# Patient Record
Sex: FEMALE | Race: WHITE | Employment: OTHER | URBAN - METROPOLITAN AREA
[De-identification: names, ages, dates, MRNs, and addresses within clinical notes are randomized per-mention and may not be internally consistent; named-entity substitution may affect disease eponyms.]

---

## 2017-12-11 PROBLEM — I42.9 CARDIOMYOPATHY, UNSPECIFIED TYPE (HCC): Status: ACTIVE | Noted: 2017-12-11

## 2017-12-11 PROBLEM — I10 ESSENTIAL HYPERTENSION: Status: ACTIVE | Noted: 2017-12-11

## 2018-11-13 PROBLEM — M81.0 AGE-RELATED OSTEOPOROSIS WITHOUT CURRENT PATHOLOGICAL FRACTURE: Status: ACTIVE | Noted: 2018-11-13

## 2018-11-13 PROBLEM — I25.10 CORONARY ARTERY DISEASE INVOLVING NATIVE CORONARY ARTERY OF NATIVE HEART WITHOUT ANGINA PECTORIS: Status: ACTIVE | Noted: 2018-11-13

## 2018-11-13 PROBLEM — F41.9 ANXIETY AND DEPRESSION: Status: ACTIVE | Noted: 2018-11-13

## 2018-11-13 PROBLEM — G89.4 CHRONIC PAIN DISORDER: Status: ACTIVE | Noted: 2018-11-13

## 2018-11-13 PROBLEM — F32.A ANXIETY AND DEPRESSION: Status: ACTIVE | Noted: 2018-11-13

## 2018-12-06 PROBLEM — E55.9 VITAMIN D DEFICIENCY: Status: ACTIVE | Noted: 2018-12-06

## 2018-12-06 PROBLEM — E11.9 CONTROLLED TYPE 2 DIABETES MELLITUS WITHOUT COMPLICATION, WITHOUT LONG-TERM CURRENT USE OF INSULIN (HCC): Status: ACTIVE | Noted: 2018-12-06

## 2019-03-21 PROBLEM — F51.05 INSOMNIA DUE TO OTHER MENTAL DISORDER: Status: ACTIVE | Noted: 2019-03-21

## 2019-03-21 PROBLEM — F99 INSOMNIA DUE TO OTHER MENTAL DISORDER: Status: ACTIVE | Noted: 2019-03-21

## 2019-05-21 PROBLEM — D12.2 ADENOMATOUS POLYP OF ASCENDING COLON: Status: ACTIVE | Noted: 2019-05-21

## 2019-05-21 PROBLEM — K29.40: Status: ACTIVE | Noted: 2019-05-21

## 2019-05-21 PROBLEM — T45.8X5S: Status: ACTIVE | Noted: 2019-05-21

## 2019-06-27 PROBLEM — R11.2 NAUSEA AND VOMITING: Status: ACTIVE | Noted: 2019-06-27

## 2019-09-03 PROBLEM — K63.5 POLYP OF COLON: Status: ACTIVE | Noted: 2019-05-21

## 2020-11-11 ENCOUNTER — APPOINTMENT (OUTPATIENT)
Dept: GENERAL RADIOLOGY | Facility: HOSPITAL | Age: 70
DRG: 177 | End: 2020-11-11
Attending: EMERGENCY MEDICINE
Payer: MEDICARE

## 2020-11-11 ENCOUNTER — APPOINTMENT (OUTPATIENT)
Dept: CT IMAGING | Facility: HOSPITAL | Age: 70
DRG: 177 | End: 2020-11-11
Attending: EMERGENCY MEDICINE
Payer: MEDICARE

## 2020-11-11 ENCOUNTER — HOSPITAL ENCOUNTER (INPATIENT)
Facility: HOSPITAL | Age: 70
LOS: 3 days | Discharge: HOME | DRG: 177 | End: 2020-11-15
Attending: EMERGENCY MEDICINE | Admitting: INTERNAL MEDICINE
Payer: MEDICARE

## 2020-11-11 DIAGNOSIS — R09.02 HYPOXIA: ICD-10-CM

## 2020-11-11 DIAGNOSIS — R77.8 ELEVATED TROPONIN: ICD-10-CM

## 2020-11-11 DIAGNOSIS — U07.1 COVID-19: Primary | ICD-10-CM

## 2020-11-11 DIAGNOSIS — J44.1 COPD EXACERBATION (HCC): ICD-10-CM

## 2020-11-11 PROCEDURE — 94640 AIRWAY INHALATION TREATMENT: CPT

## 2020-11-11 PROCEDURE — 71275 CT ANGIOGRAPHY CHEST: CPT | Performed by: EMERGENCY MEDICINE

## 2020-11-11 PROCEDURE — 80053 COMPREHEN METABOLIC PANEL: CPT | Performed by: EMERGENCY MEDICINE

## 2020-11-11 PROCEDURE — 93005 ELECTROCARDIOGRAM TRACING: CPT

## 2020-11-11 PROCEDURE — 99285 EMERGENCY DEPT VISIT HI MDM: CPT

## 2020-11-11 PROCEDURE — 96374 THER/PROPH/DIAG INJ IV PUSH: CPT

## 2020-11-11 PROCEDURE — 84145 PROCALCITONIN (PCT): CPT | Performed by: INTERNAL MEDICINE

## 2020-11-11 PROCEDURE — 36415 COLL VENOUS BLD VENIPUNCTURE: CPT

## 2020-11-11 PROCEDURE — 84484 ASSAY OF TROPONIN QUANT: CPT | Performed by: EMERGENCY MEDICINE

## 2020-11-11 PROCEDURE — 71045 X-RAY EXAM CHEST 1 VIEW: CPT | Performed by: EMERGENCY MEDICINE

## 2020-11-11 PROCEDURE — 85025 COMPLETE CBC W/AUTO DIFF WBC: CPT | Performed by: EMERGENCY MEDICINE

## 2020-11-11 PROCEDURE — 93010 ELECTROCARDIOGRAM REPORT: CPT

## 2020-11-11 PROCEDURE — 85379 FIBRIN DEGRADATION QUANT: CPT | Performed by: EMERGENCY MEDICINE

## 2020-11-11 PROCEDURE — 86769 SARS-COV-2 COVID-19 ANTIBODY: CPT | Performed by: INTERNAL MEDICINE

## 2020-11-11 RX ORDER — ALBUTEROL SULFATE 90 UG/1
8 AEROSOL, METERED RESPIRATORY (INHALATION) ONCE
Status: COMPLETED | OUTPATIENT
Start: 2020-11-11 | End: 2020-11-11

## 2020-11-11 RX ORDER — ASPIRIN 81 MG/1
243 TABLET, CHEWABLE ORAL ONCE
Status: COMPLETED | OUTPATIENT
Start: 2020-11-11 | End: 2020-11-11

## 2020-11-11 RX ORDER — METHYLPREDNISOLONE SODIUM SUCCINATE 125 MG/2ML
125 INJECTION, POWDER, LYOPHILIZED, FOR SOLUTION INTRAMUSCULAR; INTRAVENOUS ONCE
Status: COMPLETED | OUTPATIENT
Start: 2020-11-11 | End: 2020-11-11

## 2020-11-11 RX ORDER — ASPIRIN 81 MG/1
324 TABLET, CHEWABLE ORAL ONCE
Status: DISCONTINUED | OUTPATIENT
Start: 2020-11-11 | End: 2020-11-11

## 2020-11-11 RX ORDER — LISINOPRIL 20 MG/1
40 TABLET ORAL DAILY
Status: ON HOLD | COMMUNITY
End: 2021-01-31

## 2020-11-11 RX ORDER — ATORVASTATIN CALCIUM 40 MG/1
40 TABLET, FILM COATED ORAL NIGHTLY
COMMUNITY
End: 2021-05-03

## 2020-11-11 RX ORDER — MONTELUKAST SODIUM 10 MG/1
10 TABLET ORAL NIGHTLY
COMMUNITY
End: 2020-11-23

## 2020-11-11 RX ORDER — ASPIRIN 81 MG/1
243 TABLET, CHEWABLE ORAL ONCE
Status: DISCONTINUED | OUTPATIENT
Start: 2020-11-11 | End: 2020-11-11

## 2020-11-11 RX ORDER — ASPIRIN 81 MG/1
81 TABLET ORAL DAILY
COMMUNITY
End: 2021-05-03

## 2020-11-11 RX ORDER — PANTOPRAZOLE SODIUM 40 MG/1
40 TABLET, DELAYED RELEASE ORAL
COMMUNITY
End: 2021-01-28

## 2020-11-12 PROBLEM — R79.89 ELEVATED TROPONIN: Status: ACTIVE | Noted: 2020-11-12

## 2020-11-12 PROBLEM — R09.02 HYPOXIA: Status: ACTIVE | Noted: 2020-11-12

## 2020-11-12 PROBLEM — R77.8 ELEVATED TROPONIN: Status: ACTIVE | Noted: 2020-11-12

## 2020-11-12 PROBLEM — J44.1 COPD EXACERBATION (HCC): Status: ACTIVE | Noted: 2020-11-12

## 2020-11-12 PROCEDURE — 94640 AIRWAY INHALATION TREATMENT: CPT

## 2020-11-12 PROCEDURE — 86140 C-REACTIVE PROTEIN: CPT | Performed by: INTERNAL MEDICINE

## 2020-11-12 PROCEDURE — 85025 COMPLETE CBC W/AUTO DIFF WBC: CPT | Performed by: INTERNAL MEDICINE

## 2020-11-12 PROCEDURE — 93010 ELECTROCARDIOGRAM REPORT: CPT | Performed by: INTERNAL MEDICINE

## 2020-11-12 PROCEDURE — 80048 BASIC METABOLIC PNL TOTAL CA: CPT | Performed by: INTERNAL MEDICINE

## 2020-11-12 PROCEDURE — 83880 ASSAY OF NATRIURETIC PEPTIDE: CPT | Performed by: INTERNAL MEDICINE

## 2020-11-12 PROCEDURE — 84484 ASSAY OF TROPONIN QUANT: CPT | Performed by: INTERNAL MEDICINE

## 2020-11-12 PROCEDURE — 85379 FIBRIN DEGRADATION QUANT: CPT | Performed by: INTERNAL MEDICINE

## 2020-11-12 PROCEDURE — 93005 ELECTROCARDIOGRAM TRACING: CPT

## 2020-11-12 PROCEDURE — 82728 ASSAY OF FERRITIN: CPT | Performed by: INTERNAL MEDICINE

## 2020-11-12 PROCEDURE — 83615 LACTATE (LD) (LDH) ENZYME: CPT | Performed by: INTERNAL MEDICINE

## 2020-11-12 RX ORDER — ENOXAPARIN SODIUM 100 MG/ML
40 INJECTION SUBCUTANEOUS DAILY
Status: DISCONTINUED | OUTPATIENT
Start: 2020-11-12 | End: 2020-11-15

## 2020-11-12 RX ORDER — METOCLOPRAMIDE HYDROCHLORIDE 5 MG/ML
5 INJECTION INTRAMUSCULAR; INTRAVENOUS EVERY 8 HOURS PRN
Status: DISCONTINUED | OUTPATIENT
Start: 2020-11-12 | End: 2020-11-15

## 2020-11-12 RX ORDER — AMLODIPINE BESYLATE 5 MG/1
5 TABLET ORAL DAILY
Status: DISCONTINUED | OUTPATIENT
Start: 2020-11-12 | End: 2020-11-15

## 2020-11-12 RX ORDER — MORPHINE SULFATE 4 MG/ML
4 INJECTION, SOLUTION INTRAMUSCULAR; INTRAVENOUS EVERY 2 HOUR PRN
Status: DISCONTINUED | OUTPATIENT
Start: 2020-11-12 | End: 2020-11-15

## 2020-11-12 RX ORDER — METHYLPREDNISOLONE SODIUM SUCCINATE 125 MG/2ML
80 INJECTION, POWDER, LYOPHILIZED, FOR SOLUTION INTRAMUSCULAR; INTRAVENOUS EVERY 6 HOURS
Status: DISCONTINUED | OUTPATIENT
Start: 2020-11-12 | End: 2020-11-12

## 2020-11-12 RX ORDER — FLUTICASONE PROPIONATE 50 MCG
1 SPRAY, SUSPENSION (ML) NASAL DAILY
Status: DISCONTINUED | OUTPATIENT
Start: 2020-11-12 | End: 2020-11-15

## 2020-11-12 RX ORDER — MORPHINE SULFATE 2 MG/ML
1 INJECTION, SOLUTION INTRAMUSCULAR; INTRAVENOUS EVERY 2 HOUR PRN
Status: DISCONTINUED | OUTPATIENT
Start: 2020-11-12 | End: 2020-11-15

## 2020-11-12 RX ORDER — METOCLOPRAMIDE 10 MG/1
10 TABLET ORAL
Status: DISCONTINUED | OUTPATIENT
Start: 2020-11-12 | End: 2020-11-15

## 2020-11-12 RX ORDER — HYDROCODONE BITARTRATE AND ACETAMINOPHEN 5; 325 MG/1; MG/1
1 TABLET ORAL EVERY 4 HOURS PRN
Status: DISCONTINUED | OUTPATIENT
Start: 2020-11-12 | End: 2020-11-15

## 2020-11-12 RX ORDER — ALBUTEROL SULFATE 90 UG/1
2 AEROSOL, METERED RESPIRATORY (INHALATION) EVERY 4 HOURS PRN
Status: DISCONTINUED | OUTPATIENT
Start: 2020-11-12 | End: 2020-11-15

## 2020-11-12 RX ORDER — ESCITALOPRAM OXALATE 20 MG/1
20 TABLET ORAL DAILY
Status: DISCONTINUED | OUTPATIENT
Start: 2020-11-12 | End: 2020-11-15

## 2020-11-12 RX ORDER — TIZANIDINE 4 MG/1
4 TABLET ORAL EVERY 8 HOURS PRN
Status: DISCONTINUED | OUTPATIENT
Start: 2020-11-12 | End: 2020-11-15

## 2020-11-12 RX ORDER — LISINOPRIL 20 MG/1
20 TABLET ORAL DAILY
Status: DISCONTINUED | OUTPATIENT
Start: 2020-11-12 | End: 2020-11-15

## 2020-11-12 RX ORDER — METHYLPREDNISOLONE SODIUM SUCCINATE 40 MG/ML
40 INJECTION, POWDER, LYOPHILIZED, FOR SOLUTION INTRAMUSCULAR; INTRAVENOUS EVERY 6 HOURS
Status: DISCONTINUED | OUTPATIENT
Start: 2020-11-12 | End: 2020-11-14

## 2020-11-12 RX ORDER — TRIMETHOPRIM 100 MG/1
100 TABLET ORAL DAILY
Status: DISCONTINUED | OUTPATIENT
Start: 2020-11-12 | End: 2020-11-15

## 2020-11-12 RX ORDER — HYDROCODONE BITARTRATE AND ACETAMINOPHEN 5; 325 MG/1; MG/1
2 TABLET ORAL EVERY 4 HOURS PRN
Status: DISCONTINUED | OUTPATIENT
Start: 2020-11-12 | End: 2020-11-15

## 2020-11-12 RX ORDER — EZETIMIBE 10 MG/1
10 TABLET ORAL NIGHTLY
Status: DISCONTINUED | OUTPATIENT
Start: 2020-11-12 | End: 2020-11-15

## 2020-11-12 RX ORDER — MORPHINE SULFATE 2 MG/ML
2 INJECTION, SOLUTION INTRAMUSCULAR; INTRAVENOUS EVERY 2 HOUR PRN
Status: DISCONTINUED | OUTPATIENT
Start: 2020-11-12 | End: 2020-11-15

## 2020-11-12 RX ORDER — SODIUM CHLORIDE 9 MG/ML
INJECTION, SOLUTION INTRAVENOUS CONTINUOUS
Status: DISCONTINUED | OUTPATIENT
Start: 2020-11-12 | End: 2020-11-12

## 2020-11-12 RX ORDER — ASPIRIN 81 MG/1
81 TABLET ORAL DAILY
Status: DISCONTINUED | OUTPATIENT
Start: 2020-11-12 | End: 2020-11-15

## 2020-11-12 RX ORDER — PANTOPRAZOLE SODIUM 40 MG/1
40 TABLET, DELAYED RELEASE ORAL
Status: DISCONTINUED | OUTPATIENT
Start: 2020-11-12 | End: 2020-11-15

## 2020-11-12 RX ORDER — MONTELUKAST SODIUM 10 MG/1
10 TABLET ORAL NIGHTLY
Status: DISCONTINUED | OUTPATIENT
Start: 2020-11-12 | End: 2020-11-15

## 2020-11-12 RX ORDER — ATORVASTATIN CALCIUM 40 MG/1
40 TABLET, FILM COATED ORAL NIGHTLY
Status: DISCONTINUED | OUTPATIENT
Start: 2020-11-12 | End: 2020-11-15

## 2020-11-12 RX ORDER — ONDANSETRON 2 MG/ML
4 INJECTION INTRAMUSCULAR; INTRAVENOUS EVERY 6 HOURS PRN
Status: DISCONTINUED | OUTPATIENT
Start: 2020-11-12 | End: 2020-11-15

## 2020-11-12 RX ORDER — TEMAZEPAM 7.5 MG/1
7.5 CAPSULE ORAL NIGHTLY PRN
Status: DISCONTINUED | OUTPATIENT
Start: 2020-11-12 | End: 2020-11-15

## 2020-11-12 RX ORDER — ACETAMINOPHEN 325 MG/1
650 TABLET ORAL EVERY 4 HOURS PRN
Status: DISCONTINUED | OUTPATIENT
Start: 2020-11-12 | End: 2020-11-15

## 2020-11-12 RX ORDER — LEVOTHYROXINE SODIUM 0.03 MG/1
25 TABLET ORAL
Status: DISCONTINUED | OUTPATIENT
Start: 2020-11-12 | End: 2020-11-15

## 2020-11-12 NOTE — ED NOTES
Patient resting on cart, on cardiac monitor, on O2. Resting with eyes closed. Respirations easy and unlabored.  Awaiting room assignment to be cleaned

## 2020-11-12 NOTE — ED NOTES
Patient returned from [address]. Patient's O2 sat varies from 88%-93% on RA. Patient placed on 2L NC at this time.   MD rangel

## 2020-11-12 NOTE — ED NOTES
Report to [address] [place] [address] (Ext. [phone]).   Patient awaiting room assignment to be cleaned

## 2020-11-12 NOTE — CONSULTS
INFECTIOUS DISEASE CONSULT NOTE    Fortunato Barajas Patient Status:  Inpatient    1950 MRN [id number]   [facility] 3NW-A Attending Lisa Yarbrough MD   Hosp Day # 0 PCP Marialuisa Dye SBLUJM,8+I/V,[id number]     • UMBILICAL HERNIA REPAIR       Family History   Problem Relation Age of Onset   • Cancer Father    • Cancer Mother    • No Known Problems Daughter    • No Known Problems Son    • No Known Problems Maternal Grandmother    • No Kno inhaler 2 puff, 2 puff, Inhalation, Q4H PRN  •  tiZANidine HCl (ZANAFLEX) tab 4 mg, 4 mg, Oral, Q8H PRN  •  Fluticasone Propionate (FLONASE) 50 MCG/ACT nasal spray 1 spray, 1 spray, Nasal, Daily  •  ezetimibe (ZETIA) tab 10 mg, 10 mg, Oral, Nightly  •  esc 98.2 °F (36.8 °C), temperature source Oral, resp. rate 16, height 5' 1\" (1.549 m), weight 171 lb (77.6 kg), SpO2 94 %.       Laboratory Data:    Recent Labs   Lab 11/11/20  1817 11/12/20  0652   RBC 3.85 3.43*   HGB 12.0 10.8*   HCT 34.2* 31.0*   MCV 88.8 Radiology) NRDR ([address]) which includes the Dose Index Registry. PATIENT STATED HISTORY:(As transcribed by Technologist)  Patient presents with shortness of breath, headache, nausea, vomiting and diarrhea.    CONTRAST USED:  100cc CONCLUSION:  No acute abnormality is seen. If clinical symptoms persist consider followup radiographs or CT.    Dictated by (CST): Demetrio Kennedy MD on 11/11/2020 at 7:36 PM     Finalized by (CST): Demetrio Kennedy MD on 11/11/2020 at 7:37 PM          ASSESSMENT

## 2020-11-12 NOTE — PLAN OF CARE
Assumed patient care at 0730 this AM. Patient A&O x4- glasses, hearing aids and dentures left at home- advised pt to ask son not to bring them in given he is awaiting covid test results himself. Received pt on 2L O2, weaned to RA.  Orders to decrease IV Sol appropriate and evaluate response  - Consider cultural and social influences on pain and pain management  - Manage/alleviate anxiety  - Utilize distraction and/or relaxation techniques  - Monitor for opioid side effects  - Notify MD/LIP if interventions un for coordinating discharge planning if the patient needs post-hospital services based on physician/LIP order or complex needs related to functional status, cognitive ability or social support system  Outcome: Progressing

## 2020-11-12 NOTE — ED INITIAL ASSESSMENT (HPI)
Pt here for chest pain that started today. Pt dx with covid yesterday @ [facility]. Pt states sob, headache, n,v,d. Pt denies fever.

## 2020-11-12 NOTE — RESPIRATORY THERAPY NOTE
Albuterol 8 puffs given with spacer. Spacer teaching done. Breath sounds improved, better aeration, but still scattered wheezes. Overall, pt states her breathing has improved.

## 2020-11-12 NOTE — ED PROVIDER NOTES
Patient Seen in: [facility] Emergency Department      History   Patient presents with:  Chest Pain Angina    Stated Complaint: TL - SOB, chest pain, covid positive     HPI    39-year-old female presents for evaluation of chest pain and shortness of again    Alcohol use: No    Drug use: No             Review of Systems    Positive for stated complaint: TL - SOB, chest pain, covid positive   Other systems are as noted in HPI. Constitutional and vital signs reviewed.       All other systems reviewed and WITH DIFFERENTIAL WITH PLATELET    Narrative: The following orders were created for panel order CBC WITH DIFFERENTIAL WITH PLATELET.   Procedure                               Abnormality         Status                     --------- slightly elevated at 0.07.   Discussed with Dr. Matthew Sommers, will trend no need for cardiology at this time as patient has known nonobstructive CAD with medical management                   Disposition and Plan     Clinical Impression:  COVID-19  (primary enco

## 2020-11-12 NOTE — PROGRESS NOTES
Pharmacy Note: Renal dose adjustment for Metoclopramide (Reglan)  Magnolia Jefferson has been prescribed Metoclopramide (Reglan) 10 mg every 8 hours as needed. Estimated Creatinine Clearance: 36.7 mL/min (A) (based on SCr of 1.06 mg/dL (H)).     Her calculate

## 2020-11-12 NOTE — H&P
[facility] Patient Status:  Inpatient    1950 MRN [id number]   [facility] 3NW-A Attending Sarita Holt MD   Hosp Day # 0 PCP Bettye Fulton MD     History of Present Illness:  Rosy Peterson That all  Sounds fine. Would obviously not use tramadol further and for now would recommend tylenol alone for pain rather than suggesting alternative pain medication which also may have the side effect of nausea.    Grandmother    • No Known Problems Paternal Grandfather    • No Known Problems Sister    • No Known Problems Brother    • No Known Problems Other       reports that she quit smoking about 3 years ago.  She has never used smokeless tobacco. She reports that Disp: 30 tablet, Rfl: 2, 11/11/2020 at Unknown time    •  Levothyroxine Sodium 25 MCG Oral Tab, Take 1 tablet (25 mcg total) by mouth before breakfast., Disp: 90 tablet, Rfl: 0, 11/11/2020 at Unknown time    •  amLODIPine Besylate 5 MG Oral Tab, amlodipine auscultation bilaterally. No wheezes. No rhonchi. Cardiovascular: S1, S2.  Regular rate and rhythm. No murmurs. Equal pulses   Abdomen: Soft, nontender, nondistended. Positive bowel sounds.  No rebound tenderness  Neurologic: No focal neurological defic moderate centrilobular emphysematous changes are present. There is some minimal secretions present within the trachea. MEDIASTINUM:  No adenopathy or mass. REBEKAH:  No mass or adenopathy.   CARDIAC:  No enlargement, pericardial thickening, or significant ca Patient Active Problem List:     Cardiomyopathy, unspecified type ([facility]     Essential hypertension     Atherosclerosis of coronary artery     COPD (chronic obstructive pulmonary disease) with chronic bronchitis ([facility]     Asthma     Cancer ([facility]     Marti

## 2020-11-12 NOTE — PLAN OF CARE
NURSING ADMISSION NOTE  Dolly Bill  8/8/1950    Patient admitted via Cart  Oriented to room. Safety precautions initiated. Bed in low position. Call light in reach.   /74 (BP Location: Left arm)   Pulse 70   Temp 98.8 °F (37.1 °C) (Oral)   R

## 2020-11-12 NOTE — ED NOTES
IV site right AC infiltrated in CT. Site removed with catheter intact in CT.   MD aware, new IV to be placed for CT

## 2020-11-13 ENCOUNTER — APPOINTMENT (OUTPATIENT)
Dept: CV DIAGNOSTICS | Facility: HOSPITAL | Age: 70
DRG: 177 | End: 2020-11-13
Attending: INTERNAL MEDICINE
Payer: MEDICARE

## 2020-11-13 PROCEDURE — 94640 AIRWAY INHALATION TREATMENT: CPT

## 2020-11-13 PROCEDURE — 85025 COMPLETE CBC W/AUTO DIFF WBC: CPT | Performed by: INTERNAL MEDICINE

## 2020-11-13 PROCEDURE — 83880 ASSAY OF NATRIURETIC PEPTIDE: CPT | Performed by: INTERNAL MEDICINE

## 2020-11-13 PROCEDURE — 86850 RBC ANTIBODY SCREEN: CPT | Performed by: INTERNAL MEDICINE

## 2020-11-13 PROCEDURE — 82728 ASSAY OF FERRITIN: CPT | Performed by: INTERNAL MEDICINE

## 2020-11-13 PROCEDURE — 93306 TTE W/DOPPLER COMPLETE: CPT | Performed by: INTERNAL MEDICINE

## 2020-11-13 PROCEDURE — 80048 BASIC METABOLIC PNL TOTAL CA: CPT | Performed by: INTERNAL MEDICINE

## 2020-11-13 PROCEDURE — 83615 LACTATE (LD) (LDH) ENZYME: CPT | Performed by: INTERNAL MEDICINE

## 2020-11-13 PROCEDURE — 86900 BLOOD TYPING SEROLOGIC ABO: CPT | Performed by: INTERNAL MEDICINE

## 2020-11-13 PROCEDURE — 86901 BLOOD TYPING SEROLOGIC RH(D): CPT | Performed by: INTERNAL MEDICINE

## 2020-11-13 PROCEDURE — 85379 FIBRIN DEGRADATION QUANT: CPT | Performed by: INTERNAL MEDICINE

## 2020-11-13 PROCEDURE — 86140 C-REACTIVE PROTEIN: CPT | Performed by: INTERNAL MEDICINE

## 2020-11-13 RX ORDER — FUROSEMIDE 10 MG/ML
40 INJECTION INTRAMUSCULAR; INTRAVENOUS ONCE
Status: COMPLETED | OUTPATIENT
Start: 2020-11-13 | End: 2020-11-13

## 2020-11-13 RX ORDER — FUROSEMIDE 10 MG/ML
20 INJECTION INTRAMUSCULAR; INTRAVENOUS
Status: DISCONTINUED | OUTPATIENT
Start: 2020-11-13 | End: 2020-11-14

## 2020-11-13 RX ORDER — SODIUM CHLORIDE 9 MG/ML
INJECTION, SOLUTION INTRAVENOUS ONCE
Status: COMPLETED | OUTPATIENT
Start: 2020-11-13 | End: 2020-11-14

## 2020-11-13 NOTE — PROGRESS NOTES
INFECTIOUS DISEASE PROGRESS NOTE    Josefa Pandya Patient Status:  Inpatient    1950 MRN [id number]   [facility] 3NW-A Attending Vilma Santos MD   Hosp Day # 1 PCP [facility] trimethoprim (TRIMPEX) tab 100 mg, 100 mg, Oral, Daily  •  Levothyroxine Sodium tab 25 mcg, 25 mcg, Oral, Before breakfast  •  Metoclopramide HCl (REGLAN) tab 10 mg, 10 mg, Oral, TID AC and HS  •  Montelukast Sodium (SINGULAIR) tab 10 mg, 10 mg, Oral, Nigh Bret Mingle, [address], [address], [address], NITRITE, LEUUR, WBCUR, RBCUR, BACUR, EPIUR in the last 168 hours.    COVID-19 Lab Results    COVID-19  Lab Results   Component Value Date    COVID19 Not Detected 11/11/2020       Pro-Calcitonin  Recent Labs   Lab 11 adenopathy. CARDIAC:  No enlargement, pericardial thickening, or significant calcification. PLEURA:  No mass or effusion. CHEST WALL:  No mass or axillary adenopathy. LIMITED ABDOMEN:  Limited images of the upper abdomen are unremarkable.   BONES:  No nancy n/v/d- ? covid related- seems better today  4. Pancytopenia- ? covid related, better today  5.  Copd with exacerbation- per Dr Calle Pellet:  - since may have an early covid infection and may be at risk for more severe illness would give CCP- d/w pt marcin

## 2020-11-13 NOTE — PROGRESS NOTES
[facility]    Progress Note    Thais Vasquez Patient Status:  Inpatient    1950 MRN [id number]   [facility] 3NW-A Attending Miguel Angel Padilla MD   Hosp Day # 1 PCP Chucky Bliss MD       SUBJECTIVE:  Feeling much better (PF) 2 MG/ML injection 1 mg, 1 mg, Intravenous, Q2H PRN    Or    •  morphINE sulfate (PF) 2 MG/ML injection 2 mg, 2 mg, Intravenous, Q2H PRN    Or    •  morphINE sulfate (PF) 4 MG/ML injection 4 mg, 4 mg, Intravenous, Q2H PRN    •  temazepam (RESTORIL) cap pulmonary edema - improving with iv lasix  · HYPERTENSION  · CAD    · CHRONIC PAIN S/P PAIN PUMP  · HYPONATREMIA  · MILD ACUTE KIDNEY INJURY - CR - 1.01  · HYPOCALCEMIA    PLAN:    Admit inpatient. IV solumedrol  MDI albuterol. o2 support - weaned off.

## 2020-11-13 NOTE — PLAN OF CARE
Assumed care at 0730  Pt reporting mild dyspnea with exertion. O2 satuaration >90% on RA  Self proning and IS encouraged frequently  No nausea, vomiting, or diarrhea  Pt updated on POC.  Needs attended to, will continue to monitor

## 2020-11-13 NOTE — PLAN OF CARE
Assumed care at [address] [place] [address].  1L O2 per NC as patient was satting 88-89% on room air. C/o headache, given PRN Norco. Patient also has implanted morphine pump to [facility]. Reports appetite is improving. Patient ate cheesecake this evening.    Self-proning encouraged Administer growth factors as ordered  - Implement neutropenic guidelines  Outcome: Progressing     Problem: SAFETY ADULT - FALL  Goal: Free from fall injury  Description: INTERVENTIONS:  - Assess pt frequently for physical needs  - Identify cognitive and p

## 2020-11-14 PROCEDURE — 86140 C-REACTIVE PROTEIN: CPT | Performed by: INTERNAL MEDICINE

## 2020-11-14 PROCEDURE — 82728 ASSAY OF FERRITIN: CPT | Performed by: INTERNAL MEDICINE

## 2020-11-14 PROCEDURE — XW13325 TRANSFUSION OF CONVALESCENT PLASMA (NONAUTOLOGOUS) INTO PERIPHERAL VEIN, PERCUTANEOUS APPROACH, NEW TECHNOLOGY GROUP 5: ICD-10-PCS | Performed by: INTERNAL MEDICINE

## 2020-11-14 PROCEDURE — 85025 COMPLETE CBC W/AUTO DIFF WBC: CPT | Performed by: INTERNAL MEDICINE

## 2020-11-14 PROCEDURE — 85379 FIBRIN DEGRADATION QUANT: CPT | Performed by: INTERNAL MEDICINE

## 2020-11-14 PROCEDURE — 80048 BASIC METABOLIC PNL TOTAL CA: CPT | Performed by: INTERNAL MEDICINE

## 2020-11-14 PROCEDURE — 83880 ASSAY OF NATRIURETIC PEPTIDE: CPT | Performed by: INTERNAL MEDICINE

## 2020-11-14 PROCEDURE — 86927 PLASMA FRESH FROZEN: CPT

## 2020-11-14 PROCEDURE — 36430 TRANSFUSION BLD/BLD COMPNT: CPT

## 2020-11-14 PROCEDURE — 83615 LACTATE (LD) (LDH) ENZYME: CPT | Performed by: INTERNAL MEDICINE

## 2020-11-14 RX ORDER — FUROSEMIDE 10 MG/ML
20 INJECTION INTRAMUSCULAR; INTRAVENOUS DAILY
Status: DISCONTINUED | OUTPATIENT
Start: 2020-11-14 | End: 2020-11-15

## 2020-11-14 RX ORDER — FUROSEMIDE 10 MG/ML
20 INJECTION INTRAMUSCULAR; INTRAVENOUS DAILY
Status: DISCONTINUED | OUTPATIENT
Start: 2020-11-15 | End: 2020-11-14

## 2020-11-14 RX ORDER — METHYLPREDNISOLONE SODIUM SUCCINATE 40 MG/ML
40 INJECTION, POWDER, LYOPHILIZED, FOR SOLUTION INTRAMUSCULAR; INTRAVENOUS EVERY 12 HOURS
Status: DISCONTINUED | OUTPATIENT
Start: 2020-11-14 | End: 2020-11-15

## 2020-11-14 NOTE — PLAN OF CARE
Assumed care at 0700  A&O x 4. Very pleasant with staff. Lungs clear but diminished  HR NS to SB on tele. Morning metoprolol given around lunch once HR increased. HR in 40s in am.  Steroid and lasix changed  Urine output adequate. Up stand by  C/o HA.  Ty

## 2020-11-14 NOTE — PROGRESS NOTES
INFECTIOUS DISEASE PROGRESS NOTE    Raciel Lively Patient Status:  Inpatient    1950 MRN [id number]   [facility] 3NW-A Attending Sheryl Graff MD   Hosp Day # 2 [facility] (TRIMPEX) tab 100 mg, 100 mg, Oral, Daily  •  Levothyroxine Sodium tab 25 mcg, 25 mcg, Oral, Before breakfast  •  Metoclopramide HCl (REGLAN) tab 10 mg, 10 mg, Oral, TID AC and HS  •  Montelukast Sodium (SINGULAIR) tab 10 mg, 10 mg, Oral, Nightly  •  Panto Component Value Date    COVID19 Not Detected 11/11/2020       Pro-Calcitonin  Recent Labs   Lab 11/11/20  1817   PCT <0.05       Cardiac  Recent Labs   Lab 11/11/20  1817 11/12/20  0652 11/12/20  0652 11/14/20  0727   TROP 0.072* <0.045  --   --    PBNP adenopathy. LIMITED ABDOMEN:  Limited images of the upper abdomen are unremarkable. BONES:  No bony lesion or fracture. OTHER:  Negative. CONCLUSION:  No pulmonary embolism to the first subsegmental arterial level.   No focal consolidation is diuresis and steroids per primary (would hold off on steroids unless felt to have a copd exacerbation)  - TTE noted  - stool for Cdiff f more diarrhea  - hopefully home tomorrow if cont to improve and after ccp given.  If ccp not available but pt close to b

## 2020-11-14 NOTE — PLAN OF CARE
Assumed care at [address]. C/o headache, given PRN Norco with relief. Morphine pain pump to LLQ. Satting 93% on room air. Self-proning encouraged. Patient stated \"I've been on my belly all afternoon. \"   Denies nausea/vomiting/diarrhea.    Consent received INTERVENTIONS  - Monitor WBC  - Administer growth factors as ordered  - Implement neutropenic guidelines  Outcome: Progressing     Problem: SAFETY ADULT - FALL  Goal: Free from fall injury  Description: INTERVENTIONS:  - Assess pt frequently for physical n

## 2020-11-15 VITALS
RESPIRATION RATE: 18 BRPM | BODY MASS INDEX: 31.59 KG/M2 | SYSTOLIC BLOOD PRESSURE: 138 MMHG | TEMPERATURE: 99 F | DIASTOLIC BLOOD PRESSURE: 69 MMHG | OXYGEN SATURATION: 90 % | HEIGHT: 61 IN | HEART RATE: 53 BPM | WEIGHT: 167.31 LBS

## 2020-11-15 RX ORDER — PREDNISONE 10 MG/1
10 TABLET ORAL DAILY
Qty: 7 TABLET | Refills: 0 | Status: SHIPPED | OUTPATIENT
Start: 2020-11-15 | End: 2021-01-28

## 2020-11-15 RX ORDER — FUROSEMIDE 40 MG/1
40 TABLET ORAL DAILY
Qty: 30 TABLET | Refills: 11 | Status: ON HOLD | OUTPATIENT
Start: 2020-11-15 | End: 2021-01-31

## 2020-11-15 RX ORDER — POTASSIUM CHLORIDE 750 MG/1
10 TABLET, FILM COATED, EXTENDED RELEASE ORAL DAILY
Qty: 30 TABLET | Refills: 0 | Status: SHIPPED | OUTPATIENT
Start: 2020-11-15 | End: 2020-12-14

## 2020-11-15 NOTE — DISCHARGE SUMMARY
[facility]  Discharge Summary    Kaylie Lawler Patient Status:  Inpatient    1950 MRN [id number]   [facility] 3NW-A Attending No att. providers found   Hosp Day # 3 PCP Sandoval Lundberg MD     Date of Admission: 2020    Da use, sequela     Nausea and vomiting     COVID-19     Hypoxia     COPD exacerbation (HCC)     Elevated troponin      Reason for Admission: SOB     Physical Exam: See progress note    Disposition: Home     Discharge Condition: Stable    Discharge Medication Historical    Fluticasone Propionate 50 MCG/ACT Nasal Suspension  1 spray by Nasal route daily.   , Historical    ADVAIR DISKUS 500-50 MCG/DOSE Inhalation Aerosol Powder, Breath Activated  Inhale 1 puff into the lungs as needed.  , Historical, GLORIA LYNCH

## 2020-11-15 NOTE — PROGRESS NOTES
Weaned to room air while sleeping. 02 sat would drop to 82% but non-sustaining, it will go up to 96% in few seconds. No resp distress noted. Cont. to monitor

## 2020-11-15 NOTE — PLAN OF CARE
Alert. Oriented. Sr per tele. 02 sat 91% on 2lnc. Denies pain, sob. Up ad ambar. Poc updated w/ pt. Cont. to monitor pt.  Pt poss home today

## 2020-11-15 NOTE — PLAN OF CARE
Assumed care of patient at 07:30 - A&Ox4; NAD; reports feeling better than when she came in. Reports breathing is much improved since admission and denies SOB at rest. Patient yet to have BM but denies constipation symptoms at present.  She is eating breakf supplementation based on oxygen saturation or ABGs  - Provide Smoking Cessation handout, if applicable  - Encourage broncho-pulmonary hygiene including cough, deep breathe, Incentive Spirometry  - Assess the need for suctioning and perform as needed  - Ass Progressing     Problem: RISK FOR INFECTION - ADULT  Goal: Absence of fever/infection during anticipated neutropenic period  Description: INTERVENTIONS  - Monitor WBC  - Administer growth factors as ordered  - Implement neutropenic guidelines  Outcome: Pro

## 2020-11-25 NOTE — CDS QUERY
Heart Failure  CLINICAL DOCUMENTATION CLARIFICATION FORM  Dear Doctor:  Clinical information (provided below) includes a diagnosis of Heart Failure.  For accurate ICD-10-CM code assignment to reflect severity of illness and risk of mortality,  PLEASE (X) AL PERMANENT PART OF THE MEDICAL RECORD

## 2021-01-25 DIAGNOSIS — Z23 NEED FOR VACCINATION: ICD-10-CM

## 2021-01-28 ENCOUNTER — APPOINTMENT (OUTPATIENT)
Dept: GENERAL RADIOLOGY | Facility: HOSPITAL | Age: 71
DRG: 065 | End: 2021-01-28
Attending: EMERGENCY MEDICINE
Payer: MEDICARE

## 2021-01-28 ENCOUNTER — HOSPITAL ENCOUNTER (INPATIENT)
Facility: HOSPITAL | Age: 71
LOS: 3 days | Discharge: HOME | DRG: 065 | End: 2021-01-31
Attending: EMERGENCY MEDICINE | Admitting: INTERNAL MEDICINE
Payer: MEDICARE

## 2021-01-28 ENCOUNTER — APPOINTMENT (OUTPATIENT)
Dept: CT IMAGING | Facility: HOSPITAL | Age: 71
DRG: 065 | End: 2021-01-28
Attending: EMERGENCY MEDICINE
Payer: MEDICARE

## 2021-01-28 DIAGNOSIS — E53.8 FOLIC ACID DEFICIENCY: ICD-10-CM

## 2021-01-28 DIAGNOSIS — R53.1 RIGHT SIDED WEAKNESS: ICD-10-CM

## 2021-01-28 DIAGNOSIS — D61.818 PANCYTOPENIA (HCC): ICD-10-CM

## 2021-01-28 DIAGNOSIS — N17.9 ACUTE RENAL FAILURE, UNSPECIFIED ACUTE RENAL FAILURE TYPE (HCC): ICD-10-CM

## 2021-01-28 DIAGNOSIS — E87.1 HYPONATREMIA: Primary | ICD-10-CM

## 2021-01-28 LAB
ALBUMIN SERPL-MCNC: 3.4 G/DL (ref 3.4–5)
ALBUMIN/GLOB SERPL: 1.1 {RATIO} (ref 1–2)
ALP LIVER SERPL-CCNC: 72 U/L
ALT SERPL-CCNC: 17 U/L
ANION GAP SERPL CALC-SCNC: 6 MMOL/L (ref 0–18)
AST SERPL-CCNC: 18 U/L (ref 15–37)
ATRIAL RATE: 57 BPM
BASOPHILS # BLD AUTO: 0.02 X10(3) UL (ref 0–0.2)
BASOPHILS NFR BLD AUTO: 0.6 %
BILIRUB SERPL-MCNC: 0.8 MG/DL (ref 0.1–2)
BILIRUB UR QL STRIP.AUTO: NEGATIVE
BUN BLD-MCNC: 48 MG/DL (ref 7–18)
BUN/CREAT SERPL: 22.2 (ref 10–20)
CALCIUM BLD-MCNC: 9.1 MG/DL (ref 8.5–10.1)
CHLORIDE SERPL-SCNC: 94 MMOL/L (ref 98–112)
CLARITY UR REFRACT.AUTO: CLEAR
CO2 SERPL-SCNC: 25 MMOL/L (ref 21–32)
CREAT BLD-MCNC: 2.16 MG/DL
DEPRECATED RDW RBC AUTO: 43 FL (ref 35.1–46.3)
EOSINOPHIL # BLD AUTO: 0.09 X10(3) UL (ref 0–0.7)
EOSINOPHIL NFR BLD AUTO: 2.7 %
ERYTHROCYTE [DISTWIDTH] IN BLOOD BY AUTOMATED COUNT: 13.9 % (ref 11–15)
GLOBULIN PLAS-MCNC: 3.1 G/DL (ref 2.8–4.4)
GLUCOSE BLD-MCNC: 106 MG/DL (ref 70–99)
GLUCOSE BLD-MCNC: 110 MG/DL (ref 70–99)
GLUCOSE UR STRIP.AUTO-MCNC: NEGATIVE MG/DL
HCT VFR BLD AUTO: 30.8 %
HGB BLD-MCNC: 11 G/DL
IMM GRANULOCYTES # BLD AUTO: 0.05 X10(3) UL (ref 0–1)
IMM GRANULOCYTES NFR BLD: 1.5 %
KETONES UR STRIP.AUTO-MCNC: NEGATIVE MG/DL
LEUKOCYTE ESTERASE UR QL STRIP.AUTO: NEGATIVE
LYMPHOCYTES # BLD AUTO: 0.52 X10(3) UL (ref 1–4)
LYMPHOCYTES NFR BLD AUTO: 15.5 %
M PROTEIN MFR SERPL ELPH: 6.5 G/DL (ref 6.4–8.2)
MCH RBC QN AUTO: 30.6 PG (ref 26–34)
MCHC RBC AUTO-ENTMCNC: 35.7 G/DL (ref 31–37)
MCV RBC AUTO: 85.8 FL
MONOCYTES # BLD AUTO: 0.26 X10(3) UL (ref 0.1–1)
MONOCYTES NFR BLD AUTO: 7.8 %
NEUTROPHILS # BLD AUTO: 2.41 X10 (3) UL (ref 1.5–7.7)
NEUTROPHILS # BLD AUTO: 2.41 X10(3) UL (ref 1.5–7.7)
NEUTROPHILS NFR BLD AUTO: 71.9 %
NITRITE UR QL STRIP.AUTO: NEGATIVE
OSMOLALITY SERPL CALC.SUM OF ELEC: 273 MOSM/KG (ref 275–295)
P AXIS: 52 DEGREES
P-R INTERVAL: 178 MS
PH UR STRIP.AUTO: 5 [PH] (ref 4.5–8)
PLATELET # BLD AUTO: 113 10(3)UL (ref 150–450)
POTASSIUM SERPL-SCNC: 4.5 MMOL/L (ref 3.5–5.1)
PROT UR STRIP.AUTO-MCNC: NEGATIVE MG/DL
Q-T INTERVAL: 458 MS
QRS DURATION: 88 MS
QTC CALCULATION (BEZET): 445 MS
R AXIS: -24 DEGREES
RBC # BLD AUTO: 3.59 X10(6)UL
RBC UR QL AUTO: NEGATIVE
SODIUM SERPL-SCNC: 125 MMOL/L (ref 136–145)
SP GR UR STRIP.AUTO: 1.01 (ref 1–1.03)
T AXIS: 29 DEGREES
UROBILINOGEN UR STRIP.AUTO-MCNC: <2 MG/DL
VENTRICULAR RATE: 57 BPM
WBC # BLD AUTO: 3.4 X10(3) UL (ref 4–11)

## 2021-01-28 PROCEDURE — 71045 X-RAY EXAM CHEST 1 VIEW: CPT | Performed by: EMERGENCY MEDICINE

## 2021-01-28 PROCEDURE — 70450 CT HEAD/BRAIN W/O DYE: CPT | Performed by: EMERGENCY MEDICINE

## 2021-01-28 PROCEDURE — 72125 CT NECK SPINE W/O DYE: CPT | Performed by: EMERGENCY MEDICINE

## 2021-01-28 RX ORDER — FLUTICASONE PROPIONATE 50 MCG
1 SPRAY, SUSPENSION (ML) NASAL DAILY
Status: DISCONTINUED | OUTPATIENT
Start: 2021-01-29 | End: 2021-01-31

## 2021-01-28 RX ORDER — HEPARIN SODIUM 5000 [USP'U]/ML
5000 INJECTION, SOLUTION INTRAVENOUS; SUBCUTANEOUS EVERY 8 HOURS SCHEDULED
Status: DISCONTINUED | OUTPATIENT
Start: 2021-01-28 | End: 2021-01-31

## 2021-01-28 RX ORDER — HYDROMORPHONE HYDROCHLORIDE 1 MG/ML
0.2 INJECTION, SOLUTION INTRAMUSCULAR; INTRAVENOUS; SUBCUTANEOUS EVERY 2 HOUR PRN
Status: DISCONTINUED | OUTPATIENT
Start: 2021-01-28 | End: 2021-01-31

## 2021-01-28 RX ORDER — ESCITALOPRAM OXALATE 20 MG/1
20 TABLET ORAL DAILY
Status: DISCONTINUED | OUTPATIENT
Start: 2021-01-29 | End: 2021-01-29

## 2021-01-28 RX ORDER — LEVOTHYROXINE SODIUM 0.03 MG/1
25 TABLET ORAL
Status: DISCONTINUED | OUTPATIENT
Start: 2021-01-29 | End: 2021-01-31

## 2021-01-28 RX ORDER — SODIUM CHLORIDE 9 MG/ML
INJECTION, SOLUTION INTRAVENOUS CONTINUOUS
Status: DISCONTINUED | OUTPATIENT
Start: 2021-01-28 | End: 2021-01-30

## 2021-01-28 RX ORDER — ONDANSETRON 2 MG/ML
4 INJECTION INTRAMUSCULAR; INTRAVENOUS EVERY 6 HOURS PRN
Status: DISCONTINUED | OUTPATIENT
Start: 2021-01-28 | End: 2021-01-31

## 2021-01-28 RX ORDER — ALBUTEROL SULFATE 90 UG/1
2 AEROSOL, METERED RESPIRATORY (INHALATION) EVERY 4 HOURS PRN
Status: DISCONTINUED | OUTPATIENT
Start: 2021-01-28 | End: 2021-01-31

## 2021-01-28 RX ORDER — ASPIRIN 81 MG/1
324 TABLET, CHEWABLE ORAL ONCE
Status: COMPLETED | OUTPATIENT
Start: 2021-01-28 | End: 2021-01-28

## 2021-01-28 RX ORDER — HYDROMORPHONE HYDROCHLORIDE 1 MG/ML
0.8 INJECTION, SOLUTION INTRAMUSCULAR; INTRAVENOUS; SUBCUTANEOUS EVERY 2 HOUR PRN
Status: DISCONTINUED | OUTPATIENT
Start: 2021-01-28 | End: 2021-01-31

## 2021-01-28 RX ORDER — ATORVASTATIN CALCIUM 40 MG/1
40 TABLET, FILM COATED ORAL NIGHTLY
Status: DISCONTINUED | OUTPATIENT
Start: 2021-01-28 | End: 2021-01-31

## 2021-01-28 RX ORDER — ENOXAPARIN SODIUM 100 MG/ML
40 INJECTION SUBCUTANEOUS DAILY
Status: DISCONTINUED | OUTPATIENT
Start: 2021-01-28 | End: 2021-01-28

## 2021-01-28 RX ORDER — EZETIMIBE 10 MG/1
10 TABLET ORAL NIGHTLY
COMMUNITY
End: 2021-05-03

## 2021-01-28 RX ORDER — METOCLOPRAMIDE HYDROCHLORIDE 5 MG/ML
5 INJECTION INTRAMUSCULAR; INTRAVENOUS EVERY 8 HOURS PRN
Status: DISCONTINUED | OUTPATIENT
Start: 2021-01-28 | End: 2021-01-31

## 2021-01-28 RX ORDER — ASPIRIN 81 MG/1
81 TABLET ORAL DAILY
Status: DISCONTINUED | OUTPATIENT
Start: 2021-01-29 | End: 2021-01-29

## 2021-01-28 RX ORDER — HYDROMORPHONE HYDROCHLORIDE 1 MG/ML
0.4 INJECTION, SOLUTION INTRAMUSCULAR; INTRAVENOUS; SUBCUTANEOUS EVERY 2 HOUR PRN
Status: DISCONTINUED | OUTPATIENT
Start: 2021-01-28 | End: 2021-01-31

## 2021-01-28 RX ORDER — MORPHINE SULFATE 4 MG/ML
4 INJECTION, SOLUTION INTRAMUSCULAR; INTRAVENOUS ONCE
Status: COMPLETED | OUTPATIENT
Start: 2021-01-28 | End: 2021-01-28

## 2021-01-28 RX ORDER — AMLODIPINE BESYLATE 5 MG/1
5 TABLET ORAL DAILY
Status: DISCONTINUED | OUTPATIENT
Start: 2021-01-29 | End: 2021-01-29

## 2021-01-28 RX ORDER — ZOLPIDEM TARTRATE 5 MG/1
5 TABLET ORAL NIGHTLY PRN
Status: DISCONTINUED | OUTPATIENT
Start: 2021-01-28 | End: 2021-01-31

## 2021-01-28 RX ORDER — POTASSIUM CHLORIDE 750 MG/1
10 TABLET, EXTENDED RELEASE ORAL DAILY
Status: ON HOLD | COMMUNITY
End: 2021-01-31

## 2021-01-28 RX ORDER — ONDANSETRON 2 MG/ML
4 INJECTION INTRAMUSCULAR; INTRAVENOUS ONCE
Status: COMPLETED | OUTPATIENT
Start: 2021-01-28 | End: 2021-01-28

## 2021-01-28 RX ORDER — SODIUM CHLORIDE 9 MG/ML
INJECTION, SOLUTION INTRAVENOUS CONTINUOUS
Status: ACTIVE | OUTPATIENT
Start: 2021-01-28 | End: 2021-01-28

## 2021-01-28 RX ORDER — ACETAMINOPHEN 325 MG/1
650 TABLET ORAL EVERY 6 HOURS PRN
Status: DISCONTINUED | OUTPATIENT
Start: 2021-01-28 | End: 2021-01-31

## 2021-01-28 NOTE — ED PROVIDER NOTES
Patient Seen in: [facility] Emergency Department      History   Patient presents with:  Pain  Dehydration    Stated Complaint: PAIN, WEIGHT LOSS    HPI/Subjective:   HPI    76-year-old female presents emergency room with multiple complaints.   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 SKIN: Warm, dry, intact, no rashes.   NEUROLOGIC EXAM: Tongue midline, no facial drooping, no ptosis, muscle strength 4/5 right upper and l unremarkable. Chemistry sodium 125 potassium 4.5 BUN 40 creatinine 2.16 glucose 110. CBC white count 3.4 hemoglobin 8.0 platelet 187. Discussed all results with the patient, and son. Patient did receive IV fluid hydration.   Discussed with Dr. Vignesh Lockhart,

## 2021-01-29 ENCOUNTER — APPOINTMENT (OUTPATIENT)
Dept: CT IMAGING | Facility: HOSPITAL | Age: 71
DRG: 065 | End: 2021-01-29
Attending: PHYSICIAN ASSISTANT
Payer: MEDICARE

## 2021-01-29 LAB
ANION GAP SERPL CALC-SCNC: 4 MMOL/L (ref 0–18)
BASOPHILS # BLD AUTO: 0.02 X10(3) UL (ref 0–0.2)
BASOPHILS NFR BLD AUTO: 0.8 %
BUN BLD-MCNC: 38 MG/DL (ref 7–18)
BUN/CREAT SERPL: 23.2 (ref 10–20)
CALCIUM BLD-MCNC: 8.3 MG/DL (ref 8.5–10.1)
CHLORIDE SERPL-SCNC: 103 MMOL/L (ref 98–112)
CO2 SERPL-SCNC: 26 MMOL/L (ref 21–32)
CREAT BLD-MCNC: 1.64 MG/DL
DEPRECATED HBV CORE AB SER IA-ACNC: 316.3 NG/ML
DEPRECATED RDW RBC AUTO: 43.9 FL (ref 35.1–46.3)
EOSINOPHIL # BLD AUTO: 0.08 X10(3) UL (ref 0–0.7)
EOSINOPHIL NFR BLD AUTO: 3.1 %
ERYTHROCYTE [DISTWIDTH] IN BLOOD BY AUTOMATED COUNT: 14.1 % (ref 11–15)
FOLATE SERPL-MCNC: 6.2 NG/ML (ref 8.7–?)
GLUCOSE BLD-MCNC: 165 MG/DL (ref 70–99)
GLUCOSE BLD-MCNC: 90 MG/DL (ref 70–99)
HCT VFR BLD AUTO: 27.1 %
HGB BLD-MCNC: 9.5 G/DL
HGB RETIC QN AUTO: 35.8 PG (ref 28.2–36.6)
IMM GRANULOCYTES # BLD AUTO: 0.04 X10(3) UL (ref 0–1)
IMM GRANULOCYTES NFR BLD: 1.6 %
IMM RETICS NFR: 0.12 RATIO (ref 0.1–0.3)
IRON SATURATION: 37 %
IRON SERPL-MCNC: 116 UG/DL
LDH SERPL L TO P-CCNC: 187 U/L
LYMPHOCYTES # BLD AUTO: 0.56 X10(3) UL (ref 1–4)
LYMPHOCYTES NFR BLD AUTO: 22 %
MCH RBC QN AUTO: 30.6 PG (ref 26–34)
MCHC RBC AUTO-ENTMCNC: 35.1 G/DL (ref 31–37)
MCV RBC AUTO: 87.4 FL
MONOCYTES # BLD AUTO: 0.27 X10(3) UL (ref 0.1–1)
MONOCYTES NFR BLD AUTO: 10.6 %
NEUTROPHILS # BLD AUTO: 1.57 X10 (3) UL (ref 1.5–7.7)
NEUTROPHILS # BLD AUTO: 1.57 X10(3) UL (ref 1.5–7.7)
NEUTROPHILS NFR BLD AUTO: 61.9 %
OSMOLALITY SERPL CALC.SUM OF ELEC: 285 MOSM/KG (ref 275–295)
PLATELET # BLD AUTO: 88 10(3)UL (ref 150–450)
POTASSIUM SERPL-SCNC: 4.1 MMOL/L (ref 3.5–5.1)
RBC # BLD AUTO: 3.1 X10(6)UL
RETICS # AUTO: 102.4 X10(3) UL (ref 22.5–147.5)
RETICS/RBC NFR AUTO: 3.2 %
SODIUM SERPL-SCNC: 133 MMOL/L (ref 136–145)
TOTAL IRON BINDING CAPACITY: 310 UG/DL (ref 240–450)
TRANSFERRIN SERPL-MCNC: 208 MG/DL (ref 200–360)
VIT B12 SERPL-MCNC: 403 PG/ML (ref 193–986)
WBC # BLD AUTO: 2.5 X10(3) UL (ref 4–11)

## 2021-01-29 PROCEDURE — 70496 CT ANGIOGRAPHY HEAD: CPT | Performed by: PHYSICIAN ASSISTANT

## 2021-01-29 PROCEDURE — 99223 1ST HOSP IP/OBS HIGH 75: CPT | Performed by: OTHER

## 2021-01-29 PROCEDURE — 70498 CT ANGIOGRAPHY NECK: CPT | Performed by: PHYSICIAN ASSISTANT

## 2021-01-29 PROCEDURE — 99223 1ST HOSP IP/OBS HIGH 75: CPT | Performed by: INTERNAL MEDICINE

## 2021-01-29 RX ORDER — ASPIRIN 325 MG
325 TABLET, DELAYED RELEASE (ENTERIC COATED) ORAL DAILY
Status: DISCONTINUED | OUTPATIENT
Start: 2021-01-30 | End: 2021-01-31

## 2021-01-29 RX ORDER — ESCITALOPRAM OXALATE 10 MG/1
10 TABLET ORAL DAILY
Status: DISCONTINUED | OUTPATIENT
Start: 2021-01-30 | End: 2021-01-31

## 2021-01-29 RX ORDER — FOLIC ACID 1 MG/1
1 TABLET ORAL DAILY
Status: DISCONTINUED | OUTPATIENT
Start: 2021-01-29 | End: 2021-01-31

## 2021-01-29 NOTE — CONSULTS
[address] [facility] Consultation    Date of consult: 1/29/2021    Reason for consult: right side weeakness    HPI: Mireille Reed is a 69year old female with past medical history as listed below presents with right side weakness for 4 weeks, Pa Daily    •  Albuterol Sulfate  (90 Base) MCG/ACT inhaler 2 puff, 2 puff, Inhalation, Q4H PRN    •  atorvastatin (LIPITOR) tab 40 mg, 40 mg, Oral, Nightly    •  aspirin EC tab 81 mg, 81 mg, Oral, Daily    •  Fluticasone Furoate-Vilanterol (BREO ELLIP Smoking status: Former Smoker        Quit date: 11/12/2017        Years since quitting: 3.2      Smokeless tobacco: Never Used      Tobacco comment: started smoking, but just lasted for a month and she quit again    Alcohol use: No    Family History   Prob Reviewed on 1/29/2021  Labs Reviewed:   Recent Labs   Lab 01/29/21  0705   RBC 3.10*   HGB 9.5*   HCT 27.1*   MCV 87.4   MCH 30.6   MCHC 35.1   RDW 14.1   NEPRELIM 1.57   WBC 2.5*   PLT 88.0*     Recent Labs   Lab 01/28/21  1254 01/29/21  0705   * 9

## 2021-01-29 NOTE — H&P
[address] Patient Status:  Inpatient    1950 MRN [id number]   [facility] 4NW-A Attending Vilma Santos MD   Hosp Day # 1 PCP Fortunato Talley MD     Date:  2021  Date of Admiss chemotherapy    • Polyp of ascending colon    • Stroke ([facility]     tia   • Visual impairment      Past Surgical History:   Procedure Laterality Date   • ANGIOPLASTY (CORONARY)      x 2 ballon   • OTHER SURGICAL HISTORY      pain pump L side Medtronic   • OT 0900    •  zolpidem 5 MG Oral Tab, Take 1 tablet (5 mg total) by mouth nightly as needed for Sleep., Disp: 14 tablet, Rfl: 0, 1/27/2021 at 2000    •  furosemide 40 MG Oral Tab, Take 1 tablet (40 mg total) by mouth daily. , Disp: 30 tablet, Rfl: 11, 1/27/202 regular rate and rhythm  Abdomen: soft, non-tender; bowel sounds normal; no masses,  no organomegaly  Extremities: extremities normal, atraumatic, no cyanosis or edema  Neurologic: right facial droop  Definite right sided weakness     Laboratory Data:   La long-term current use of insulin (HCC)     Vitamin D deficiency     Insomnia due to other mental disorder     Polyp of colon     CEG (chronic erosive gastritis)     Oral alendronate causing adverse effect in therapeutic use, sequela     Nausea and vomiting

## 2021-01-29 NOTE — PLAN OF CARE
Alert and oriented, no facial droop noted, both hand  are weak, able to move them up and has good strength, pupils are reactive to lights, denies nausea or vomiting. Awaits for MRI, pain service to interrogate pain pump prior to MRI.  Palpable pain pum Monitor temperature, glucose, and sodium.  Initiate appropriate interventions as ordered  Outcome: Progressing

## 2021-01-29 NOTE — PROGRESS NOTES
[facility] Pharmacy Note:  Renal Dose Adjustment for Enoxaparin and Reglan    Rufina Horne has been prescribed:  1. Enoxaparin (LOVENOX) 40 mg subcutaneously every 24 hours.   2. Reglan 10mg iv q8h prn n/v    Estimated Creatinine Clearance: 17.2 mL/min (A) (based

## 2021-01-29 NOTE — CONSULTS
THE MEDICAL [facility] Hematology and Oncology Consult Note    Reason for Consult: Pancytopenia   Medical Record Number: [id number]   CSN: [id number]   Referring Physician: No ref.  provider found  PCP: Ivory Fatima MD    History of Present Illness: 78F with a PMH of (LEXAPRO) tablet 10 mg, 10 mg, Oral, Daily  •  sodium chloride 0.9% IV bolus 500 mL, 500 mL, Intravenous, Once PRN  •  [START ON 1/30/2021] aspirin EC EC tab 325 mg, 325 mg, Oral, Daily  •  sodium chloride 0.9% IV bolus 500 mL, 500 mL, Intravenous, Once ** to medical diagnostic radiation    • Hearing impairment    • Heart attack ([facility]     3 heart attacks   • High blood pressure    • High cholesterol    • History of blood clots     Heart clot    • History of stomach ulcers    • Localized swelling of both lowe no scleral icterus   LN: no supraclavicular, infraclavicular, axillary LAD  CV: RRR S1S2 no murmurs  Extremities: No edema  Lungs: CTAB, no increased work of breathing  Abd: soft nt nd +BS no hepatosplenomegaly, pain pump  Neuro: CN: II-XII grossly intact Folic acid deficiency: Folic acid 1 mg daily     Right sided weakness: CT Brain normal. Neuro following. MR pending. Ken  [facility]

## 2021-01-29 NOTE — CONSULTS
[facility]                       [facility]astroenterology Consultation[facility]    Magnolia Jefferson Patient Status:  Inpatient    1950 MRN [id number]   [facility] 4NW-A Attending Walker Gowers, MD   Hosp Day # 1 PCP Sum attacks   • High blood pressure    • High cholesterol    • History of blood clots     Heart clot    • History of stomach ulcers    • Localized swelling of both lower legs    • Obstructive chronic bronchitis without exacerbation (HCC)    • Personal history 5 mg, 5 mg, Intravenous, Q8H PRN    •  Pantoprazole Sodium (PROTONIX) 40 mg in Sodium Chloride (PF) 0.9 % 10 mL IV push, 40 mg, Intravenous, Q24H    •  Umeclidinium Bromide (INCRUSE ELLIPTA) 62.5 MCG/INH inhaler 1 puff, 1 puff, Inhalation, Daily    •  Flut reports no history of chronic arthritis, myalgias, arthralgias            Genitourinary:  The patient reports no history of recurrent urinary tract infections, hematuria, dysuria, or nephrolithiasis           Psychiatric: The patient reports no history of 31*   CA 9.1 8.3*   * 133*   K 4.5 4.1   CL 94* 103   CO2 25.0 26.0     Recent Labs   Lab 01/29/21  0705   RBC 3.10*   HGB 9.5*   HCT 27.1*   MCV 87.4   MCH 30.6   MCHC 35.1   RDW 14.1   NEPRELIM 1.57   WBC 2.5*   PLT 88.0*       Recent Labs   Lab 01 Shaggy Ritter last month which he believes were normal except for a hiatal hernia. Patient does endorse a 10 pound weight loss over the last week. Pt noted to have a hemoglobin of 9.5.      Assessment and Plan:  - pt with full resolution of nausea and vomiting and

## 2021-01-29 NOTE — PROGRESS NOTES
THE [facility] Hematology and Oncology Progress Note   Length of Stay: 1    Subjective: She states that she feels better. No new complaints. Unable to have MRI due to pain pump. US abdomen with normal liver contour and splenomegaly (15.4 cm).      ROS: 12 Point ROS Ref. Range 1/29/2021 07:05   Iron Saturation Latest Ref Range: 15 - 50 % 37   FERRITIN Latest Ref Range: 18.0 - 340.0 ng/mL 316.3   Vitamin B12 Latest Ref Range: 193 - 986 pg/mL 533   FOLATE (FOLIC ACID), SERUM Latest Ref Range: >=8.7 ng/mL 6.2 (L)       I

## 2021-01-29 NOTE — PLAN OF CARE
Discussed with RN. Pt unable to have MRI due to pain pump and other back surgeries. Unable to have CTA H/N due to Cr 1.64. Will recheck Cr tomorrow (1/30) am and get CTA if able. Otherwise will obtain repeat CTH for stroke work up.     Ramirez Gutierrez, [id number]

## 2021-01-29 NOTE — PLAN OF CARE
Pt is aox4 on room air and tele and via tele pt is nsr. Pt vitals has been stable and pt has been afebrile during this shift. Pt is stating right sided neck pain with nausea. Pt has not had any episodes of vomiting during this shift.  Pt did come in and p

## 2021-01-29 NOTE — PLAN OF CARE
Responding to rapid response. NIH completed, recorded, score 0.  RUE strength 4/5. Pt states strength consistent with her known baseline. History of prior CVA with right side weakness.

## 2021-01-30 ENCOUNTER — APPOINTMENT (OUTPATIENT)
Dept: GENERAL RADIOLOGY | Facility: HOSPITAL | Age: 71
DRG: 065 | End: 2021-01-30
Attending: INTERNAL MEDICINE
Payer: MEDICARE

## 2021-01-30 ENCOUNTER — TELEPHONE (OUTPATIENT)
Dept: SURGERY | Facility: CLINIC | Age: 71
End: 2021-01-30

## 2021-01-30 ENCOUNTER — APPOINTMENT (OUTPATIENT)
Dept: ULTRASOUND IMAGING | Facility: HOSPITAL | Age: 71
DRG: 065 | End: 2021-01-30
Attending: INTERNAL MEDICINE
Payer: MEDICARE

## 2021-01-30 LAB
ANION GAP SERPL CALC-SCNC: 7 MMOL/L (ref 0–18)
BASOPHILS # BLD AUTO: 0.03 X10(3) UL (ref 0–0.2)
BASOPHILS NFR BLD AUTO: 1.1 %
BUN BLD-MCNC: 20 MG/DL (ref 7–18)
BUN/CREAT SERPL: 15.3 (ref 10–20)
C DIFF TOX B STL QL: POSITIVE
CALCIUM BLD-MCNC: 8.5 MG/DL (ref 8.5–10.1)
CHLORIDE SERPL-SCNC: 111 MMOL/L (ref 98–112)
CO2 SERPL-SCNC: 21 MMOL/L (ref 21–32)
CREAT BLD-MCNC: 1.31 MG/DL
DEPRECATED RDW RBC AUTO: 47.8 FL (ref 35.1–46.3)
EOSINOPHIL # BLD AUTO: 0.05 X10(3) UL (ref 0–0.7)
EOSINOPHIL NFR BLD AUTO: 1.9 %
ERYTHROCYTE [DISTWIDTH] IN BLOOD BY AUTOMATED COUNT: 14.2 % (ref 11–15)
GLUCOSE BLD-MCNC: 99 MG/DL (ref 70–99)
HCT VFR BLD AUTO: 32 %
HGB BLD-MCNC: 10.6 G/DL
HIV 1+2 AB+HIV1 P24 AG SERPL QL IA: NONREACTIVE
IMM GRANULOCYTES # BLD AUTO: 0.11 X10(3) UL (ref 0–1)
IMM GRANULOCYTES NFR BLD: 4.2 %
LYMPHOCYTES # BLD AUTO: 0.39 X10(3) UL (ref 1–4)
LYMPHOCYTES NFR BLD AUTO: 14.7 %
MCH RBC QN AUTO: 30.5 PG (ref 26–34)
MCHC RBC AUTO-ENTMCNC: 33.1 G/DL (ref 31–37)
MCV RBC AUTO: 92.2 FL
MONOCYTES # BLD AUTO: 0.29 X10(3) UL (ref 0.1–1)
MONOCYTES NFR BLD AUTO: 10.9 %
NEUTROPHILS # BLD AUTO: 1.78 X10 (3) UL (ref 1.5–7.7)
NEUTROPHILS # BLD AUTO: 1.78 X10(3) UL (ref 1.5–7.7)
NEUTROPHILS NFR BLD AUTO: 67.2 %
OSMOLALITY SERPL CALC.SUM OF ELEC: 291 MOSM/KG (ref 275–295)
PLATELET # BLD AUTO: 99 10(3)UL (ref 150–450)
POTASSIUM SERPL-SCNC: 4.2 MMOL/L (ref 3.5–5.1)
RBC # BLD AUTO: 3.47 X10(6)UL
SODIUM SERPL-SCNC: 139 MMOL/L (ref 136–145)
WBC # BLD AUTO: 2.7 X10(3) UL (ref 4–11)

## 2021-01-30 PROCEDURE — 99232 SBSQ HOSP IP/OBS MODERATE 35: CPT | Performed by: OTHER

## 2021-01-30 PROCEDURE — 74018 RADEX ABDOMEN 1 VIEW: CPT | Performed by: INTERNAL MEDICINE

## 2021-01-30 PROCEDURE — 99232 SBSQ HOSP IP/OBS MODERATE 35: CPT | Performed by: INTERNAL MEDICINE

## 2021-01-30 PROCEDURE — 72050 X-RAY EXAM NECK SPINE 4/5VWS: CPT | Performed by: INTERNAL MEDICINE

## 2021-01-30 PROCEDURE — 76700 US EXAM ABDOM COMPLETE: CPT | Performed by: INTERNAL MEDICINE

## 2021-01-30 RX ORDER — VANCOMYCIN HYDROCHLORIDE 125 MG/1
125 CAPSULE ORAL EVERY 6 HOURS
Status: DISCONTINUED | OUTPATIENT
Start: 2021-01-30 | End: 2021-01-31

## 2021-01-30 RX ORDER — CLOTRIMAZOLE 1 %
CREAM (GRAM) TOPICAL 2 TIMES DAILY
Status: DISCONTINUED | OUTPATIENT
Start: 2021-01-30 | End: 2021-01-30

## 2021-01-30 NOTE — PROGRESS NOTES
Pain Service    70 yo admitted after fall at home      Interrogated Medtronic Implanted Intrathecal Pump    Pump is managed by Dr. Nicole Jolley [phone]  [place], [place] [id number] [phone]    Morphine 5 mg/ml  Baclofen 350 mcg/ml  Clonidin

## 2021-01-30 NOTE — PROGRESS NOTES
[facility]  Progress Note    Jake Shearer Patient Status:  Inpatient    1950 MRN [id number]   [facility] 4NW-A Attending Rosmery Minaya MD   Hosp Day # 2 PCP Yaya Chi MD       SUBJECTIVE:  C/o nausea, not able to Daily    •  Albuterol Sulfate  (90 Base) MCG/ACT inhaler 2 puff, 2 puff, Inhalation, Q4H PRN    •  atorvastatin (LIPITOR) tab 40 mg, 40 mg, Oral, Nightly    •  Fluticasone Furoate-Vilanterol (BREO ELLIPTA) 200-25 MCG/INH inhaler 1 puff, 1 puff, Inha COPD  HYPERTENSION.    HYPOTHYROIDISM  ADVANCE DIET   D/C IV FLUID     Patient Active Problem List:     Cardiomyopathy, unspecified type [facility])     Essential hypertension     Atherosclerosis of coronary artery     COPD (chronic obstructive pulmonary diseas

## 2021-01-30 NOTE — PHYSICAL THERAPY NOTE
PHYSICAL THERAPY QUICK EVALUATION - INPATIENT    Room Number: 408/408-A  Evaluation Date: 1/30/2021  Presenting Problem: hyponatremia  Physician Order: PT Eval and Treat    Problem List  Principal Problem:    Hyponatremia  Active Problems:    Acute renal RESTRICTION  Weight Bearing Restriction: None                PAIN ASSESSMENT  Ratin         RANGE OF MOTION AND STRENGTH ASSESSMENT  Upper extremity ROM and strength are within functional limits  Lower extremity ROM is within functional limits   Lower DC recs; discussed use of RW for long distances/community, assist with stair management, pt in agreement    Patient End of Session: Up in chair;Needs met;Call light within reach;RN aware of session/findings; All patient questions and concerns addressed    A

## 2021-01-30 NOTE — CM/SW NOTE
01/30/21 0900   CM/SW Referral Data   Referral Source Nurse;Social Work (self-referral)   Reason for Referral Discharge planning;Psychoscial assessment     SW completed a chart review to identify discharge planning needs.  Pt is a 59-year-old female pres

## 2021-01-30 NOTE — PROGRESS NOTES
[address] [facility] Progress Note    Tierney Alvarez Patient Status:  Inpatient    1950 MRN [id number]   [facility] 4NW-A Attending Jessa Smith MD   Hosp Day # 2 PCP Donn Velasco MD     Subjective:  Tierney Alvarez Attention/concentration: Normal, able to follow commands. Face is symmetrical.   PERRLA +3 brisk. EOMI. VFF. Tongue midline. Uvula and palate elevate symmetrically. Shoulder shrug normal bilaterally,   All other CN's 2-12 Grossly Intact. weeks    Plan:  Family reported that patient cannot have MRI due to the presence of metal rods from previous spine surgeries  Incidental small aneurysm seen on CTA imaging, would recommend follow up as outpatient with neuro interventional radiology  Contin

## 2021-01-30 NOTE — PLAN OF CARE
C/o nausea and dry heaves. No emesis. No appetite. Zofran and reglan given with some relief. Drank ginger ale. Formed BM this am. Diarrhea this afternoon. Hat placed in toilet for stool specimen to check for CDIFF.

## 2021-01-30 NOTE — PLAN OF CARE
Pt is A&Ox4. On clear liquid diet, NPO at midnight for abdominal ultrasound. Pt complains of neck pain. Medication given. Reassessment shows improvement. BP remains around 379 systolic. Pain service to assess pain pump in LLQ. CAT scan completed.   No N/V o

## 2021-01-30 NOTE — SLP NOTE
ADULT SWALLOWING EVALUATION    ASSESSMENT    ASSESSMENT/OVERALL IMPRESSION:  Order received for BSE due to possible stroke. Pt presented with increased weakness, recent fall, decreased appetite, nausea, vomiting. Pt c/o right sided weakness.  Pt reports epi reassess       HISTORY   MEDICAL HISTORY  Reason for Referral: Stroke protocol    Problem List  Principal Problem:    Hyponatremia  Active Problems:    Acute renal failure, unspecified acute renal failure type (HCC)    Right sided weakness    Pancytopenia of Presentation: Self presentation  Patient Positioning: Upright;Midline    Oral Phase of Swallow: Within Functional Limits           Pharyngeal Phase of Swallow:  Within Functional Limits           (Please note: Silent aspiration cannot be evaluated clinic

## 2021-01-30 NOTE — PLAN OF CARE
A/O. Right facial droop with c/o right cheek heaviness. MDs aware. Right side weakness. C/o feeling dizzy when up. Instructed to get up slowly and to call for assistance. NSR on tele. C/o dry heaves and nausea. Zofran and reglan given with minimal relief.

## 2021-01-31 VITALS
DIASTOLIC BLOOD PRESSURE: 76 MMHG | HEIGHT: 60 IN | WEIGHT: 166.38 LBS | RESPIRATION RATE: 18 BRPM | HEART RATE: 79 BPM | SYSTOLIC BLOOD PRESSURE: 140 MMHG | TEMPERATURE: 98 F | OXYGEN SATURATION: 94 % | BODY MASS INDEX: 32.67 KG/M2

## 2021-01-31 LAB
ANION GAP SERPL CALC-SCNC: 6 MMOL/L (ref 0–18)
BASOPHILS # BLD AUTO: 0.01 X10(3) UL (ref 0–0.2)
BASOPHILS NFR BLD AUTO: 0.4 %
BUN BLD-MCNC: 12 MG/DL (ref 7–18)
BUN/CREAT SERPL: 9.2 (ref 10–20)
CALCIUM BLD-MCNC: 8.5 MG/DL (ref 8.5–10.1)
CHLORIDE SERPL-SCNC: 110 MMOL/L (ref 98–112)
CO2 SERPL-SCNC: 23 MMOL/L (ref 21–32)
CREAT BLD-MCNC: 1.31 MG/DL
DEPRECATED RDW RBC AUTO: 47.1 FL (ref 35.1–46.3)
EOSINOPHIL # BLD AUTO: 0.06 X10(3) UL (ref 0–0.7)
EOSINOPHIL NFR BLD AUTO: 2.6 %
ERYTHROCYTE [DISTWIDTH] IN BLOOD BY AUTOMATED COUNT: 14.2 % (ref 11–15)
GLUCOSE BLD-MCNC: 131 MG/DL (ref 70–99)
HCT VFR BLD AUTO: 25.8 %
HGB BLD-MCNC: 8.6 G/DL
IMM GRANULOCYTES # BLD AUTO: 0.04 X10(3) UL (ref 0–1)
IMM GRANULOCYTES NFR BLD: 1.8 %
LYMPHOCYTES # BLD AUTO: 0.41 X10(3) UL (ref 1–4)
LYMPHOCYTES NFR BLD AUTO: 18.1 %
MCH RBC QN AUTO: 30.6 PG (ref 26–34)
MCHC RBC AUTO-ENTMCNC: 33.3 G/DL (ref 31–37)
MCV RBC AUTO: 91.8 FL
MONOCYTES # BLD AUTO: 0.19 X10(3) UL (ref 0.1–1)
MONOCYTES NFR BLD AUTO: 8.4 %
NEUTROPHILS # BLD AUTO: 1.56 X10 (3) UL (ref 1.5–7.7)
NEUTROPHILS # BLD AUTO: 1.56 X10(3) UL (ref 1.5–7.7)
NEUTROPHILS NFR BLD AUTO: 68.7 %
OSMOLALITY SERPL CALC.SUM OF ELEC: 290 MOSM/KG (ref 275–295)
PLATELET # BLD AUTO: 92 10(3)UL (ref 150–450)
POTASSIUM SERPL-SCNC: 4 MMOL/L (ref 3.5–5.1)
RBC # BLD AUTO: 2.81 X10(6)UL
SODIUM SERPL-SCNC: 139 MMOL/L (ref 136–145)
WBC # BLD AUTO: 2.3 X10(3) UL (ref 4–11)

## 2021-01-31 PROCEDURE — 99232 SBSQ HOSP IP/OBS MODERATE 35: CPT | Performed by: INTERNAL MEDICINE

## 2021-01-31 PROCEDURE — 99232 SBSQ HOSP IP/OBS MODERATE 35: CPT | Performed by: OTHER

## 2021-01-31 RX ORDER — FOLIC ACID 1 MG/1
1 TABLET ORAL DAILY
Qty: 30 TABLET | Refills: 11 | Status: SHIPPED | OUTPATIENT
Start: 2021-02-01 | End: 2021-05-03

## 2021-01-31 RX ORDER — GARLIC EXTRACT 500 MG
1 CAPSULE ORAL DAILY
Qty: 30 CAPSULE | Refills: 5 | Status: ON HOLD | OUTPATIENT
Start: 2021-01-31 | End: 2021-02-10

## 2021-01-31 RX ORDER — GARLIC EXTRACT 500 MG
1 CAPSULE ORAL DAILY
Qty: 30 CAPSULE | Refills: 3 | Status: SHIPPED | OUTPATIENT
Start: 2021-01-31 | End: 2021-01-31

## 2021-01-31 RX ORDER — VANCOMYCIN HYDROCHLORIDE 125 MG/1
125 CAPSULE ORAL 4 TIMES DAILY
Qty: 56 CAPSULE | Refills: 0 | Status: SHIPPED | OUTPATIENT
Start: 2021-01-31 | End: 2021-01-31

## 2021-01-31 RX ORDER — VANCOMYCIN HYDROCHLORIDE 125 MG/1
125 CAPSULE ORAL 4 TIMES DAILY
Qty: 56 CAPSULE | Refills: 0 | Status: SHIPPED | OUTPATIENT
Start: 2021-01-31 | End: 2021-03-09 | Stop reason: ALTCHOICE

## 2021-01-31 RX ORDER — ONDANSETRON 4 MG/1
4 TABLET, FILM COATED ORAL EVERY 8 HOURS PRN
Qty: 30 TABLET | Refills: 0 | Status: SHIPPED | OUTPATIENT
Start: 2021-01-31 | End: 2021-01-31

## 2021-01-31 RX ORDER — PANTOPRAZOLE SODIUM 20 MG/1
20 TABLET, DELAYED RELEASE ORAL
Qty: 30 TABLET | Refills: 2 | Status: SHIPPED | OUTPATIENT
Start: 2021-01-31 | End: 2021-04-26

## 2021-01-31 RX ORDER — PANTOPRAZOLE SODIUM 20 MG/1
20 TABLET, DELAYED RELEASE ORAL
Qty: 30 TABLET | Refills: 0 | Status: SHIPPED | OUTPATIENT
Start: 2021-01-31 | End: 2021-01-31

## 2021-01-31 RX ORDER — LISINOPRIL 20 MG/1
20 TABLET ORAL DAILY
Refills: 0 | Status: SHIPPED | COMMUNITY
Start: 2021-01-31 | End: 2021-05-03

## 2021-01-31 RX ORDER — ONDANSETRON 4 MG/1
4 TABLET, FILM COATED ORAL EVERY 8 HOURS PRN
Qty: 30 TABLET | Refills: 1 | Status: SHIPPED | OUTPATIENT
Start: 2021-01-31 | End: 2021-05-03

## 2021-01-31 NOTE — PROGRESS NOTES
[facility]                       Gastroenterology Follow up [address] [facility] Gastroenterology    Vladislav Berto Patient Status:  Inpatient    1950 MRN [id number]   [facility] 4NW-A Attending Mc Mckenzie MD   [facility] Day degenerative disc disease and bilateral foraminal narrowing. Prior fusion at C6-7. No significant change from CT of 1/28/2021.    Dictated by (CST): Chloé Barba MD on 1/30/2021 at 10:27 AM     Finalized by (CST): Chloé Barba MD on 1/30/2021 at 10:30 A significant interval changes are noted.     Dictated by (CST): Naye Corado DO on 1/28/2021 at 3:04 PM     Finalized by (CST): Naye Corado DO on 1/28/2021 at 3:05 PM       Cta Brain + Cta Carotids (WOU=[id number])    Result Date: 1/29/2021  CONCLUSION: from post-infectious gastroparesis? C.diff? Narcotic induced? . Reassuring workup with EGD/colon at OSH. States nausea now resolved    Plan:  -Continue Vancomycin  -Monitor symptoms of nausea.  Obtain records of EGD/colon from [address]

## 2021-01-31 NOTE — PROGRESS NOTES
[address] [facility] Progress Note    Maddie Alert Patient Status:  Inpatient    1950 MRN [id number]   [facility] 4NW-A Attending Sarita Holt MD   Hosp Day # 3 PCP Bettye Fulton MD     Subjective:  Maddie Alert follow commands. Face is symmetrical.   PERRLA +3 brisk. EOMI. VFF. Tongue midline. Uvula and palate elevate symmetrically. Shoulder shrug normal bilaterally,   All other CN's 2-12 Grossly Intact.     Sensation to light touch is intact bilater note with following additions:  S: much better, no new complaints  O: /58 (BP Location: Left arm)   Pulse 66   Temp 98.2 °F (36.8 °C) (Oral)   Resp 18   Ht 60\"   Wt 166 lb 6.4 oz (75.5 kg)   SpO2 90%   BMI 32.50 kg/m²   Neuro exam unchanged, see abo

## 2021-01-31 NOTE — PROGRESS NOTES
NURSING DISCHARGE NOTE    Discharged Home via Wheelchair. Accompanied by Support staff  Belongings Taken by patient/family. VSS afebrile. IV's x 2 d/c'd with catheter tips noted intact and without complication.   Discharge instructions given and expl

## 2021-01-31 NOTE — PROGRESS NOTES
THE [facility] Hematology and Oncology Progress Note   Length of Stay: 2    Subjective: Felt nauseated last night with food. She has some abdominal cramping. Energy levels are better. C diff positive. HIV negative.  CBC yesterday: WBC 2.7, Hb 10.6 and Plt 99 a --    AST 18  --   --    ALT 17  --   --    BILT 0.8  --   --    TP 6.5  --   --      Results for Kt Abraham (MRN [id number]) as of 1/29/2021 14:57    Ref.  Range 1/29/2021 07:05   Iron Saturation Latest Ref Range: 15 - 50 % 37   FERRITIN Latest Ref Ran

## 2021-01-31 NOTE — DISCHARGE SUMMARY
[facility]  Discharge Summary    Facundo Miles Patient Status:  Inpatient    1950 MRN [id number]   [facility] 4NW-A Attending Mirela Martinez MD   Hosp Day # 3 PCP Mary Folres MD     Date of Admission: 2021    Louie Polyp of colon     CEG (chronic erosive gastritis)     Oral alendronate causing adverse effect in therapeutic use, sequela     Nausea and vomiting     COVID-19     Hypoxia     COPD exacerbation (HCC)     Elevated troponin     Hyponatremia     Acute renal f daily.  Qty:   Refills: 0    zolpidem 5 MG Oral Tab  Take 1 tablet (5 mg total) by mouth nightly as needed for Sleep.   Qty: 14 tablet Refills: 0  Associated Diagnoses:Insomnia due to other mental disorder    atorvastatin 40 MG Oral Tab  Take 40 mg by mouth

## 2021-02-01 NOTE — PAYOR COMM NOTE
--------------  DISCHARGE REVIEW  AND  NOTES  Payor: Frances West #:  [id number]  Authorization Number: N/A    Admit date: 1/28/21  Admit time:  2047  Discharge Date: 1/31/2021  4:26 PM       1/29/2021  NEUROLOGY CONSULT  Assessment:  Right zina Intermittent nausea with vomiting, differential diagnoses include: functional, secondary to dyspepsia, secondary to irritable bowel syndrome, secondary to PUD vs less likely mechanical obstruction.  Pt currently without any nausea or vomiting and tolerating surgeries  Incidental small aneurysm seen on CTA imaging, would recommend follow up as outpatient with neuro interventional radiology  Continue  mg and long with Lipitor 40 mg daily for stroke / cardiovascular prevention  Sbp > 100 mmHg (ranging upp sign off.             Discharge Summary Notes           [facility]  Discharge Summary    Dorine Burkitt Patient Status:  Inpatient    1950 MRN [id number]   [facility] 4NW-A Attending Nery Orantes MD   Hosp Day # 3 ZENON Benitez deficiency     Insomnia due to other mental disorder     Polyp of colon     CEG (chronic erosive gastritis)     Oral alendronate causing adverse effect in therapeutic use, sequela     Nausea and vomiting     COVID-19     Hypoxia     COPD exacerbation ([address] into the lungs daily. Qty:   Refills: 0    zolpidem 5 MG Oral Tab  Take 1 tablet (5 mg total) by mouth nightly as needed for Sleep.   Qty: 14 tablet Refills: 0  Associated Diagnoses:Insomnia due to other mental disorder    atorvastatin 40 MG Oral Tab  Take

## 2021-02-01 NOTE — PLAN OF CARE
Problem: Patient/Family Goals  Goal: Patient/Family Long Term Goal  Description: Patient's Long Term Goal:     Interventions:  -   - See additional Care Plan goals for specific interventions  Outcome: Progressing  Goal: Patient/Family Short Term Goal  Aida Monterroso

## 2021-02-01 NOTE — PAYOR COMM NOTE
--------------  ADMISSION REVIEW     Payor: Jon Montero  Subscriber #:  [id number]  Authorization Number: N/A    Admit date: 1/28/21  Admit time: 2047         REVIEW DOCUMENTATION:     ED Provider Notes             Patient Seen in: [facility] Emergency acute distress. HEENT: Pupils equal round reactive to light, extraocular muscles are intact, there is no scleral icterus. Mucous membranes are moderately dry, oropharynx is clear, uvula midline. .  Scalp is atraumatic.   NECK: No midline cervical spine t Status                     ---------                               -----------         ------                     CBC W/ DIFFERENTIAL[[id number]]          Abnormal            Final result                 Please view results for these tests on the individua Location [facility] 4NW-A Attending Teresa Freitas MD   Hosp Day # 1 PCP Sandoval Lundberg MD     Date:  1/29/2021  Date of Admission:  1/28/2021    History of Present Illness:  Kaylie Lawler is a(n) 69year old female.  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   , Disp: , Rfl: , 1/27/2021 at 0900    •  ESCITALOPRAM 20 MG Oral Tab, Take 1 tablet (20 mg total) 01/28/2021       Imaging:  CT Scan    Assessment:    NEW ONSET RIGHT SIDED WEAKNESS WITH RIGHT FACIAL DROOP. PRESUMED RIGHT CVA. HYPOTENSION - ? ? HYPOVOLEMIC - STARTED HYDRATION. INRTACTABLE NAUSEA - ? PRESUMED GASTRITIS. M  ACUTE KIDNEY INJURY - ?  P Action Dose Route User    Discharged on 1/31/2021 1/31/2021 1027 Given 1 mg Oral Jarrod Maxwell RN      Heparin Sodium (Porcine) 5000 UNIT/ML injection 5,000 Units     Date Action Dose Route User    Discharged on 1/31/2021 1/31/2021 1303 Given 5,

## 2021-02-02 ENCOUNTER — TELEPHONE (OUTPATIENT)
Dept: HEMATOLOGY/ONCOLOGY | Facility: HOSPITAL | Age: 71
End: 2021-02-02

## 2021-02-02 LAB — COPPER, SERUM: 112.8 UG/DL

## 2021-02-02 NOTE — TELEPHONE ENCOUNTER
----- Message from Salome Forrester MD sent at 2/2/2021  7:48 AM CST -----  Hi patient discharged. She should follow up with me in 1 week.  Thanks

## 2021-02-04 LAB
CD10 CELLS NFR SPEC: 1 %
CD11C CELLS NFR SPEC: 5 %
CD14 CELLS NFR SPEC: 1 %
CD19 CELLS NFR SPEC: 17 %
CD19/CD10 CELLS: <1 %
CD20 CELLS NFR SPEC: 16 %
CD22 CELLS NFR SPEC: 15 %
CD23 CELLS NFR SPEC: 14 %
CD3 CELLS NFR SPEC: 80 %
CD3+CD4+ CELLS NFR SPEC: 50 %
CD3+CD4+ CELLS/CD3+CD8+ CLL SPEC: 1.7
CD3+CD8+ CELLS NFR SPEC: 29 %
CD33 CELLS NFR SPEC: 2 %
CD33/CD34 CELLS: 2 %
CD34 CELLS NFR SPEC: 20 %
CD45 CELLS NFR SPEC: 100 %
CD5 CELLS NFR SPEC: 80 %
CD5/CD19 CELLS: 1 %
CD56 CELLS NFR SPEC: 4 %
CD7 CELLS NFR SPEC: 77 %
CELL SURF KAPPA/LAMBDA RATIO: 1.5
CELL SURF LAMBDA LIGHT CHAIN: 6 %
CELL SURFACE KAPPA LIGHT CHAIN: 9 %
FMC7 CELLS NFR SPEC: 12 %

## 2021-02-04 NOTE — PROGRESS NOTES
[facility] Note    Visit Diagnosis:  Iron deficiency anemia due to chronic blood loss  (primary encounter diagnosis)    History of Present Illness:   70F with a PMH of Chronic pain, COPD, CAD, Peptic ulcers, COVID-19 (11/2020 s/p improvement in cell counts: WBC 3.0, Hb 9.7 and Plt 132. However, after the past few days she has noticed severe RLE pain and swelling. She also notes dyspnea at rest and with exertion. She does not have chest pain. She denies any bleeding or bruising. Take 1 tablet (4 mg total) by mouth every 8 (eight) hours as needed for Nausea., Disp: 30 tablet, Rfl: 1    •  Pantoprazole Sodium 20 MG Oral Tab EC, Take 1 tablet (20 mg total) by mouth every morning before breakfast., Disp: 30 tablet, Rfl: 2    •  [place] problem    • C. difficile diarrhea    • Cancer (HCC)     throat   • Cancer (HCC)     Breast   • Congestive heart disease (HCC)    • COPD (chronic obstructive pulmonary disease) (HCC)    • Esophageal reflux    • Exposure to medical diagnostic radiation    • (5') (02/08 1445)  Weight: 77.6 kg (171 lb) (02/08 1445)  BSA (Calculated - sq m): 1.75 sq meters (02/08 1445)  Pulse: 80 (02/08 1445)  BP: 155/67 (02/08 1445)  Temp: 98.2 °F (36.8 °C) (02/08 1445)  Do Not Use - Resp Rate: --  SpO2: 96 % (02/08 1445)     G normalize, I would recommend a bone marrow biopsy.      - Normal B12, Ferritin, TSH, LDH, Copper   - CTA Chest from 11/2020 normal  -  Peripheral flow unremarkable      Plan  - Repeat CBC in 2-4 weeks   - Bone marrow if no improvement   - Will need to repe

## 2021-02-08 ENCOUNTER — APPOINTMENT (OUTPATIENT)
Dept: CT IMAGING | Age: 71
End: 2021-02-08
Attending: EMERGENCY MEDICINE
Payer: MEDICARE

## 2021-02-08 ENCOUNTER — OFFICE VISIT (OUTPATIENT)
Dept: HEMATOLOGY/ONCOLOGY | Age: 71
End: 2021-02-08
Attending: INTERNAL MEDICINE
Payer: MEDICARE

## 2021-02-08 ENCOUNTER — HOSPITAL ENCOUNTER (OUTPATIENT)
Facility: HOSPITAL | Age: 71
Setting detail: OBSERVATION
LOS: 1 days | Discharge: HOME | End: 2021-02-10
Attending: EMERGENCY MEDICINE | Admitting: INTERNAL MEDICINE
Payer: MEDICARE

## 2021-02-08 ENCOUNTER — APPOINTMENT (OUTPATIENT)
Dept: ULTRASOUND IMAGING | Age: 71
End: 2021-02-08
Attending: EMERGENCY MEDICINE
Payer: MEDICARE

## 2021-02-08 ENCOUNTER — APPOINTMENT (OUTPATIENT)
Dept: GENERAL RADIOLOGY | Age: 71
End: 2021-02-08
Attending: EMERGENCY MEDICINE
Payer: MEDICARE

## 2021-02-08 VITALS
DIASTOLIC BLOOD PRESSURE: 84 MMHG | BODY MASS INDEX: 34 KG/M2 | RESPIRATION RATE: 18 BRPM | OXYGEN SATURATION: 91 % | WEIGHT: 174.38 LBS | HEART RATE: 76 BPM | TEMPERATURE: 98 F | SYSTOLIC BLOOD PRESSURE: 140 MMHG

## 2021-02-08 DIAGNOSIS — R77.8 ELEVATED TROPONIN: ICD-10-CM

## 2021-02-08 DIAGNOSIS — D50.0 IRON DEFICIENCY ANEMIA DUE TO CHRONIC BLOOD LOSS: Primary | ICD-10-CM

## 2021-02-08 DIAGNOSIS — I50.9 ACUTE ON CHRONIC CONGESTIVE HEART FAILURE, UNSPECIFIED HEART FAILURE TYPE (HCC): ICD-10-CM

## 2021-02-08 DIAGNOSIS — R06.00 DOE (DYSPNEA ON EXERTION): Primary | ICD-10-CM

## 2021-02-08 PROBLEM — D69.6 THROMBOCYTOPENIA (HCC): Status: ACTIVE | Noted: 2021-02-08

## 2021-02-08 PROBLEM — R06.09 DOE (DYSPNEA ON EXERTION): Status: ACTIVE | Noted: 2021-02-08

## 2021-02-08 PROBLEM — D64.9 ANEMIA: Status: ACTIVE | Noted: 2021-02-08

## 2021-02-08 LAB
ALBUMIN SERPL-MCNC: 3.2 G/DL (ref 3.4–5)
ALBUMIN/GLOB SERPL: 1.2 {RATIO} (ref 1–2)
ALP LIVER SERPL-CCNC: 60 U/L
ALT SERPL-CCNC: 21 U/L
ANION GAP SERPL CALC-SCNC: 4 MMOL/L (ref 0–18)
APTT PPP: 25.4 SECONDS (ref 25.4–36.1)
AST SERPL-CCNC: 23 U/L (ref 15–37)
ATRIAL RATE: 81 BPM
BASOPHILS # BLD AUTO: 0.02 X10(3) UL (ref 0–0.2)
BASOPHILS # BLD AUTO: 0.02 X10(3) UL (ref 0–0.2)
BASOPHILS NFR BLD AUTO: 0.7 %
BASOPHILS NFR BLD AUTO: 0.7 %
BILIRUB SERPL-MCNC: 0.5 MG/DL (ref 0.1–2)
BUN BLD-MCNC: 10 MG/DL (ref 7–18)
BUN/CREAT SERPL: 9.3 (ref 10–20)
CALCIUM BLD-MCNC: 8.6 MG/DL (ref 8.5–10.1)
CHLORIDE SERPL-SCNC: 109 MMOL/L (ref 98–112)
CO2 SERPL-SCNC: 28 MMOL/L (ref 21–32)
CREAT BLD-MCNC: 1.08 MG/DL
DEPRECATED RDW RBC AUTO: 52.7 FL (ref 35.1–46.3)
DEPRECATED RDW RBC AUTO: 52.8 FL (ref 35.1–46.3)
EOSINOPHIL # BLD AUTO: 0.03 X10(3) UL (ref 0–0.7)
EOSINOPHIL # BLD AUTO: 0.05 X10(3) UL (ref 0–0.7)
EOSINOPHIL NFR BLD AUTO: 1 %
EOSINOPHIL NFR BLD AUTO: 1.7 %
ERYTHROCYTE [DISTWIDTH] IN BLOOD BY AUTOMATED COUNT: 15.6 % (ref 11–15)
ERYTHROCYTE [DISTWIDTH] IN BLOOD BY AUTOMATED COUNT: 15.7 % (ref 11–15)
GLOBULIN PLAS-MCNC: 2.6 G/DL (ref 2.8–4.4)
GLUCOSE BLD-MCNC: 119 MG/DL (ref 70–99)
HCT VFR BLD AUTO: 28.9 %
HCT VFR BLD AUTO: 30.9 %
HGB BLD-MCNC: 9.3 G/DL
HGB BLD-MCNC: 9.7 G/DL
IMM GRANULOCYTES # BLD AUTO: 0.03 X10(3) UL (ref 0–1)
IMM GRANULOCYTES # BLD AUTO: 0.04 X10(3) UL (ref 0–1)
IMM GRANULOCYTES NFR BLD: 1 %
IMM GRANULOCYTES NFR BLD: 1.3 %
INR BLD: 1.05 (ref 0.88–1.11)
LYMPHOCYTES # BLD AUTO: 0.45 X10(3) UL (ref 1–4)
LYMPHOCYTES # BLD AUTO: 0.53 X10(3) UL (ref 1–4)
LYMPHOCYTES NFR BLD AUTO: 14.9 %
LYMPHOCYTES NFR BLD AUTO: 17.6 %
M PROTEIN MFR SERPL ELPH: 5.8 G/DL (ref 6.4–8.2)
MCH RBC QN AUTO: 29.7 PG (ref 26–34)
MCH RBC QN AUTO: 30.3 PG (ref 26–34)
MCHC RBC AUTO-ENTMCNC: 31.4 G/DL (ref 31–37)
MCHC RBC AUTO-ENTMCNC: 32.2 G/DL (ref 31–37)
MCV RBC AUTO: 94.1 FL
MCV RBC AUTO: 94.5 FL
MONOCYTES # BLD AUTO: 0.3 X10(3) UL (ref 0.1–1)
MONOCYTES # BLD AUTO: 0.32 X10(3) UL (ref 0.1–1)
MONOCYTES NFR BLD AUTO: 10 %
MONOCYTES NFR BLD AUTO: 10.6 %
NEUTROPHILS # BLD AUTO: 2.1 X10 (3) UL (ref 1.5–7.7)
NEUTROPHILS # BLD AUTO: 2.1 X10(3) UL (ref 1.5–7.7)
NEUTROPHILS # BLD AUTO: 2.15 X10 (3) UL (ref 1.5–7.7)
NEUTROPHILS # BLD AUTO: 2.15 X10(3) UL (ref 1.5–7.7)
NEUTROPHILS NFR BLD AUTO: 69.7 %
NEUTROPHILS NFR BLD AUTO: 70.8 %
NT-PROBNP SERPL-MCNC: ABNORMAL PG/ML (ref ?–125)
OSMOLALITY SERPL CALC.SUM OF ELEC: 292 MOSM/KG (ref 275–295)
P AXIS: 61 DEGREES
P-R INTERVAL: 158 MS
PLATELET # BLD AUTO: 127 10(3)UL (ref 150–450)
PLATELET # BLD AUTO: 132 10(3)UL (ref 150–450)
POTASSIUM SERPL-SCNC: 3.6 MMOL/L (ref 3.5–5.1)
PSA SERPL DL<=0.01 NG/ML-MCNC: 13.6 SECONDS (ref 12–14.3)
Q-T INTERVAL: 356 MS
QRS DURATION: 76 MS
QTC CALCULATION (BEZET): 413 MS
R AXIS: -11 DEGREES
RBC # BLD AUTO: 3.07 X10(6)UL
RBC # BLD AUTO: 3.27 X10(6)UL
SARS-COV-2 RNA RESP QL NAA+PROBE: NOT DETECTED
SODIUM SERPL-SCNC: 141 MMOL/L (ref 136–145)
T AXIS: 69 DEGREES
TROPONIN I SERPL-MCNC: 0.18 NG/ML (ref ?–0.04)
VENTRICULAR RATE: 81 BPM
WBC # BLD AUTO: 3 X10(3) UL (ref 4–11)
WBC # BLD AUTO: 3 X10(3) UL (ref 4–11)

## 2021-02-08 PROCEDURE — 80053 COMPREHEN METABOLIC PANEL: CPT | Performed by: EMERGENCY MEDICINE

## 2021-02-08 PROCEDURE — 99285 EMERGENCY DEPT VISIT HI MDM: CPT

## 2021-02-08 PROCEDURE — 71045 X-RAY EXAM CHEST 1 VIEW: CPT | Performed by: EMERGENCY MEDICINE

## 2021-02-08 PROCEDURE — 71275 CT ANGIOGRAPHY CHEST: CPT | Performed by: EMERGENCY MEDICINE

## 2021-02-08 PROCEDURE — 85025 COMPLETE CBC W/AUTO DIFF WBC: CPT | Performed by: EMERGENCY MEDICINE

## 2021-02-08 PROCEDURE — 99204 OFFICE O/P NEW MOD 45 MIN: CPT | Performed by: INTERNAL MEDICINE

## 2021-02-08 PROCEDURE — 84484 ASSAY OF TROPONIN QUANT: CPT | Performed by: EMERGENCY MEDICINE

## 2021-02-08 PROCEDURE — 85730 THROMBOPLASTIN TIME PARTIAL: CPT | Performed by: EMERGENCY MEDICINE

## 2021-02-08 PROCEDURE — 85610 PROTHROMBIN TIME: CPT | Performed by: EMERGENCY MEDICINE

## 2021-02-08 PROCEDURE — 93010 ELECTROCARDIOGRAM REPORT: CPT

## 2021-02-08 PROCEDURE — 96374 THER/PROPH/DIAG INJ IV PUSH: CPT

## 2021-02-08 PROCEDURE — 96376 TX/PRO/DX INJ SAME DRUG ADON: CPT

## 2021-02-08 PROCEDURE — 93005 ELECTROCARDIOGRAM TRACING: CPT

## 2021-02-08 PROCEDURE — 96375 TX/PRO/DX INJ NEW DRUG ADDON: CPT

## 2021-02-08 PROCEDURE — 93971 EXTREMITY STUDY: CPT | Performed by: EMERGENCY MEDICINE

## 2021-02-08 PROCEDURE — 83880 ASSAY OF NATRIURETIC PEPTIDE: CPT | Performed by: EMERGENCY MEDICINE

## 2021-02-08 PROCEDURE — 70450 CT HEAD/BRAIN W/O DYE: CPT | Performed by: EMERGENCY MEDICINE

## 2021-02-08 PROCEDURE — 99214 OFFICE O/P EST MOD 30 MIN: CPT | Performed by: INTERNAL MEDICINE

## 2021-02-08 RX ORDER — HYDROMORPHONE HYDROCHLORIDE 1 MG/ML
0.5 INJECTION, SOLUTION INTRAMUSCULAR; INTRAVENOUS; SUBCUTANEOUS EVERY 30 MIN PRN
Status: DISCONTINUED | OUTPATIENT
Start: 2021-02-08 | End: 2021-02-09

## 2021-02-08 RX ORDER — FUROSEMIDE 10 MG/ML
40 INJECTION INTRAMUSCULAR; INTRAVENOUS ONCE
Status: COMPLETED | OUTPATIENT
Start: 2021-02-08 | End: 2021-02-08

## 2021-02-08 RX ORDER — ASPIRIN 81 MG/1
324 TABLET, CHEWABLE ORAL ONCE
Status: COMPLETED | OUTPATIENT
Start: 2021-02-08 | End: 2021-02-08

## 2021-02-08 NOTE — ED PROVIDER NOTES
[facility] [place]      History   Patient presents with:  Headache  Difficulty Breathing    Stated Complaint: headaches started 1 week ago,  sob started 3 days ago    HPI/Subjective:   HPI    Patient is a 65-year-o No pertinent past surgical history. History no history of premature coronary disease, history of PE DVT    No pertinent social history. Former 55-year smoker. No alcohol.     Review of Systems    Positive for stated complaint: headach for the following components:    Troponin 0.181 (*)     All other components within normal limits   PRO BETA NATRIURETIC PEPTIDE - Abnormal; Notable for the following components:    Pro-Beta Natriuretic Peptide 17,631 (*)     All other components within no with [address] [place] [address] cardiology. Patient be admitted for further evaluation.   Admission disposition: 2/8/2021  7:03 PM                              Disposition and Plan     Clinical Impression:  BAI (dyspnea on exertion)  (primary encounter diagnosis)  Elevated t

## 2021-02-08 NOTE — PROGRESS NOTES
Education Record    Learner:  Patient and Family Member    Disease / Lor Ellie labs     Barriers / Limitations:  None   Comments:    Method:  Discussion   Comments:    General Topics:  Plan of care reviewed   Comments:    Outcome:  Shows [place]

## 2021-02-08 NOTE — ED INITIAL ASSESSMENT (HPI)
C/o right leg swelling - was in hospital about 1 week ago for stroke - also c/o HA - SOB on exertion

## 2021-02-09 ENCOUNTER — APPOINTMENT (OUTPATIENT)
Dept: CV DIAGNOSTICS | Facility: HOSPITAL | Age: 71
End: 2021-02-09
Attending: INTERNAL MEDICINE
Payer: MEDICARE

## 2021-02-09 LAB
NT-PROBNP SERPL-MCNC: ABNORMAL PG/ML (ref ?–125)
POTASSIUM SERPL-SCNC: 4.7 MMOL/L (ref 3.5–5.1)
TROPONIN I SERPL-MCNC: 0.1 NG/ML (ref ?–0.04)

## 2021-02-09 PROCEDURE — 84484 ASSAY OF TROPONIN QUANT: CPT | Performed by: NURSE PRACTITIONER

## 2021-02-09 PROCEDURE — 96375 TX/PRO/DX INJ NEW DRUG ADDON: CPT

## 2021-02-09 PROCEDURE — 83880 ASSAY OF NATRIURETIC PEPTIDE: CPT | Performed by: NURSE PRACTITIONER

## 2021-02-09 PROCEDURE — 93306 TTE W/DOPPLER COMPLETE: CPT | Performed by: INTERNAL MEDICINE

## 2021-02-09 PROCEDURE — 94640 AIRWAY INHALATION TREATMENT: CPT

## 2021-02-09 PROCEDURE — 84132 ASSAY OF SERUM POTASSIUM: CPT | Performed by: INTERNAL MEDICINE

## 2021-02-09 PROCEDURE — 96376 TX/PRO/DX INJ SAME DRUG ADON: CPT

## 2021-02-09 RX ORDER — ONDANSETRON 4 MG/1
4 TABLET, ORALLY DISINTEGRATING ORAL EVERY 6 HOURS PRN
Status: DISCONTINUED | OUTPATIENT
Start: 2021-02-09 | End: 2021-02-10

## 2021-02-09 RX ORDER — ATORVASTATIN CALCIUM 40 MG/1
40 TABLET, FILM COATED ORAL NIGHTLY
Status: DISCONTINUED | OUTPATIENT
Start: 2021-02-09 | End: 2021-02-10

## 2021-02-09 RX ORDER — POTASSIUM CHLORIDE 20 MEQ/1
40 TABLET, EXTENDED RELEASE ORAL EVERY 4 HOURS
Status: COMPLETED | OUTPATIENT
Start: 2021-02-09 | End: 2021-02-09

## 2021-02-09 RX ORDER — HYDROMORPHONE HYDROCHLORIDE 1 MG/ML
0.5 INJECTION, SOLUTION INTRAMUSCULAR; INTRAVENOUS; SUBCUTANEOUS EVERY 2 HOUR PRN
Status: DISCONTINUED | OUTPATIENT
Start: 2021-02-09 | End: 2021-02-10

## 2021-02-09 RX ORDER — ONDANSETRON 2 MG/ML
4 INJECTION INTRAMUSCULAR; INTRAVENOUS EVERY 6 HOURS PRN
Status: DISCONTINUED | OUTPATIENT
Start: 2021-02-09 | End: 2021-02-10

## 2021-02-09 RX ORDER — VANCOMYCIN HYDROCHLORIDE 125 MG/1
125 CAPSULE ORAL EVERY 6 HOURS SCHEDULED
Status: DISCONTINUED | OUTPATIENT
Start: 2021-02-09 | End: 2021-02-10

## 2021-02-09 RX ORDER — PANTOPRAZOLE SODIUM 20 MG/1
20 TABLET, DELAYED RELEASE ORAL
Status: DISCONTINUED | OUTPATIENT
Start: 2021-02-09 | End: 2021-02-10

## 2021-02-09 RX ORDER — HYDROMORPHONE HYDROCHLORIDE 1 MG/ML
1 INJECTION, SOLUTION INTRAMUSCULAR; INTRAVENOUS; SUBCUTANEOUS EVERY 2 HOUR PRN
Status: DISCONTINUED | OUTPATIENT
Start: 2021-02-09 | End: 2021-02-10

## 2021-02-09 RX ORDER — EZETIMIBE 10 MG/1
10 TABLET ORAL DAILY
Status: DISCONTINUED | OUTPATIENT
Start: 2021-02-09 | End: 2021-02-10

## 2021-02-09 RX ORDER — FUROSEMIDE 10 MG/ML
40 INJECTION INTRAMUSCULAR; INTRAVENOUS DAILY
Status: DISCONTINUED | OUTPATIENT
Start: 2021-02-09 | End: 2021-02-10

## 2021-02-09 RX ORDER — ASPIRIN 81 MG/1
81 TABLET ORAL DAILY
Status: DISCONTINUED | OUTPATIENT
Start: 2021-02-09 | End: 2021-02-10

## 2021-02-09 RX ORDER — ZOLPIDEM TARTRATE 5 MG/1
5 TABLET ORAL NIGHTLY PRN
Status: DISCONTINUED | OUTPATIENT
Start: 2021-02-09 | End: 2021-02-10

## 2021-02-09 RX ORDER — LEVOTHYROXINE SODIUM 0.03 MG/1
25 TABLET ORAL
Status: DISCONTINUED | OUTPATIENT
Start: 2021-02-09 | End: 2021-02-10

## 2021-02-09 RX ORDER — FLUTICASONE PROPIONATE 50 MCG
1 SPRAY, SUSPENSION (ML) NASAL DAILY
Status: DISCONTINUED | OUTPATIENT
Start: 2021-02-09 | End: 2021-02-10

## 2021-02-09 RX ORDER — FOLIC ACID 1 MG/1
1 TABLET ORAL DAILY
Status: DISCONTINUED | OUTPATIENT
Start: 2021-02-09 | End: 2021-02-10

## 2021-02-09 RX ORDER — ALBUTEROL SULFATE 90 UG/1
2 AEROSOL, METERED RESPIRATORY (INHALATION) EVERY 4 HOURS PRN
Status: DISCONTINUED | OUTPATIENT
Start: 2021-02-09 | End: 2021-02-10

## 2021-02-09 RX ORDER — LISINOPRIL 20 MG/1
20 TABLET ORAL DAILY
Status: DISCONTINUED | OUTPATIENT
Start: 2021-02-09 | End: 2021-02-10

## 2021-02-09 RX ORDER — ESCITALOPRAM OXALATE 20 MG/1
20 TABLET ORAL DAILY
Status: DISCONTINUED | OUTPATIENT
Start: 2021-02-09 | End: 2021-02-10

## 2021-02-09 NOTE — CM/SW NOTE
Patient failed inpatient criteria. Second level of review completed and supports observation. UR committee in agreement. Discussed with Dr. Alex Rodriges who approves observation status. MOON given to the patient and order written.

## 2021-02-09 NOTE — H&P
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 Physical Exam:  Vital signs: Blood pressure 146/59, pulse 82, temperature 98.4 °F (36.9 °C), temperature source Oral, resp. rate 20, height 5' (1.524 m), weight 167 lb (75.8 kg), SpO2 94 %. General: No acute distress.  Al is noted. CONCLUSION:  Bowel gas pattern is unremarkable.    Dictated by (CST): Oniel Farr MD on 1/30/2021 at 5:41 PM     Finalized by (CST): Oneil Farr MD on 1/30/2021 at 5:42 PM       Xr Cervical Spine (4views) (cpt=72050)    Re cerebral atrophy with symmetric prominence of the ventricles. There are patchy areas of low attenuation in the periventricular and deep white matter which are nonspecific but most consistent with small vessel ischemic changes.  There is no evidence of hemor within the deep white matter. If an acute infarct is of high clinical concern, recommend MRI for further evaluation.     Dictated by (CST): Roosevelt Akbar MD on 1/28/2021 at 3:39 PM     Finalized by (CST): Roosevelt Akbar MD on 1/28/2021 at 3:41 PM       Ct Angiogr (CPT=72125)  COMPARISON:  None. INDICATIONS:  PAIN, WEIGHT LOSS  TECHNIQUE:  Noncontrast CT scanning of the cervical spine is performed from the skull base through C7. Multiplanar reconstructions are generated. Dose reduction techniques were used.  Dose Complete (cpt=76700)    Result Date: 1/30/2021  PROCEDURE:  US ABDOMEN COMPLETE (CPT=76700)  COMPARISON:  None. INDICATIONS:  pancytopenia  TECHNIQUE:  Real time gray-scale ultrasound was used to evaluate the abdomen.   The exam includes images of the live femoral, popliteal, sapheno-femoral junction, posterior tibial veins, and the contralateral common femoral vein. PATIENT STATED HISTORY: (As transcribed by Technologist)  Patient is having right leg pain and swelling for two days.   She recently had a stro history at this time. FINDINGS:  Lungs are clear with hyperaeration of the upper lobes, appearing similar to the previous study. Cardiac silhouette is within normal limits for size. Normal pulmonary vasculature.   Mild atherosclerotic calcifications of are patent. The gray-white matter differentiation is intact. There is no acute intracranial hemorrhage or extra-axial fluid collection. There is no evident fracture. The visualized paranasal sinuses and mastoid air cells are unremarkable.   Postoperati atherosclerotic irregularity is seen within the left vertebral artery. Degenerative changes in the spine. Postoperative changes in the spine. Possible dental caries noted. Scattered atelectasis in the lung apices.   Emphysematous changes are noted withi ascending colon     History of blood clots     C. difficile diarrhea     Gastroesophageal reflux disease without esophagitis     High blood pressure     Stroke ([facility]     Heart attack (HCC)     Hyperlipidemia     Localized swelling of both lower legs     Co

## 2021-02-09 NOTE — CONSULTS
[facility] - CARDIOLOGY CONSULTATION    Katie Ruth Patient Status:  Inpatient    1950 MRN [id number]   [facility] 8NE-A Attending Shelly Dean MD   Hosp Day # 0 PCP Collette Babinski, MD     Consulting Physician: 11/13/2018      Anxiety and depression         Date Noted: 11/13/2018      Chronic pain disorder         Date Noted: 11/13/2018      Age-related osteoporosis without current pathological fracture         Date Noted: 11/13/2018      Atherosclerosis of coron without exacerbation ([facility]    • Personal history of antineoplastic chemotherapy    • Polyp of ascending colon    • Stroke [facility])     tia   • Visual impairment     who presents with Patient presents with:  Headache  Difficulty Breathing  .  68 yo F  With a PM • C. difficile diarrhea    • Cancer ([facility]     throat   • Cancer (HCC)     Breast   • Congestive heart disease (HCC)    • COPD (chronic obstructive pulmonary disease) (HCC)    • Esophageal reflux    • Exposure to medical diagnostic radiation    • Hearing (500,000 Units total) by mouth 4 (four) times daily. , Disp: 60 mL, Rfl: 0    •  LEVOTHYROXINE SODIUM 25 MCG Oral Tab, Take 1 tablet (25 mcg total) by mouth before breakfast., Disp: 30 tablet, Rfl: 0    •  lisinopril 20 MG Oral Tab, Take 1 tablet (20 mg tot Suspension, 1 spray by Nasal route daily. , Disp: , Rfl:     •  INCRUSE ELLIPTA 62.5 MCG/INH Inhalation Aerosol Powder, Breath Activated, Inhale 1 puff into the lungs daily.   , Disp: , Rfl:     •  TiZANidine HCl 4 MG Oral Tab, Take 4 mg by mouth 4 (four) Right lower extremity    THROMBI:  None visible. COMPRESSION:  Normal compressibility, phasicity, and augmentation.     OTHER:  Negative.                               =====    CONCLUSION:  No acute deep vein thrombosis                   Dictated by (CST 2. Emphysematous changes of the aorta.                 Dictated by (CST): China Milton MD on 2/08/2021 at 6:07 PM         Finalized by (CST): China Milton MD on 2/08/2021 at 6:12 PM        [address] [address] ([id number])   Final Result    PROCEDURE:  C XR, XR CHEST AP PORTABLE  (CPT=71045), 1/28/2021,     2:29 PM.  EDWARD , XR, XR CHEST AP PORTABLE  (CPT=71045), 11/11/2020, 6:47     PM.         INDICATIONS:  headaches started 1 week ago,  sob started 3 days ago         PATIENT STATED HISTORY: (As transcr

## 2021-02-09 NOTE — PLAN OF CARE
Received patient this am aaox4 in bed  C/o headache- IV dilaudid with some brief relief ordered q 2 hr PRN  SR on tele  Room air  +2 bilateral lower leg edema and general edema to upper extremities  2d echo this am   Iv lasix as ordered  Up in chair, keny

## 2021-02-09 NOTE — PLAN OF CARE
Assumed care of the patient at 2300. Patient alert and oriented x4. RA with diminished lung sounds with no cough. NSR with no cardiac symptoms. No N/V/D. C/o headache, see MAR. Bed at the lowest position. Call light within reach.            Problem: CARDIOV Monitor labs and vital signs for trends  - Administer supportive blood products/factors, fluids and medications as ordered and appropriate  - Administer supportive blood products/factors as ordered and appropriate  Outcome: Progressing

## 2021-02-09 NOTE — RESPIRATORY THERAPY NOTE
Pt states that she used to use a CPAP at home but was not comfortable with it.  Patient denies to use CPAP while staying at the hospital.

## 2021-02-09 NOTE — PROGRESS NOTES
02/09/21 4019   Over the last 2 weeks, how often have you been bothered by any of the following problems?    Little interest or pleasure in doing things 3   Feeling down, depressed, or hopeless 3   Trouble falling or staying asleep, or sleeping too much or therapist.     O(objective) Pt presents as alert and oriented, with typical affect and speech.     A(assessment) Pt PHQ-9 score is 14. Pt reports son is very supportive.  Pt reports that he provides her with the things that she needs and checks in on her

## 2021-02-10 VITALS
HEART RATE: 79 BPM | WEIGHT: 167.56 LBS | DIASTOLIC BLOOD PRESSURE: 46 MMHG | OXYGEN SATURATION: 86 % | SYSTOLIC BLOOD PRESSURE: 136 MMHG | TEMPERATURE: 98 F | RESPIRATION RATE: 20 BRPM | BODY MASS INDEX: 32.89 KG/M2 | HEIGHT: 60 IN

## 2021-02-10 LAB
ANA SCREEN: NEGATIVE
ANION GAP SERPL CALC-SCNC: 2 MMOL/L (ref 0–18)
BUN BLD-MCNC: 8 MG/DL (ref 7–18)
BUN/CREAT SERPL: 5.4 (ref 10–20)
CALCIUM BLD-MCNC: 8.6 MG/DL (ref 8.5–10.1)
CHLORIDE SERPL-SCNC: 103 MMOL/L (ref 98–112)
CO2 SERPL-SCNC: 32 MMOL/L (ref 21–32)
CREAT BLD-MCNC: 1.49 MG/DL
GLUCOSE BLD-MCNC: 115 MG/DL (ref 70–99)
HAV IGM SER QL: 1.6 MG/DL (ref 1.6–2.6)
OSMOLALITY SERPL CALC.SUM OF ELEC: 283 MOSM/KG (ref 275–295)
POTASSIUM SERPL-SCNC: 4.6 MMOL/L (ref 3.5–5.1)
SODIUM SERPL-SCNC: 137 MMOL/L (ref 136–145)

## 2021-02-10 PROCEDURE — 76937 US GUIDE VASCULAR ACCESS: CPT

## 2021-02-10 PROCEDURE — 83735 ASSAY OF MAGNESIUM: CPT | Performed by: NURSE PRACTITIONER

## 2021-02-10 PROCEDURE — 80048 BASIC METABOLIC PNL TOTAL CA: CPT | Performed by: NURSE PRACTITIONER

## 2021-02-10 PROCEDURE — 36410 VNPNXR 3YR/> PHY/QHP DX/THER: CPT

## 2021-02-10 PROCEDURE — 96376 TX/PRO/DX INJ SAME DRUG ADON: CPT

## 2021-02-10 PROCEDURE — 99214 OFFICE O/P EST MOD 30 MIN: CPT | Performed by: INTERNAL MEDICINE

## 2021-02-10 RX ORDER — FUROSEMIDE 20 MG/1
20 TABLET ORAL DAILY
Qty: 30 TABLET | Refills: 3 | Status: SHIPPED | OUTPATIENT
Start: 2021-02-11 | End: 2021-03-09

## 2021-02-10 NOTE — PROGRESS NOTES
02/10/21 1407 02/10/21 1408 02/10/21 1409   Oxygen Therapy   SpO2 (!) 88 % (!) 82 % (!) 87 %   O2 Device None (Room air) None (Room air) None (Room air)      02/10/21 1410 02/10/21 1411 02/10/21 1412   Oxygen Therapy   SpO2 (!) 86 % (!) 87 % (!) 89 %

## 2021-02-10 NOTE — PLAN OF CARE
AOx4. Neuro check intact. Up with standby assist.  NSR on cardiac monitor. Desaturates to mid 90's on RA intermittently, with exertion, and sleep. Notified hospitalist. 02 prn prescribed for discharge and sleep study. Lung sounds diminished.  Reports marvin including rhythm and repeat lab results as appropriate  - Fluid restriction as ordered  - Instruct patient on fluid and nutrition restrictions as appropriate  Outcome: Progressing     Problem: HEMATOLOGIC - ADULT  Goal: Maintains hematologic stability  Ghulam

## 2021-02-10 NOTE — PROGRESS NOTES
[facility]  Cardiology Progress Note    Jake Shearer Patient Status:  Observation    1950 MRN [id number]   [facility] 8NE-A Attending Rosmery Minaya MD   Hosp Day # 1 PCP Yaya Chi MD       Subjective:  Sitting up Furoate-Vilanterol  1 puff Inhalation Daily   • folic acid  1 mg Oral Daily   • Fluticasone Propionate  1 spray Nasal Daily   • Umeclidinium Bromide  1 puff Inhalation Daily   • Levothyroxine Sodium  25 mcg Oral Before breakfast   • lisinopril  20 mg Oral am.   · Troponin mildly elevated 0.181-->0.096. Discussed plan of care with patient and nursing. Savanah Ramon, APRN  2/10/2021  10:09 AM    Seen and examined. Notes reviewed. [facility]     Feels breathing is better ye

## 2021-02-10 NOTE — DISCHARGE SUMMARY
[facility]    Discharge Summary    Raciel Livegiovanni Patient Status:  Observation    1950 MRN [id number]   [facility] 8NE-A Attending Sheryl Graff MD   Hosp Day # 1 PCP Kosta Hargrove MD     Date of Admission:  2021  D MD.    INDICATIONS:  This is a left heart catheterization, coronary angiogram via the right radial artery in a patient with unstable angina and abnormal EKG.     DESCRIPTION OF PROCEDURE:  After obtaining informed consent, the patient was brought to the car as: LASIX  Start taking on: February 11, 2021      Take 1 tablet (20 mg total) by mouth daily.    Quantity: 30 tablet  Refills: 3        CONTINUE taking these medications      Instructions Prescription details   aspirin 81 MG Tbec      Take 81 mg by mouth d capsule  Refills: 0        STOP taking these medications    Acidophilus/Pectin Caps  Commonly known as: PROBIOTIC        Albuterol Sulfate  (90 Base) MCG/ACT Aers        amLODIPine Besylate 5 MG Tabs  Commonly known as: NORVASC        nystatin 15976

## 2021-02-10 NOTE — PROGRESS NOTES
[facility]    Progress Note    Randee Hart Patient Status:  Observation    1950 MRN [id number]   [facility] 8NE-A Attending Anila Rutledge MD   Hosp Day # 1 PCP Nicole Jorge MD       SUBJECTIVE:    Doing ok.    feel sedation was administered and carefully monitored. Time for initiation and end of my personal supervision was 8:22 to 8:43.         MARINA/[id number]  DD:  05/08/2018 08:49:23  DT:  05/08/2018 13:24:43  Job #:  [id number]    Signature Line  [Electr Nightly    •  escitalopram (LEXAPRO) tab 20 mg, 20 mg, Oral, Daily    •  ezetimibe (ZETIA) tab 10 mg, 10 mg, Oral, Daily    •  Fluticasone Furoate-Vilanterol (BREO ELLIPTA) 200-25 MCG/INH inhaler 1 puff, 1 puff, Inhalation, Daily    •  folic acid (FOLVITE) bronchitis ([facility]     Asthma     Cancer ([facility]     Congestive heart disease (HCC)     COPD (chronic obstructive pulmonary disease) (HCC)     Polyp of ascending colon     History of blood clots     C. difficile diarrhea     Gastroesophageal reflux disease wit

## 2021-02-10 NOTE — DIETARY NOTE
[facility]   CLINICAL NUTRITION    Vladislav Thomson     Admitting diagnosis:  BAI (dyspnea on exertion) [R06.00]  Elevated troponin [R77.8]  Acute on chronic congestive heart failure, unspecified heart failure type ([facility] [I50.9]    Ht: 152.4 cm (5')  Wt:

## 2021-02-10 NOTE — PLAN OF CARE
----- Message from Gianfranco De La Vega MD sent at 3/12/2017  7:09 PM CDT -----  [place], of note, total bilirubin elevated a bit.  ? Significance.  Recommend checking lft's (nonfasting) in 2 weeks to see if persists.  shari   AOx4. Calm, cooperative. Up independently. NSR on cardiac monitor. Adequate saturation on RA. Lung sounds diminished. Denies sob, chest discomfort. C/o headache. Voiding without difficulty. LBM 2/8. Hourly and prn rounding. Bed at the lowest position.  Ca symptoms of bleeding or hemorrhage  - Monitor labs and vital signs for trends  - Administer supportive blood products/factors, fluids and medications as ordered and appropriate  - Administer supportive blood products/factors as ordered and appropriate  Out

## 2021-02-10 NOTE — CM/SW NOTE
Oxygen order noted--sent to [facility] via deborah; await response    [facility] to provide oxygen to patient. Requested RT to bring tank to bedside.   Patient to call [facility] [phone] when leaving the hospital so that supplies can be delivered to her residence

## 2021-02-11 NOTE — DISCHARGE PLANNING
Discharge     Discharge d/w son Kassie Phillips [employer] [facility]) at bedside. Patient cleared for discharge by all services. Discharge education and handout provided to patient.   Additional information handouts given on heart healthy diet, going home with O2, and new medi

## 2021-02-15 ENCOUNTER — OFFICE VISIT (OUTPATIENT)
Dept: SURGERY | Facility: CLINIC | Age: 71
End: 2021-02-15
Payer: MEDICARE

## 2021-02-15 VITALS
SYSTOLIC BLOOD PRESSURE: 122 MMHG | BODY MASS INDEX: 32.59 KG/M2 | HEIGHT: 60 IN | DIASTOLIC BLOOD PRESSURE: 76 MMHG | WEIGHT: 166 LBS

## 2021-02-15 DIAGNOSIS — I67.1 CEREBRAL ANEURYSM WITHOUT RUPTURE: Primary | ICD-10-CM

## 2021-02-15 PROCEDURE — 99213 OFFICE O/P EST LOW 20 MIN: CPT | Performed by: RADIOLOGY

## 2021-02-15 NOTE — H&P
[facility]  Interventional Neuroradiology Clinic Note        Neurology  [facility] Hector Velásquez MD  Primary Care      Date of Service: 2/15/2021    Dear Wesley Woodruff,     We had the pleasure of seeing Josefa Pandya in the I History:  Past Medical History:   Diagnosis Date   • Asthma    • Atherosclerosis of coronary artery    • Back problem    • C. difficile diarrhea    • Cancer (HCC)     throat   • Cancer (HCC)     Breast   • Congestive heart disease (HCC)    • COPD (chronic diseases    Medications:    Current Outpatient Medications:   •  furosemide 20 MG Oral Tab, Take 1 tablet (20 mg total) by mouth daily. , Disp: 30 tablet, Rfl: 3  •  LEVOTHYROXINE SODIUM 25 MCG Oral Tab, Take 1 tablet (25 mcg total) by mouth before breakfas ARREST  Penicillins             NAUSEA AND VOMITING  Percocet [Oxycodone*    UNKNOWN    Physical Exam:  /76   Ht 60\"   Wt 166 lb (75.3 kg)   BMI 32.42 kg/m²     The patient's NIH stroke scale is 0 , modified Fatuma Score is 0        Imaging:   CTA B interesting patient. Please do not hesitate to contact us with any further questions or concerns.      Sincerely,       FRANSICO Kelly  Patient seen together with Dr. Sissy Burns    2/15/2021 2:40 PM      I spent approximately 45 minutes w

## 2021-02-15 NOTE — PROGRESS NOTES
76yo, [facility], smoker, multiple vascular risk factors. 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 No other intracranial vascular lesion. Estimated 0.5% per annum rupture risk.  Risk of treatment at least 5% of disabling complic

## 2021-02-15 NOTE — PROGRESS NOTES
Patient is here for consult referred for intracranial aneurysm. She was admitted to the hospital on 2-8 for congestive heart failure. A CT scan showed saccular aneurysm of intracranial vessels.   Today she and her son state they do not know why they are her

## 2021-02-16 ENCOUNTER — TELEPHONE (OUTPATIENT)
Dept: CARDIOLOGY | Age: 71
End: 2021-02-16

## 2021-02-18 ENCOUNTER — TELEPHONIC VISIT (OUTPATIENT)
Dept: CARDIOLOGY | Age: 71
End: 2021-02-18

## 2021-02-18 VITALS
HEART RATE: 54 BPM | BODY MASS INDEX: 33.18 KG/M2 | DIASTOLIC BLOOD PRESSURE: 55 MMHG | HEIGHT: 60 IN | WEIGHT: 169 LBS | SYSTOLIC BLOOD PRESSURE: 115 MMHG

## 2021-02-18 DIAGNOSIS — J44.9 CHRONIC OBSTRUCTIVE PULMONARY DISEASE, UNSPECIFIED COPD TYPE (CMD): ICD-10-CM

## 2021-02-18 DIAGNOSIS — Z09 HOSPITAL DISCHARGE FOLLOW-UP: Primary | ICD-10-CM

## 2021-02-18 DIAGNOSIS — E11.9 CONTROLLED TYPE 2 DIABETES MELLITUS WITHOUT COMPLICATION, WITHOUT LONG-TERM CURRENT USE OF INSULIN (CMD): ICD-10-CM

## 2021-02-18 DIAGNOSIS — I10 ESSENTIAL HYPERTENSION: ICD-10-CM

## 2021-02-18 DIAGNOSIS — I50.33 ACUTE ON CHRONIC DIASTOLIC CONGESTIVE HEART FAILURE (CMD): ICD-10-CM

## 2021-02-18 DIAGNOSIS — E78.2 MIXED HYPERLIPIDEMIA: ICD-10-CM

## 2021-02-18 PROBLEM — I50.9 ACUTE ON CHRONIC CONGESTIVE HEART FAILURE (CMD): Status: ACTIVE | Noted: 2021-02-08

## 2021-02-18 PROBLEM — E78.5 HYPERLIPIDEMIA: Status: ACTIVE | Noted: 2021-02-18

## 2021-02-18 PROBLEM — J44.1 COPD EXACERBATION (CMD): Status: RESOLVED | Noted: 2020-11-12 | Resolved: 2021-02-18

## 2021-02-18 PROBLEM — J44.1 COPD EXACERBATION (CMD): Status: ACTIVE | Noted: 2020-11-12

## 2021-02-18 PROCEDURE — 99441 TELEPHONE E&M BY PHYSICIAN EST PT NOT ORIG PREV 7 DAYS 5-10 MIN: CPT | Performed by: NURSE PRACTITIONER

## 2021-02-18 RX ORDER — ESCITALOPRAM OXALATE 20 MG/1
20 TABLET ORAL DAILY
COMMUNITY

## 2021-02-18 RX ORDER — LEVOTHYROXINE SODIUM 0.03 MG/1
25 TABLET ORAL
COMMUNITY
Start: 2021-02-01

## 2021-02-18 RX ORDER — ONDANSETRON 4 MG/1
4 TABLET, FILM COATED ORAL EVERY 8 HOURS PRN
COMMUNITY
Start: 2021-02-01 | End: 2021-02-18

## 2021-02-18 RX ORDER — FUROSEMIDE 20 MG/1
20 TABLET ORAL DAILY
COMMUNITY
Start: 2021-02-10 | End: 2021-02-18 | Stop reason: DRUGHIGH

## 2021-02-18 RX ORDER — MORPHINE SULFATE/PF 1 MG/2 ML
SYRINGE (ML) INTRAVENOUS
COMMUNITY

## 2021-02-18 RX ORDER — GARLIC EXTRACT 500 MG
CAPSULE ORAL
COMMUNITY
Start: 2021-01-31 | End: 2021-02-18

## 2021-02-18 RX ORDER — LISINOPRIL 20 MG/1
20 TABLET ORAL DAILY
COMMUNITY
Start: 2021-01-30

## 2021-02-18 RX ORDER — FUROSEMIDE 40 MG/1
TABLET ORAL
COMMUNITY
Start: 2021-01-09 | End: 2021-02-18 | Stop reason: ALTCHOICE

## 2021-02-18 RX ORDER — FLUTICASONE PROPIONATE 50 MCG
SPRAY, SUSPENSION (ML) NASAL
COMMUNITY

## 2021-02-18 RX ORDER — VANCOMYCIN HYDROCHLORIDE 125 MG/1
125 CAPSULE ORAL 4 TIMES DAILY
COMMUNITY
Start: 2021-02-01 | End: 2021-02-18

## 2021-02-18 RX ORDER — FLUTICASONE FUROATE AND VILANTEROL 200; 25 UG/1; UG/1
POWDER RESPIRATORY (INHALATION)
COMMUNITY

## 2021-02-18 RX ORDER — PANTOPRAZOLE SODIUM 20 MG/1
20 TABLET, DELAYED RELEASE ORAL
COMMUNITY
Start: 2021-01-31

## 2021-02-18 RX ORDER — FOLIC ACID 1 MG/1
1000 TABLET ORAL DAILY
COMMUNITY
Start: 2021-01-31

## 2021-02-18 RX ORDER — ATORVASTATIN CALCIUM 40 MG/1
40 TABLET, FILM COATED ORAL DAILY
COMMUNITY
Start: 2021-02-02

## 2021-02-18 RX ORDER — EZETIMIBE 10 MG/1
10 TABLET ORAL DAILY
COMMUNITY
Start: 2021-02-02

## 2021-02-18 SDOH — HEALTH STABILITY: MENTAL HEALTH: HOW OFTEN DO YOU HAVE A DRINK CONTAINING ALCOHOL?: NEVER

## 2021-02-18 SDOH — HEALTH STABILITY: PHYSICAL HEALTH: ON AVERAGE, HOW MANY MINUTES DO YOU ENGAGE IN EXERCISE AT THIS LEVEL?: 30 MIN

## 2021-02-18 SDOH — HEALTH STABILITY: PHYSICAL HEALTH: ON AVERAGE, HOW MANY DAYS PER WEEK DO YOU ENGAGE IN MODERATE TO STRENUOUS EXERCISE (LIKE A BRISK WALK)?: 7 DAYS

## 2021-02-18 SDOH — SOCIAL STABILITY: SOCIAL NETWORK: ARE YOU MARRIED, WIDOWED, DIVORCED, SEPARATED, NEVER MARRIED, OR LIVING WITH A PARTNER?: WIDOWED

## 2021-02-18 ASSESSMENT — PATIENT HEALTH QUESTIONNAIRE - PHQ9
2. FEELING DOWN, DEPRESSED OR HOPELESS: NOT AT ALL
SUM OF ALL RESPONSES TO PHQ9 QUESTIONS 1 AND 2: 0
CLINICAL INTERPRETATION OF PHQ9 SCORE: NO FURTHER SCREENING NEEDED
1. LITTLE INTEREST OR PLEASURE IN DOING THINGS: NOT AT ALL
SUM OF ALL RESPONSES TO PHQ9 QUESTIONS 1 AND 2: 0
CLINICAL INTERPRETATION OF PHQ2 SCORE: NO FURTHER SCREENING NEEDED

## 2021-02-18 ASSESSMENT — ENCOUNTER SYMPTOMS
NEAR-SYNCOPE: 0
SHORTNESS OF BREATH: 0
FEVER: 0
ABDOMINAL PAIN: 0
BLOATING: 0
COUGH: 0
ORTHOPNEA: 0
NIGHT SWEATS: 0
SYNCOPE: 0

## 2021-02-19 ENCOUNTER — TELEPHONE (OUTPATIENT)
Dept: SURGERY | Facility: CLINIC | Age: 71
End: 2021-02-19

## 2021-02-19 NOTE — TELEPHONE ENCOUNTER
Patient of Dr. Saenz Artist    Discussed case at multidisciplinary conference today. 4 mm saccular R PCOM aneurysm. 0.5% risk of rupture per year. Plan to monitor with surveillance imaging non contrast MRA brain in 6 months.

## 2021-02-24 ENCOUNTER — TELEPHONE (OUTPATIENT)
Dept: CARDIOLOGY | Age: 71
End: 2021-02-24

## 2021-03-09 PROBLEM — J44.9 CHRONIC OBSTRUCTIVE PULMONARY DISEASE (HCC): Status: ACTIVE | Noted: 2021-02-18

## 2021-03-09 PROBLEM — I50.9 ACUTE ON CHRONIC CONGESTIVE HEART FAILURE (HCC): Status: ACTIVE | Noted: 2021-02-08

## 2021-03-09 PROBLEM — E78.5 HYPERLIPIDEMIA: Status: ACTIVE | Noted: 2021-02-18

## 2021-03-16 ENCOUNTER — TELEPHONE (OUTPATIENT)
Dept: CARDIOLOGY | Age: 71
End: 2021-03-16

## 2021-03-16 DIAGNOSIS — E11.9 CONTROLLED TYPE 2 DIABETES MELLITUS WITHOUT COMPLICATION, WITHOUT LONG-TERM CURRENT USE OF INSULIN (CMD): ICD-10-CM

## 2021-03-16 DIAGNOSIS — I50.33 ACUTE ON CHRONIC DIASTOLIC CONGESTIVE HEART FAILURE (CMD): ICD-10-CM

## 2021-03-16 DIAGNOSIS — I10 ESSENTIAL HYPERTENSION: Primary | ICD-10-CM

## 2021-03-16 RX ORDER — FUROSEMIDE 40 MG/1
40 TABLET ORAL DAILY
COMMUNITY
End: 2021-03-17 | Stop reason: DRUGHIGH

## 2021-03-17 RX ORDER — FUROSEMIDE 20 MG/1
20 TABLET ORAL DAILY
Qty: 30 TABLET | Refills: 11 | Status: SHIPPED
Start: 2021-03-17

## 2021-03-22 ENCOUNTER — HOSPITAL ENCOUNTER (OUTPATIENT)
Dept: CT IMAGING | Facility: HOSPITAL | Age: 71
Discharge: HOME | End: 2021-03-22
Attending: NURSE PRACTITIONER
Payer: MEDICARE

## 2021-03-22 DIAGNOSIS — I67.1 CEREBRAL ANEURYSM WITHOUT RUPTURE: ICD-10-CM

## 2021-03-22 LAB — CREAT BLD-MCNC: 1.5 MG/DL

## 2021-03-22 PROCEDURE — 70496 CT ANGIOGRAPHY HEAD: CPT | Performed by: NURSE PRACTITIONER

## 2021-03-22 PROCEDURE — 82565 ASSAY OF CREATININE: CPT

## 2021-03-24 PROBLEM — I50.30 HEART FAILURE WITH NORMAL EJECTION FRACTION (HCC): Status: ACTIVE | Noted: 2021-03-24

## 2021-05-26 ENCOUNTER — HOSPITAL ENCOUNTER (OUTPATIENT)
Dept: VASCULAR LAB | Age: 71
Discharge: HOME | End: 2021-05-26
Payer: MEDICARE

## 2021-05-26 DIAGNOSIS — M79.604 PAIN OF RIGHT LEG: ICD-10-CM

## 2021-05-26 PROCEDURE — 93971 EXTREMITY STUDY: CPT

## 2021-07-01 ENCOUNTER — TELEPHONE (OUTPATIENT)
Dept: SURGERY | Facility: CLINIC | Age: 71
End: 2021-07-01

## 2021-07-01 DIAGNOSIS — I67.1 CEREBRAL ANEURYSM WITHOUT RUPTURE: Primary | ICD-10-CM

## 2021-07-01 NOTE — TELEPHONE ENCOUNTER
Per last OV note 2-15-21 We would recommend follow up CTA brain imaging in 3 months with return to clinic.     Order pended

## 2021-09-08 NOTE — TELEPHONE ENCOUNTER
Wizdee message from august has not been read. Called patient and spoke with her son, Harding Meigs (okay per HIP[employer]). Informed him that imaging is due and Dr Radames Carnes has left the practice. He will call once she has scheduled the CTA and make a follow up apt.

## 2021-09-20 NOTE — TELEPHONE ENCOUNTER
Patient seen by neurology over the weekend in the hospital for anorexia, nausea, vomiting and weakness. Had CTA head and neck. Incidental PCOM aneurysm found on imaging.   She will need new patient consultation with any of the docs in the next 4-6 weeks Pt complaining of chest pain since midnight. PT states he has been having increased weakness, tachypnea, more than usual cough, chest pain. Pt in ED diaphoretic, states he feels very weak, and saturating at 90 percent on RA, placed on 3 liters of NC. Rectal temperature of 103.

## 2021-11-26 ENCOUNTER — HOSPITAL ENCOUNTER (EMERGENCY)
Age: 71
Discharge: LEFT BEFORE DISCHARGE | End: 2021-11-26

## 2021-11-26 VITALS
SYSTOLIC BLOOD PRESSURE: 118 MMHG | WEIGHT: 150 LBS | DIASTOLIC BLOOD PRESSURE: 54 MMHG | HEART RATE: 58 BPM | HEIGHT: 60 IN | TEMPERATURE: 98.6 F | OXYGEN SATURATION: 97 % | BODY MASS INDEX: 29.45 KG/M2 | RESPIRATION RATE: 16 BRPM

## 2021-11-26 ASSESSMENT — PAIN SCALES - GENERAL: PAINLEVEL_OUTOF10: 0

## 2022-05-20 ENCOUNTER — APPOINTMENT (OUTPATIENT)
Dept: GENERAL RADIOLOGY | Facility: HOSPITAL | Age: 72
End: 2022-05-20
Attending: EMERGENCY MEDICINE
Payer: MEDICARE

## 2022-05-20 ENCOUNTER — HOSPITAL ENCOUNTER (EMERGENCY)
Facility: HOSPITAL | Age: 72
Discharge: HOME | End: 2022-05-20
Attending: EMERGENCY MEDICINE
Payer: MEDICARE

## 2022-05-20 ENCOUNTER — APPOINTMENT (OUTPATIENT)
Dept: ULTRASOUND IMAGING | Facility: HOSPITAL | Age: 72
End: 2022-05-20
Attending: EMERGENCY MEDICINE
Payer: MEDICARE

## 2022-05-20 VITALS
WEIGHT: 160.94 LBS | BODY MASS INDEX: 31 KG/M2 | OXYGEN SATURATION: 97 % | RESPIRATION RATE: 18 BRPM | HEART RATE: 71 BPM | SYSTOLIC BLOOD PRESSURE: 113 MMHG | TEMPERATURE: 98 F | DIASTOLIC BLOOD PRESSURE: 50 MMHG

## 2022-05-20 DIAGNOSIS — M19.90 ARTHRITIS: ICD-10-CM

## 2022-05-20 DIAGNOSIS — M79.605 PAIN OF LEFT LOWER EXTREMITY: Primary | ICD-10-CM

## 2022-05-20 LAB
ALBUMIN SERPL-MCNC: 3.4 G/DL (ref 3.4–5)
ALBUMIN/GLOB SERPL: 1 {RATIO} (ref 1–2)
ALP LIVER SERPL-CCNC: 145 U/L
ALT SERPL-CCNC: 14 U/L
ANION GAP SERPL CALC-SCNC: 6 MMOL/L (ref 0–18)
AST SERPL-CCNC: 18 U/L (ref 15–37)
BASOPHILS # BLD AUTO: 0.03 X10(3) UL (ref 0–0.2)
BASOPHILS NFR BLD AUTO: 0.6 %
BILIRUB SERPL-MCNC: 0.4 MG/DL (ref 0.1–2)
BUN BLD-MCNC: 23 MG/DL (ref 7–18)
CALCIUM BLD-MCNC: 8.9 MG/DL (ref 8.5–10.1)
CHLORIDE SERPL-SCNC: 102 MMOL/L (ref 98–112)
CO2 SERPL-SCNC: 30 MMOL/L (ref 21–32)
CREAT BLD-MCNC: 1.7 MG/DL
EOSINOPHIL # BLD AUTO: 0.15 X10(3) UL (ref 0–0.7)
EOSINOPHIL NFR BLD AUTO: 3.1 %
ERYTHROCYTE [DISTWIDTH] IN BLOOD BY AUTOMATED COUNT: 14.6 %
GLOBULIN PLAS-MCNC: 3.3 G/DL (ref 2.8–4.4)
GLUCOSE BLD-MCNC: 132 MG/DL (ref 70–99)
HCT VFR BLD AUTO: 32.5 %
HGB BLD-MCNC: 10.9 G/DL
IMM GRANULOCYTES # BLD AUTO: 0.03 X10(3) UL (ref 0–1)
IMM GRANULOCYTES NFR BLD: 0.6 %
LYMPHOCYTES # BLD AUTO: 0.61 X10(3) UL (ref 1–4)
LYMPHOCYTES NFR BLD AUTO: 12.7 %
MCH RBC QN AUTO: 30 PG (ref 26–34)
MCHC RBC AUTO-ENTMCNC: 33.5 G/DL (ref 31–37)
MCV RBC AUTO: 89.5 FL
MONOCYTES # BLD AUTO: 0.31 X10(3) UL (ref 0.1–1)
MONOCYTES NFR BLD AUTO: 6.5 %
NEUTROPHILS # BLD AUTO: 3.66 X10 (3) UL (ref 1.5–7.7)
NEUTROPHILS # BLD AUTO: 3.66 X10(3) UL (ref 1.5–7.7)
NEUTROPHILS NFR BLD AUTO: 76.5 %
OSMOLALITY SERPL CALC.SUM OF ELEC: 292 MOSM/KG (ref 275–295)
PLATELET # BLD AUTO: 127 10(3)UL (ref 150–450)
POTASSIUM SERPL-SCNC: 4.2 MMOL/L (ref 3.5–5.1)
PROT SERPL-MCNC: 6.7 G/DL (ref 6.4–8.2)
RBC # BLD AUTO: 3.63 X10(6)UL
SODIUM SERPL-SCNC: 138 MMOL/L (ref 136–145)
WBC # BLD AUTO: 4.8 X10(3) UL (ref 4–11)

## 2022-05-20 PROCEDURE — 80053 COMPREHEN METABOLIC PANEL: CPT | Performed by: EMERGENCY MEDICINE

## 2022-05-20 PROCEDURE — 99284 EMERGENCY DEPT VISIT MOD MDM: CPT

## 2022-05-20 PROCEDURE — 93971 EXTREMITY STUDY: CPT | Performed by: EMERGENCY MEDICINE

## 2022-05-20 PROCEDURE — 85025 COMPLETE CBC W/AUTO DIFF WBC: CPT | Performed by: EMERGENCY MEDICINE

## 2022-05-20 PROCEDURE — 36415 COLL VENOUS BLD VENIPUNCTURE: CPT

## 2022-05-20 PROCEDURE — 73552 X-RAY EXAM OF FEMUR 2/>: CPT | Performed by: EMERGENCY MEDICINE

## 2022-05-20 RX ORDER — PREDNISONE 20 MG/1
60 TABLET ORAL ONCE
Status: COMPLETED | OUTPATIENT
Start: 2022-05-20 | End: 2022-05-20

## 2022-05-20 RX ORDER — PREDNISONE 20 MG/1
60 TABLET ORAL DAILY
Qty: 9 TABLET | Refills: 0 | Status: SHIPPED | OUTPATIENT
Start: 2022-05-20 | End: 2022-05-23

## 2022-05-23 ENCOUNTER — HOSPITAL ENCOUNTER (OUTPATIENT)
Dept: BONE DENSITY | Age: 72
Discharge: HOME | End: 2022-05-23
Attending: INTERNAL MEDICINE
Payer: MEDICARE

## 2022-05-23 ENCOUNTER — OFFICE VISIT (OUTPATIENT)
Dept: HEMATOLOGY/ONCOLOGY | Age: 72
End: 2022-05-23
Attending: INTERNAL MEDICINE
Payer: MEDICARE

## 2022-05-23 VITALS
WEIGHT: 157 LBS | HEART RATE: 86 BPM | SYSTOLIC BLOOD PRESSURE: 125 MMHG | BODY MASS INDEX: 31 KG/M2 | RESPIRATION RATE: 18 BRPM | DIASTOLIC BLOOD PRESSURE: 69 MMHG | TEMPERATURE: 98 F | OXYGEN SATURATION: 90 %

## 2022-05-23 DIAGNOSIS — R16.1 SPLENOMEGALY: ICD-10-CM

## 2022-05-23 DIAGNOSIS — D50.0 IRON DEFICIENCY ANEMIA DUE TO CHRONIC BLOOD LOSS: Primary | ICD-10-CM

## 2022-05-23 DIAGNOSIS — Z78.0 POSTMENOPAUSAL: ICD-10-CM

## 2022-05-23 LAB
BASOPHILS # BLD AUTO: 0.03 X10(3) UL (ref 0–0.2)
BASOPHILS NFR BLD AUTO: 0.7 %
DEPRECATED HBV CORE AB SER IA-ACNC: 113.2 NG/ML
EOSINOPHIL # BLD AUTO: 0.14 X10(3) UL (ref 0–0.7)
EOSINOPHIL NFR BLD AUTO: 3.1 %
ERYTHROCYTE [DISTWIDTH] IN BLOOD BY AUTOMATED COUNT: 14.9 %
FOLATE SERPL-MCNC: >100 NG/ML (ref 8.7–?)
HBV CORE AB SERPL QL IA: REACTIVE
HBV CORE IGM SER QL: NONREACTIVE
HBV SURFACE AB SER QL: REACTIVE
HBV SURFACE AB SERPL IA-ACNC: 12.97 MIU/ML
HBV SURFACE AG SER-ACNC: <0.1 [IU]/L
HBV SURFACE AG SERPL QL IA: NONREACTIVE
HCT VFR BLD AUTO: 35.9 %
HCV AB SERPL QL IA: NONREACTIVE
HGB BLD-MCNC: 11.7 G/DL
HGB RETIC QN AUTO: 34.8 PG (ref 28.2–36.6)
IMM GRANULOCYTES # BLD AUTO: 0.03 X10(3) UL (ref 0–1)
IMM GRANULOCYTES NFR BLD: 0.7 %
IMM RETICS NFR: 0.21 RATIO (ref 0.1–0.3)
IRON SATN MFR SERPL: 16 %
IRON SERPL-MCNC: 57 UG/DL
LDH SERPL L TO P-CCNC: 186 U/L
LDH SERPL L TO P-CCNC: 202 U/L
LYMPHOCYTES # BLD AUTO: 0.57 X10(3) UL (ref 1–4)
LYMPHOCYTES NFR BLD AUTO: 12.7 %
MCH RBC QN AUTO: 29.8 PG (ref 26–34)
MCHC RBC AUTO-ENTMCNC: 32.6 G/DL (ref 31–37)
MCV RBC AUTO: 91.6 FL
MONOCYTES # BLD AUTO: 0.27 X10(3) UL (ref 0.1–1)
MONOCYTES NFR BLD AUTO: 6 %
NEUTROPHILS # BLD AUTO: 3.46 X10 (3) UL (ref 1.5–7.7)
NEUTROPHILS # BLD AUTO: 3.46 X10(3) UL (ref 1.5–7.7)
NEUTROPHILS NFR BLD AUTO: 76.8 %
PLATELET # BLD AUTO: 138 10(3)UL (ref 150–450)
RBC # BLD AUTO: 3.92 X10(6)UL
RETICS # AUTO: 116.8 X10(3) UL (ref 22.5–147.5)
RETICS/RBC NFR AUTO: 3 %
TIBC SERPL-MCNC: 358 UG/DL (ref 240–450)
TRANSFERRIN SERPL-MCNC: 240 MG/DL (ref 200–360)
WBC # BLD AUTO: 4.5 X10(3) UL (ref 4–11)

## 2022-05-23 PROCEDURE — 77080 DXA BONE DENSITY AXIAL: CPT | Performed by: INTERNAL MEDICINE

## 2022-05-23 PROCEDURE — 99214 OFFICE O/P EST MOD 30 MIN: CPT | Performed by: INTERNAL MEDICINE

## 2022-05-24 LAB
ALBUMIN SERPL ELPH-MCNC: 4.14 G/DL (ref 3.75–5.21)
ALBUMIN/GLOB SERPL: 1.44 {RATIO} (ref 1–2)
ALPHA1 GLOB SERPL ELPH-MCNC: 0.37 G/DL (ref 0.19–0.46)
ALPHA2 GLOB SERPL ELPH-MCNC: 1.06 G/DL (ref 0.48–1.05)
B-GLOBULIN SERPL ELPH-MCNC: 0.74 G/DL (ref 0.68–1.23)
GAMMA GLOB SERPL ELPH-MCNC: 0.69 G/DL (ref 0.62–1.7)
KAPPA LC FREE SER-MCNC: 2.03 MG/DL (ref 0.33–1.94)
KAPPA LC FREE/LAMBDA FREE SER NEPH: 1.13 {RATIO} (ref 0.26–1.65)
LAMBDA LC FREE SERPL-MCNC: 1.8 MG/DL (ref 0.57–2.63)
PROT SERPL-MCNC: 7 G/DL (ref 6.4–8.2)

## 2022-05-27 ENCOUNTER — HOSPITAL ENCOUNTER (OUTPATIENT)
Dept: CT IMAGING | Age: 72
Discharge: HOME | End: 2022-05-27
Attending: INTERNAL MEDICINE
Payer: MEDICARE

## 2022-05-27 ENCOUNTER — TELEPHONE (OUTPATIENT)
Dept: HEMATOLOGY/ONCOLOGY | Facility: HOSPITAL | Age: 72
End: 2022-05-27

## 2022-05-27 DIAGNOSIS — D50.0 IRON DEFICIENCY ANEMIA DUE TO CHRONIC BLOOD LOSS: ICD-10-CM

## 2022-05-27 DIAGNOSIS — R16.1 SPLENOMEGALY: ICD-10-CM

## 2022-05-27 PROCEDURE — 74176 CT ABD & PELVIS W/O CONTRAST: CPT | Performed by: INTERNAL MEDICINE

## 2022-05-27 NOTE — TELEPHONE ENCOUNTER
Daniel Fees, APRN  [facility]  No concerning findings on CT. There is a small aneurysm, would recommend discussing need for possible future ultrasound with PCP for follow up. ENRICO Knight informed.

## 2022-07-14 ENCOUNTER — OFFICE VISIT (OUTPATIENT)
Dept: RHEUMATOLOGY | Facility: CLINIC | Age: 72
End: 2022-07-14
Payer: MEDICARE

## 2022-07-14 VITALS
OXYGEN SATURATION: 87 % | TEMPERATURE: 98 F | BODY MASS INDEX: 31.41 KG/M2 | WEIGHT: 160 LBS | HEART RATE: 74 BPM | HEIGHT: 60 IN | SYSTOLIC BLOOD PRESSURE: 115 MMHG | DIASTOLIC BLOOD PRESSURE: 65 MMHG

## 2022-07-14 DIAGNOSIS — M25.541 ARTHRALGIA OF HANDS, BILATERAL: ICD-10-CM

## 2022-07-14 DIAGNOSIS — J44.9 CHRONIC OBSTRUCTIVE PULMONARY DISEASE, UNSPECIFIED COPD TYPE (HCC): ICD-10-CM

## 2022-07-14 DIAGNOSIS — M25.542 ARTHRALGIA OF HANDS, BILATERAL: ICD-10-CM

## 2022-07-14 DIAGNOSIS — M54.10 RADICULOPATHY, UNSPECIFIED SPINAL REGION: Primary | ICD-10-CM

## 2022-07-14 DIAGNOSIS — G62.9 PERIPHERAL POLYNEUROPATHY: ICD-10-CM

## 2022-07-14 DIAGNOSIS — E55.9 VITAMIN D DEFICIENCY: ICD-10-CM

## 2022-07-14 DIAGNOSIS — M47.816 OSTEOARTHRITIS OF LUMBAR SPINE, UNSPECIFIED SPINAL OSTEOARTHRITIS COMPLICATION STATUS: ICD-10-CM

## 2022-07-14 DIAGNOSIS — M10.9 GOUT OF MULTIPLE SITES, UNSPECIFIED CAUSE, UNSPECIFIED CHRONICITY: ICD-10-CM

## 2022-07-14 DIAGNOSIS — R76.8 HEPATITIS B CORE ANTIBODY POSITIVE: ICD-10-CM

## 2022-07-14 PROCEDURE — 99204 OFFICE O/P NEW MOD 45 MIN: CPT | Performed by: INTERNAL MEDICINE

## 2022-07-14 RX ORDER — ESCITALOPRAM OXALATE 20 MG/1
20 TABLET ORAL DAILY
COMMUNITY
Start: 2022-07-06

## 2022-07-14 RX ORDER — ERGOCALCIFEROL 1.25 MG/1
50000 CAPSULE ORAL WEEKLY
Qty: 12 CAPSULE | Refills: 0 | Status: SHIPPED | OUTPATIENT
Start: 2022-07-14 | End: 2022-10-06

## 2022-07-14 NOTE — PATIENT INSTRUCTIONS
Get blood work today. I will message Dr. Christel Ray about switching lexapro to cymbalta. Call THE [facility] scheduling line to set the lab/imaging/procedure appointment up. Phone number is [phone].

## 2022-07-22 ENCOUNTER — LAB ENCOUNTER (OUTPATIENT)
Dept: LAB | Age: 72
End: 2022-07-22
Attending: INTERNAL MEDICINE
Payer: MEDICARE

## 2022-07-22 DIAGNOSIS — M10.9 GOUT OF MULTIPLE SITES, UNSPECIFIED CAUSE, UNSPECIFIED CHRONICITY: ICD-10-CM

## 2022-07-22 DIAGNOSIS — M25.542 ARTHRALGIA OF HANDS, BILATERAL: ICD-10-CM

## 2022-07-22 DIAGNOSIS — R76.8 HEPATITIS B CORE ANTIBODY POSITIVE: ICD-10-CM

## 2022-07-22 DIAGNOSIS — M25.541 ARTHRALGIA OF HANDS, BILATERAL: ICD-10-CM

## 2022-07-22 LAB
CRP SERPL-MCNC: <0.29 MG/DL (ref ?–0.3)
ERYTHROCYTE [SEDIMENTATION RATE] IN BLOOD: 10 MM/HR
MAGNESIUM SERPL-MCNC: 1.9 MG/DL (ref 1.6–2.6)
PHOSPHATE SERPL-MCNC: 4.4 MG/DL (ref 2.5–4.9)
PTH-INTACT SERPL-MCNC: 109.5 PG/ML (ref 18.5–88)
RHEUMATOID FACT SERPL-ACNC: <10 IU/ML (ref ?–15)
URATE SERPL-MCNC: 4.5 MG/DL

## 2022-07-22 PROCEDURE — 83735 ASSAY OF MAGNESIUM: CPT | Performed by: INTERNAL MEDICINE

## 2022-07-22 PROCEDURE — 83970 ASSAY OF PARATHORMONE: CPT | Performed by: INTERNAL MEDICINE

## 2022-07-22 PROCEDURE — 36415 COLL VENOUS BLD VENIPUNCTURE: CPT

## 2022-07-22 PROCEDURE — 87517 HEPATITIS B DNA QUANT: CPT

## 2022-07-22 PROCEDURE — 85652 RBC SED RATE AUTOMATED: CPT | Performed by: INTERNAL MEDICINE

## 2022-07-22 PROCEDURE — 86200 CCP ANTIBODY: CPT | Performed by: INTERNAL MEDICINE

## 2022-07-22 PROCEDURE — 84550 ASSAY OF BLOOD/URIC ACID: CPT | Performed by: INTERNAL MEDICINE

## 2022-07-22 PROCEDURE — 86038 ANTINUCLEAR ANTIBODIES: CPT | Performed by: INTERNAL MEDICINE

## 2022-07-22 PROCEDURE — 86431 RHEUMATOID FACTOR QUANT: CPT | Performed by: INTERNAL MEDICINE

## 2022-07-22 PROCEDURE — 86140 C-REACTIVE PROTEIN: CPT | Performed by: INTERNAL MEDICINE

## 2022-07-22 PROCEDURE — 84100 ASSAY OF PHOSPHORUS: CPT | Performed by: INTERNAL MEDICINE

## 2022-07-24 ENCOUNTER — TELEPHONE (OUTPATIENT)
Dept: RHEUMATOLOGY | Facility: CLINIC | Age: 72
End: 2022-07-24

## 2022-07-24 DIAGNOSIS — M79.18 CHRONIC MUSCULOSKELETAL PAIN: Primary | ICD-10-CM

## 2022-07-24 DIAGNOSIS — G89.29 CHRONIC MUSCULOSKELETAL PAIN: Primary | ICD-10-CM

## 2022-07-24 RX ORDER — DULOXETIN HYDROCHLORIDE 30 MG/1
CAPSULE, DELAYED RELEASE ORAL
Qty: 165 CAPSULE | Refills: 0 | Status: SHIPPED | OUTPATIENT
Start: 2022-07-24 | End: 2022-10-22

## 2022-07-24 NOTE — TELEPHONE ENCOUNTER
Received ok from PCP: Jessica Goode MD  to change lexapro to cymbalta (duloxetine). Stop lexapro, next day Start cymbalta 30 mg daily x 2 weeks, then 60 mg daily. Take in the morning. Watch out for increase in BP and mood changes. If patient does not , call son b/c patient is hard of hearing.      Please see if patient needs the Rx sent to a different pharmacy

## 2022-07-25 LAB
HBV QNT BY NAAT (IU/ML): NOT DETECTED IU/ML
HBV QNT BY NAAT (LOG IU/ML): NOT DETECTED LOG IU/ML
HBV QNT BY NAAT INTERP: NOT DETECTED

## 2022-07-25 NOTE — TELEPHONE ENCOUNTER
Pt's son returned our call; reviewed notes below per Dr Bradly Pedroza. Son verbalized understanding and agreed to this plan.

## 2022-07-26 LAB — CCP IGG SERPL-ACNC: 1.4 U/ML (ref 0–6.9)

## 2022-07-27 LAB — ANA SER QL: NEGATIVE

## 2022-09-06 DIAGNOSIS — R76.8 HEPATITIS B CORE ANTIBODY POSITIVE: ICD-10-CM

## 2022-09-06 DIAGNOSIS — M25.542 ARTHRALGIA OF HANDS, BILATERAL: ICD-10-CM

## 2022-09-06 DIAGNOSIS — E55.9 VITAMIN D DEFICIENCY: ICD-10-CM

## 2022-09-06 DIAGNOSIS — M25.541 ARTHRALGIA OF HANDS, BILATERAL: ICD-10-CM

## 2022-09-06 RX ORDER — ERGOCALCIFEROL 1.25 MG/1
CAPSULE ORAL
Qty: 4 CAPSULE | Refills: 1 | OUTPATIENT
Start: 2022-09-06

## 2022-09-27 DIAGNOSIS — E55.9 VITAMIN D DEFICIENCY: ICD-10-CM

## 2022-09-27 DIAGNOSIS — M25.542 ARTHRALGIA OF HANDS, BILATERAL: ICD-10-CM

## 2022-09-27 DIAGNOSIS — R76.8 HEPATITIS B CORE ANTIBODY POSITIVE: ICD-10-CM

## 2022-09-27 DIAGNOSIS — M25.541 ARTHRALGIA OF HANDS, BILATERAL: ICD-10-CM

## 2022-09-27 RX ORDER — ERGOCALCIFEROL 1.25 MG/1
50000 CAPSULE ORAL
Qty: 6 CAPSULE | Refills: 0 | Status: ON HOLD | OUTPATIENT
Start: 2022-09-27 | End: 2023-02-10

## 2022-09-27 NOTE — TELEPHONE ENCOUNTER
LOV: 07/14/2022  Next appt: 11/09/2022  Most recent labs comp on 09/01/2022  Last filled on 07/14/2022

## 2022-12-27 ENCOUNTER — OFFICE VISIT (OUTPATIENT)
Dept: FAMILY MEDICINE CLINIC | Facility: CLINIC | Age: 72
End: 2022-12-27

## 2022-12-27 VITALS
HEIGHT: 56 IN | BODY MASS INDEX: 36.98 KG/M2 | OXYGEN SATURATION: 93 % | TEMPERATURE: 96.9 F | HEART RATE: 76 BPM | RESPIRATION RATE: 16 BRPM | WEIGHT: 164.4 LBS | DIASTOLIC BLOOD PRESSURE: 82 MMHG | SYSTOLIC BLOOD PRESSURE: 153 MMHG

## 2022-12-27 DIAGNOSIS — J01.00 ACUTE NON-RECURRENT MAXILLARY SINUSITIS: Primary | ICD-10-CM

## 2022-12-27 DIAGNOSIS — H65.93 FLUID LEVEL BEHIND TYMPANIC MEMBRANE OF BOTH EARS: ICD-10-CM

## 2022-12-27 DIAGNOSIS — Z87.09 HISTORY OF COPD: ICD-10-CM

## 2022-12-27 DIAGNOSIS — R05.2 SUBACUTE COUGH: ICD-10-CM

## 2022-12-27 PROCEDURE — 99203 OFFICE O/P NEW LOW 30 MIN: CPT

## 2022-12-27 RX ORDER — ASPIRIN 81 MG/1
81 TABLET, COATED ORAL DAILY
COMMUNITY
Start: 2022-12-08

## 2022-12-27 RX ORDER — PANTOPRAZOLE SODIUM 20 MG/1
20 TABLET, DELAYED RELEASE ORAL
COMMUNITY
Start: 2022-12-21

## 2022-12-27 RX ORDER — CYCLOBENZAPRINE HCL 10 MG
10 TABLET ORAL 3 TIMES DAILY
COMMUNITY
Start: 2022-12-22

## 2022-12-27 RX ORDER — METHYLPREDNISOLONE 4 MG/1
TABLET ORAL
Qty: 21 TABLET | Refills: 0 | Status: SHIPPED | OUTPATIENT
Start: 2022-12-27 | End: 2023-01-04

## 2022-12-27 RX ORDER — ALBUTEROL SULFATE 2.5 MG/3ML
2.5 SOLUTION RESPIRATORY (INHALATION) EVERY 4 HOURS PRN
Qty: 3 ML | Refills: 12 | Status: SHIPPED | OUTPATIENT
Start: 2022-12-27 | End: 2023-03-30 | Stop reason: SDUPTHER

## 2022-12-27 RX ORDER — GABAPENTIN 100 MG/1
100 CAPSULE ORAL
COMMUNITY
Start: 2022-12-01

## 2022-12-27 RX ORDER — ATORVASTATIN CALCIUM 40 MG/1
40 TABLET, FILM COATED ORAL
COMMUNITY
Start: 2022-12-22

## 2022-12-27 RX ORDER — LEVOTHYROXINE SODIUM 0.03 MG/1
TABLET ORAL
COMMUNITY
Start: 2022-12-21

## 2022-12-27 RX ORDER — ALBUTEROL SULFATE 90 UG/1
AEROSOL, METERED RESPIRATORY (INHALATION)
COMMUNITY
Start: 2022-10-11 | End: 2023-02-28 | Stop reason: SDUPTHER

## 2022-12-27 RX ORDER — NYSTATIN 100000 [USP'U]/G
POWDER TOPICAL
COMMUNITY
Start: 2022-12-14

## 2022-12-27 RX ORDER — EZETIMIBE 10 MG/1
10 TABLET ORAL
COMMUNITY
Start: 2022-12-21

## 2022-12-27 RX ORDER — AZITHROMYCIN 250 MG/1
TABLET, FILM COATED ORAL
Qty: 6 TABLET | Refills: 0 | Status: SHIPPED | OUTPATIENT
Start: 2022-12-27 | End: 2023-01-04

## 2022-12-27 RX ORDER — ALLOPURINOL 100 MG/1
100 TABLET ORAL DAILY
COMMUNITY
Start: 2022-12-21

## 2022-12-27 RX ORDER — BENZONATATE 100 MG/1
100 CAPSULE ORAL 3 TIMES DAILY PRN
Qty: 30 CAPSULE | Refills: 0 | Status: SHIPPED | OUTPATIENT
Start: 2022-12-27

## 2022-12-27 NOTE — PROGRESS NOTES
"Chief Complaint  URI, Ear Fullness, and Cough    Subjective        Christine Steven presents to [facility]  History of Present Illness  Productive cough, bilateral ear fullness, history of COPD      Objective   Vital Signs:  /82 (BP Location: Left arm, Patient Position: Sitting, Cuff Size: Adult)   Pulse 76   Temp 96.9 °F (36.1 °C) (Infrared)   Resp 16   Ht 142.2 cm (56\")   Wt 74.6 kg (164 lb 6.4 oz)   SpO2 93%   BMI 36.86 kg/m²   Estimated body mass index is 36.86 kg/m² as calculated from the following:    Height as of this encounter: 142.2 cm (56\").    Weight as of this encounter: 74.6 kg (164 lb 6.4 oz).          Physical Exam  Vitals and nursing note reviewed.   Constitutional:       General: She is not in acute distress.     Appearance: Normal appearance. She is not toxic-appearing or diaphoretic.   HENT:      Head: Normocephalic and atraumatic.      Right Ear: Ear canal and external ear normal. A middle ear effusion is present.      Left Ear: Ear canal and external ear normal. A middle ear effusion is present.      Nose: Congestion and rhinorrhea present. Rhinorrhea is purulent.      Right Turbinates: Swollen.      Left Turbinates: Swollen.      Right Sinus: Maxillary sinus tenderness and frontal sinus tenderness present.      Left Sinus: Maxillary sinus tenderness and frontal sinus tenderness present.      Mouth/Throat:      Mouth: Mucous membranes are moist.      Pharynx: Oropharynx is clear. No oropharyngeal exudate or posterior oropharyngeal erythema.   Eyes:      General:         Right eye: No discharge.         Left eye: No discharge.      Extraocular Movements: Extraocular movements intact.      Conjunctiva/sclera: Conjunctivae normal.      Pupils: Pupils are equal, round, and reactive to light.   Cardiovascular:      Rate and Rhythm: Normal rate and regular rhythm.      Pulses: Normal pulses.      Heart sounds: Normal heart sounds.   Pulmonary:      Effort: " Pulmonary effort is normal. No respiratory distress.      Breath sounds: Normal breath sounds. No stridor. No wheezing, rhonchi or rales.      Comments: Lung sounds are clear, she reports productive cough at home  Musculoskeletal:      Cervical back: Normal range of motion and neck supple. No rigidity or tenderness.   Lymphadenopathy:      Cervical: No cervical adenopathy.   Skin:     General: Skin is warm and dry.   Neurological:      Mental Status: She is alert and oriented to person, place, and time. Mental status is at baseline.        Result Review :                Assessment and Plan   Diagnoses and all orders for this visit:    1. Acute non-recurrent maxillary sinusitis (Primary)  -     azithromycin (Zithromax Z-Eddy) 250 MG tablet; Take 2 tablets by mouth on day 1, then 1 tablet daily on days 2-5  Dispense: 6 tablet; Refill: 0    2. Subacute cough  -     methylPREDNISolone (MEDROL) 4 MG dose pack; Take as directed on package instructions.  Dispense: 21 tablet; Refill: 0  -     benzonatate (Tessalon Perles) 100 MG capsule; Take 1 capsule by mouth 3 (Three) Times a Day As Needed for Cough.  Dispense: 30 capsule; Refill: 0  -     azithromycin (Zithromax Z-Eddy) 250 MG tablet; Take 2 tablets by mouth on day 1, then 1 tablet daily on days 2-5  Dispense: 6 tablet; Refill: 0  -     albuterol (PROVENTIL) (2.5 MG/3ML) 0.083% nebulizer solution; Take 2.5 mg by nebulization Every 4 (Four) Hours As Needed for Wheezing.  Dispense: 3 mL; Refill: 12    3. Fluid level behind tympanic membrane of both ears  -     methylPREDNISolone (MEDROL) 4 MG dose pack; Take as directed on package instructions.  Dispense: 21 tablet; Refill: 0    4. History of COPD  -     albuterol (PROVENTIL) (2.5 MG/3ML) 0.083% nebulizer solution; Take 2.5 mg by nebulization Every 4 (Four) Hours As Needed for Wheezing.  Dispense: 3 mL; Refill: 12      Patient is seen today for complaints of productive cough, bilateral ear fullness, and nasal congestion.   She reports history of COPD, states she uses daily inhalers with an albuterol inhaler as needed as well as albuterol neb treatments as needed.  She declines COVID and flu testing today. On exam today she is in no obvious distress, she denies any chest pain, shortness of breath, wheezing, or palpitations.  Physical exam reveals bilateral middle ear effusions, frontal and maxillary sinus tenderness, swollen turbinates, and purulent nasal drainage.  Patient came in today because she was concerned about her potential for getting pneumonia.  We will go ahead and treat with antibiotics and steroids as well as some Tessalon Perles for cough.  I have instructed her to utilize her albuterol inhaler for wheezing, she is requesting refills on her nebulizer solution which I will provide.  I have instructed her to use these as needed as well.  She is to report to the ED with any shortness of breath or chest pain.  She is to return to us with any new or worsening symptoms, follow-up as needed.  She may also use Flonase over-the-counter for nasal symptoms as well as an oral antihistamine.  She is to increase her oral fluid intake and take Tylenol/ibuprofen as needed for pain/fever.    Plan:  1.  Continue albuterol nebulizer solution and treatments as needed  2.  Continue albuterol inhaler as needed  3.  Start Z-Eddy  4.  Start Tessalon Perles as needed for cough  5.  Start Medrol Dosepak  6.  Use Flonase and an oral antihistamine over-the-counter for nasal symptoms  7.  Increase oral fluid intake, can take Tylenol/ibuprofen as needed for pain/fever  8.  Follow-up as needed, please return with any new or worsening symptoms         Follow Up   Return if symptoms worsen or fail to improve.  Patient was given instructions and counseling regarding her condition or for health maintenance advice. Please see specific information pulled into the AVS if appropriate.

## 2023-01-04 ENCOUNTER — OFFICE VISIT (OUTPATIENT)
Dept: FAMILY MEDICINE CLINIC | Facility: CLINIC | Age: 73
End: 2023-01-04
Payer: MEDICARE

## 2023-01-04 VITALS
TEMPERATURE: 97.1 F | BODY MASS INDEX: 36.44 KG/M2 | DIASTOLIC BLOOD PRESSURE: 79 MMHG | HEIGHT: 56 IN | WEIGHT: 162 LBS | HEART RATE: 80 BPM | SYSTOLIC BLOOD PRESSURE: 134 MMHG | RESPIRATION RATE: 16 BRPM

## 2023-01-04 DIAGNOSIS — J44.9 CHRONIC OBSTRUCTIVE PULMONARY DISEASE, UNSPECIFIED COPD TYPE: Chronic | ICD-10-CM

## 2023-01-04 DIAGNOSIS — K21.9 GASTROESOPHAGEAL REFLUX DISEASE, UNSPECIFIED WHETHER ESOPHAGITIS PRESENT: ICD-10-CM

## 2023-01-04 DIAGNOSIS — G47.19 EXCESSIVE DAYTIME SLEEPINESS: ICD-10-CM

## 2023-01-04 DIAGNOSIS — Z99.81 OXYGEN DEPENDENT: ICD-10-CM

## 2023-01-04 DIAGNOSIS — I15.8 OTHER SECONDARY HYPERTENSION: Primary | Chronic | ICD-10-CM

## 2023-01-04 DIAGNOSIS — H66.92 ACUTE LEFT OTITIS MEDIA: ICD-10-CM

## 2023-01-04 DIAGNOSIS — G47.33 OBSTRUCTIVE SLEEP APNEA: ICD-10-CM

## 2023-01-04 PROBLEM — D50.9 IRON DEFICIENCY ANEMIA: Chronic | Status: ACTIVE | Noted: 2023-01-04

## 2023-01-04 PROBLEM — N18.32 STAGE 3B CHRONIC KIDNEY DISEASE: Chronic | Status: ACTIVE | Noted: 2023-01-04

## 2023-01-04 PROBLEM — E03.9 ACQUIRED HYPOTHYROIDISM: Status: ACTIVE | Noted: 2023-01-04

## 2023-01-04 PROBLEM — E53.8 VITAMIN B12 DEFICIENCY: Chronic | Status: ACTIVE | Noted: 2023-01-04

## 2023-01-04 PROBLEM — E78.49 OTHER HYPERLIPIDEMIA: Chronic | Status: ACTIVE | Noted: 2023-01-04

## 2023-01-04 PROBLEM — E53.8 FOLATE DEFICIENCY: Chronic | Status: ACTIVE | Noted: 2023-01-04

## 2023-01-04 PROCEDURE — 99214 OFFICE O/P EST MOD 30 MIN: CPT | Performed by: NURSE PRACTITIONER

## 2023-01-04 RX ORDER — DOXYCYCLINE HYCLATE 100 MG/1
100 CAPSULE ORAL 2 TIMES DAILY
Qty: 14 CAPSULE | Refills: 0 | Status: SHIPPED | OUTPATIENT
Start: 2023-01-04 | End: 2023-01-11

## 2023-01-04 RX ORDER — BETAMETHASONE DIPROPIONATE 0.5 MG/G
1 CREAM TOPICAL 2 TIMES DAILY
COMMUNITY

## 2023-01-04 RX ORDER — LISINOPRIL 2.5 MG/1
2.5 TABLET ORAL DAILY
COMMUNITY
End: 2023-01-04 | Stop reason: SDUPTHER

## 2023-01-04 RX ORDER — SUCRALFATE 1 G/1
1 TABLET ORAL 4 TIMES DAILY
Qty: 360 TABLET | Refills: 0 | Status: SHIPPED | OUTPATIENT
Start: 2023-01-04 | End: 2023-04-04

## 2023-01-04 RX ORDER — HYDROMORPHONE HCL 110MG/55ML
1.36 PATIENT CONTROLLED ANALGESIA SYRINGE INTRAVENOUS
COMMUNITY

## 2023-01-04 RX ORDER — BACLOFEN 500 UG/ML
237.53 INJECTION, SOLUTION INTRATHECAL ONCE
COMMUNITY

## 2023-01-04 RX ORDER — CLONIDINE 100 UG/ML
156.09 INJECTION, SOLUTION EPIDURAL ONCE
COMMUNITY

## 2023-01-04 RX ORDER — LISINOPRIL 2.5 MG/1
2.5 TABLET ORAL DAILY
Qty: 90 TABLET | Refills: 1 | Status: SHIPPED | OUTPATIENT
Start: 2023-01-04 | End: 2023-07-03

## 2023-01-04 NOTE — PROGRESS NOTES
Subjective     Chief Complaint   Patient presents with   • Establish Care     Lives here 50% of the time and needs a doctor for when she is here.   • Cough   • Nasal Congestion       History of Present Illness    Patient is accompanied by her son who is the primary historian.     Patient presents today to [facility] here.  She lives in the area [place] through [place] and then lives in [place] the rest of the year.  She is needing a refill on lisinopril.  Wears NC 2L O2 continuously.  Has severe sleep apnea with obstruction but PCP in [place] never ordered a CPAP.       Pain pump with dilaudid and baclofen.    She is also here with cough and nasal congestion.  She was treated for bilateral mid-ear effusion, cough, and maxillary sinusitis on 12/27 with z-roberto, medrol dose pack, Tessalon Perles, flonase, albuterol nebulizer and albuterol inhaler.          Dr. Roro Cai [phone] PCP  Dr. Hallie Gilbert [phone] Nephrologist  Pain Management [facility] [place] [phone]    Patient's PMR from outside medical facility reviewed and noted.    Review of Systems   Constitutional: Positive for fatigue. Negative for fever.   HENT: Positive for congestion.    Respiratory: Positive for cough. Negative for shortness of breath.         Otherwise complete ROS reviewed and negative except as mentioned in the HPI.    Past Medical History:   Past Medical History:   Diagnosis Date   • Cancer (HCC)    • Lung nodule    • Neck fracture (HCC)    • Thyroid nodule      Past Surgical History:  Past Surgical History:   Procedure Laterality Date   • APPENDECTOMY     • BACK SURGERY N/A    • BRAIN SURGERY     • COLONOSCOPY     • HYSTERECTOMY       Social History:  reports that she has never smoked. She has never used smokeless tobacco. She reports that she does not drink alcohol and does not use drugs.    Family History: family history includes No Known Problems in her father and mother.       Allergies:  Allergies   Allergen Reactions   • Bee Venom Shortness Of Breath and Swelling   • Ciprofloxacin Shortness Of Breath   • Nitroglycerin Anaphylaxis   • Penicillins Shortness Of Breath   • Codeine Unknown - High Severity   • Eggs Or Egg-Derived Products Nausea Only   • Oxycodone-Acetaminophen Unknown - High Severity   • Latex Rash     Medications:  Prior to Admission medications    Medication Sig Start Date End Date Taking? Authorizing Provider   albuterol (PROVENTIL) (2.5 MG/3ML) 0.083% nebulizer solution Take 2.5 mg by nebulization Every 4 (Four) Hours As Needed for Wheezing. 12/27/22  Yes Taya Yeung APRN   albuterol sulfate  (90 Base) MCG/ACT inhaler Inhale 2 puffs into the lungs every 4 hours as needed for Wheezing. 10/11/22  Yes Jacquie White MD   allopurinol (ZYLOPRIM) 100 MG tablet Take 100 mg by mouth Daily. 12/21/22  Yes Jacquie White MD   Aspirin Low Dose 81 MG EC tablet Take 81 mg by mouth Daily. 12/8/22  Yes Jacquie White MD   atorvastatin (LIPITOR) 40 MG tablet Take 40 mg by mouth every night at bedtime. 12/22/22  Yes Jacquie White MD   benzonatate (Tessalon Perles) 100 MG capsule Take 1 capsule by mouth 3 (Three) Times a Day As Needed for Cough. 12/27/22  Yes Taya Yeung APRN   betamethasone dipropionate 0.05 % cream Apply 1 application topically to the appropriate area as directed 2 (Two) Times a Day.   Yes ProviderJacquie MD   cyclobenzaprine (FLEXERIL) 10 MG tablet Take 10 mg by mouth 3 (Three) Times a Day. 12/22/22  Yes Jacquie White MD   ezetimibe (ZETIA) 10 MG tablet Take 10 mg by mouth every night at bedtime. 12/21/22  Yes Jacquie White MD   gabapentin (NEURONTIN) 100 MG capsule Take 100 mg by mouth every night at bedtime. 12/1/22  Yes Jacquie White MD   HYDROmorphone (Dilaudid) 2 MG/ML injection Infuse  into a venous catheter.   Yes Jacquie White MD   levothyroxine (SYNTHROID, LEVOTHROID) 25  MCG tablet TAKE 1 TABLET BY MOUTH BEFORE BREAKFAST. 12/21/22  Yes Jacquie White MD   lisinopril (PRINIVIL,ZESTRIL) 2.5 MG tablet Take 2.5 mg by mouth Daily.   Yes Jacquie White MD   nystatin (MYCOSTATIN) 783109 UNIT/GM powder APPLY TO AFFECTED AREA EVERY DAY AND AS NEEDED 12/14/22  Yes Jacquie White MD   pantoprazole (PROTONIX) 20 MG EC tablet Take 20 mg by mouth Every Morning Before Breakfast. 12/21/22  Yes Jacquie White MD   azithromycin (Zithromax Z-Eddy) 250 MG tablet Take 2 tablets by mouth on day 1, then 1 tablet daily on days 2-5 12/27/22   Taya Yeung APRN   methylPREDNISolone (MEDROL) 4 MG dose pack Take as directed on package instructions. 12/27/22   aTya Yeung APRN           PHQ-9 Depression Screening  Little interest or pleasure in doing things? 0-->not at all   Feeling down, depressed, or hopeless? 0-->not at all   Trouble falling or staying asleep, or sleeping too much?     Feeling tired or having little energy?     Poor appetite or overeating?     Feeling bad about yourself - or that you are a failure or have let yourself or your family down?     Trouble concentrating on things, such as reading the newspaper or watching television?     Moving or speaking so slowly that other people could have noticed? Or the opposite - being so fidgety or restless that you have been moving around a lot more than usual?     Thoughts that you would be better off dead, or of hurting yourself in some way?     PHQ-9 Total Score 0   If you checked off any problems, how difficult have these problems made it for you to do your work, take care of things at home, or get along with other people?         PHQ-9 Total Score: 0   0 (Negative screening for depression)      Objective     Vital Signs: /79 (BP Location: Left arm, Patient Position: Sitting, Cuff Size: Adult)   Pulse 80   Temp 97.1 °F (36.2 °C) (Infrared)   Resp 16   Ht 142.2 cm (56\")   Wt 73.5 kg (162 lb)   BMI  36.32 kg/m²   Physical Exam  Vitals and nursing note reviewed.   Constitutional:       Appearance: Normal appearance.      Comments: Patient continues to fall asleep during HPI but awakens easily - states it is her nap time.   HENT:      Head: Normocephalic.      Right Ear: Tympanic membrane normal.      Left Ear: A middle ear effusion is present. Tympanic membrane is erythematous and bulging.      Nose: Nose normal.      Mouth/Throat:      Mouth: Mucous membranes are moist.   Eyes:      Extraocular Movements: Extraocular movements intact.      Pupils: Pupils are equal, round, and reactive to light.   Cardiovascular:      Rate and Rhythm: Normal rate and regular rhythm.      Pulses: Normal pulses.      Heart sounds: Normal heart sounds.   Pulmonary:      Effort: Pulmonary effort is normal.      Breath sounds: Decreased breath sounds present.      Comments: Not currently wearing oxygen.  Wears continuous O2 at 2L.  Abdominal:      General: Bowel sounds are normal.      Palpations: Abdomen is soft.   Musculoskeletal:         General: Normal range of motion.      Cervical back: Normal range of motion.   Skin:     General: Skin is warm and dry.   Neurological:      General: No focal deficit present.      Mental Status: She is oriented to person, place, and time and easily aroused.   Psychiatric:         Mood and Affect: Mood normal.         Behavior: Behavior normal.         Thought Content: Thought content normal.         Judgment: Judgment normal.         Advance Care Planning   ACP discussion was held with the patient during this visit.         Results Reviewed:  No results found for: GLUCOSE, BUN, CREATININE, NA, K, CL, CO2, CALCIUM, ALT, AST, WBC, HCT, PLT, CHOL, TRIG, HDL, LDL, LDLHDL, HGBA1C      Assessment / Plan     Assessment/Plan     Diagnoses and all orders for this visit:    1. Other secondary hypertension (Primary)  -     lisinopril (PRINIVIL,ZESTRIL) 2.5 MG tablet; Take 1 tablet by mouth Daily for 180  days.  Dispense: 90 tablet; Refill: 1    2. Obstructive sleep apnea  -     Ambulatory Referral to Sleep Medicine    3. Excessive daytime sleepiness  -     Ambulatory Referral to Sleep Medicine    4. Chronic obstructive pulmonary disease, unspecified COPD type (HCC)  -     Ambulatory Referral to Pulmonology    5. Oxygen dependent  -     Ambulatory Referral to Pulmonology    6. Acute left otitis media  -     doxycycline (VIBRAMYCIN) 100 MG capsule; Take 1 capsule by mouth 2 (Two) Times a Day for 7 days.  Dispense: 14 capsule; Refill: 0    7. Gastroesophageal reflux disease, unspecified whether esophagitis present  -     sucralfate (Carafate) 1 g tablet; Take 1 tablet by mouth 4 (Four) Times a Day for 90 days.  Dispense: 360 tablet; Refill: 0         An After Visit Summary was printed and given to the patient at discharge.  Return in about 3 months (around 4/4/2023) for Recheck.    I have discussed the patient results/orders and plan/recommendation with them at today's visit.      Fabiola Morris, NISHANT   01/04/2023

## 2023-01-20 ENCOUNTER — OFFICE VISIT (OUTPATIENT)
Dept: FAMILY MEDICINE CLINIC | Facility: CLINIC | Age: 73
End: 2023-01-20
Payer: MEDICARE

## 2023-01-20 VITALS
HEIGHT: 58 IN | BODY MASS INDEX: 35.48 KG/M2 | OXYGEN SATURATION: 95 % | WEIGHT: 169 LBS | SYSTOLIC BLOOD PRESSURE: 141 MMHG | RESPIRATION RATE: 16 BRPM | DIASTOLIC BLOOD PRESSURE: 82 MMHG | HEART RATE: 89 BPM | TEMPERATURE: 97.7 F

## 2023-01-20 DIAGNOSIS — J34.89 RHINORRHEA: ICD-10-CM

## 2023-01-20 DIAGNOSIS — H70.91 MASTOIDITIS OF RIGHT SIDE: ICD-10-CM

## 2023-01-20 DIAGNOSIS — Z92.89 HISTORY OF RECENT HOSPITALIZATION: Primary | ICD-10-CM

## 2023-01-20 PROCEDURE — 1111F DSCHRG MED/CURRENT MED MERGE: CPT | Performed by: NURSE PRACTITIONER

## 2023-01-20 PROCEDURE — 99213 OFFICE O/P EST LOW 20 MIN: CPT | Performed by: NURSE PRACTITIONER

## 2023-01-20 RX ORDER — CLINDAMYCIN HYDROCHLORIDE 150 MG/1
300 CAPSULE ORAL 3 TIMES DAILY
COMMUNITY
End: 2023-02-28

## 2023-01-24 ENCOUNTER — TELEPHONE (OUTPATIENT)
Dept: OTOLARYNGOLOGY | Facility: CLINIC | Age: 73
End: 2023-01-24
Payer: MEDICARE

## 2023-01-30 ENCOUNTER — OFFICE VISIT (OUTPATIENT)
Dept: HEMATOLOGY/ONCOLOGY | Age: 73
End: 2023-01-30
Attending: INTERNAL MEDICINE
Payer: MEDICARE

## 2023-01-30 VITALS
OXYGEN SATURATION: 88 % | BODY MASS INDEX: 32.22 KG/M2 | HEART RATE: 73 BPM | WEIGHT: 164.13 LBS | HEIGHT: 60 IN | SYSTOLIC BLOOD PRESSURE: 185 MMHG | TEMPERATURE: 97 F | DIASTOLIC BLOOD PRESSURE: 79 MMHG

## 2023-01-30 DIAGNOSIS — D61.818 PANCYTOPENIA (HCC): ICD-10-CM

## 2023-01-30 DIAGNOSIS — D50.0 IRON DEFICIENCY ANEMIA DUE TO CHRONIC BLOOD LOSS: Primary | ICD-10-CM

## 2023-01-30 LAB
ALBUMIN SERPL-MCNC: 3.5 G/DL (ref 3.4–5)
ALBUMIN/GLOB SERPL: 1.1 {RATIO} (ref 1–2)
ALP LIVER SERPL-CCNC: 96 U/L
ALT SERPL-CCNC: 19 U/L
ANION GAP SERPL CALC-SCNC: 3 MMOL/L (ref 0–18)
AST SERPL-CCNC: 15 U/L (ref 15–37)
BASOPHILS # BLD AUTO: 0.03 X10(3) UL (ref 0–0.2)
BASOPHILS NFR BLD AUTO: 0.9 %
BILIRUB SERPL-MCNC: 0.6 MG/DL (ref 0.1–2)
BUN BLD-MCNC: 12 MG/DL (ref 7–18)
CALCIUM BLD-MCNC: 9.3 MG/DL (ref 8.5–10.1)
CHLORIDE SERPL-SCNC: 105 MMOL/L (ref 98–112)
CO2 SERPL-SCNC: 33 MMOL/L (ref 21–32)
CREAT BLD-MCNC: 1.05 MG/DL
CRP SERPL-MCNC: <0.29 MG/DL (ref ?–0.3)
DEPRECATED HBV CORE AB SER IA-ACNC: 70.7 NG/ML
EOSINOPHIL # BLD AUTO: 0.08 X10(3) UL (ref 0–0.7)
EOSINOPHIL NFR BLD AUTO: 2.4 %
ERYTHROCYTE [DISTWIDTH] IN BLOOD BY AUTOMATED COUNT: 14.9 %
FASTING STATUS PATIENT QL REPORTED: NO
FOLATE SERPL-MCNC: 37.6 NG/ML (ref 8.7–?)
GFR SERPLBLD BASED ON 1.73 SQ M-ARVRAT: 56 ML/MIN/1.73M2 (ref 60–?)
GLOBULIN PLAS-MCNC: 3.1 G/DL (ref 2.8–4.4)
GLUCOSE BLD-MCNC: 127 MG/DL (ref 70–99)
HCT VFR BLD AUTO: 34.3 %
HGB BLD-MCNC: 11.5 G/DL
IMM GRANULOCYTES # BLD AUTO: 0.02 X10(3) UL (ref 0–1)
IMM GRANULOCYTES NFR BLD: 0.6 %
IRON SATN MFR SERPL: 26 %
IRON SERPL-MCNC: 82 UG/DL
LYMPHOCYTES # BLD AUTO: 0.51 X10(3) UL (ref 1–4)
LYMPHOCYTES NFR BLD AUTO: 15.5 %
MCH RBC QN AUTO: 29.3 PG (ref 26–34)
MCHC RBC AUTO-ENTMCNC: 33.5 G/DL (ref 31–37)
MCV RBC AUTO: 87.5 FL
MONOCYTES # BLD AUTO: 0.25 X10(3) UL (ref 0.1–1)
MONOCYTES NFR BLD AUTO: 7.6 %
NEUTROPHILS # BLD AUTO: 2.41 X10 (3) UL (ref 1.5–7.7)
NEUTROPHILS # BLD AUTO: 2.41 X10(3) UL (ref 1.5–7.7)
NEUTROPHILS NFR BLD AUTO: 73 %
OSMOLALITY SERPL CALC.SUM OF ELEC: 293 MOSM/KG (ref 275–295)
PLATELET # BLD AUTO: 120 10(3)UL (ref 150–450)
POTASSIUM SERPL-SCNC: 4.4 MMOL/L (ref 3.5–5.1)
PROT SERPL-MCNC: 6.6 G/DL (ref 6.4–8.2)
RBC # BLD AUTO: 3.92 X10(6)UL
SODIUM SERPL-SCNC: 141 MMOL/L (ref 136–145)
TIBC SERPL-MCNC: 314 UG/DL (ref 240–450)
TRANSFERRIN SERPL-MCNC: 211 MG/DL (ref 200–360)
VIT B12 SERPL-MCNC: 337 PG/ML (ref 193–986)
WBC # BLD AUTO: 3.3 X10(3) UL (ref 4–11)

## 2023-01-30 PROCEDURE — 99214 OFFICE O/P EST MOD 30 MIN: CPT | Performed by: INTERNAL MEDICINE

## 2023-01-30 RX ORDER — GABAPENTIN 100 MG/1
100 CAPSULE ORAL NIGHTLY
COMMUNITY
Start: 2023-01-10

## 2023-01-30 RX ORDER — SUCRALFATE 1 G/1
TABLET ORAL
COMMUNITY
Start: 2023-01-04

## 2023-01-30 RX ORDER — LISINOPRIL 2.5 MG/1
2.5 TABLET ORAL DAILY
COMMUNITY
Start: 2023-01-04

## 2023-01-30 RX ORDER — FOLIC ACID 1 MG/1
1 TABLET ORAL DAILY
Qty: 30 TABLET | Refills: 5 | Status: SHIPPED | OUTPATIENT
Start: 2023-01-30

## 2023-01-30 RX ORDER — CYCLOBENZAPRINE HCL 10 MG
10 TABLET ORAL 3 TIMES DAILY
COMMUNITY
Start: 2023-01-19

## 2023-01-30 NOTE — PROGRESS NOTES
Education Record    Learner:  Patient    Disease / Diagnosis:Iron deficiency anemia     Barriers / Limitations:  None   Comments:    Method:  Discussion   Comments:    General Topics:  Plan of care reviewed   Comments:    Outcome:  Shows understanding   Comments:    Patient here for follow-up. States energy levels are poor. Having lightheadedness and dyspnea despite being on home oxygen. Denies any signs of active bleeding. Has not been taking folic acid.

## 2023-02-06 ENCOUNTER — HOSPITAL ENCOUNTER (INPATIENT)
Facility: HOSPITAL | Age: 73
LOS: 4 days | Discharge: HOME | End: 2023-02-10
Attending: EMERGENCY MEDICINE | Admitting: INTERNAL MEDICINE
Payer: MEDICARE

## 2023-02-06 ENCOUNTER — APPOINTMENT (OUTPATIENT)
Dept: GENERAL RADIOLOGY | Facility: HOSPITAL | Age: 73
End: 2023-02-06
Attending: EMERGENCY MEDICINE
Payer: MEDICARE

## 2023-02-06 ENCOUNTER — APPOINTMENT (OUTPATIENT)
Dept: CT IMAGING | Facility: HOSPITAL | Age: 73
End: 2023-02-06
Attending: EMERGENCY MEDICINE
Payer: MEDICARE

## 2023-02-06 DIAGNOSIS — J18.9 COMMUNITY ACQUIRED PNEUMONIA OF LEFT LOWER LOBE OF LUNG: Primary | ICD-10-CM

## 2023-02-06 DIAGNOSIS — R76.8 HEPATITIS B CORE ANTIBODY POSITIVE: ICD-10-CM

## 2023-02-06 DIAGNOSIS — B37.0 ORAL CANDIDIASIS: ICD-10-CM

## 2023-02-06 DIAGNOSIS — H70.92 MASTOIDITIS OF LEFT SIDE: ICD-10-CM

## 2023-02-06 DIAGNOSIS — M25.541 ARTHRALGIA OF HANDS, BILATERAL: ICD-10-CM

## 2023-02-06 DIAGNOSIS — M25.542 ARTHRALGIA OF HANDS, BILATERAL: ICD-10-CM

## 2023-02-06 DIAGNOSIS — E55.9 VITAMIN D DEFICIENCY: ICD-10-CM

## 2023-02-06 LAB
ALBUMIN SERPL-MCNC: 3.3 G/DL (ref 3.4–5)
ALBUMIN/GLOB SERPL: 1.1 {RATIO} (ref 1–2)
ALP LIVER SERPL-CCNC: 80 U/L
ALT SERPL-CCNC: 16 U/L
ANION GAP SERPL CALC-SCNC: 4 MMOL/L (ref 0–18)
APTT PPP: 23.6 SECONDS (ref 23.3–35.6)
AST SERPL-CCNC: 25 U/L (ref 15–37)
BASOPHILS # BLD AUTO: 0.02 X10(3) UL (ref 0–0.2)
BASOPHILS NFR BLD AUTO: 0.5 %
BILIRUB SERPL-MCNC: 0.4 MG/DL (ref 0.1–2)
BUN BLD-MCNC: 12 MG/DL (ref 7–18)
CALCIUM BLD-MCNC: 9.2 MG/DL (ref 8.5–10.1)
CHLORIDE SERPL-SCNC: 105 MMOL/L (ref 98–112)
CO2 SERPL-SCNC: 32 MMOL/L (ref 21–32)
CREAT BLD-MCNC: 1.08 MG/DL
EOSINOPHIL # BLD AUTO: 0.08 X10(3) UL (ref 0–0.7)
EOSINOPHIL NFR BLD AUTO: 2.1 %
ERYTHROCYTE [DISTWIDTH] IN BLOOD BY AUTOMATED COUNT: 14.3 %
GFR SERPLBLD BASED ON 1.73 SQ M-ARVRAT: 54 ML/MIN/1.73M2 (ref 60–?)
GLOBULIN PLAS-MCNC: 3.1 G/DL (ref 2.8–4.4)
GLUCOSE BLD-MCNC: 107 MG/DL (ref 70–99)
HCT VFR BLD AUTO: 34.1 %
HGB BLD-MCNC: 11.5 G/DL
IMM GRANULOCYTES # BLD AUTO: 0.03 X10(3) UL (ref 0–1)
IMM GRANULOCYTES NFR BLD: 0.8 %
INR BLD: 1.02 (ref 0.85–1.16)
LYMPHOCYTES # BLD AUTO: 0.69 X10(3) UL (ref 1–4)
LYMPHOCYTES NFR BLD AUTO: 18.1 %
MCH RBC QN AUTO: 29.8 PG (ref 26–34)
MCHC RBC AUTO-ENTMCNC: 33.7 G/DL (ref 31–37)
MCV RBC AUTO: 88.3 FL
MONOCYTES # BLD AUTO: 0.26 X10(3) UL (ref 0.1–1)
MONOCYTES NFR BLD AUTO: 6.8 %
NEUTROPHILS # BLD AUTO: 2.74 X10 (3) UL (ref 1.5–7.7)
NEUTROPHILS # BLD AUTO: 2.74 X10(3) UL (ref 1.5–7.7)
NEUTROPHILS NFR BLD AUTO: 71.7 %
OSMOLALITY SERPL CALC.SUM OF ELEC: 292 MOSM/KG (ref 275–295)
PLATELET # BLD AUTO: 125 10(3)UL (ref 150–450)
POTASSIUM SERPL-SCNC: 3.9 MMOL/L (ref 3.5–5.1)
PROT SERPL-MCNC: 6.4 G/DL (ref 6.4–8.2)
PROTHROMBIN TIME: 13.4 SECONDS (ref 11.6–14.8)
RBC # BLD AUTO: 3.86 X10(6)UL
SARS-COV-2 RNA RESP QL NAA+PROBE: NOT DETECTED
SODIUM SERPL-SCNC: 141 MMOL/L (ref 136–145)
WBC # BLD AUTO: 3.8 X10(3) UL (ref 4–11)

## 2023-02-06 PROCEDURE — 87040 BLOOD CULTURE FOR BACTERIA: CPT | Performed by: EMERGENCY MEDICINE

## 2023-02-06 PROCEDURE — 96361 HYDRATE IV INFUSION ADD-ON: CPT

## 2023-02-06 PROCEDURE — 96374 THER/PROPH/DIAG INJ IV PUSH: CPT

## 2023-02-06 PROCEDURE — 70450 CT HEAD/BRAIN W/O DYE: CPT | Performed by: EMERGENCY MEDICINE

## 2023-02-06 PROCEDURE — 99285 EMERGENCY DEPT VISIT HI MDM: CPT

## 2023-02-06 PROCEDURE — 85025 COMPLETE CBC W/AUTO DIFF WBC: CPT | Performed by: EMERGENCY MEDICINE

## 2023-02-06 PROCEDURE — 80053 COMPREHEN METABOLIC PANEL: CPT | Performed by: EMERGENCY MEDICINE

## 2023-02-06 PROCEDURE — 85730 THROMBOPLASTIN TIME PARTIAL: CPT | Performed by: EMERGENCY MEDICINE

## 2023-02-06 PROCEDURE — 70480 CT ORBIT/EAR/FOSSA W/O DYE: CPT | Performed by: EMERGENCY MEDICINE

## 2023-02-06 PROCEDURE — 70486 CT MAXILLOFACIAL W/O DYE: CPT | Performed by: EMERGENCY MEDICINE

## 2023-02-06 PROCEDURE — 85610 PROTHROMBIN TIME: CPT | Performed by: EMERGENCY MEDICINE

## 2023-02-06 PROCEDURE — 71045 X-RAY EXAM CHEST 1 VIEW: CPT | Performed by: EMERGENCY MEDICINE

## 2023-02-06 PROCEDURE — 96375 TX/PRO/DX INJ NEW DRUG ADDON: CPT

## 2023-02-06 RX ORDER — KETOROLAC TROMETHAMINE 15 MG/ML
15 INJECTION, SOLUTION INTRAMUSCULAR; INTRAVENOUS ONCE AS NEEDED
Status: COMPLETED | OUTPATIENT
Start: 2023-02-06 | End: 2023-02-06

## 2023-02-06 RX ORDER — BISACODYL 10 MG
10 SUPPOSITORY, RECTAL RECTAL
Status: DISCONTINUED | OUTPATIENT
Start: 2023-02-06 | End: 2023-02-10

## 2023-02-06 RX ORDER — KETOROLAC TROMETHAMINE 15 MG/ML
15 INJECTION, SOLUTION INTRAMUSCULAR; INTRAVENOUS ONCE
Status: COMPLETED | OUTPATIENT
Start: 2023-02-06 | End: 2023-02-06

## 2023-02-06 RX ORDER — TEMAZEPAM 15 MG/1
15 CAPSULE ORAL NIGHTLY PRN
Status: DISCONTINUED | OUTPATIENT
Start: 2023-02-06 | End: 2023-02-10

## 2023-02-06 RX ORDER — SODIUM CHLORIDE 9 MG/ML
INJECTION, SOLUTION INTRAVENOUS CONTINUOUS
Status: DISCONTINUED | OUTPATIENT
Start: 2023-02-06 | End: 2023-02-06

## 2023-02-06 RX ORDER — LEVOTHYROXINE SODIUM 0.03 MG/1
25 TABLET ORAL
Status: DISCONTINUED | OUTPATIENT
Start: 2023-02-06 | End: 2023-02-10

## 2023-02-06 RX ORDER — SODIUM CHLORIDE 9 MG/ML
1000 INJECTION, SOLUTION INTRAVENOUS ONCE
Status: COMPLETED | OUTPATIENT
Start: 2023-02-06 | End: 2023-02-06

## 2023-02-06 RX ORDER — EZETIMIBE 10 MG/1
10 TABLET ORAL NIGHTLY
Status: DISCONTINUED | OUTPATIENT
Start: 2023-02-06 | End: 2023-02-10

## 2023-02-06 RX ORDER — ENOXAPARIN SODIUM 100 MG/ML
40 INJECTION SUBCUTANEOUS DAILY
Status: DISCONTINUED | OUTPATIENT
Start: 2023-02-06 | End: 2023-02-10

## 2023-02-06 RX ORDER — SODIUM CHLORIDE 9 MG/ML
INJECTION, SOLUTION INTRAVENOUS CONTINUOUS
Status: DISCONTINUED | OUTPATIENT
Start: 2023-02-06 | End: 2023-02-10

## 2023-02-06 RX ORDER — FOLIC ACID 1 MG/1
1 TABLET ORAL DAILY
Status: DISCONTINUED | OUTPATIENT
Start: 2023-02-06 | End: 2023-02-10

## 2023-02-06 RX ORDER — AZITHROMYCIN 250 MG/1
500 TABLET, FILM COATED ORAL
Status: DISCONTINUED | OUTPATIENT
Start: 2023-02-06 | End: 2023-02-10

## 2023-02-06 RX ORDER — ATORVASTATIN CALCIUM 40 MG/1
40 TABLET, FILM COATED ORAL NIGHTLY
Status: DISCONTINUED | OUTPATIENT
Start: 2023-02-06 | End: 2023-02-10

## 2023-02-06 RX ORDER — SODIUM PHOSPHATE, DIBASIC AND SODIUM PHOSPHATE, MONOBASIC 7; 19 G/133ML; G/133ML
1 ENEMA RECTAL ONCE AS NEEDED
Status: DISCONTINUED | OUTPATIENT
Start: 2023-02-06 | End: 2023-02-10

## 2023-02-06 RX ORDER — ONDANSETRON 2 MG/ML
4 INJECTION INTRAMUSCULAR; INTRAVENOUS ONCE
Status: COMPLETED | OUTPATIENT
Start: 2023-02-06 | End: 2023-02-06

## 2023-02-06 RX ORDER — ALLOPURINOL 100 MG/1
100 TABLET ORAL DAILY
Status: DISCONTINUED | OUTPATIENT
Start: 2023-02-06 | End: 2023-02-10

## 2023-02-06 RX ORDER — ALBUTEROL SULFATE 90 UG/1
2 AEROSOL, METERED RESPIRATORY (INHALATION) EVERY 4 HOURS PRN
Status: DISCONTINUED | OUTPATIENT
Start: 2023-02-06 | End: 2023-02-10

## 2023-02-06 RX ORDER — ONDANSETRON 2 MG/ML
4 INJECTION INTRAMUSCULAR; INTRAVENOUS EVERY 6 HOURS PRN
Status: DISCONTINUED | OUTPATIENT
Start: 2023-02-06 | End: 2023-02-10

## 2023-02-06 RX ORDER — POLYETHYLENE GLYCOL 3350 17 G/17G
17 POWDER, FOR SOLUTION ORAL DAILY PRN
Status: DISCONTINUED | OUTPATIENT
Start: 2023-02-06 | End: 2023-02-10

## 2023-02-06 RX ORDER — IBUPROFEN 600 MG/1
600 TABLET ORAL 3 TIMES DAILY
Status: DISCONTINUED | OUTPATIENT
Start: 2023-02-06 | End: 2023-02-10

## 2023-02-06 RX ORDER — METOCLOPRAMIDE HYDROCHLORIDE 5 MG/ML
10 INJECTION INTRAMUSCULAR; INTRAVENOUS EVERY 8 HOURS PRN
Status: DISCONTINUED | OUTPATIENT
Start: 2023-02-06 | End: 2023-02-10

## 2023-02-06 RX ORDER — SENNOSIDES 8.6 MG
17.2 TABLET ORAL NIGHTLY PRN
Status: DISCONTINUED | OUTPATIENT
Start: 2023-02-06 | End: 2023-02-10

## 2023-02-06 RX ORDER — ASPIRIN 81 MG/1
81 TABLET ORAL DAILY
Status: DISCONTINUED | OUTPATIENT
Start: 2023-02-06 | End: 2023-02-10

## 2023-02-06 RX ORDER — PANTOPRAZOLE SODIUM 20 MG/1
20 TABLET, DELAYED RELEASE ORAL
Status: DISCONTINUED | OUTPATIENT
Start: 2023-02-06 | End: 2023-02-10

## 2023-02-06 NOTE — ED QUICK NOTES
Orders for admission, patient is aware of plan and ready to go upstairs. Any questions, please call ED RN Gunnar Santamaria at extension [phone].      Patient Covid vaccination status: Partially vaccinated     COVID Test Ordered in ED: Rapid SARS-CoV-2 by PCR    COVID Suspicion at Admission: Low clinical suspicion for COVID    Running Infusions:      Mental Status/LOC at time of transport: A&Ox4    Other pertinent information:   CIWA score: N/A   NIH score:  N/A

## 2023-02-06 NOTE — ED INITIAL ASSESSMENT (HPI)
Diagnosed with mastoiditis on 1/16/23, c/o worsening pain, states food and drink comes out of her nose.   Wants to see an ENT

## 2023-02-07 LAB
ANION GAP SERPL CALC-SCNC: 2 MMOL/L (ref 0–18)
BASOPHILS # BLD AUTO: 0.02 X10(3) UL (ref 0–0.2)
BASOPHILS NFR BLD AUTO: 0.7 %
BUN BLD-MCNC: 14 MG/DL (ref 7–18)
CALCIUM BLD-MCNC: 8.5 MG/DL (ref 8.5–10.1)
CHLORIDE SERPL-SCNC: 108 MMOL/L (ref 98–112)
CO2 SERPL-SCNC: 28 MMOL/L (ref 21–32)
CREAT BLD-MCNC: 1.09 MG/DL
EOSINOPHIL # BLD AUTO: 0.06 X10(3) UL (ref 0–0.7)
EOSINOPHIL NFR BLD AUTO: 2.1 %
ERYTHROCYTE [DISTWIDTH] IN BLOOD BY AUTOMATED COUNT: 14.1 %
GFR SERPLBLD BASED ON 1.73 SQ M-ARVRAT: 54 ML/MIN/1.73M2 (ref 60–?)
GLUCOSE BLD-MCNC: 119 MG/DL (ref 70–99)
HCT VFR BLD AUTO: 31.1 %
HGB BLD-MCNC: 10.1 G/DL
IMM GRANULOCYTES # BLD AUTO: 0.03 X10(3) UL (ref 0–1)
IMM GRANULOCYTES NFR BLD: 1 %
LYMPHOCYTES # BLD AUTO: 0.53 X10(3) UL (ref 1–4)
LYMPHOCYTES NFR BLD AUTO: 18.3 %
MCH RBC QN AUTO: 29.3 PG (ref 26–34)
MCHC RBC AUTO-ENTMCNC: 32.5 G/DL (ref 31–37)
MCV RBC AUTO: 90.1 FL
MONOCYTES # BLD AUTO: 0.2 X10(3) UL (ref 0.1–1)
MONOCYTES NFR BLD AUTO: 6.9 %
NEUTROPHILS # BLD AUTO: 2.06 X10 (3) UL (ref 1.5–7.7)
NEUTROPHILS # BLD AUTO: 2.06 X10(3) UL (ref 1.5–7.7)
NEUTROPHILS NFR BLD AUTO: 71 %
OSMOLALITY SERPL CALC.SUM OF ELEC: 288 MOSM/KG (ref 275–295)
PLATELET # BLD AUTO: 111 10(3)UL (ref 150–450)
POTASSIUM SERPL-SCNC: 3.9 MMOL/L (ref 3.5–5.1)
RBC # BLD AUTO: 3.45 X10(6)UL
SODIUM SERPL-SCNC: 138 MMOL/L (ref 136–145)
WBC # BLD AUTO: 2.9 X10(3) UL (ref 4–11)

## 2023-02-07 PROCEDURE — 97161 PT EVAL LOW COMPLEX 20 MIN: CPT

## 2023-02-07 PROCEDURE — 80048 BASIC METABOLIC PNL TOTAL CA: CPT | Performed by: INTERNAL MEDICINE

## 2023-02-07 PROCEDURE — 94640 AIRWAY INHALATION TREATMENT: CPT

## 2023-02-07 PROCEDURE — 85025 COMPLETE CBC W/AUTO DIFF WBC: CPT | Performed by: INTERNAL MEDICINE

## 2023-02-07 PROCEDURE — 97165 OT EVAL LOW COMPLEX 30 MIN: CPT

## 2023-02-07 PROCEDURE — 97535 SELF CARE MNGMENT TRAINING: CPT

## 2023-02-07 PROCEDURE — 97116 GAIT TRAINING THERAPY: CPT

## 2023-02-07 NOTE — PLAN OF CARE
Patient A&Ox4 Patient resting in bed, no apparent distress, denies pain. Patient resting on 3L NC, per report, this is baseline. Patient remains on tele and  monitoring. Patient does complain of jaw pain, MD notified, One time PRN toradol given, see MAR for more details. PRN temazepam given for sleep per patient request. ENT services did see patient at bedside, new orders for scheduled ibuprofen and warm compresses added, see MAR and Orders for details. IVF. Safety/fall precautions in place. Call light in reach.      Problem: Patient/Family Goals  Goal: Patient/Family Long Term Goal  Description: Patient's Long Term Goal: discharge    Interventions:  - ENT consult  -PT/OT cosult  -ABX  - See additional Care Plan goals for specific interventions  Outcome: Progressing  Goal: Patient/Family Short Term Goal  Description: Patient's Short Term Goal:   2/6 noc: rest, pain control    Interventions:   - pain assessment/reassessment  -Scheduled Ibuprofen  -PRN temazepam  - See additional Care Plan goals for specific interventions  Outcome: Progressing     Problem: RESPIRATORY - ADULT  Goal: Achieves optimal ventilation and oxygenation  Description: INTERVENTIONS:  - Assess for changes in respiratory status  - Assess for changes in mentation and behavior  - Position to facilitate oxygenation and minimize respiratory effort  - Oxygen supplementation based on oxygen saturation or ABGs  - Provide Smoking Cessation handout, if applicable  - Encourage broncho-pulmonary hygiene including cough, deep breathe, Incentive Spirometry  - Assess the need for suctioning and perform as needed  - Assess and instruct to report SOB or any respiratory difficulty  - Respiratory Therapy support as indicated  - Manage/alleviate anxiety  - Monitor for signs/symptoms of CO2 retention  Outcome: Progressing     Problem: PAIN - ADULT  Goal: Verbalizes/displays adequate comfort level or patient's stated pain goal  Description: INTERVENTIONS:  - Encourage pt to monitor pain and request assistance  - Assess pain using appropriate pain scale  - Administer analgesics based on type and severity of pain and evaluate response  - Implement non-pharmacological measures as appropriate and evaluate response  - Consider cultural and social influences on pain and pain management  - Manage/alleviate anxiety  - Utilize distraction and/or relaxation techniques  - Monitor for opioid side effects  - Notify MD/LIP if interventions unsuccessful or patient reports new pain  - Anticipate increased pain with activity and pre-medicate as appropriate  Outcome: Progressing

## 2023-02-07 NOTE — PLAN OF CARE
2/7 received pt.a sleep,easily arousable,  On 2L/nc ,sat=94%ambulates to bathroom with standby assist. Ivf infusing well on right upper arm. Denies any pain at this time. Swallow meds ok, went back to sleep.  Refused breakfast.    Problem: Patient/Family Goals  Goal: Patient/Family Long Term Goal  Description: Patient's Long Term Goal: discharge    Interventions:  - ENT consult  -PT/OT cosult  -ABX  - See additional Care Plan goals for specific interventions  Outcome: Progressing  Goal: Patient/Family Short Term Goal  Description: Patient's Short Term Goal:   2/6 noc: rest, pain control    Interventions:   - pain assessment/reassessment  -Scheduled Ibuprofen  -PRN temazepam  - See additional Care Plan goals for specific interventions  Outcome: Progressing     Problem: RESPIRATORY - ADULT  Goal: Achieves optimal ventilation and oxygenation  Description: INTERVENTIONS:  - Assess for changes in respiratory status  - Assess for changes in mentation and behavior  - Position to facilitate oxygenation and minimize respiratory effort  - Oxygen supplementation based on oxygen saturation or ABGs  - Provide Smoking Cessation handout, if applicable  - Encourage broncho-pulmonary hygiene including cough, deep breathe, Incentive Spirometry  - Assess the need for suctioning and perform as needed  - Assess and instruct to report SOB or any respiratory difficulty  - Respiratory Therapy support as indicated  - Manage/alleviate anxiety  - Monitor for signs/symptoms of CO2 retention  Outcome: Progressing     Problem: PAIN - ADULT  Goal: Verbalizes/displays adequate comfort level or patient's stated pain goal  Description: INTERVENTIONS:  - Encourage pt to monitor pain and request assistance  - Assess pain using appropriate pain scale  - Administer analgesics based on type and severity of pain and evaluate response  - Implement non-pharmacological measures as appropriate and evaluate response  - Consider cultural and social influences on pain and pain management  - Manage/alleviate anxiety  - Utilize distraction and/or relaxation techniques  - Monitor for opioid side effects  - Notify MD/LIP if interventions unsuccessful or patient reports new pain  - Anticipate increased pain with activity and pre-medicate as appropriate  Outcome: Progressing

## 2023-02-07 NOTE — CM/SW NOTE
Department  notified of request for [facility] [place] referrals started. Assigned CM/SW to follow up with pt/family on further discharge planning.      Fern ARROYO [facility]

## 2023-02-07 NOTE — CM/SW NOTE
02/07/23 1600   CM/SW Referral Data   Referral Source    Reason for Referral Discharge planning   Informant Patient; Son   Patient Info   Patient's Current Mental Status at Time of Assessment Alert;Oriented   Patient's Home Environment Condo/Apt no elevator   Number of Levels in Home 1   Number of Stair in Home 0   Patient lives with Son   Patient Status Prior to Admission   Independent with ADLs and Mobility No   Pt. requires assistance with Housework;Driving;Meals; Bathing; Ambulating;Dressing; Toileting   Services in place prior to admission DME/Supplies at home   Type of DME/Supplies Standard Walker;Home Oxygen;Raised Toilet Seat;Grab Bars; Shower Chair;Hospital Bed   Discharge Needs   Anticipated D/C needs Home health care   Choice of Post-Acute Provider   Informed patient of right to choose their preferred provider Yes     CM self referred to case for discharge planning  Discussed with PT/OT and they are recommending [facility]    Met with patient and her son for eval and they area agreeable to referrals  Pt lives with her son who assists with ADLs/IaDLs. She wears O2 2L at baseline, but can't recall vendor    CM requested department  [facility]) to initiate [address] referral for [facility]. SW/CM will continue to follow. Face to Face entered    / to remain available for support and/or discharge planning.      Oniel Schneider MBA MSN, RN CTL/  [id number]

## 2023-02-08 ENCOUNTER — APPOINTMENT (OUTPATIENT)
Dept: GENERAL RADIOLOGY | Facility: HOSPITAL | Age: 73
End: 2023-02-08
Attending: INTERNAL MEDICINE
Payer: MEDICARE

## 2023-02-08 PROCEDURE — 74230 X-RAY XM SWLNG FUNCJ C+: CPT | Performed by: INTERNAL MEDICINE

## 2023-02-08 PROCEDURE — 92611 MOTION FLUOROSCOPY/SWALLOW: CPT

## 2023-02-08 RX ORDER — LISINOPRIL 5 MG/1
5 TABLET ORAL DAILY
Status: DISCONTINUED | OUTPATIENT
Start: 2023-02-08 | End: 2023-02-09

## 2023-02-08 NOTE — PLAN OF CARE
AO x3. Forgetful at time. 2L at rest which is baseline. Tele - NSR. Lovenox. Continent. Up x1 with walker to bathroom. L ear pain. LLQ pain pump. Rocephin. Zithro. IVF infusing per MAR. Regular diet decreased appetite. Bed alarm on. Pt and son updated on POC. No further needs at this time. 2023 - Pt had unwitnessed fall in bathroom, son was able to find her. VSS. No reports of pain. MD Keita notified. No new orders. Bed alarm on. Nonskid socks on.     Problem: Patient/Family Goals  Goal: Patient/Family Long Term Goal  Description: Patient's Long Term Goal: discharge    Interventions:  - ENT consult  -PT/OT cosult  -ABX  - See additional Care Plan goals for specific interventions  Outcome: Progressing  Goal: Patient/Family Short Term Goal  Description: Patient's Short Term Goal:   2/6 noc: rest, pain control    Interventions:   - pain assessment/reassessment  -Scheduled Ibuprofen  -PRN temazepam  - See additional Care Plan goals for specific interventions  Outcome: Progressing

## 2023-02-08 NOTE — PROGRESS NOTES
Significant Event - Fall Note    Date/Time of Fall: February 7, 2023 at 2023    Fall huddle completed: Yes    Description of patient fall:  Unwitnessed fall - Pt son came out of room and stated that patient was on the floor in bathroom trying to crawl back to bed. This RN and charge RN went into room and assessed patient. Pt unharmed. VSS. Pt O2 was off. Pt stated she fell to her knees from the toilet. Pt stated no pain at the time. MD notified. No new orders. Patient fell from: Toilet     Activity when fall occurred: Ambulating without assistance or assistive devices     Where did fall occur: Bathroom     Was the fall assisted: Found on floor/unassisted to floor    Who witnessed the fall: Family    Patient narrative of fall:    Pt stated she fell to her knees from the toilet. Pt stated no pain at the time. MD notified. No new orders. Staff narrative of fall:   Unwitnessed fall - Pt son came out of room and stated that patient was on the floor in bathroom trying to crawl back to bed. This RN and charge RN went into room and assessed patient. Pt unharmed. VSS. Pt O2 was off. Name of Provider notified of fall: Kristopher Dance MD    Family notification: Family present    Factors contributing to fall: Oxygen off. IVF. Pain medication. No bed alarm. No nonskid socks.       Physical: Unsteady gait     Psychological: Alert     Environmental: Footwear     Medications received in the past 8 hours:   Medication(s) Administered in past 8 Hours from 02/07/2023 1736 to 02/08/2023 0136     Date/Time Order Dose Route Action Action by Comments    02/07/2023 2120 CST atorvastatin (Lipitor) tab 40 mg 40 mg Oral Given Dione Link RN --    68/21/8291 2120 CST azithromycin (Zithromax) tab 500 mg 500 mg Oral Given Dione Link RN --    20/37/9780 2120 CST ezetimibe (Zetia) tab 10 mg 10 mg Oral Given Dione Link RN --    54/18/6739 2120 CST ibuprofen (Motrin) tab 600 mg 600 mg Oral Given Meghana Pineda RN -- 02/07/2023 2120 CST temazepam (Restoril) cap 15 mg 15 mg Oral Given Ambrose Cote RN --          Was patient identified as high fall risk prior to fall:         Fall Risk Level: High Fall Risk                      What interventions were in place prior to fall: Bed in lowest position    Interventions post fall: Bed alarm, Non skid socks on, call light with reach.      Additional comments:

## 2023-02-08 NOTE — CM/SW NOTE
Notified by [facility] liaison pt will travel home to [place] after hospital discharge and will have her [facility]CP [facility] [place]. SW will continue to remain available.     MAVERICK Viera  Discharge Planner

## 2023-02-08 NOTE — PLAN OF CARE
Pt AOx4. VSS on RA baseline 2L. Tele, NSR. Lovenox. Voids. C/o left ear pain, scheduled ibuprofen. Up w one person and walker. High fall risk. Iv abx. Ivf. Regular diet, tolerating well. Pt and son updated on poc, no further needs.          Problem: Patient/Family Goals  Goal: Patient/Family Long Term Goal  Description: Patient's Long Term Goal: discharge    Interventions:  - ENT consult  -PT/OT cosult  -ABX  - See additional Care Plan goals for specific interventions  Outcome: Progressing  Goal: Patient/Family Short Term Goal  Description: Patient's Short Term Goal:   2/6 noc: rest, pain control  2/8 AM: remain safe and free of injury  Interventions:   - pain assessment/reassessment  -Scheduled Ibuprofen  -PRN temazepam  - See additional Care Plan goals for specific interventions  Outcome: Progressing     Problem: RESPIRATORY - ADULT  Goal: Achieves optimal ventilation and oxygenation  Description: INTERVENTIONS:  - Assess for changes in respiratory status  - Assess for changes in mentation and behavior  - Position to facilitate oxygenation and minimize respiratory effort  - Oxygen supplementation based on oxygen saturation or ABGs  - Provide Smoking Cessation handout, if applicable  - Encourage broncho-pulmonary hygiene including cough, deep breathe, Incentive Spirometry  - Assess the need for suctioning and perform as needed  - Assess and instruct to report SOB or any respiratory difficulty  - Respiratory Therapy support as indicated  - Manage/alleviate anxiety  - Monitor for signs/symptoms of CO2 retention  Outcome: Progressing     Problem: PAIN - ADULT  Goal: Verbalizes/displays adequate comfort level or patient's stated pain goal  Description: INTERVENTIONS:  - Encourage pt to monitor pain and request assistance  - Assess pain using appropriate pain scale  - Administer analgesics based on type and severity of pain and evaluate response  - Implement non-pharmacological measures as appropriate and evaluate response  - Consider cultural and social influences on pain and pain management  - Manage/alleviate anxiety  - Utilize distraction and/or relaxation techniques  - Monitor for opioid side effects  - Notify MD/LIP if interventions unsuccessful or patient reports new pain  - Anticipate increased pain with activity and pre-medicate as appropriate  Outcome: Progressing

## 2023-02-09 ENCOUNTER — APPOINTMENT (OUTPATIENT)
Dept: GENERAL RADIOLOGY | Facility: HOSPITAL | Age: 73
End: 2023-02-09
Attending: INTERNAL MEDICINE
Payer: MEDICARE

## 2023-02-09 PROCEDURE — 71046 X-RAY EXAM CHEST 2 VIEWS: CPT | Performed by: INTERNAL MEDICINE

## 2023-02-09 PROCEDURE — 97116 GAIT TRAINING THERAPY: CPT

## 2023-02-09 PROCEDURE — 97530 THERAPEUTIC ACTIVITIES: CPT

## 2023-02-09 RX ORDER — LACTULOSE 20 G/30ML
20 SOLUTION ORAL DAILY
Status: DISCONTINUED | OUTPATIENT
Start: 2023-02-09 | End: 2023-02-10

## 2023-02-09 RX ORDER — LISINOPRIL 10 MG/1
10 TABLET ORAL DAILY
Status: DISCONTINUED | OUTPATIENT
Start: 2023-02-09 | End: 2023-02-10

## 2023-02-09 NOTE — PLAN OF CARE
AO x4. Forgetful at times. 2L at rest which is baseline. Tele - NSR. Lovenox. Continent. Up x1 with walker to bathroom. L ear pain. LLQ pain pump. Rocephin. Zithro. IVF infusing per MAR. Regular diet decreased appetite. Bed alarm on. Pt and son updated on POC. No further needs at this time. 0630 - Zofran given for nausea this AM. Pt has been spitting up.         Problem: Patient/Family Goals  Goal: Patient/Family Long Term Goal  Description: Patient's Long Term Goal: discharge    Interventions:  - ENT consult  -PT/OT cosult  -ABX  - See additional Care Plan goals for specific interventions  Outcome: Progressing  Goal: Patient/Family Short Term Goal  Description: Patient's Short Term Goal:   2/6 noc: rest, pain control    Interventions:   - pain assessment/reassessment  -Scheduled Ibuprofen  -PRN temazepam  - See additional Care Plan goals for specific interventions  Outcome: Progressing

## 2023-02-09 NOTE — HOME CARE LIAISON
Received referral via [facility] for Home Health services. Spoke w/ patient and her son and provided with list of [place] [address] providers from [facility]. Patient stated she plans to return to her other home in [place] 3 days after discharge and will have services set up once there by her PCP. Selina Cote  Notified Tomasz Arthur

## 2023-02-09 NOTE — PLAN OF CARE
Pt is admitted for PNA and mastoiditis. High fall precaution, bed alarm is on. Pt is AOX4. VSS, afebrile and left ear pain. Gave scheduled Morphine. LLQ pain pump. SpO2 maintained on 2L. BL is 2L. . Denies any SOB. Congested  cough. Lung sounds diminished and wheezy. Scheduled inhaler. Tele-NSR. Lovenox. Reg diet and poor appetite. Denies any v/d. Pt c/o nausea and gave Reglan. Voids. Pt did not have any bowel movement last few days. Gave Lactulose. Up with SBA/walker. IV fluid is infusing. IV ABX. CXR done. WCTM. Pt is updated with plan of care.         Problem: Patient/Family Goals  Goal: Patient/Family Long Term Goal  Description: Patient's Long Term Goal: discharge    Interventions:  - ENT consult  -PT/OT cosult  -ABX  - See additional Care Plan goals for specific interventions  Outcome: Progressing  Goal: Patient/Family Short Term Goal  Description: Patient's Short Term Goal:   2/6 noc: rest, pain control  2/8 AM: remain safe and free of injury  2/9: stay stable and control pain , nausea   Interventions:   - pain assessment/reassessment  -Scheduled Ibuprofen  -PRN temazepam  - See additional Care Plan goals for specific interventions  Outcome: Progressing     Problem: RESPIRATORY - ADULT  Goal: Achieves optimal ventilation and oxygenation  Description: INTERVENTIONS:  - Assess for changes in respiratory status  - Assess for changes in mentation and behavior  - Position to facilitate oxygenation and minimize respiratory effort  - Oxygen supplementation based on oxygen saturation or ABGs  - Provide Smoking Cessation handout, if applicable  - Encourage broncho-pulmonary hygiene including cough, deep breathe, Incentive Spirometry  - Assess the need for suctioning and perform as needed  - Assess and instruct to report SOB or any respiratory difficulty  - Respiratory Therapy support as indicated  - Manage/alleviate anxiety  - Monitor for signs/symptoms of CO2 retention  Outcome: Progressing     Problem: PAIN - ADULT  Goal: Verbalizes/displays adequate comfort level or patient's stated pain goal  Description: INTERVENTIONS:  - Encourage pt to monitor pain and request assistance  - Assess pain using appropriate pain scale  - Administer analgesics based on type and severity of pain and evaluate response  - Implement non-pharmacological measures as appropriate and evaluate response  - Consider cultural and social influences on pain and pain management  - Manage/alleviate anxiety  - Utilize distraction and/or relaxation techniques  - Monitor for opioid side effects  - Notify MD/LIP if interventions unsuccessful or patient reports new pain  - Anticipate increased pain with activity and pre-medicate as appropriate  Outcome: Progressing

## 2023-02-10 VITALS
HEART RATE: 67 BPM | TEMPERATURE: 99 F | SYSTOLIC BLOOD PRESSURE: 143 MMHG | RESPIRATION RATE: 18 BRPM | BODY MASS INDEX: 36.89 KG/M2 | HEIGHT: 56 IN | OXYGEN SATURATION: 93 % | WEIGHT: 164 LBS | DIASTOLIC BLOOD PRESSURE: 69 MMHG

## 2023-02-10 RX ORDER — CEPHALEXIN 500 MG/1
500 CAPSULE ORAL 3 TIMES DAILY
Qty: 15 CAPSULE | Refills: 0 | Status: SHIPPED | OUTPATIENT
Start: 2023-02-10 | End: 2023-02-15

## 2023-02-10 RX ORDER — ERGOCALCIFEROL 1.25 MG/1
50000 CAPSULE ORAL
Qty: 6 CAPSULE | Refills: 0 | Status: SHIPPED | OUTPATIENT
Start: 2023-02-10 | End: 2023-05-11

## 2023-02-10 NOTE — PLAN OF CARE
PROBLEM: L-Ostoiditis    ASSESSMENT: Pt is A&Ox4. VSS, afebrile. Maintaining O2 sats >92% on 2L. Tele, NSR. Voids, tolerating diet no c/o n/v/d thisshift. Denies pain. Prn sleeping pill per MAR. IVF. Safety and fall precautions in place no falls or injuries this shift wctm hourly.      EDUCATION: updated pt on poc    RESPONSE: verbalized understanding of poc       Problem: Patient/Family Goals  Goal: Patient/Family Long Term Goal  Description: Patient's Long Term Goal: discharge    Interventions:  - ENT consult  -PT/OT cosult  -ABX  - See additional Care Plan goals for specific interventions  Outcome: Progressing  Goal: Patient/Family Short Term Goal  Description: Patient's Short Term Goal:   2/6 noc: rest, pain control  2/8 AM: remain safe and free of injury  2/9: stay stable and control pain , nausea   2/9 NOC: sleep    Interventions:   - pain assessment/reassessment  -Scheduled Ibuprofen  -PRN temazepam  - See additional Care Plan goals for specific interventions  Outcome: Progressing     Problem: RESPIRATORY - ADULT  Goal: Achieves optimal ventilation and oxygenation  Description: INTERVENTIONS:  - Assess for changes in respiratory status  - Assess for changes in mentation and behavior  - Position to facilitate oxygenation and minimize respiratory effort  - Oxygen supplementation based on oxygen saturation or ABGs  - Provide Smoking Cessation handout, if applicable  - Encourage broncho-pulmonary hygiene including cough, deep breathe, Incentive Spirometry  - Assess the need for suctioning and perform as needed  - Assess and instruct to report SOB or any respiratory difficulty  - Respiratory Therapy support as indicated  - Manage/alleviate anxiety  - Monitor for signs/symptoms of CO2 retention  Outcome: Progressing     Problem: PAIN - ADULT  Goal: Verbalizes/displays adequate comfort level or patient's stated pain goal  Description: INTERVENTIONS:  - Encourage pt to monitor pain and request assistance  - Assess pain using appropriate pain scale  - Administer analgesics based on type and severity of pain and evaluate response  - Implement non-pharmacological measures as appropriate and evaluate response  - Consider cultural and social influences on pain and pain management  - Manage/alleviate anxiety  - Utilize distraction and/or relaxation techniques  - Monitor for opioid side effects  - Notify MD/LIP if interventions unsuccessful or patient reports new pain  - Anticipate increased pain with activity and pre-medicate as appropriate  Outcome: Progressing

## 2023-02-14 PROBLEM — J96.12 CHRONIC HYPERCAPNIC RESPIRATORY FAILURE (HCC): Status: ACTIVE | Noted: 2023-01-24

## 2023-02-27 ENCOUNTER — TELEPHONE (OUTPATIENT)
Dept: FAMILY MEDICINE CLINIC | Facility: CLINIC | Age: 73
End: 2023-02-27

## 2023-02-27 NOTE — TELEPHONE ENCOUNTER
Caller: DAXA ESPINOSA    Relationship: Emergency Contact    Best call back number:[phone]    What medication are you requesting: MEDICATION     What are your current symptoms: CHEST CONGESTION     How long have you been experiencing symptoms: 2 WEEKS     Have you had these symptoms before:    [x] Yes  [] No    Have you been treated for these symptoms before:   [x] Yes  [] No    If a prescription is needed, what is your preferred pharmacy and phone number: [facility]ACY #[id number] - [place] - [address] [facility] [phone] [facility] [phone] FX     PATIENT HAD THE FLU FOR ABOUT A WEEK AND NOW SHE IS HAVING A LOT OF CHEST CONGESTION, BELIEVES IT MAY BE TURNING INTO PHENOMENA

## 2023-02-28 ENCOUNTER — OFFICE VISIT (OUTPATIENT)
Dept: FAMILY MEDICINE CLINIC | Facility: CLINIC | Age: 73
End: 2023-02-28
Payer: MEDICARE

## 2023-02-28 VITALS
SYSTOLIC BLOOD PRESSURE: 158 MMHG | WEIGHT: 159.8 LBS | RESPIRATION RATE: 16 BRPM | HEART RATE: 65 BPM | OXYGEN SATURATION: 96 % | HEIGHT: 58 IN | BODY MASS INDEX: 33.54 KG/M2 | DIASTOLIC BLOOD PRESSURE: 89 MMHG | TEMPERATURE: 97.7 F

## 2023-02-28 DIAGNOSIS — J18.9 PNEUMONIA OF LEFT LOWER LOBE DUE TO INFECTIOUS ORGANISM: Primary | ICD-10-CM

## 2023-02-28 PROCEDURE — 99213 OFFICE O/P EST LOW 20 MIN: CPT | Performed by: NURSE PRACTITIONER

## 2023-02-28 PROCEDURE — 1159F MED LIST DOCD IN RCRD: CPT | Performed by: NURSE PRACTITIONER

## 2023-02-28 PROCEDURE — 96372 THER/PROPH/DIAG INJ SC/IM: CPT | Performed by: NURSE PRACTITIONER

## 2023-02-28 PROCEDURE — 1160F RVW MEDS BY RX/DR IN RCRD: CPT | Performed by: NURSE PRACTITIONER

## 2023-02-28 RX ORDER — CEFPODOXIME PROXETIL 200 MG/1
200 TABLET, FILM COATED ORAL EVERY 12 HOURS
Qty: 14 TABLET | Refills: 0 | Status: CANCELLED | OUTPATIENT
Start: 2023-02-28 | End: 2023-03-07

## 2023-02-28 RX ORDER — PREDNISONE 20 MG/1
20 TABLET ORAL DAILY
Qty: 5 TABLET | Refills: 0 | Status: SHIPPED | OUTPATIENT
Start: 2023-02-28 | End: 2023-03-05

## 2023-02-28 RX ORDER — GUAIFENESIN 600 MG/1
1200 TABLET, EXTENDED RELEASE ORAL 2 TIMES DAILY
Qty: 56 TABLET | Refills: 0 | Status: SHIPPED | OUTPATIENT
Start: 2023-02-28 | End: 2023-03-14

## 2023-02-28 RX ORDER — DOXYCYCLINE HYCLATE 100 MG/1
100 CAPSULE ORAL 2 TIMES DAILY
Qty: 14 CAPSULE | Refills: 0 | Status: SHIPPED | OUTPATIENT
Start: 2023-02-28 | End: 2023-03-07

## 2023-02-28 RX ORDER — ALBUTEROL SULFATE 90 UG/1
2 AEROSOL, METERED RESPIRATORY (INHALATION) EVERY 4 HOURS PRN
Qty: 18 G | Refills: 11 | Status: SHIPPED | OUTPATIENT
Start: 2023-02-28

## 2023-03-01 DIAGNOSIS — J18.9 PNEUMONIA OF LEFT LOWER LOBE DUE TO INFECTIOUS ORGANISM: ICD-10-CM

## 2023-03-03 ENCOUNTER — APPOINTMENT (OUTPATIENT)
Dept: GENERAL RADIOLOGY | Facility: HOSPITAL | Age: 73
End: 2023-03-03
Attending: EMERGENCY MEDICINE
Payer: MEDICARE

## 2023-03-03 ENCOUNTER — HOSPITAL ENCOUNTER (EMERGENCY)
Facility: HOSPITAL | Age: 73
Discharge: HOME | End: 2023-03-03
Attending: EMERGENCY MEDICINE
Payer: MEDICARE

## 2023-03-03 VITALS
WEIGHT: 165 LBS | BODY MASS INDEX: 37.12 KG/M2 | RESPIRATION RATE: 10 BRPM | HEART RATE: 55 BPM | OXYGEN SATURATION: 100 % | SYSTOLIC BLOOD PRESSURE: 125 MMHG | DIASTOLIC BLOOD PRESSURE: 91 MMHG | TEMPERATURE: 97 F | HEIGHT: 56 IN

## 2023-03-03 DIAGNOSIS — B34.9 VIRAL SYNDROME: Primary | ICD-10-CM

## 2023-03-03 DIAGNOSIS — R06.02 SHORTNESS OF BREATH: ICD-10-CM

## 2023-03-03 DIAGNOSIS — R79.89 ELEVATED BRAIN NATRIURETIC PEPTIDE (BNP) LEVEL: ICD-10-CM

## 2023-03-03 LAB
ALBUMIN SERPL-MCNC: 3.9 G/DL (ref 3.4–5)
ALBUMIN/GLOB SERPL: 1.3 {RATIO} (ref 1–2)
ALP LIVER SERPL-CCNC: 104 U/L
ALT SERPL-CCNC: 23 U/L
ANION GAP SERPL CALC-SCNC: 6 MMOL/L (ref 0–18)
ARTERIAL PATENCY WRIST A: POSITIVE
AST SERPL-CCNC: 18 U/L (ref 15–37)
BASE EXCESS BLDA CALC-SCNC: 6.8 MMOL/L (ref ?–2)
BASOPHILS # BLD AUTO: 0.02 X10(3) UL (ref 0–0.2)
BASOPHILS NFR BLD AUTO: 0.4 %
BILIRUB SERPL-MCNC: 0.6 MG/DL (ref 0.1–2)
BODY TEMPERATURE: 98.6 F
BUN BLD-MCNC: 14 MG/DL (ref 7–18)
CALCIUM BLD-MCNC: 9.3 MG/DL (ref 8.5–10.1)
CHLORIDE SERPL-SCNC: 98 MMOL/L (ref 98–112)
CO2 SERPL-SCNC: 31 MMOL/L (ref 21–32)
COHGB MFR BLD: 1.8 % SAT (ref 0–3)
CREAT BLD-MCNC: 1.08 MG/DL
EOSINOPHIL # BLD AUTO: 0.06 X10(3) UL (ref 0–0.7)
EOSINOPHIL NFR BLD AUTO: 1.1 %
ERYTHROCYTE [DISTWIDTH] IN BLOOD BY AUTOMATED COUNT: 14.3 %
GFR SERPLBLD BASED ON 1.73 SQ M-ARVRAT: 54 ML/MIN/1.73M2 (ref 60–?)
GLOBULIN PLAS-MCNC: 3.1 G/DL (ref 2.8–4.4)
GLUCOSE BLD-MCNC: 130 MG/DL (ref 70–99)
HCO3 BLDA-SCNC: 30.2 MEQ/L (ref 21–27)
HCT VFR BLD AUTO: 39.1 %
HGB BLD-MCNC: 12.3 G/DL
HGB BLD-MCNC: 13 G/DL
IMM GRANULOCYTES # BLD AUTO: 0.03 X10(3) UL (ref 0–1)
IMM GRANULOCYTES NFR BLD: 0.5 %
L/M: 2 L/MIN
LYMPHOCYTES # BLD AUTO: 0.75 X10(3) UL (ref 1–4)
LYMPHOCYTES NFR BLD AUTO: 13.5 %
MAGNESIUM SERPL-MCNC: 2 MG/DL (ref 1.6–2.6)
MCH RBC QN AUTO: 28.8 PG (ref 26–34)
MCHC RBC AUTO-ENTMCNC: 33.2 G/DL (ref 31–37)
MCV RBC AUTO: 86.5 FL
METHGB MFR BLD: 0.6 % SAT (ref 0.4–1.5)
MONOCYTES # BLD AUTO: 0.39 X10(3) UL (ref 0.1–1)
MONOCYTES NFR BLD AUTO: 7 %
NEUTROPHILS # BLD AUTO: 4.3 X10 (3) UL (ref 1.5–7.7)
NEUTROPHILS # BLD AUTO: 4.3 X10(3) UL (ref 1.5–7.7)
NEUTROPHILS NFR BLD AUTO: 77.5 %
NT-PROBNP SERPL-MCNC: 962 PG/ML (ref ?–125)
OSMOLALITY SERPL CALC.SUM OF ELEC: 282 MOSM/KG (ref 275–295)
OXYHGB MFR BLDA: 96.2 % (ref 92–100)
PCO2 BLDA: 43 MM HG (ref 35–45)
PH BLDA: 7.47 [PH] (ref 7.35–7.45)
PLATELET # BLD AUTO: 152 10(3)UL (ref 150–450)
PO2 BLDA: 85 MM HG (ref 80–100)
POTASSIUM SERPL-SCNC: 3.7 MMOL/L (ref 3.5–5.1)
PROT SERPL-MCNC: 7 G/DL (ref 6.4–8.2)
RBC # BLD AUTO: 4.52 X10(6)UL
SARS-COV-2 RNA RESP QL NAA+PROBE: NOT DETECTED
SODIUM SERPL-SCNC: 135 MMOL/L (ref 136–145)
TROPONIN I HIGH SENSITIVITY: 6 NG/L
WBC # BLD AUTO: 5.6 X10(3) UL (ref 4–11)

## 2023-03-03 PROCEDURE — 82803 BLOOD GASES ANY COMBINATION: CPT | Performed by: EMERGENCY MEDICINE

## 2023-03-03 PROCEDURE — 84484 ASSAY OF TROPONIN QUANT: CPT | Performed by: EMERGENCY MEDICINE

## 2023-03-03 PROCEDURE — 85025 COMPLETE CBC W/AUTO DIFF WBC: CPT | Performed by: EMERGENCY MEDICINE

## 2023-03-03 PROCEDURE — 36600 WITHDRAWAL OF ARTERIAL BLOOD: CPT | Performed by: EMERGENCY MEDICINE

## 2023-03-03 PROCEDURE — 83880 ASSAY OF NATRIURETIC PEPTIDE: CPT | Performed by: EMERGENCY MEDICINE

## 2023-03-03 PROCEDURE — 93010 ELECTROCARDIOGRAM REPORT: CPT

## 2023-03-03 PROCEDURE — 85025 COMPLETE CBC W/AUTO DIFF WBC: CPT

## 2023-03-03 PROCEDURE — 71046 X-RAY EXAM CHEST 2 VIEWS: CPT | Performed by: EMERGENCY MEDICINE

## 2023-03-03 PROCEDURE — 99285 EMERGENCY DEPT VISIT HI MDM: CPT

## 2023-03-03 PROCEDURE — 80053 COMPREHEN METABOLIC PANEL: CPT | Performed by: EMERGENCY MEDICINE

## 2023-03-03 PROCEDURE — 93005 ELECTROCARDIOGRAM TRACING: CPT

## 2023-03-03 PROCEDURE — 96374 THER/PROPH/DIAG INJ IV PUSH: CPT

## 2023-03-03 PROCEDURE — 83735 ASSAY OF MAGNESIUM: CPT | Performed by: EMERGENCY MEDICINE

## 2023-03-03 PROCEDURE — 83050 HGB METHEMOGLOBIN QUAN: CPT | Performed by: EMERGENCY MEDICINE

## 2023-03-03 PROCEDURE — 80053 COMPREHEN METABOLIC PANEL: CPT

## 2023-03-03 PROCEDURE — 82375 ASSAY CARBOXYHB QUANT: CPT | Performed by: EMERGENCY MEDICINE

## 2023-03-03 PROCEDURE — 85018 HEMOGLOBIN: CPT | Performed by: EMERGENCY MEDICINE

## 2023-03-03 RX ORDER — METHYLPREDNISOLONE SODIUM SUCCINATE 125 MG/2ML
125 INJECTION, POWDER, LYOPHILIZED, FOR SOLUTION INTRAMUSCULAR; INTRAVENOUS ONCE
Status: COMPLETED | OUTPATIENT
Start: 2023-03-03 | End: 2023-03-03

## 2023-03-03 RX ORDER — GUAIFENESIN 600 MG/1
1200 TABLET, EXTENDED RELEASE ORAL 2 TIMES DAILY
COMMUNITY

## 2023-03-03 RX ORDER — DOXYCYCLINE HYCLATE 100 MG/1
100 CAPSULE ORAL 2 TIMES DAILY
COMMUNITY

## 2023-03-03 RX ORDER — PREDNISONE 20 MG/1
20 TABLET ORAL DAILY
COMMUNITY

## 2023-03-04 LAB
ATRIAL RATE: 55 BPM
P AXIS: 63 DEGREES
P-R INTERVAL: 168 MS
Q-T INTERVAL: 440 MS
QRS DURATION: 78 MS
QTC CALCULATION (BEZET): 420 MS
R AXIS: -15 DEGREES
T AXIS: 26 DEGREES
VENTRICULAR RATE: 55 BPM

## 2023-03-30 ENCOUNTER — OFFICE VISIT (OUTPATIENT)
Dept: FAMILY MEDICINE CLINIC | Facility: CLINIC | Age: 73
End: 2023-03-30
Payer: MEDICARE

## 2023-03-30 VITALS
WEIGHT: 162 LBS | RESPIRATION RATE: 16 BRPM | SYSTOLIC BLOOD PRESSURE: 137 MMHG | DIASTOLIC BLOOD PRESSURE: 84 MMHG | BODY MASS INDEX: 36.44 KG/M2 | HEART RATE: 80 BPM | HEIGHT: 56 IN | OXYGEN SATURATION: 94 % | TEMPERATURE: 98 F

## 2023-03-30 DIAGNOSIS — J44.9 CHRONIC OBSTRUCTIVE PULMONARY DISEASE, UNSPECIFIED COPD TYPE: ICD-10-CM

## 2023-03-30 DIAGNOSIS — R05.2 SUBACUTE COUGH: ICD-10-CM

## 2023-03-30 DIAGNOSIS — Z87.09 HISTORY OF COPD: ICD-10-CM

## 2023-03-30 DIAGNOSIS — B37.0 ORAL CANDIDIASIS: Primary | ICD-10-CM

## 2023-03-30 DIAGNOSIS — J01.00 ACUTE NON-RECURRENT MAXILLARY SINUSITIS: ICD-10-CM

## 2023-03-30 RX ORDER — AZITHROMYCIN 250 MG/1
TABLET, FILM COATED ORAL
Qty: 6 TABLET | Refills: 0 | Status: SHIPPED | OUTPATIENT
Start: 2023-03-30

## 2023-03-30 RX ORDER — IPRATROPIUM BROMIDE AND ALBUTEROL SULFATE 2.5; .5 MG/3ML; MG/3ML
3 SOLUTION RESPIRATORY (INHALATION) EVERY 4 HOURS PRN
Qty: 360 ML | Refills: 1 | Status: SHIPPED | OUTPATIENT
Start: 2023-03-30

## 2023-03-30 RX ORDER — GUAIFENESIN 600 MG/1
1200 TABLET, EXTENDED RELEASE ORAL 2 TIMES DAILY
COMMUNITY

## 2023-03-30 RX ORDER — CLOTRIMAZOLE 10 MG/1
10 LOZENGE ORAL; TOPICAL
Qty: 70 TABLET | Refills: 0 | Status: SHIPPED | OUTPATIENT
Start: 2023-03-30

## 2023-03-30 RX ORDER — ALBUTEROL SULFATE 2.5 MG/3ML
2.5 SOLUTION RESPIRATORY (INHALATION) EVERY 4 HOURS PRN
Qty: 3 ML | Refills: 12 | Status: SHIPPED | OUTPATIENT
Start: 2023-03-30

## 2023-03-30 RX ORDER — PREDNISONE 20 MG/1
20 TABLET ORAL DAILY
COMMUNITY

## 2023-03-30 RX ORDER — CYCLOBENZAPRINE HCL 10 MG
10 TABLET ORAL 3 TIMES DAILY PRN
COMMUNITY
End: 2023-03-30 | Stop reason: SDUPTHER

## 2023-03-30 NOTE — PROGRESS NOTES
Subjective     Chief Complaint   Patient presents with   • URI   • Cough     Worsening since last treated    • Possible Pneumonia       History of Present Illness    Saw PCP in [place] about a month and a half ago and placed her on prednisone due to chronic cough.  Patient states PCP told her she would be on prednisone for the rest of her life.  Cough developed when she saw her PCP and she was negative for COVID-19 at that time.  Went to [place] about 2 weeks ago and cough became more congested.  Saw ENT after hospitalization for mastoiditis.  Denies any additional follow up with ENT.    Patient bit off a part of her tongue on the right front during seizure a few months ago and since then her tongue has hurt.  Pain is inconsistent.  Was given nystatin swish and spit and that helps with pain.  There is a white film on her tongue.  Patient states her PCP in [place] is concerned she may have oral cancer because of the white film on her tongue and the tongue pain.  [place] PCP did not make referral to oral surgeon.  She has a pain that is quick and feels like an electrical shock at the back of her tongue and it resolves instantly and occurs randomly.  There is a constant dull pain at the site where her tongue was bitten.  Son states patient doesn't rinse her mouth out regularly after using her inhaler and thinks that is what is causing repeat cases of thrush.     Patient is accompanied by her son.      Patient's PMR from outside medical facility reviewed and noted.    Review of Systems   Constitutional: Negative for fever.   Respiratory: Positive for cough and shortness of breath. Negative for wheezing.    Cardiovascular: Negative for chest pain.        Otherwise complete ROS reviewed and negative except as mentioned in the HPI.    Past Medical History:   Past Medical History:   Diagnosis Date   • Cancer (HCC)    • Lung nodule    • Neck fracture (HCC)    • Thyroid nodule      Past Surgical History:  Past  Surgical History:   Procedure Laterality Date   • APPENDECTOMY     • BACK SURGERY N/A    • BRAIN SURGERY     • COLONOSCOPY     • HYSTERECTOMY       Social History:  reports that she has never smoked. She has never used smokeless tobacco. She reports that she does not drink alcohol and does not use drugs.    Family History: family history includes No Known Problems in her father and mother.      Allergies:  Allergies   Allergen Reactions   • Bee Venom Shortness Of Breath and Swelling   • Ciprofloxacin Shortness Of Breath   • Nitroglycerin Anaphylaxis   • Penicillins Shortness Of Breath   • Codeine Unknown - High Severity   • Eggs Or Egg-Derived Products Nausea Only   • Iodinated Contrast Media Unknown - Low Severity   • Oxycodone-Acetaminophen Unknown - High Severity   • Latex Rash     Medications:  Prior to Admission medications    Medication Sig Start Date End Date Taking? Authorizing Provider   albuterol (PROVENTIL) (2.5 MG/3ML) 0.083% nebulizer solution Take 2.5 mg by nebulization Every 4 (Four) Hours As Needed for Wheezing. 12/27/22  Yes Taya Yeung APRN   albuterol sulfate  (90 Base) MCG/ACT inhaler Inhale 2 puffs Every 4 (Four) Hours As Needed for Wheezing. 2/28/23  Yes Fabiola Morris APRN   allopurinol (ZYLOPRIM) 100 MG tablet Take 1 tablet by mouth Daily. 12/21/22  Yes ProviderJacquie MD   Aspirin Low Dose 81 MG EC tablet Take 1 tablet by mouth Daily. 12/8/22  Yes Jacquie White MD   atorvastatin (LIPITOR) 40 MG tablet Take 1 tablet by mouth every night at bedtime. 12/22/22  Yes ProviderJacquie MD   baclofen (LIORESAL) 10 MG/20ML injection 237.53 mcg by Intrathecal route 1 (One) Time. Pain pump   Yes ProviderJacquie MD   benzonatate (Tessalon Perles) 100 MG capsule Take 1 capsule by mouth 3 (Three) Times a Day As Needed for Cough. 12/27/22  Yes Taya Yeung APRN   betamethasone dipropionate 0.05 % cream Apply 1 application topically to the appropriate  "area as directed 2 (Two) Times a Day.   Yes Jacquie White MD   cloNIDine (DURACLON) 100 MCG/ML injection 156.09 mcg by Epidural route 1 (One) Time. Pain pump   Yes Jacquie White MD   cyclobenzaprine (FLEXERIL) 10 MG tablet Take 1 tablet by mouth 3 (Three) Times a Day. 12/22/22  Yes Jacquie White MD   ezetimibe (ZETIA) 10 MG tablet Take 1 tablet by mouth every night at bedtime. 12/21/22  Yes Jacquie White MD   gabapentin (NEURONTIN) 100 MG capsule Take 1 capsule by mouth every night at bedtime. 12/1/22  Yes Jacquie White MD   HYDROmorphone (DILAUDID) 2 MG/ML injection Infuse 1.3573 mg into a venous catheter.   Yes Jacquie White MD   levothyroxine (SYNTHROID, LEVOTHROID) 25 MCG tablet TAKE 1 TABLET BY MOUTH BEFORE BREAKFAST. 12/21/22  Yes Jacquie White MD   linaclotide (LINZESS) 72 MCG capsule capsule Take 1 capsule by mouth Every Morning Before Breakfast.   Yes Jacquie White MD   lisinopril (PRINIVIL,ZESTRIL) 2.5 MG tablet Take 1 tablet by mouth Daily for 180 days. 1/4/23 7/3/23 Yes Fabiola Morris APRN   nystatin (MYCOSTATIN) 865067 UNIT/GM powder APPLY TO AFFECTED AREA EVERY DAY AND AS NEEDED 12/14/22  Yes Jacquie White MD   pantoprazole (PROTONIX) 20 MG EC tablet Take 1 tablet by mouth Every Morning Before Breakfast. 12/21/22  Yes Jacquie White MD   predniSONE (DELTASONE) 20 MG tablet Take 1 tablet by mouth Daily.   Yes Jacquie White MD   sucralfate (Carafate) 1 g tablet Take 1 tablet by mouth 4 (Four) Times a Day for 90 days. 1/4/23 4/4/23 Yes Fabiola Morris APRN         Objective     Vital Signs: /84 (BP Location: Right arm, Patient Position: Sitting, Cuff Size: Adult)   Pulse 80   Temp 98 °F (36.7 °C) (Infrared)   Resp 16   Ht 142.2 cm (56\")   Wt 73.5 kg (162 lb)   SpO2 94%   BMI 36.32 kg/m²   Physical Exam  Vitals and nursing note reviewed.   Constitutional:       Appearance: Normal " appearance.   HENT:      Head: Normocephalic.      Right Ear: Tympanic membrane normal.      Left Ear: Tympanic membrane normal.      Nose:      Right Sinus: Maxillary sinus tenderness present.      Left Sinus: Maxillary sinus tenderness present.      Mouth/Throat:      Mouth: Mucous membranes are moist.      Tongue: No lesions. Tongue does not deviate from midline.      Palate: No mass and lesions.      Pharynx: Oropharynx is clear.        Comments: White coating on tongue  Eyes:      Extraocular Movements: Extraocular movements intact.      Pupils: Pupils are equal, round, and reactive to light.   Cardiovascular:      Rate and Rhythm: Normal rate and regular rhythm.      Pulses: Normal pulses.      Heart sounds: Normal heart sounds.   Pulmonary:      Effort: Pulmonary effort is normal. No respiratory distress.      Breath sounds: Normal breath sounds. No wheezing.      Comments: Congested, productive cough  Abdominal:      General: Bowel sounds are normal.      Palpations: Abdomen is soft.   Musculoskeletal:         General: Normal range of motion.      Cervical back: Normal range of motion.   Skin:     General: Skin is warm and dry.   Neurological:      General: No focal deficit present.      Mental Status: She is alert and oriented to person, place, and time.   Psychiatric:         Mood and Affect: Mood normal.         Behavior: Behavior normal.         Thought Content: Thought content normal.         Judgment: Judgment normal.         Class 2 Severe Obesity (BMI >=35 and <=39.9). Obesity-related health conditions include the following:not discussed    Advance Care Planning   ACP discussion was held with the patient during this visit.         Results Reviewed:  No results found for: GLUCOSE, BUN, CREATININE, NA, K, CL, CO2, CALCIUM, ALT, AST, WBC, HCT, PLT, CHOL, TRIG, HDL, LDL, LDLHDL, HGBA1C      Assessment / Plan     Assessment/Plan     Diagnoses and all orders for this visit:    1. Oral candidiasis  (Primary)  -     clotrimazole (MYCELEX) 10 MG jose angel; Take 1 tablet by mouth 5 (Five) Times a Day.  Dispense: 70 tablet; Refill: 0    2. Chronic obstructive pulmonary disease, unspecified COPD type (HCC)  -     ipratropium-albuterol (DUO-NEB) 0.5-2.5 mg/3 ml nebulizer; Take 3 mL by nebulization Every 4 (Four) Hours As Needed for Wheezing or Shortness of Air.  Dispense: 360 mL; Refill: 1    3. Subacute cough  -     albuterol (PROVENTIL) (2.5 MG/3ML) 0.083% nebulizer solution; Take 2.5 mg by nebulization Every 4 (Four) Hours As Needed for Wheezing.  Dispense: 3 mL; Refill: 12    4. History of COPD  -     albuterol (PROVENTIL) (2.5 MG/3ML) 0.083% nebulizer solution; Take 2.5 mg by nebulization Every 4 (Four) Hours As Needed for Wheezing.  Dispense: 3 mL; Refill: 12    5. Acute non-recurrent maxillary sinusitis  -     azithromycin (Zithromax Z-Eddy) 250 MG tablet; Take 2 tablets by mouth on day 1, then 1 tablet daily on days 2-5  Dispense: 6 tablet; Refill: 0      Assessment & Plan:  If no improvement of thrush and tongue pain with clotrimazole, will refer to oral surgery.          An After Visit Summary was printed and given to the patient at discharge.  No follow-ups on file.    I have discussed the patient results/orders and plan/recommendation with them at today's visit.      Fabiola Morris, APRN   03/30/2023

## 2023-04-28 DIAGNOSIS — J44.9 CHRONIC OBSTRUCTIVE PULMONARY DISEASE, UNSPECIFIED COPD TYPE: ICD-10-CM

## 2023-04-28 RX ORDER — IPRATROPIUM BROMIDE AND ALBUTEROL SULFATE 2.5; .5 MG/3ML; MG/3ML
3 SOLUTION RESPIRATORY (INHALATION) EVERY 4 HOURS PRN
Qty: 360 ML | Refills: 1 | Status: CANCELLED | OUTPATIENT
Start: 2023-04-28

## 2023-05-01 ENCOUNTER — TELEPHONE (OUTPATIENT)
Dept: FAMILY MEDICINE CLINIC | Facility: CLINIC | Age: 73
End: 2023-05-01
Payer: MEDICARE

## 2023-05-01 DIAGNOSIS — R06.2 WHEEZING: Primary | ICD-10-CM

## 2023-05-01 DIAGNOSIS — J44.9 CHRONIC OBSTRUCTIVE PULMONARY DISEASE, UNSPECIFIED COPD TYPE: ICD-10-CM

## 2023-05-01 RX ORDER — IPRATROPIUM BROMIDE AND ALBUTEROL SULFATE 2.5; .5 MG/3ML; MG/3ML
3 SOLUTION RESPIRATORY (INHALATION) EVERY 4 HOURS PRN
Qty: 1080 ML | Refills: 1 | Status: SHIPPED | OUTPATIENT
Start: 2023-05-01

## 2023-05-11 ENCOUNTER — OFFICE VISIT (OUTPATIENT)
Dept: FAMILY MEDICINE CLINIC | Facility: CLINIC | Age: 73
End: 2023-05-11
Payer: MEDICARE

## 2023-05-11 DIAGNOSIS — K59.00 CONSTIPATION, UNSPECIFIED CONSTIPATION TYPE: Primary | Chronic | ICD-10-CM

## 2023-05-11 DIAGNOSIS — H91.93 DECREASED HEARING, BILATERAL: ICD-10-CM

## 2023-05-11 DIAGNOSIS — H65.93 MIDDLE EAR EFFUSION, BILATERAL: ICD-10-CM

## 2023-05-11 DIAGNOSIS — E66.01 CLASS 2 SEVERE OBESITY DUE TO EXCESS CALORIES WITH SERIOUS COMORBIDITY IN ADULT, UNSPECIFIED BMI: Chronic | ICD-10-CM

## 2023-05-11 RX ORDER — ESCITALOPRAM OXALATE 10 MG/1
TABLET ORAL
COMMUNITY
End: 2023-05-11

## 2023-05-11 RX ORDER — SUCRALFATE 1 G/1
TABLET ORAL EVERY 6 HOURS SCHEDULED
COMMUNITY
End: 2023-05-11

## 2023-05-11 RX ORDER — FLUTICASONE PROPIONATE 50 MCG
SPRAY, SUSPENSION (ML) NASAL EVERY 24 HOURS
COMMUNITY
End: 2023-05-11

## 2023-05-11 RX ORDER — ALENDRONATE SODIUM 70 MG/1
TABLET ORAL
COMMUNITY
End: 2023-05-11

## 2023-05-11 RX ORDER — MORPHINE SULFATE 15 MG/1
TABLET ORAL
COMMUNITY

## 2023-05-11 RX ORDER — METOPROLOL SUCCINATE 25 MG/1
TABLET, EXTENDED RELEASE ORAL
COMMUNITY
End: 2023-05-11

## 2023-05-11 RX ORDER — LISINOPRIL 20 MG/1
TABLET ORAL
COMMUNITY
End: 2023-05-11

## 2023-05-11 RX ORDER — TRIMETHOPRIM 100 MG/1
TABLET ORAL
COMMUNITY
End: 2023-05-11

## 2023-05-11 RX ORDER — LEVOTHYROXINE SODIUM 0.03 MG/1
TABLET ORAL EVERY 24 HOURS
COMMUNITY
End: 2023-05-11

## 2023-05-11 RX ORDER — AMLODIPINE BESYLATE 5 MG/1
TABLET ORAL
COMMUNITY

## 2023-05-11 RX ORDER — FLUTICASONE PROPIONATE 50 MCG
2 SPRAY, SUSPENSION (ML) NASAL DAILY
Qty: 11.1 ML | Refills: 2 | Status: SHIPPED | OUTPATIENT
Start: 2023-05-11

## 2023-05-11 RX ORDER — PANTOPRAZOLE SODIUM 20 MG/1
1 TABLET, DELAYED RELEASE ORAL
COMMUNITY
End: 2023-05-11 | Stop reason: SDUPTHER

## 2023-05-11 RX ORDER — TRAZODONE HYDROCHLORIDE 50 MG/1
TABLET ORAL
COMMUNITY
End: 2023-05-11

## 2023-05-11 RX ORDER — TRAMADOL HYDROCHLORIDE 50 MG/1
TABLET ORAL
COMMUNITY
End: 2023-05-11

## 2023-05-11 RX ORDER — POTASSIUM CHLORIDE 750 MG/1
1 TABLET, FILM COATED, EXTENDED RELEASE ORAL DAILY
COMMUNITY
End: 2023-05-11

## 2023-05-11 RX ORDER — CYCLOBENZAPRINE HCL 10 MG
TABLET ORAL EVERY 8 HOURS SCHEDULED
COMMUNITY
End: 2023-05-11

## 2023-05-11 RX ORDER — MONTELUKAST SODIUM 10 MG/1
TABLET ORAL
COMMUNITY
End: 2023-05-11

## 2023-05-11 RX ORDER — DENOSUMAB 60 MG/ML
INJECTION SUBCUTANEOUS
COMMUNITY
End: 2023-05-11

## 2023-05-11 RX ORDER — FLUOXETINE 10 MG/1
CAPSULE ORAL
COMMUNITY
End: 2023-05-11

## 2023-05-11 RX ORDER — ZOLPIDEM TARTRATE 5 MG/1
TABLET ORAL
COMMUNITY
End: 2023-05-11

## 2023-05-11 RX ORDER — AMOXICILLIN 250 MG
1 CAPSULE ORAL 2 TIMES DAILY PRN
Qty: 30 TABLET | Refills: 2 | Status: SHIPPED | OUTPATIENT
Start: 2023-05-11

## 2023-05-11 RX ORDER — FUROSEMIDE 20 MG/1
1 TABLET ORAL DAILY
COMMUNITY
Start: 2023-04-22 | End: 2023-05-11

## 2023-05-11 RX ORDER — BISACODYL 10 MG
10 SUPPOSITORY, RECTAL RECTAL DAILY
Qty: 10 EACH | Refills: 2 | Status: SHIPPED | OUTPATIENT
Start: 2023-05-11

## 2023-05-11 RX ORDER — BISACODYL 10 MG/30ML
10 ENEMA RECTAL ONCE
Qty: 30 ML | Refills: 0 | Status: SHIPPED | OUTPATIENT
Start: 2023-05-11 | End: 2023-05-11

## 2023-05-11 RX ORDER — TIZANIDINE 4 MG/1
TABLET ORAL
COMMUNITY
End: 2023-05-11

## 2023-05-11 RX ORDER — NICOTINE 4 MG/1
INHALANT RESPIRATORY (INHALATION)
COMMUNITY
End: 2023-05-11

## 2023-05-11 RX ORDER — LORATADINE 10 MG/1
10 TABLET ORAL DAILY
Qty: 30 TABLET | Refills: 5 | Status: CANCELLED | OUTPATIENT
Start: 2023-05-11

## 2023-05-11 RX ORDER — DULOXETIN HYDROCHLORIDE 60 MG/1
CAPSULE, DELAYED RELEASE ORAL
COMMUNITY
End: 2023-05-11

## 2023-05-11 RX ORDER — METOCLOPRAMIDE 10 MG/1
TABLET ORAL
COMMUNITY
End: 2023-05-11

## 2023-05-11 RX ORDER — NALDEMEDINE 0.2 MG/1
TABLET ORAL EVERY 24 HOURS
COMMUNITY
End: 2023-05-11

## 2023-05-11 RX ORDER — PANCRELIPASE LIPASE, PANCRELIPASE PROTEASE, PANCRELIPASE AMYLASE 40000; 126000; 168000 [USP'U]/1; [USP'U]/1; [USP'U]/1
CAPSULE, DELAYED RELEASE ORAL
COMMUNITY
End: 2023-05-11

## 2023-05-11 RX ORDER — FOLIC ACID 1 MG/1
1 TABLET ORAL DAILY
COMMUNITY
Start: 2023-04-16

## 2023-05-11 RX ORDER — LISINOPRIL 10 MG/1
TABLET ORAL
COMMUNITY

## 2023-05-11 NOTE — PROGRESS NOTES
Subjective     Chief Complaint   Patient presents with   • Hearing Problem     She states that for 4 month it feels like she is stuck ing a bucket.        History of Present Illness    Patient presents today with muffled hearing bilaterally for 4 months.       LBM 7 days ago.  Taken miralax, prunes, magnesium citrate, metamucil.  She feels full quickly.  She has difficulty wit constipation before but usually resolves with prunes.   Denies abdominal pain, nausea, vomiting.     Patient's PMR from outside medical facility reviewed and noted.    Review of Systems   HENT:  Positive for hearing loss. Negative for ear pain.    Gastrointestinal:  Positive for abdominal distention and constipation. Negative for abdominal pain, nausea and vomiting.      Otherwise complete ROS reviewed and negative except as mentioned in the HPI.    Past Medical History:   Past Medical History:   Diagnosis Date   • Cancer    • Lung nodule    • Neck fracture    • Thyroid nodule      Past Surgical History:  Past Surgical History:   Procedure Laterality Date   • APPENDECTOMY     • BACK SURGERY N/A    • BRAIN SURGERY     • COLONOSCOPY     • HYSTERECTOMY       Social History:  reports that she has never smoked. She has never used smokeless tobacco. She reports that she does not drink alcohol and does not use drugs.    Family History: family history includes No Known Problems in her father and mother.      Allergies:  Allergies   Allergen Reactions   • Bee Venom Shortness Of Breath and Swelling   • Ciprofloxacin Shortness Of Breath   • Nitroglycerin Anaphylaxis   • Penicillins Shortness Of Breath   • Codeine Unknown - High Severity   • Eggs Or Egg-Derived Products Nausea Only   • Iodinated Contrast Media Unknown - Low Severity   • Latex, Natural Rubber Other (See Comments)   • Methenamine GI Intolerance   • Nylon Other (See Comments)   • Oxycodone Other (See Comments)   • Oxycodone-Acetaminophen Unknown - High Severity   •  Sulfamethoxazole-Trimethoprim Other (See Comments)   • Latex Rash     Medications:  Prior to Admission medications    Medication Sig Start Date End Date Taking? Authorizing Provider   albuterol (PROVENTIL) (2.5 MG/3ML) 0.083% nebulizer solution Take 2.5 mg by nebulization Every 4 (Four) Hours As Needed for Wheezing. 3/30/23  Yes Fabiola Morris APRN   albuterol sulfate  (90 Base) MCG/ACT inhaler Inhale 2 puffs Every 4 (Four) Hours As Needed for Wheezing. 2/28/23  Yes Fabiola Morris APRN   allopurinol (ZYLOPRIM) 100 MG tablet Take 1 tablet by mouth Daily. 12/21/22  Yes Jacquie White MD   amLODIPine (NORVASC) 5 MG tablet amlodipine 5 mg tablet   Yes Jacquie White MD   Aspirin Low Dose 81 MG EC tablet Take 1 tablet by mouth Daily. 12/8/22  Yes Jacquie White MD   atorvastatin (LIPITOR) 40 MG tablet Take 1 tablet by mouth every night at bedtime. 12/22/22  Yes Jacquie White MD   baclofen (LIORESAL) 10 MG/20ML injection 237.53 mcg by Intrathecal route 1 (One) Time. Pain pump   Yes Jacquie White MD   betamethasone dipropionate 0.05 % cream Apply 1 application topically to the appropriate area as directed 2 (Two) Times a Day.   Yes Jacquie White MD   cloNIDine (DURACLON) 100 MCG/ML injection 156.09 mcg by Epidural route 1 (One) Time. Pain pump   Yes Jacquie White MD   cyclobenzaprine (FLEXERIL) 10 MG tablet Take 1 tablet by mouth 3 (Three) Times a Day. 12/22/22  Yes Jacquie White MD   ezetimibe (ZETIA) 10 MG tablet Take 1 tablet by mouth every night at bedtime. 12/21/22  Yes Jacquie White MD   folic acid (FOLVITE) 1 MG tablet Take 1 tablet by mouth Daily. 4/16/23  Yes Jacquie White MD   HYDROmorphone (DILAUDID) 2 MG/ML injection Infuse 1.3573 mg into a venous catheter.   Yes Jacquie White MD   ipratropium-albuterol (DUO-NEB) 0.5-2.5 mg/3 ml nebulizer Take 3 mL by nebulization Every 4 (Four) Hours As Needed for  Wheezing or Shortness of Air. 5/1/23  Yes Fabiola Morris APRN   levothyroxine (SYNTHROID, LEVOTHROID) 25 MCG tablet TAKE 1 TABLET BY MOUTH BEFORE BREAKFAST. 12/21/22  Yes Jacquie White MD   lisinopril (PRINIVIL,ZESTRIL) 10 MG tablet lisinopril 10 mg tablet   TAKE ONE TABLET BY MOUTH ONE TIME DAILY   Yes ProviderJacquie MD   Morphine (MSIR) 15 MG tablet morphine 15 mg immediate release tablet   Yes Provider, MD Jacquie   nystatin (MYCOSTATIN) 446179 UNIT/GM powder APPLY TO AFFECTED AREA EVERY DAY AND AS NEEDED 12/14/22  Yes ProviderJacquie MD   pantoprazole (PROTONIX) 20 MG EC tablet Take 1 tablet by mouth Every Morning Before Breakfast. 12/21/22  Yes Jacquie White MD   pantoprazole (PROTONIX) 20 MG EC tablet Take 1 tablet by mouth Every Morning Before Breakfast.   Yes ProviderJacquie MD   predniSONE (DELTASONE) 20 MG tablet Take 1 tablet by mouth Daily.   Yes Provider, MD Jacquie   alendronate (FOSAMAX) 70 MG tablet alendronate 70 mg tablet  5/11/23 Yes Provider, MD Jacquie   azithromycin (Zithromax Z-Eddy) 250 MG tablet Take 2 tablets by mouth on day 1, then 1 tablet daily on days 2-5 3/30/23 5/11/23  Fabiola Morris APRN   benzonatate (Tessalon Perles) 100 MG capsule Take 1 capsule by mouth 3 (Three) Times a Day As Needed for Cough. 12/27/22 5/11/23  Taya Yeung APRN   clotrimazole (MYCELEX) 10 MG jose angel Take 1 tablet by mouth 5 (Five) Times a Day. 3/30/23 5/11/23  Fabiola Morris APRN   cyclobenzaprine (FLEXERIL) 10 MG tablet Every 8 (Eight) Hours.  5/11/23  Jacquie White MD   denosumab (Prolia) 60 MG/ML solution prefilled syringe syringe Prolia 60 mg/mL subcutaneous syringe  5/11/23  Jacquie White MD   DULoxetine (CYMBALTA) 60 MG capsule duloxetine 60 mg capsule,delayed release  5/11/23  Jacquie White MD   escitalopram (LEXAPRO) 10 MG tablet escitalopram 10 mg tablet   TK 1 T PO QD  5/11/23  Provider,  MD Jacquie   FLUoxetine (PROzac) 10 MG capsule fluoxetine 10 mg capsule   TAKE 1 CAPSULE BY MOUTH EVERY DAY IN THE MORNING  5/11/23  Jacquie White MD   fluticasone (Flonase Allergy Relief) 50 MCG/ACT nasal spray Daily.  5/11/23  Jacquie White MD   furosemide (LASIX) 20 MG tablet Take 1 tablet by mouth Daily. 4/22/23 5/11/23  Jacquie White MD   gabapentin (NEURONTIN) 100 MG capsule Take 1 capsule by mouth every night at bedtime. 12/1/22 5/11/23  Jacquie White MD   guaiFENesin (MUCINEX) 600 MG 12 hr tablet Take 2 tablets by mouth 2 (Two) Times a Day.  5/11/23  Jacquie White MD   levothyroxine (SYNTHROID, LEVOTHROID) 25 MCG tablet Daily.  5/11/23  Jacquie White MD   linaclotide (LINZESS) 72 MCG capsule capsule Take 1 capsule by mouth Every Morning Before Breakfast.  5/11/23  Jacquie White MD   lisinopril (PRINIVIL,ZESTRIL) 2.5 MG tablet Take 1 tablet by mouth Daily for 180 days. 1/4/23 5/11/23  Fabiola Morris APRN   lisinopril (PRINIVIL,ZESTRIL) 20 MG tablet TAKE 1/2 TABLET BY MOUTH EVERY DAY PRN  5/11/23  Jacquie White MD   metoclopramide (REGLAN) 10 MG tablet metoclopramide 10 mg tablet  5/11/23  Jacquie White MD   metoprolol succinate XL (TOPROL-XL) 25 MG 24 hr tablet metoprolol succinate ER 25 mg tablet,extended release 24 hr   1 tablet daily  5/11/23  Jacquie White MD   montelukast (SINGULAIR) 10 MG tablet montelukast 10 mg tablet  5/11/23  Jacquie White MD   Naldemedine Tosylate (Symproic) 0.2 MG tablet Daily.  5/11/23  Jacquie White MD   nicotine (Nicotrol) 10 MG inhaler Nicotrol 10 mg inhalation cartridge  5/11/23  Jacquie White MD   Pancrelipase, Lip-Prot-Amyl, (Zenpep) 71956-471889 units capsule delayed-release particles capsule Zenpep 40,000 unit-126,000 unit-168,000 unit capsule,delayed release  5/11/23  Provider, MD Jacquie   potassium chloride 10 MEQ CR tablet Take 1 tablet by mouth  "Daily.  5/11/23  Jacquie White MD   sertraline (ZOLOFT) 50 MG tablet sertraline 50 mg tablet   TAKE ONE TABLET BY MOUTH EVERYDAY  5/11/23  Jacquie White MD   sucralfate (CARAFATE) 1 g tablet Every 6 (Six) Hours.  5/11/23  Jacquie White MD   tiZANidine (ZANAFLEX) 4 MG tablet tizanidine 4 mg tablet   TAKE ONE TABLET BY MOUTH THREE TIMES DAILY  5/11/23  Jacquie White MD   traMADol (ULTRAM) 50 MG tablet tramadol 50 mg tablet  5/11/23  Jacquie White MD   traZODone (DESYREL) 50 MG tablet trazodone 50 mg tablet  5/11/23  Jacquie White MD   trimethoprim (TRIMPEX) 100 MG tablet trimethoprim 100 mg tablet   Take 1 tablet (100 mg total) by mouth daily.  5/11/23  Jacquie White MD   zolpidem (AMBIEN) 5 MG tablet zolpidem 5 mg tablet  5/11/23  Jacquie White MD     Objective     Vital Signs: /93 (BP Location: Left arm, Patient Position: Sitting, Cuff Size: Adult)   Pulse 96   Temp 98.6 °F (37 °C) (Temporal)   Resp 22   Ht 142.2 cm (56\")   Wt 72.1 kg (159 lb)   SpO2 98%   BMI 35.65 kg/m²   Physical Exam  Vitals and nursing note reviewed.   Constitutional:       Appearance: Normal appearance.   HENT:      Head: Normocephalic.      Right Ear: Tympanic membrane normal. A middle ear effusion is present.      Left Ear: Tympanic membrane normal. A middle ear effusion is present.      Nose: Nose normal.      Mouth/Throat:      Mouth: Mucous membranes are moist.   Eyes:      Extraocular Movements: Extraocular movements intact.      Pupils: Pupils are equal, round, and reactive to light.   Cardiovascular:      Rate and Rhythm: Normal rate and regular rhythm.      Pulses: Normal pulses.      Heart sounds: Normal heart sounds.   Pulmonary:      Effort: Pulmonary effort is normal.      Breath sounds: Normal breath sounds.   Abdominal:      General: Bowel sounds are normal. There is distension.      Palpations: Abdomen is soft.      Tenderness: There is no abdominal " tenderness.   Musculoskeletal:         General: Normal range of motion.      Cervical back: Normal range of motion.   Skin:     General: Skin is warm and dry.   Neurological:      General: No focal deficit present.      Mental Status: She is alert and oriented to person, place, and time.   Psychiatric:         Mood and Affect: Mood normal.         Behavior: Behavior normal.         Thought Content: Thought content normal.         Judgment: Judgment normal.       Class 2 Severe Obesity (BMI >=35 and <=39.9). Obesity-related health conditions include the following: hypertension, dyslipidemias, and COPD and CKD . Obesity is improving with lifestyle modifications. BMI is is above average; BMI management plan is completed. We discussed low calorie, low carb based diet program, portion control, and increasing exercise.      Advance Care Planning   ACP discussion was held with the patient during this visit. Patient has an advance directive (not in EMR), copy requested.         Results Reviewed:  No results found for: GLUCOSE, BUN, CREATININE, NA, K, CL, CO2, CALCIUM, ALT, AST, WBC, HCT, PLT, CHOL, TRIG, HDL, LDL, LDLHDL, HGBA1C      Assessment / Plan     Assessment/Plan     Diagnoses and all orders for this visit:    1. Constipation, unspecified constipation type (Primary)  Assessment & Plan:  Continue with prunes and increase water intake.     Orders:  -     bisacodyl (DULCOLAX) 10 MG suppository; Insert 1 suppository into the rectum Daily.  Dispense: 10 each; Refill: 2  -     bisacodyl (Fleet Bisacodyl) 10 MG/30ML enema; Insert 30 mL into the rectum 1 (One) Time for 1 dose.  Dispense: 30 mL; Refill: 0  -     sennosides-docusate (senna-docusate sodium) 8.6-50 MG per tablet; Take 1 tablet by mouth 2 (Two) Times a Day As Needed for Constipation.  Dispense: 30 tablet; Refill: 2    2. Middle ear effusion, bilateral  -     fluticasone (FLONASE) 50 MCG/ACT nasal spray; 2 sprays into the nostril(s) as directed by provider Daily.   Dispense: 11.1 mL; Refill: 2  -     Ambulatory Referral to ENT (Otolaryngology)    3. Decreased hearing, bilateral  -     Ambulatory Referral to ENT (Otolaryngology)    4. Class 2 severe obesity due to excess calories with serious comorbidity in adult, unspecified BMI  Assessment & Plan:  Patient's (Body mass index is 35.65 kg/m².) indicates that they are morbidly/severely obese (BMI > 40 or > 35 with obesity - related health condition) with health conditions that include hypertension, dyslipidemias, and COPD and CKD  . Weight is improving with lifestyle modifications. BMI  is above average; BMI management plan is completed. We discussed low calorie, low carb based diet program, portion control, and increasing exercise.            An After Visit Summary was printed and given to the patient at discharge.  Return in about 1 week (around 5/18/2023) for Recheck.    I have discussed the patient results/orders and plan/recommendation with them at today's visit.      Fabiola Morris, NISHANT   05/11/2023

## 2023-05-22 VITALS
HEIGHT: 56 IN | BODY MASS INDEX: 35.77 KG/M2 | RESPIRATION RATE: 22 BRPM | HEART RATE: 96 BPM | OXYGEN SATURATION: 98 % | DIASTOLIC BLOOD PRESSURE: 93 MMHG | WEIGHT: 159 LBS | TEMPERATURE: 98.6 F | SYSTOLIC BLOOD PRESSURE: 140 MMHG

## 2023-05-22 PROBLEM — K59.00 CONSTIPATION: Chronic | Status: ACTIVE | Noted: 2023-05-22

## 2023-05-22 PROBLEM — K59.00 CONSTIPATION: Status: ACTIVE | Noted: 2023-05-22

## 2023-05-22 PROBLEM — E66.812 CLASS 2 SEVERE OBESITY DUE TO EXCESS CALORIES WITH SERIOUS COMORBIDITY IN ADULT: Status: ACTIVE | Noted: 2023-05-22

## 2023-05-22 PROBLEM — E66.812 CLASS 2 SEVERE OBESITY DUE TO EXCESS CALORIES WITH SERIOUS COMORBIDITY IN ADULT: Chronic | Status: ACTIVE | Noted: 2023-05-22

## 2023-05-22 PROBLEM — E66.01 CLASS 2 SEVERE OBESITY DUE TO EXCESS CALORIES WITH SERIOUS COMORBIDITY IN ADULT: Status: ACTIVE | Noted: 2023-05-22

## 2023-05-22 PROBLEM — E66.01 CLASS 2 SEVERE OBESITY DUE TO EXCESS CALORIES WITH SERIOUS COMORBIDITY IN ADULT: Chronic | Status: ACTIVE | Noted: 2023-05-22

## 2023-05-22 NOTE — ASSESSMENT & PLAN NOTE
Patient's (Body mass index is 35.65 kg/m².) indicates that they are morbidly/severely obese (BMI > 40 or > 35 with obesity - related health condition) with health conditions that include hypertension, dyslipidemias, and COPD and CKD  . Weight is improving with lifestyle modifications. BMI  is above average; BMI management plan is completed. We discussed low calorie, low carb based diet program, portion control, and increasing exercise.

## 2023-05-23 ENCOUNTER — TELEPHONE (OUTPATIENT)
Dept: OTOLARYNGOLOGY | Facility: CLINIC | Age: 73
End: 2023-05-23
Payer: MEDICARE

## 2023-05-23 NOTE — TELEPHONE ENCOUNTER
Informed referring office that pt needs an outside audio before they can be scheduled. S[facility], number given [phone]. Office confirmed understanding.

## 2023-05-24 DIAGNOSIS — H91.93 DECREASED HEARING, BILATERAL: Primary | ICD-10-CM

## 2023-07-14 NOTE — PROGRESS NOTES
[facility]ematology and Oncology Clinic Note    Visit Diagnosis:  No diagnosis found. History of Present Illness:   72F with a PMH of Chronic pain, COPD, CAD, Peptic ulcers, COVID-19 (11/2020 s/p CCP), CVA, HLD, HTN, CHF, hearing loss and \"neck cancer\" here for follow up. She has a history of intermittent pancytopenia. She has a history of folic acid deficiency, iron deficiency, mild splenomegaly.     -She lives in [place] for 6 months and [place] for 6 months a year. She alternates every 2 weeks  -No changes since I last saw her. Has occasional bruising. No fevers or chills. No weight loss  -Takes PO folic acid. Not taking iron tabs currently. Hematology History   01/2021: She fell about 3 months ago and hit her head. Denies LOC. She started to feel weak about 4 weeks ago. About 1 week ago, she noticed worsening Right sided weakness. No chest pain, dyspnea, abdominal pain, diarrhea or hematochezia/melena. She has a pain pump for chronic pain. For the past week she has felt nauseated with poor appetite. Due to lack of appetite, she feels like she has lost weight. No fevers. She states that she has had occasional night sweats since she initiated her morphine pump. She states that she was a smoker for \"years\" and quit a couple years ago. She states that she was treated for \"neck\" cancer about 15 years ago in [place]. She does not recall the MD or facility. She was treated with 6 rounds of chemotherapy (not sure which one), surgery and radiation. She states that she had a mammogram and colonoscopy done at [facility] which were normal. She does not recall a history of low blood counts. Her CBC on admission: WBC 3.4, Hb 11 and Plt 113. After IVF --> WBC 2.5, HB 9.5, Plt 88, MCV 87, lymphocytopenia on differential. She has had leukopenia/thrombocytopenia since 11/2020. TSH normal. Normal LFTs. B12 403, Ferritin 316. She has a history of elevated BNPs. CXR without acute process.  CTA Chest from 11/11/20 was unremarkable (no lymphadenopathy, masses). Her CBC was normal in 05/2020. During work up: she had normal B12, Ferritin, TSH, copper, LDH and peripheral flow. Path review of smear was unremarkable. 05/23/22: Follow up for pancytopenia. Noted to have Folic acid deficiency and mild splenomegaly. Has not been seen since 02/2021. She lives in [place] every other month. She states that she recently received IV Iron in [place]. She denies any bleeding. Energy level is about the same. No fevers or chills. Has occasional night sweats since having the pain pump. CT AP from 05/2022: Spleen 13-14 cm. Labs from May 2022: normal LDH, Fe studies, Folate, SPEP, YUE and CRP. Labs on 6/20/23: WBC 3.6, Hb 12.3, Plt 110. ANC normal.     Review of Systems: 12 Point ROS was completed and pertinent positives are in the HPI    furosemide 20 MG Oral Tab, Take 1 tablet (20 mg total) by mouth daily. , Disp: 90 tablet, Rfl: 0  levothyroxine 25 MCG Oral Tab, Take 1 tablet (25 mcg total) by mouth before breakfast., Disp: 30 tablet, Rfl: 2  aspirin 81 MG Oral Tab EC, Take 1 tablet (81 mg total) by mouth daily. , Disp: 90 tablet, Rfl: 3  fluticasone propionate 50 MCG/ACT Nasal Suspension, , Disp: , Rfl:   gabapentin 100 MG Oral Cap, Take 100 mg by mouth nightly. TAKE AT BEDTIME, Disp: , Rfl:   sennosides 8.6 MG Oral Tab, Take 1 tablet (8.6 mg total) by mouth 2 (two) times daily. , Disp: 180 tablet, Rfl: 0  allopurinol 100 MG Oral Tab, Take 1 tablet (100 mg total) by mouth daily. , Disp: 30 tablet, Rfl: 2  atorvastatin 40 MG Oral Tab, Take 1 tablet (40 mg total) by mouth nightly., Disp: 30 tablet, Rfl: 2  ezetimibe 10 MG Oral Tab, Take 1 tablet (10 mg total) by mouth nightly., Disp: 30 tablet, Rfl: 2  folic acid 1 MG Oral Tab, Take 1 tablet (1 mg total) by mouth daily. , Disp: 90 tablet, Rfl: 3  lisinopril 10 MG Oral Tab, Take 1 tablet (10 mg total) by mouth daily. , Disp: 90 tablet, Rfl: 1  pantoprazole 20 MG Oral Tab EC, Take 1 tablet (20 mg total) by mouth before breakfast., Disp: 30 tablet, Rfl: 2  albuterol 108 (90 Base) MCG/ACT Inhalation Aero Soln, Inhale 2 puffs into the lungs every 4 (four) hours as needed for Wheezing., Disp: 1 each, Rfl: 5  predniSONE 20 MG Oral Tab, Take 1 tablet (20 mg total) by mouth daily. , Disp: , Rfl:   ipratropium-albuterol 0.5-2.5 (3) MG/3ML Inhalation Solution, Take 3 mL by nebulization every 4 (four) hours as needed. , Disp: 100 each, Rfl: 5  [] linaCLOtide (LINZESS) 290 MCG Oral Cap, Take 1 capsule (290 mcg total) by mouth every morning before breakfast., Disp: 30 capsule, Rfl: 3  nystatin 018470 UNIT/ML Mouth/Throat Suspension, Take 5 mL (500,000 Units total) by mouth 4 (four) times daily as needed. , Disp: 60 mL, Rfl: 5  Nystatin 823293 UNIT/GM External Powder, nystatin 100,000 unit/gram topical powder- apply affected areas daily and PRN, Disp: 60 g, Rfl: 5  Betamethasone Dipropionate Aug (DIPROLENE AF) 0.05 % External Cream, Apply 1 Application topically 2 (two) times daily. , Disp: 50 g, Rfl: 1  budeson-glycopyrrol-formoterol (BREZTRI AEROSPHERE) 160-9-4.8 MCG/ACT Inhalation Aerosol, Inhale 2 puffs into the lungs 2 (two) times daily. , Disp: 3 each, Rfl: 3    No current facility-administered medications on file prior to visit.     Past Medical History:   Diagnosis Date    Asthma     Atherosclerosis of coronary artery     Back problem     C. difficile diarrhea     Cancer (HCC)     throat    Cancer (HCC)     Breast    Congestive heart disease (HCC)     COPD (chronic obstructive pulmonary disease) (HCC)     Disorder of thyroid     Esophageal reflux     Exposure to medical diagnostic radiation     Hearing impairment     Heart attack (HCC)     3 heart attacks    High blood pressure     High cholesterol     History of blood clots     Heart clot     History of stomach ulcers     Localized swelling of both lower legs     Obstructive chronic bronchitis without exacerbation ([address]     Personal history of antineoplastic chemotherapy     Polyp of ascending colon     Stroke ([facility] [address]     tia    Visual impairment      Past Surgical History:   Procedure Laterality Date    ANGIOPLASTY (CORONARY)      x 2 ballon    HERNIA SURGERY      HYSTERECTOMY      OTHER SURGICAL HISTORY      pain pump L side [employer]    OTHER SURGICAL HISTORY      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    REPAIR ING HERNIA,5+Y/O,REDUCIBL      SPINE SURGERY PROCEDURE UNLISTED      UMBILICAL HERNIA REPAIR       Social History    Socioeconomic History      Marital status:      Tobacco Use      Smoking status: Former        Types: Cigarettes        Quit date: 2017        Years since quittin.6      Smokeless tobacco: Never      Tobacco comments: started smoking, but just lasted for a month and she quit again    Vaping Use      Vaping Use: Some days        Devices: Pre-filled pod    Substance and Sexual Activity      Alcohol use: No      Drug use: No     Family History   Problem Relation Age of Onset    Cancer Father     Cancer Mother     No Known Problems Daughter     No Known Problems Son     No Known Problems Maternal Grandmother     No Known Problems Maternal Grandfather     No Known Problems Paternal Grandmother     No Known Problems Paternal Grandfather     No Known Problems Sister     No Known Problems Brother     No Known Problems Other        Physical Exam  Height: --  Weight: --  BSA (Calculated - sq m): --  Pulse: --  BP: --  Temp: --  Do Not Use - Resp Rate: --  SpO2: --     General: NAD, AOX3  HEENT: clear op, mmm, no jvd, no scleral icterus  CV: RR  Extremities: bilateral mild swelling   Lungs: no increased work of breathing  Abd: non-distended   Neuro: CN: II-XII grossly intact    Results:  Lab Results   Component Value Date    WBC 3.6 (L) 2023    HGB 12.3 2023    HCT 38.1 2023    MCV 91.6 2023     (L) 2023     Lab Results   Component Value Date     2023    K 4.0 2023    CO2 28 06/20/2023    CL 99 06/20/2023    BUN 25 06/20/2023    PHOS 4.4 07/22/2022    ALB 4.1 06/20/2023       Radiology: I personally reviewed the imaging  CTA Chest 02/2021  1. No acute findings. No acute pulmonary embolus. 2. Emphysematous changes of the aorta. CT AP 05/2022  1. Spleen is borderline enlarged at 13-14 cm. 2. No evidence of an acute inflammatory process. 3. Infrarenal abdominal aortic aneurysm measures 2.9 x 2.9 cm.    4. Sequelae of right ventral hernia repair is noted. Sequelae central ventral hernia repair is also noted. Left ventral battery pack is causing streak artifact. Pathology:   Path review of smear:  Pathologist review of CBC for clinician request.   Normocytic anemia. Thrombocytopenia. Neutrophils are adequate in number and show unremarkable morphology. Absolute lymphopenia composed of small mature lymphocytes. Circulating blasts are not identified. Assessment and Plan:  72F with a PMH of Chronic pain, COPD, CAD, Peptic ulcers, COVID-19 (11/2020 s/p CCP), CVA, HLD, HTN, CHF, smoker, hearing loss and \"neck cancer\" presented on 1/29/21 with right sided weakness. Mild Pancytopenia: she has intermittent and mild leukopenia/lymphocytopenia, anemia and thrombocytopenia. This has been present since at 2019. She also has a history of mild folate deficiency, fe deficiency which might be contributing. She may also have a component of ITP and anemia 2/2 CKD, chronic disease. She had a normal B12, TSH, LDH and copper levels. Peripheral flow was negative. Of note, T cell PCR was not approved by insurance. Given the stability of her cell counts we can hold off of a bone marrow biopsy at this time. We will repeat her FE studies, Folic acid, B88, fe studies today. We will also check a hep C. Repeat CBC in 3 months. As previously discussed, will consider a bone marrow if she develops worsening cytopenias, B sx or macrocytosis.      Folic acid deficiency: Folic acid 1 mg daily      Mild Splenomegaly: 13-14 cm. Normal LDH. Possibly from recent [place]. Can continue to observe. Peripheral flow unremarkable. RTC in 3-6 months     SANDEEP Eric MD  [facility]

## 2023-07-17 ENCOUNTER — OFFICE VISIT (OUTPATIENT)
Dept: HEMATOLOGY/ONCOLOGY | Age: 73
End: 2023-07-17
Attending: INTERNAL MEDICINE
Payer: MEDICARE

## 2023-07-17 VITALS
WEIGHT: 161.63 LBS | DIASTOLIC BLOOD PRESSURE: 77 MMHG | RESPIRATION RATE: 16 BRPM | BODY MASS INDEX: 36 KG/M2 | TEMPERATURE: 99 F | OXYGEN SATURATION: 94 % | SYSTOLIC BLOOD PRESSURE: 132 MMHG | HEART RATE: 79 BPM

## 2023-07-17 DIAGNOSIS — R79.9 ABNORMAL FINDING OF BLOOD CHEMISTRY, UNSPECIFIED: ICD-10-CM

## 2023-07-17 DIAGNOSIS — D61.818 PANCYTOPENIA (HCC): Primary | ICD-10-CM

## 2023-07-17 LAB
BASOPHILS # BLD AUTO: 0.02 X10(3) UL (ref 0–0.2)
BASOPHILS NFR BLD AUTO: 0.4 %
DEPRECATED HBV CORE AB SER IA-ACNC: 42.6 NG/ML
EOSINOPHIL # BLD AUTO: 0.06 X10(3) UL (ref 0–0.7)
EOSINOPHIL NFR BLD AUTO: 1.3 %
ERYTHROCYTE [DISTWIDTH] IN BLOOD BY AUTOMATED COUNT: 14.9 %
FOLATE SERPL-MCNC: 55.3 NG/ML (ref 8.7–?)
HCT VFR BLD AUTO: 36.8 %
HCV AB SERPL QL IA: NONREACTIVE
HGB BLD-MCNC: 12 G/DL
IMM GRANULOCYTES # BLD AUTO: 0.01 X10(3) UL (ref 0–1)
IMM GRANULOCYTES NFR BLD: 0.2 %
IRON SATN MFR SERPL: 18 %
IRON SERPL-MCNC: 59 UG/DL
LYMPHOCYTES # BLD AUTO: 0.64 X10(3) UL (ref 1–4)
LYMPHOCYTES NFR BLD AUTO: 14 %
MCH RBC QN AUTO: 29.6 PG (ref 26–34)
MCHC RBC AUTO-ENTMCNC: 32.6 G/DL (ref 31–37)
MCV RBC AUTO: 90.9 FL
MONOCYTES # BLD AUTO: 0.29 X10(3) UL (ref 0.1–1)
MONOCYTES NFR BLD AUTO: 6.3 %
NEUTROPHILS # BLD AUTO: 3.56 X10 (3) UL (ref 1.5–7.7)
NEUTROPHILS # BLD AUTO: 3.56 X10(3) UL (ref 1.5–7.7)
NEUTROPHILS NFR BLD AUTO: 77.8 %
PLATELET # BLD AUTO: 137 10(3)UL (ref 150–450)
RBC # BLD AUTO: 4.05 X10(6)UL
TIBC SERPL-MCNC: 328 UG/DL (ref 240–450)
TRANSFERRIN SERPL-MCNC: 220 MG/DL (ref 200–360)
VIT B12 SERPL-MCNC: 444 PG/ML (ref 193–986)
WBC # BLD AUTO: 4.6 X10(3) UL (ref 4–11)

## 2023-07-17 PROCEDURE — 99214 OFFICE O/P EST MOD 30 MIN: CPT | Performed by: INTERNAL MEDICINE

## 2023-07-17 NOTE — PROGRESS NOTES
Education Record    Learner:  Patient    Disease / Diagnosis:iron deficiency anemia     Barriers / Limitations:  None   Comments:    Method:  Discussion   Comments:    General Topics:  Plan of care reviewed   Comments:    Outcome:  Shows understanding   Comments:    Patient here for follow-up. States energy levels are low. Having dyspnea,lightheadedness, and easy bruising. Denies any signs of active bleeding.

## 2023-07-25 PROBLEM — M79.89 SWELLING OF LOWER LEG: Status: ACTIVE | Noted: 2023-07-10

## 2023-07-27 ENCOUNTER — APPOINTMENT (OUTPATIENT)
Dept: CT IMAGING | Age: 73
End: 2023-07-27
Attending: INTERNAL MEDICINE
Payer: MEDICARE

## 2023-07-27 ENCOUNTER — HOSPITAL ENCOUNTER (OUTPATIENT)
Dept: ULTRASOUND IMAGING | Age: 73
Discharge: HOME | End: 2023-07-27
Attending: INTERNAL MEDICINE
Payer: MEDICARE

## 2023-07-27 DIAGNOSIS — E04.1 THYROID NODULE: ICD-10-CM

## 2023-07-27 PROCEDURE — 76536 US EXAM OF HEAD AND NECK: CPT | Performed by: INTERNAL MEDICINE

## 2023-07-27 NOTE — PROGRESS NOTES
Patient's son informed and verbalized understanding. Routed results to Dr. Nirav Heaton. Closing encounter.

## 2023-07-31 ENCOUNTER — HOSPITAL ENCOUNTER (OUTPATIENT)
Dept: CT IMAGING | Age: 73
Discharge: HOME | End: 2023-07-31
Attending: INTERNAL MEDICINE
Payer: MEDICARE

## 2023-07-31 DIAGNOSIS — K57.20 DIVERTICULITIS OF LARGE INTESTINE WITH ABSCESS WITHOUT BLEEDING: ICD-10-CM

## 2023-07-31 PROCEDURE — 74177 CT ABD & PELVIS W/CONTRAST: CPT | Performed by: INTERNAL MEDICINE

## 2023-08-23 ENCOUNTER — OFFICE VISIT (OUTPATIENT)
Dept: NEUROSURGERY | Facility: CLINIC | Age: 73
End: 2023-08-23
Payer: MEDICARE

## 2023-08-23 VITALS — BODY MASS INDEX: 35.77 KG/M2 | WEIGHT: 159 LBS | HEIGHT: 56 IN

## 2023-08-23 DIAGNOSIS — M96.1 FAILED BACK SYNDROME: ICD-10-CM

## 2023-08-23 DIAGNOSIS — Z87.891 FORMER SMOKER: ICD-10-CM

## 2023-08-23 DIAGNOSIS — E66.01 CLASS 2 SEVERE OBESITY DUE TO EXCESS CALORIES WITH SERIOUS COMORBIDITY AND BODY MASS INDEX (BMI) OF 35.0 TO 35.9 IN ADULT: ICD-10-CM

## 2023-08-23 DIAGNOSIS — T85.610A BACLOFEN PUMP FAILURE, INITIAL ENCOUNTER: Primary | ICD-10-CM

## 2023-08-23 NOTE — PROGRESS NOTES
Primary Care Provider: Fabiola Morris APRN    Chief Complaint:   Chief Complaint   Patient presents with    pump replacement     New patient here for evaluation and discussion of new pump and catheter replacement.       History of Present Illness  Christine Steven is a 73 y.o. female who presents today for evaluation of pain pump failure and potential revision.  Patient has had a total of 17 back surgeries previously.  Her pain pump was implanted in [place] approximately 6 years ago.  She also has a history of Chiari decompression.    Recently underwent catheter evaluation by Dr. Ashley at the ambulatory surgery center in [place].    Operative note states however during injection of contrast it was noted that there was leakage of contrast along butterfly and proximal right screws/fusion.  These showed contrast to flow both intraspinal intrathecally and anterior to the vertebral spinal column.  Recommend revision of catheter and pump.  On questioning by the patient she states that only the pump itself and the connection between the pump and the catheter were leaking.  I suggested a proximal revision is all that is needed.  Regardless currently she complains at a 6 out of 10 low back and neck pain.  She has migratory spots of numbness in her lower extremities and pain that radiates down both legs all the way to her feet.  No bowel or bladder incontinence she arrives in a wheelchair today but is able to stand up and ambulate approximately 10 to 20 feet.  She has a kyphotic gait.  Previous incisions are well-healed    Review of Systems   Constitutional:  Positive for fatigue.   HENT:  Positive for congestion, dental problem, drooling, ear discharge, hearing loss and trouble swallowing.    Eyes: Negative.    Respiratory:  Positive for cough and shortness of breath.    Cardiovascular: Negative.    Gastrointestinal: Negative.    Endocrine: Negative.    Genitourinary: Negative.    Musculoskeletal:  Positive  for back pain, neck pain and neck stiffness.   Skin: Negative.    Allergic/Immunologic: Positive for food allergies.   Neurological: Negative.    Hematological:  Bruises/bleeds easily.   Psychiatric/Behavioral: Negative.       Past Medical History:   Diagnosis Date    Cancer     Lung nodule     Neck fracture     Thyroid nodule        Past Surgical History:   Procedure Laterality Date    APPENDECTOMY      BACK SURGERY N/A     BRAIN SURGERY      COLONOSCOPY      HYSTERECTOMY         Family History: family history includes No Known Problems in her father and mother.    Social History:  reports that she quit smoking about 10 years ago. Her smoking use included cigarettes. She has a 20.00 pack-year smoking history. She has never used smokeless tobacco. She reports that she does not drink alcohol and does not use drugs.    Medications:    Current Outpatient Medications:     albuterol (PROVENTIL) (2.5 MG/3ML) 0.083% nebulizer solution, Take 2.5 mg by nebulization Every 4 (Four) Hours As Needed for Wheezing., Disp: 3 mL, Rfl: 12    albuterol sulfate  (90 Base) MCG/ACT inhaler, Inhale 2 puffs Every 4 (Four) Hours As Needed for Wheezing., Disp: 18 g, Rfl: 11    allopurinol (ZYLOPRIM) 100 MG tablet, Take 1 tablet by mouth Daily., Disp: , Rfl:     amLODIPine (NORVASC) 5 MG tablet, amlodipine 5 mg tablet, Disp: , Rfl:     Aspirin Low Dose 81 MG EC tablet, Take 1 tablet by mouth Daily., Disp: , Rfl:     atorvastatin (LIPITOR) 40 MG tablet, Take 1 tablet by mouth every night at bedtime., Disp: , Rfl:     baclofen (LIORESAL) 10 MG/20ML injection, 237.53 mcg by Intrathecal route 1 (One) Time. Pain pump, Disp: , Rfl:     betamethasone dipropionate 0.05 % cream, Apply 1 application topically to the appropriate area as directed 2 (Two) Times a Day., Disp: , Rfl:     bisacodyl (DULCOLAX) 10 MG suppository, Insert 1 suppository into the rectum Daily., Disp: 10 each, Rfl: 2    cloNIDine (DURACLON) 100 MCG/ML injection, 156.09 mcg  by Epidural route 1 (One) Time. Pain pump, Disp: , Rfl:     cyclobenzaprine (FLEXERIL) 10 MG tablet, Take 1 tablet by mouth 3 (Three) Times a Day., Disp: , Rfl:     ezetimibe (ZETIA) 10 MG tablet, Take 1 tablet by mouth every night at bedtime., Disp: , Rfl:     fluticasone (FLONASE) 50 MCG/ACT nasal spray, 2 sprays into the nostril(s) as directed by provider Daily., Disp: 11.1 mL, Rfl: 2    folic acid (FOLVITE) 1 MG tablet, Take 1 tablet by mouth Daily., Disp: , Rfl:     HYDROmorphone (DILAUDID) 2 MG/ML injection, Infuse 1.3573 mg into a venous catheter., Disp: , Rfl:     ipratropium-albuterol (DUO-NEB) 0.5-2.5 mg/3 ml nebulizer, Take 3 mL by nebulization Every 4 (Four) Hours As Needed for Wheezing or Shortness of Air., Disp: 1080 mL, Rfl: 1    levothyroxine (SYNTHROID, LEVOTHROID) 25 MCG tablet, TAKE 1 TABLET BY MOUTH BEFORE BREAKFAST., Disp: , Rfl:     lisinopril (PRINIVIL,ZESTRIL) 10 MG tablet, lisinopril 10 mg tablet  TAKE ONE TABLET BY MOUTH ONE TIME DAILY, Disp: , Rfl:     Morphine (MSIR) 15 MG tablet, morphine 15 mg immediate release tablet, Disp: , Rfl:     nystatin (MYCOSTATIN) 488154 UNIT/GM powder, APPLY TO AFFECTED AREA EVERY DAY AND AS NEEDED, Disp: , Rfl:     pantoprazole (PROTONIX) 20 MG EC tablet, Take 1 tablet by mouth Every Morning Before Breakfast., Disp: , Rfl:     predniSONE (DELTASONE) 20 MG tablet, Take 1 tablet by mouth Daily., Disp: , Rfl:     sennosides-docusate (senna-docusate sodium) 8.6-50 MG per tablet, Take 1 tablet by mouth 2 (Two) Times a Day As Needed for Constipation., Disp: 30 tablet, Rfl: 2    Allergies:  Bee venom; Ciprofloxacin; Nitroglycerin; Penicillins; Eggs or egg-derived products; Codeine; Contrast dye (echo or unknown ct/mr); Iodinated contrast media; Latex, natural rubber; Methenamine; Nylon; Oxycodone; Oxycodone-acetaminophen; Sulfamethoxazole-trimethoprim; and Latex    Objective   Physical Exam  Constitutional:       Appearance: She is well-developed. She is obese.    HENT:      Head: Normocephalic and atraumatic.   Eyes:      Extraocular Movements: EOM normal.      Pupils: Pupils are equal, round, and reactive to light.   Pulmonary:      Effort: Pulmonary effort is normal.      Breath sounds: Normal breath sounds.   Abdominal:      Palpations: Abdomen is soft.   Musculoskeletal:         General: Normal range of motion.      Cervical back: Normal range of motion and neck supple.   Skin:     General: Skin is warm and dry.   Neurological:      Mental Status: She is oriented to person, place, and time.      Coordination: Finger-Nose-Finger Test and Heel to Shin Test normal.      Gait: Tandem walk normal.      Deep Tendon Reflexes:      Reflex Scores:       Tricep reflexes are 1+ on the right side and 1+ on the left side.       Bicep reflexes are 1+ on the right side and 1+ on the left side.       Brachioradialis reflexes are 1+ on the right side and 1+ on the left side.       Patellar reflexes are 1+ on the right side and 1+ on the left side.       Achilles reflexes are 0 on the right side and 0 on the left side.  Psychiatric:         Speech: Speech normal.     Neurologic Exam     Mental Status   Oriented to person, place, and time.   Registration: recalls 3 of 3 objects. Recall at 5 minutes: recalls 3 of 3 objects.   Attention: normal. Concentration: normal.   Speech: speech is normal   Level of consciousness: alert  Knowledge: consistent with education.     Cranial Nerves     CN II   Visual acuity: normal    CN III, IV, VI   Pupils are equal, round, and reactive to light.  Extraocular motions are normal.   Diplopia: none    CN V   Facial sensation intact.   Right corneal reflex: normal  Left corneal reflex: normal    CN VII   Right facial weakness: none  Left facial weakness: none    CN VIII   Hearing: intact    CN IX, X   Palate: symmetric  Right gag reflex: normal  Left gag reflex: normal    CN XI   Right trapezius strength: normal  Left trapezius strength: normal    CN XII    Tongue deviation: none    Motor Exam   Right arm tone: normal  Left arm tone: normal  Right arm pronator drift: absent  Left arm pronator drift: absent  Right leg tone: normal  Left leg tone: normal    Strength   Right deltoid: 5/5  Left deltoid: 5/5  Right biceps: 5/5  Left biceps: 5/5  Right triceps: 5/5  Left triceps: 5/5  Right interossei: 5/5  Left interossei: 5/5  Right iliopsoas: 5/5  Left iliopsoas: 4/5  Right quadriceps: 5/5  Left quadriceps: 5/5  Right anterior tibial: 5/5  Left anterior tibial: 5/5  Right gastroc: 5/5  Left gastroc: 5/5Right EHL 5/5   Left EHL 5/5     Sensory Exam   Right arm light touch: normal  Left arm light touch: normal  Right leg light touch: normal  Left leg light touch: normal  Proprioception normal.   Sensory deficit distribution on right: S1  Sensory deficit distribution on left: S1    Gait, Coordination, and Reflexes     Gait  Gait: shuffling and wide-based    Coordination   Finger to nose coordination: normal  Heel to shin coordination: normal  Tandem walking coordination: normal    Reflexes   Right brachioradialis: 1+  Left brachioradialis: 1+  Right biceps: 1+  Left biceps: 1+  Right triceps: 1+  Left triceps: 1+  Right patellar: 1+  Left patellar: 1+  Right achilles: 0  Left achilles: 0  Right plantar: normal  Left plantar: normal  Right Benavidez: absent  Left Benavidez: absent  Right ankle clonus: absent  Left ankle clonus: absentKyphotic gait.  Able to ambulate 10 feet out of her wheelchair.       Imaging: (independent review and interpretation)  No radiology results for the last 30 days.    No imaging for review    ASSESSMENT and PLAN  Christine Steven is a 73 y.o. female with a significant comorbidity of hyperlipidemia, hypertension, B12 deficiency, class II obesity, stage IIIb chronic kidney disease, iron deficiency, COPD and former smoker. She presents with a new problem of persistent axial back pain with recent pain pump evaluation showing leakage of contrast into her  pocket. Physical exam findings of sequela of prior surgeries.  Well-healed left abdominal pocket.  No evidence of baclofen withdrawal.  Her imaging is pending.    Failed back syndrome  Pain pump at end-of-life  Christine and I discussed revision of her pain pump.  It is unclear from the provided documentation whether or not she needs a pump revision with a proximal catheter revision versus a pump revision with complete catheter revision.  Additionally we have no imaging of her spine to guide if distal catheter placement is needed.  We discussed that the risk of distal catheter revision intrinsically has a higher risk of CSF leak and therefore should be avoided if necessary.  She consents to revision of proximal pain pump and proximal catheter revision, with posterior revision catheter dye study.  -Obtain outside imaging from pain [facility] of [place]  -Baseline MRI of the lumbar and thoracic spine to rule out catheter tip granuloma  -Noncontrast CT of the thoracic and lumbar spine both as a baseline and to guide distal revision if necessary.  -PCP clearance    Obesity Counseling  We discussed Christine's current weight and the effect on her spine, including premature disc degeneration and increased anterior force on the lumbar spine resulting in worsening facet arthropathy.  Christine's Body mass index is 35.65 kg/mý..  We spent 2 minutes in weight management counseling including discussing current weight, current diet, and exercise patterns.  Additional we set goals for weight reduction.  Therefore above normal parameters. Recommendations include: educational material and exercise counseling.         Diagnoses and all orders for this visit:    1. Baclofen pump failure, initial encounter (Primary)  -     CT Thoracic Spine Without Contrast; Future  -     CT Lumbar Spine Without Contrast; Future  -     MRI Thoracic Spine Without Contrast; Future  -     MRI Lumbar Spine Without Contrast; Future  -     Case Request; Standing  -      CBC & Differential; Future  -     Comprehensive Metabolic Panel; Future  -     MRSA Screen Culture (Outpatient) - Swab, Nares; Future  -     Type & Screen; Future  -     ECG 12 Lead; Future  -     XR Chest 1 View; Future  -     vancomycin (VANCOCIN) 1,000 mg in sodium chloride 0.9 % 250 mL IVPB  -     Case Request    2. Failed back syndrome  -     CT Thoracic Spine Without Contrast; Future  -     CT Lumbar Spine Without Contrast; Future  -     MRI Thoracic Spine Without Contrast; Future  -     MRI Lumbar Spine Without Contrast; Future    3. Class 2 severe obesity due to excess calories with serious comorbidity and body mass index (BMI) of 35.0 to 35.9 in adult    4. Former smoker    Other orders  -     Inpatient Admission; Standing  -     Follow Anesthesia Guidelines / Protocol; Future  -     Follow Anesthesia Guidelines / Protocol; Standing  -     Verify NPO Status; Standing  -     Obtain Informed Consent; Standing  -     SCD (Sequential Compression Device) - Place on Patient in Pre-Op; Standing  -     Clip Operative Site; Standing  -     Have Patient Void Prior to Procedure; Standing  -     Verify / Perform Chlorhexidine Skin Prep; Standing  -     Obtain Informed Consent; Future  -     Provide NPO Instructions to Patient; Future  -     Chlorhexidine Skin Prep; Future  -     Provide Patient with Enhanced Recovery Booklet(s) or Handout; Future  -     Type & Screen; Standing        Return for POSTOPERATIVELY.    Thank you for this Consultation and the opportunity to participate in Christine's care.    Sincerely,  Cristopher Reza MD    I spent 34 minutes caring for Christine on this date of service. This time includes time spent by me in the following activities: preparing for the visit, reviewing tests, obtaining and/or reviewing a separately obtained history, performing a medically appropriate examination and/or evaluation, counseling and educating the patient/family/caregiver, ordering medications, tests, or  procedures, referring and communicating with other health care professionals, documenting information in the medical record, independently interpreting results and communicating that information with the patient/family/caregiver, and/or care coordination.     Medical Decision Making (2/3)  Problem Points (2,3,4 or more)  Established Problem, stable = 1x1  New Problem, additional workup = 4x1  Data Points (2,3,4 or more)  Ordered lab = 1.  Ordered Imaging (X-ray,CT, MRI) = 1x4.  Risk (Low, Mod, High)  Surgery: Minor with risk factors (Moderate)    E/M = MDM 3 out of 3   or   TIME  New Level 4 - 37518 = Mod (3, 3, Mod)   or   45-59 minutes

## 2023-08-23 NOTE — LETTER
August 23, 2023       No Recipients    Patient: Christine Steven   YOB: 1950   Date of Visit: 8/23/2023     Dear Franchesca Ring MD:       Thank you for referring Christine Steven to me for evaluation. Below are the relevant portions of my assessment and plan of care.    If you have questions, please do not hesitate to call me. I look forward to following Christine along with you.         Sincerely,        Cristopher Reza MD        CC:   No Recipients    Cristopher Reza MD  08/23/23 1027  Sign when Signing Visit  Primary Care Provider: Fabiola Morris APRN    Chief Complaint:   Chief Complaint   Patient presents with    pump replacement     New patient here for evaluation and discussion of new pump and catheter replacement.       History of Present Illness  Christine Steven is a 73 y.o. female who presents today for evaluation of pain pump failure and potential revision.  Patient has had a total of 17 back surgeries previously.  Her pain pump was implanted in [place] approximately 6 years ago.  She also has a history of Chiari decompression.    Recently underwent catheter evaluation by Dr. Ashley at the ambulatory surgery center in [place].    Operative note states however during injection of contrast it was noted that there was leakage of contrast along butterfly and proximal right screws/fusion.  These showed contrast to flow both intraspinal intrathecally and anterior to the vertebral spinal column.  Recommend revision of catheter and pump.  On questioning by the patient she states that only the pump itself and the connection between the pump and the catheter were leaking.  I suggested a proximal revision is all that is needed.  Regardless currently she complains at a 6 out of 10 low back and neck pain.  She has migratory spots of numbness in her lower extremities and pain that radiates down both legs all the way to her feet.  No bowel or bladder incontinence she arrives  in a wheelchair today but is able to stand up and ambulate approximately 10 to 20 feet.  She has a kyphotic gait.  Previous incisions are well-healed    Review of Systems   Constitutional:  Positive for fatigue.   HENT:  Positive for congestion, dental problem, drooling, ear discharge, hearing loss and trouble swallowing.    Eyes: Negative.    Respiratory:  Positive for cough and shortness of breath.    Cardiovascular: Negative.    Gastrointestinal: Negative.    Endocrine: Negative.    Genitourinary: Negative.    Musculoskeletal:  Positive for back pain, neck pain and neck stiffness.   Skin: Negative.    Allergic/Immunologic: Positive for food allergies.   Neurological: Negative.    Hematological:  Bruises/bleeds easily.   Psychiatric/Behavioral: Negative.       Past Medical History:   Diagnosis Date    Cancer     Lung nodule     Neck fracture     Thyroid nodule        Past Surgical History:   Procedure Laterality Date    APPENDECTOMY      BACK SURGERY N/A     BRAIN SURGERY      COLONOSCOPY      HYSTERECTOMY         Family History: family history includes No Known Problems in her father and mother.    Social History:  reports that she quit smoking about 10 years ago. Her smoking use included cigarettes. She has a 20.00 pack-year smoking history. She has never used smokeless tobacco. She reports that she does not drink alcohol and does not use drugs.    Medications:    Current Outpatient Medications:     albuterol (PROVENTIL) (2.5 MG/3ML) 0.083% nebulizer solution, Take 2.5 mg by nebulization Every 4 (Four) Hours As Needed for Wheezing., Disp: 3 mL, Rfl: 12    albuterol sulfate  (90 Base) MCG/ACT inhaler, Inhale 2 puffs Every 4 (Four) Hours As Needed for Wheezing., Disp: 18 g, Rfl: 11    allopurinol (ZYLOPRIM) 100 MG tablet, Take 1 tablet by mouth Daily., Disp: , Rfl:     amLODIPine (NORVASC) 5 MG tablet, amlodipine 5 mg tablet, Disp: , Rfl:     Aspirin Low Dose 81 MG EC tablet, Take 1 tablet by  mouth Daily., Disp: , Rfl:     atorvastatin (LIPITOR) 40 MG tablet, Take 1 tablet by mouth every night at bedtime., Disp: , Rfl:     baclofen (LIORESAL) 10 MG/20ML injection, 237.53 mcg by Intrathecal route 1 (One) Time. Pain pump, Disp: , Rfl:     betamethasone dipropionate 0.05 % cream, Apply 1 application topically to the appropriate area as directed 2 (Two) Times a Day., Disp: , Rfl:     bisacodyl (DULCOLAX) 10 MG suppository, Insert 1 suppository into the rectum Daily., Disp: 10 each, Rfl: 2    cloNIDine (DURACLON) 100 MCG/ML injection, 156.09 mcg by Epidural route 1 (One) Time. Pain pump, Disp: , Rfl:     cyclobenzaprine (FLEXERIL) 10 MG tablet, Take 1 tablet by mouth 3 (Three) Times a Day., Disp: , Rfl:     ezetimibe (ZETIA) 10 MG tablet, Take 1 tablet by mouth every night at bedtime., Disp: , Rfl:     fluticasone (FLONASE) 50 MCG/ACT nasal spray, 2 sprays into the nostril(s) as directed by provider Daily., Disp: 11.1 mL, Rfl: 2    folic acid (FOLVITE) 1 MG tablet, Take 1 tablet by mouth Daily., Disp: , Rfl:     HYDROmorphone (DILAUDID) 2 MG/ML injection, Infuse 1.3573 mg into a venous catheter., Disp: , Rfl:     ipratropium-albuterol (DUO-NEB) 0.5-2.5 mg/3 ml nebulizer, Take 3 mL by nebulization Every 4 (Four) Hours As Needed for Wheezing or Shortness of Air., Disp: 1080 mL, Rfl: 1    levothyroxine (SYNTHROID, LEVOTHROID) 25 MCG tablet, TAKE 1 TABLET BY MOUTH BEFORE BREAKFAST., Disp: , Rfl:     lisinopril (PRINIVIL,ZESTRIL) 10 MG tablet, lisinopril 10 mg tablet  TAKE ONE TABLET BY MOUTH ONE TIME DAILY, Disp: , Rfl:     Morphine (MSIR) 15 MG tablet, morphine 15 mg immediate release tablet, Disp: , Rfl:     nystatin (MYCOSTATIN) 903907 UNIT/GM powder, APPLY TO AFFECTED AREA EVERY DAY AND AS NEEDED, Disp: , Rfl:     pantoprazole (PROTONIX) 20 MG EC tablet, Take 1 tablet by mouth Every Morning Before Breakfast., Disp: , Rfl:     predniSONE (DELTASONE) 20 MG tablet, Take 1 tablet by mouth Daily.,  Disp: , Rfl:     sennosides-docusate (senna-docusate sodium) 8.6-50 MG per tablet, Take 1 tablet by mouth 2 (Two) Times a Day As Needed for Constipation., Disp: 30 tablet, Rfl: 2    Allergies:  Bee venom; Ciprofloxacin; Nitroglycerin; Penicillins; Eggs or egg-derived products; Codeine; Contrast dye (echo or unknown ct/mr); Iodinated contrast media; Latex, natural rubber; Methenamine; Nylon; Oxycodone; Oxycodone-acetaminophen; Sulfamethoxazole-trimethoprim; and Latex    Objective  Physical Exam  Constitutional:       Appearance: She is well-developed. She is obese.   HENT:      Head: Normocephalic and atraumatic.   Eyes:      Extraocular Movements: EOM normal.      Pupils: Pupils are equal, round, and reactive to light.   Pulmonary:      Effort: Pulmonary effort is normal.      Breath sounds: Normal breath sounds.   Abdominal:      Palpations: Abdomen is soft.   Musculoskeletal:         General: Normal range of motion.      Cervical back: Normal range of motion and neck supple.   Skin:     General: Skin is warm and dry.   Neurological:      Mental Status: She is oriented to person, place, and time.      Coordination: Finger-Nose-Finger Test and Heel to Shin Test normal.      Gait: Tandem walk normal.      Deep Tendon Reflexes:      Reflex Scores:       Tricep reflexes are 1+ on the right side and 1+ on the left side.       Bicep reflexes are 1+ on the right side and 1+ on the left side.       Brachioradialis reflexes are 1+ on the right side and 1+ on the left side.       Patellar reflexes are 1+ on the right side and 1+ on the left side.       Achilles reflexes are 0 on the right side and 0 on the left side.  Psychiatric:         Speech: Speech normal.     Neurologic Exam     Mental Status   Oriented to person, place, and time.   Registration: recalls 3 of 3 objects. Recall at 5 minutes: recalls 3 of 3 objects.   Attention: normal. Concentration: normal.   Speech: speech is normal   Level of consciousness:  alert  Knowledge: consistent with education.     Cranial Nerves     CN II   Visual acuity: normal    CN III, IV, VI   Pupils are equal, round, and reactive to light.  Extraocular motions are normal.   Diplopia: none    CN V   Facial sensation intact.   Right corneal reflex: normal  Left corneal reflex: normal    CN VII   Right facial weakness: none  Left facial weakness: none    CN VIII   Hearing: intact    CN IX, X   Palate: symmetric  Right gag reflex: normal  Left gag reflex: normal    CN XI   Right trapezius strength: normal  Left trapezius strength: normal    CN XII   Tongue deviation: none    Motor Exam   Right arm tone: normal  Left arm tone: normal  Right arm pronator drift: absent  Left arm pronator drift: absent  Right leg tone: normal  Left leg tone: normal    Strength   Right deltoid: 5/5  Left deltoid: 5/5  Right biceps: 5/5  Left biceps: 5/5  Right triceps: 5/5  Left triceps: 5/5  Right interossei: 5/5  Left interossei: 5/5  Right iliopsoas: 5/5  Left iliopsoas: 4/5  Right quadriceps: 5/5  Left quadriceps: 5/5  Right anterior tibial: 5/5  Left anterior tibial: 5/5  Right gastroc: 5/5  Left gastroc: 5/5Right EHL 5/5   Left EHL 5/5     Sensory Exam   Right arm light touch: normal  Left arm light touch: normal  Right leg light touch: normal  Left leg light touch: normal  Proprioception normal.   Sensory deficit distribution on right: S1  Sensory deficit distribution on left: S1    Gait, Coordination, and Reflexes     Gait  Gait: shuffling and wide-based    Coordination   Finger to nose coordination: normal  Heel to shin coordination: normal  Tandem walking coordination: normal    Reflexes   Right brachioradialis: 1+  Left brachioradialis: 1+  Right biceps: 1+  Left biceps: 1+  Right triceps: 1+  Left triceps: 1+  Right patellar: 1+  Left patellar: 1+  Right achilles: 0  Left achilles: 0  Right plantar: normal  Left plantar: normal  Right Benavidez: absent  Left Benavidez: absent  Right ankle clonus:  absent  Left ankle clonus: absentKyphotic gait.  Able to ambulate 10 feet out of her wheelchair.       Imaging: (independent review and interpretation)  No radiology results for the last 30 days.    No imaging for review    ASSESSMENT and PLAN  Christine Steven is a 73 y.o. female with a significant comorbidity of hyperlipidemia, hypertension, B12 deficiency, class II obesity, stage IIIb chronic kidney disease, iron deficiency, COPD and former smoker. She presents with a new problem of persistent axial back pain with recent pain pump evaluation showing leakage of contrast into her pocket. Physical exam findings of sequela of prior surgeries.  Well-healed left abdominal pocket.  No evidence of baclofen withdrawal.  Her imaging is pending.    Failed back syndrome  Pain pump at end-of-life  Christine and I discussed revision of her pain pump.  It is unclear from the provided documentation whether or not she needs a pump revision with a proximal catheter revision versus a pump revision with complete catheter revision.  Additionally we have no imaging of her spine to guide if distal catheter placement is needed.  We discussed that the risk of distal catheter revision intrinsically has a higher risk of CSF leak and therefore should be avoided if necessary.  She consents to revision of proximal pain pump and proximal catheter revision, with posterior revision catheter dye study.  -Obtain outside imaging from pain [facility] of [place]  -Baseline MRI of the lumbar and thoracic spine to rule out catheter tip granuloma  -Noncontrast CT of the thoracic and lumbar spine both as a baseline and to guide distal revision if necessary.  -PCP clearance    Obesity Counseling  We discussed Christine's current weight and the effect on her spine, including premature disc degeneration and increased anterior force on the lumbar spine resulting in worsening facet arthropathy.  Christine's Body mass index is 35.65 kg/mý..  We spent 2 minutes in weight  management counseling including discussing current weight, current diet, and exercise patterns.  Additional we set goals for weight reduction.  Therefore above normal parameters. Recommendations include: educational material and exercise counseling.         Diagnoses and all orders for this visit:    1. Baclofen pump failure, initial encounter (Primary)  -     CT Thoracic Spine Without Contrast; Future  -     CT Lumbar Spine Without Contrast; Future  -     MRI Thoracic Spine Without Contrast; Future  -     MRI Lumbar Spine Without Contrast; Future  -     Case Request; Standing  -     CBC & Differential; Future  -     Comprehensive Metabolic Panel; Future  -     MRSA Screen Culture (Outpatient) - Swab, Nares; Future  -     Type & Screen; Future  -     ECG 12 Lead; Future  -     XR Chest 1 View; Future  -     vancomycin (VANCOCIN) 1,000 mg in sodium chloride 0.9 % 250 mL IVPB  -     Case Request    2. Failed back syndrome  -     CT Thoracic Spine Without Contrast; Future  -     CT Lumbar Spine Without Contrast; Future  -     MRI Thoracic Spine Without Contrast; Future  -     MRI Lumbar Spine Without Contrast; Future    3. Class 2 severe obesity due to excess calories with serious comorbidity and body mass index (BMI) of 35.0 to 35.9 in adult    4. Former smoker    Other orders  -     Inpatient Admission; Standing  -     Follow Anesthesia Guidelines / Protocol; Future  -     Follow Anesthesia Guidelines / Protocol; Standing  -     Verify NPO Status; Standing  -     Obtain Informed Consent; Standing  -     SCD (Sequential Compression Device) - Place on Patient in Pre-Op; Standing  -     Clip Operative Site; Standing  -     Have Patient Void Prior to Procedure; Standing  -     Verify / Perform Chlorhexidine Skin Prep; Standing  -     Obtain Informed Consent; Future  -     Provide NPO Instructions to Patient; Future  -     Chlorhexidine Skin Prep; Future  -     Provide Patient with Enhanced Recovery Booklet(s) or Handout;  Future  -     Type & Screen; Standing        Return for POSTOPERATIVELY.    Thank you for this Consultation and the opportunity to participate in Christine's care.    Sincerely,  Cristopher Reza MD    I spent 34 minutes caring for Christine on this date of service. This time includes time spent by me in the following activities: preparing for the visit, reviewing tests, obtaining and/or reviewing a separately obtained history, performing a medically appropriate examination and/or evaluation, counseling and educating the patient/family/caregiver, ordering medications, tests, or procedures, referring and communicating with other health care professionals, documenting information in the medical record, independently interpreting results and communicating that information with the patient/family/caregiver, and/or care coordination.     Medical Decision Making (2/3)  Problem Points (2,3,4 or more)  Established Problem, stable = 1x1  New Problem, additional workup = 4x1  Data Points (2,3,4 or more)  Ordered lab = 1.  Ordered Imaging (X-ray,CT, MRI) = 1x4.  Risk (Low, Mod, High)  Surgery: Minor with risk factors (Moderate)    E/M = MDM 3 out of 3   or   TIME  New Level 4 - 31264 = Mod (3, 3, Mod)   or   45-59 minutes

## 2023-08-23 NOTE — H&P (VIEW-ONLY)
Primary Care Provider: Fabiola Morris APRN    Chief Complaint:   Chief Complaint   Patient presents with    pump replacement     New patient here for evaluation and discussion of new pump and catheter replacement.       History of Present Illness  Christine Steven is a 73 y.o. female who presents today for evaluation of pain pump failure and potential revision.  Patient has had a total of 17 back surgeries previously.  Her pain pump was implanted in [place] approximately 6 years ago.  She also has a history of Chiari decompression.    Recently underwent catheter evaluation by Dr. Ashley at the ambulatory surgery center in [place].    Operative note states however during injection of contrast it was noted that there was leakage of contrast along butterfly and proximal right screws/fusion.  These showed contrast to flow both intraspinal intrathecally and anterior to the vertebral spinal column.  Recommend revision of catheter and pump.  On questioning by the patient she states that only the pump itself and the connection between the pump and the catheter were leaking.  I suggested a proximal revision is all that is needed.  Regardless currently she complains at a 6 out of 10 low back and neck pain.  She has migratory spots of numbness in her lower extremities and pain that radiates down both legs all the way to her feet.  No bowel or bladder incontinence she arrives in a wheelchair today but is able to stand up and ambulate approximately 10 to 20 feet.  She has a kyphotic gait.  Previous incisions are well-healed    Review of Systems   Constitutional:  Positive for fatigue.   HENT:  Positive for congestion, dental problem, drooling, ear discharge, hearing loss and trouble swallowing.    Eyes: Negative.    Respiratory:  Positive for cough and shortness of breath.    Cardiovascular: Negative.    Gastrointestinal: Negative.    Endocrine: Negative.    Genitourinary: Negative.    Musculoskeletal:  Positive  for back pain, neck pain and neck stiffness.   Skin: Negative.    Allergic/Immunologic: Positive for food allergies.   Neurological: Negative.    Hematological:  Bruises/bleeds easily.   Psychiatric/Behavioral: Negative.       Past Medical History:   Diagnosis Date    Cancer     Lung nodule     Neck fracture     Thyroid nodule        Past Surgical History:   Procedure Laterality Date    APPENDECTOMY      BACK SURGERY N/A     BRAIN SURGERY      COLONOSCOPY      HYSTERECTOMY         Family History: family history includes No Known Problems in her father and mother.    Social History:  reports that she quit smoking about 10 years ago. Her smoking use included cigarettes. She has a 20.00 pack-year smoking history. She has never used smokeless tobacco. She reports that she does not drink alcohol and does not use drugs.    Medications:    Current Outpatient Medications:     albuterol (PROVENTIL) (2.5 MG/3ML) 0.083% nebulizer solution, Take 2.5 mg by nebulization Every 4 (Four) Hours As Needed for Wheezing., Disp: 3 mL, Rfl: 12    albuterol sulfate  (90 Base) MCG/ACT inhaler, Inhale 2 puffs Every 4 (Four) Hours As Needed for Wheezing., Disp: 18 g, Rfl: 11    allopurinol (ZYLOPRIM) 100 MG tablet, Take 1 tablet by mouth Daily., Disp: , Rfl:     amLODIPine (NORVASC) 5 MG tablet, amlodipine 5 mg tablet, Disp: , Rfl:     Aspirin Low Dose 81 MG EC tablet, Take 1 tablet by mouth Daily., Disp: , Rfl:     atorvastatin (LIPITOR) 40 MG tablet, Take 1 tablet by mouth every night at bedtime., Disp: , Rfl:     baclofen (LIORESAL) 10 MG/20ML injection, 237.53 mcg by Intrathecal route 1 (One) Time. Pain pump, Disp: , Rfl:     betamethasone dipropionate 0.05 % cream, Apply 1 application topically to the appropriate area as directed 2 (Two) Times a Day., Disp: , Rfl:     bisacodyl (DULCOLAX) 10 MG suppository, Insert 1 suppository into the rectum Daily., Disp: 10 each, Rfl: 2    cloNIDine (DURACLON) 100 MCG/ML injection, 156.09 mcg  by Epidural route 1 (One) Time. Pain pump, Disp: , Rfl:     cyclobenzaprine (FLEXERIL) 10 MG tablet, Take 1 tablet by mouth 3 (Three) Times a Day., Disp: , Rfl:     ezetimibe (ZETIA) 10 MG tablet, Take 1 tablet by mouth every night at bedtime., Disp: , Rfl:     fluticasone (FLONASE) 50 MCG/ACT nasal spray, 2 sprays into the nostril(s) as directed by provider Daily., Disp: 11.1 mL, Rfl: 2    folic acid (FOLVITE) 1 MG tablet, Take 1 tablet by mouth Daily., Disp: , Rfl:     HYDROmorphone (DILAUDID) 2 MG/ML injection, Infuse 1.3573 mg into a venous catheter., Disp: , Rfl:     ipratropium-albuterol (DUO-NEB) 0.5-2.5 mg/3 ml nebulizer, Take 3 mL by nebulization Every 4 (Four) Hours As Needed for Wheezing or Shortness of Air., Disp: 1080 mL, Rfl: 1    levothyroxine (SYNTHROID, LEVOTHROID) 25 MCG tablet, TAKE 1 TABLET BY MOUTH BEFORE BREAKFAST., Disp: , Rfl:     lisinopril (PRINIVIL,ZESTRIL) 10 MG tablet, lisinopril 10 mg tablet  TAKE ONE TABLET BY MOUTH ONE TIME DAILY, Disp: , Rfl:     Morphine (MSIR) 15 MG tablet, morphine 15 mg immediate release tablet, Disp: , Rfl:     nystatin (MYCOSTATIN) 056522 UNIT/GM powder, APPLY TO AFFECTED AREA EVERY DAY AND AS NEEDED, Disp: , Rfl:     pantoprazole (PROTONIX) 20 MG EC tablet, Take 1 tablet by mouth Every Morning Before Breakfast., Disp: , Rfl:     predniSONE (DELTASONE) 20 MG tablet, Take 1 tablet by mouth Daily., Disp: , Rfl:     sennosides-docusate (senna-docusate sodium) 8.6-50 MG per tablet, Take 1 tablet by mouth 2 (Two) Times a Day As Needed for Constipation., Disp: 30 tablet, Rfl: 2    Allergies:  Bee venom; Ciprofloxacin; Nitroglycerin; Penicillins; Eggs or egg-derived products; Codeine; Contrast dye (echo or unknown ct/mr); Iodinated contrast media; Latex, natural rubber; Methenamine; Nylon; Oxycodone; Oxycodone-acetaminophen; Sulfamethoxazole-trimethoprim; and Latex    Objective   Physical Exam  Constitutional:       Appearance: She is well-developed. She is obese.    HENT:      Head: Normocephalic and atraumatic.   Eyes:      Extraocular Movements: EOM normal.      Pupils: Pupils are equal, round, and reactive to light.   Pulmonary:      Effort: Pulmonary effort is normal.      Breath sounds: Normal breath sounds.   Abdominal:      Palpations: Abdomen is soft.   Musculoskeletal:         General: Normal range of motion.      Cervical back: Normal range of motion and neck supple.   Skin:     General: Skin is warm and dry.   Neurological:      Mental Status: She is oriented to person, place, and time.      Coordination: Finger-Nose-Finger Test and Heel to Shin Test normal.      Gait: Tandem walk normal.      Deep Tendon Reflexes:      Reflex Scores:       Tricep reflexes are 1+ on the right side and 1+ on the left side.       Bicep reflexes are 1+ on the right side and 1+ on the left side.       Brachioradialis reflexes are 1+ on the right side and 1+ on the left side.       Patellar reflexes are 1+ on the right side and 1+ on the left side.       Achilles reflexes are 0 on the right side and 0 on the left side.  Psychiatric:         Speech: Speech normal.     Neurologic Exam     Mental Status   Oriented to person, place, and time.   Registration: recalls 3 of 3 objects. Recall at 5 minutes: recalls 3 of 3 objects.   Attention: normal. Concentration: normal.   Speech: speech is normal   Level of consciousness: alert  Knowledge: consistent with education.     Cranial Nerves     CN II   Visual acuity: normal    CN III, IV, VI   Pupils are equal, round, and reactive to light.  Extraocular motions are normal.   Diplopia: none    CN V   Facial sensation intact.   Right corneal reflex: normal  Left corneal reflex: normal    CN VII   Right facial weakness: none  Left facial weakness: none    CN VIII   Hearing: intact    CN IX, X   Palate: symmetric  Right gag reflex: normal  Left gag reflex: normal    CN XI   Right trapezius strength: normal  Left trapezius strength: normal    CN XII    Tongue deviation: none    Motor Exam   Right arm tone: normal  Left arm tone: normal  Right arm pronator drift: absent  Left arm pronator drift: absent  Right leg tone: normal  Left leg tone: normal    Strength   Right deltoid: 5/5  Left deltoid: 5/5  Right biceps: 5/5  Left biceps: 5/5  Right triceps: 5/5  Left triceps: 5/5  Right interossei: 5/5  Left interossei: 5/5  Right iliopsoas: 5/5  Left iliopsoas: 4/5  Right quadriceps: 5/5  Left quadriceps: 5/5  Right anterior tibial: 5/5  Left anterior tibial: 5/5  Right gastroc: 5/5  Left gastroc: 5/5Right EHL 5/5   Left EHL 5/5     Sensory Exam   Right arm light touch: normal  Left arm light touch: normal  Right leg light touch: normal  Left leg light touch: normal  Proprioception normal.   Sensory deficit distribution on right: S1  Sensory deficit distribution on left: S1    Gait, Coordination, and Reflexes     Gait  Gait: shuffling and wide-based    Coordination   Finger to nose coordination: normal  Heel to shin coordination: normal  Tandem walking coordination: normal    Reflexes   Right brachioradialis: 1+  Left brachioradialis: 1+  Right biceps: 1+  Left biceps: 1+  Right triceps: 1+  Left triceps: 1+  Right patellar: 1+  Left patellar: 1+  Right achilles: 0  Left achilles: 0  Right plantar: normal  Left plantar: normal  Right Benavidez: absent  Left Benavidez: absent  Right ankle clonus: absent  Left ankle clonus: absentKyphotic gait.  Able to ambulate 10 feet out of her wheelchair.       Imaging: (independent review and interpretation)  No radiology results for the last 30 days.    No imaging for review    ASSESSMENT and PLAN  Christine Steven is a 73 y.o. female with a significant comorbidity of hyperlipidemia, hypertension, B12 deficiency, class II obesity, stage IIIb chronic kidney disease, iron deficiency, COPD and former smoker. She presents with a new problem of persistent axial back pain with recent pain pump evaluation showing leakage of contrast into her  pocket. Physical exam findings of sequela of prior surgeries.  Well-healed left abdominal pocket.  No evidence of baclofen withdrawal.  Her imaging is pending.    Failed back syndrome  Pain pump at end-of-life  Christine and I discussed revision of her pain pump.  It is unclear from the provided documentation whether or not she needs a pump revision with a proximal catheter revision versus a pump revision with complete catheter revision.  Additionally we have no imaging of her spine to guide if distal catheter placement is needed.  We discussed that the risk of distal catheter revision intrinsically has a higher risk of CSF leak and therefore should be avoided if necessary.  She consents to revision of proximal pain pump and proximal catheter revision, with posterior revision catheter dye study.  -Obtain outside imaging from pain centers of [place]  -Baseline MRI of the lumbar and thoracic spine to rule out catheter tip granuloma  -Noncontrast CT of the thoracic and lumbar spine both as a baseline and to guide distal revision if necessary.  -PCP clearance    Obesity Counseling  We discussed Christine's current weight and the effect on her spine, including premature disc degeneration and increased anterior force on the lumbar spine resulting in worsening facet arthropathy.  Christine's Body mass index is 35.65 kg/m²..  We spent 2 minutes in weight management counseling including discussing current weight, current diet, and exercise patterns.  Additional we set goals for weight reduction.  Therefore above normal parameters. Recommendations include: educational material and exercise counseling.         Diagnoses and all orders for this visit:    1. Baclofen pump failure, initial encounter (Primary)  -     CT Thoracic Spine Without Contrast; Future  -     CT Lumbar Spine Without Contrast; Future  -     MRI Thoracic Spine Without Contrast; Future  -     MRI Lumbar Spine Without Contrast; Future  -     Case Request; Standing  -      CBC & Differential; Future  -     Comprehensive Metabolic Panel; Future  -     MRSA Screen Culture (Outpatient) - Swab, Nares; Future  -     Type & Screen; Future  -     ECG 12 Lead; Future  -     XR Chest 1 View; Future  -     vancomycin (VANCOCIN) 1,000 mg in sodium chloride 0.9 % 250 mL IVPB  -     Case Request    2. Failed back syndrome  -     CT Thoracic Spine Without Contrast; Future  -     CT Lumbar Spine Without Contrast; Future  -     MRI Thoracic Spine Without Contrast; Future  -     MRI Lumbar Spine Without Contrast; Future    3. Class 2 severe obesity due to excess calories with serious comorbidity and body mass index (BMI) of 35.0 to 35.9 in adult    4. Former smoker    Other orders  -     Inpatient Admission; Standing  -     Follow Anesthesia Guidelines / Protocol; Future  -     Follow Anesthesia Guidelines / Protocol; Standing  -     Verify NPO Status; Standing  -     Obtain Informed Consent; Standing  -     SCD (Sequential Compression Device) - Place on Patient in Pre-Op; Standing  -     Clip Operative Site; Standing  -     Have Patient Void Prior to Procedure; Standing  -     Verify / Perform Chlorhexidine Skin Prep; Standing  -     Obtain Informed Consent; Future  -     Provide NPO Instructions to Patient; Future  -     Chlorhexidine Skin Prep; Future  -     Provide Patient with Enhanced Recovery Booklet(s) or Handout; Future  -     Type & Screen; Standing        Return for POSTOPERATIVELY.    Thank you for this Consultation and the opportunity to participate in Christine's care.    Sincerely,  Cristopher Reza MD    I spent 34 minutes caring for Christine on this date of service. This time includes time spent by me in the following activities: preparing for the visit, reviewing tests, obtaining and/or reviewing a separately obtained history, performing a medically appropriate examination and/or evaluation, counseling and educating the patient/family/caregiver, ordering medications, tests, or  procedures, referring and communicating with other health care professionals, documenting information in the medical record, independently interpreting results and communicating that information with the patient/family/caregiver, and/or care coordination.     Medical Decision Making (2/3)  Problem Points (2,3,4 or more)  Established Problem, stable = 1x1  New Problem, additional workup = 4x1  Data Points (2,3,4 or more)  Ordered lab = 1.  Ordered Imaging (X-ray,CT, MRI) = 1x4.  Risk (Low, Mod, High)  Surgery: Minor with risk factors (Moderate)    E/M = MDM 3 out of 3   or   TIME  [place] 4 - [address]0330 = Mod (3, 3, Mod)   or   45-59 minutes

## 2023-08-25 PROBLEM — E11.40 TYPE 2 DIABETES MELLITUS WITH DIABETIC NEUROPATHY, WITHOUT LONG-TERM CURRENT USE OF INSULIN: Status: ACTIVE | Noted: 2023-08-25

## 2023-08-30 ENCOUNTER — TELEPHONE (OUTPATIENT)
Dept: NEUROSURGERY | Facility: CLINIC | Age: 73
End: 2023-08-30
Payer: MEDICARE

## 2023-08-30 NOTE — TELEPHONE ENCOUNTER
Patient son Colt is calling to let us know that patient is being scheduled for her MRI prior to her surgery on 9/19. She is claustrophobic and will need medication to take the day of her mri. Asks that we call it in to [facility] in [facility].     Please call when prescription completed.     Off note Colt also let me know that the MRI was scheduled for after the surgery, I am working with MRI to get this moved prior.

## 2023-09-05 ENCOUNTER — TELEPHONE (OUTPATIENT)
Dept: FAMILY MEDICINE CLINIC | Facility: CLINIC | Age: 73
End: 2023-09-05

## 2023-09-05 NOTE — TELEPHONE ENCOUNTER
Called and informed them they need to contact Dr Reza's office since he is the one who ordered that.   They will give his office a call.

## 2023-09-05 NOTE — TELEPHONE ENCOUNTER
Caller: Roland Steven  NOT ON  [facility]    Relationship: Emergency Contact    Best call back number: [phone]     What orders are you requesting (i.e. lab or imaging): MRI     In what timeframe would the patient need to come in: ASAP    Where will you receive your lab/imaging services: SOMEWHERE CLOSEST TO [facility] THAT HAS OPEN MRI    Additional notes: NEEDS OPEN MRI. HAS SURGERY COMING UP

## 2023-09-08 ENCOUNTER — HOSPITAL ENCOUNTER (OUTPATIENT)
Dept: GENERAL RADIOLOGY | Facility: HOSPITAL | Age: 73
Discharge: HOME | End: 2023-09-08
Payer: MEDICARE

## 2023-09-08 ENCOUNTER — PRE-ADMISSION TESTING (OUTPATIENT)
Dept: PREADMISSION TESTING | Facility: HOSPITAL | Age: 73
End: 2023-09-08
Payer: MEDICARE

## 2023-09-08 VITALS
WEIGHT: 155.42 LBS | HEIGHT: 57 IN | DIASTOLIC BLOOD PRESSURE: 81 MMHG | RESPIRATION RATE: 18 BRPM | BODY MASS INDEX: 33.53 KG/M2 | SYSTOLIC BLOOD PRESSURE: 141 MMHG | OXYGEN SATURATION: 96 % | HEART RATE: 105 BPM

## 2023-09-08 DIAGNOSIS — T85.610A BACLOFEN PUMP FAILURE, INITIAL ENCOUNTER: ICD-10-CM

## 2023-09-08 LAB
ALBUMIN SERPL-MCNC: 4 G/DL (ref 3.5–5.2)
ALBUMIN/GLOB SERPL: 1.6 G/DL
ALP SERPL-CCNC: 108 U/L (ref 39–117)
ALT SERPL W P-5'-P-CCNC: 10 U/L (ref 1–33)
ANION GAP SERPL CALCULATED.3IONS-SCNC: 11 MMOL/L (ref 5–15)
AST SERPL-CCNC: 16 U/L (ref 1–32)
BASOPHILS # BLD AUTO: 0.03 10*3/MM3 (ref 0–0.2)
BASOPHILS NFR BLD AUTO: 0.7 % (ref 0–1.5)
BILIRUB SERPL-MCNC: 0.4 MG/DL (ref 0–1.2)
BUN SERPL-MCNC: 13 MG/DL (ref 8–23)
BUN/CREAT SERPL: 10.9 (ref 7–25)
CALCIUM SPEC-SCNC: 8.8 MG/DL (ref 8.6–10.5)
CHLORIDE SERPL-SCNC: 100 MMOL/L (ref 98–107)
CO2 SERPL-SCNC: 29 MMOL/L (ref 22–29)
CREAT SERPL-MCNC: 1.19 MG/DL (ref 0.57–1)
DEPRECATED RDW RBC AUTO: 50.7 FL (ref 37–54)
EGFRCR SERPLBLD CKD-EPI 2021: 48.1 ML/MIN/1.73
EOSINOPHIL # BLD AUTO: 0.16 10*3/MM3 (ref 0–0.4)
EOSINOPHIL NFR BLD AUTO: 3.7 % (ref 0.3–6.2)
ERYTHROCYTE [DISTWIDTH] IN BLOOD BY AUTOMATED COUNT: 16 % (ref 12.3–15.4)
GLOBULIN UR ELPH-MCNC: 2.5 GM/DL
GLUCOSE SERPL-MCNC: 153 MG/DL (ref 65–99)
HCT VFR BLD AUTO: 34.1 % (ref 34–46.6)
HGB BLD-MCNC: 11.3 G/DL (ref 12–15.9)
IMM GRANULOCYTES # BLD AUTO: 0.02 10*3/MM3 (ref 0–0.05)
IMM GRANULOCYTES NFR BLD AUTO: 0.5 % (ref 0–0.5)
LYMPHOCYTES # BLD AUTO: 0.69 10*3/MM3 (ref 0.7–3.1)
LYMPHOCYTES NFR BLD AUTO: 16 % (ref 19.6–45.3)
MCH RBC QN AUTO: 29.1 PG (ref 26.6–33)
MCHC RBC AUTO-ENTMCNC: 33.1 G/DL (ref 31.5–35.7)
MCV RBC AUTO: 87.9 FL (ref 79–97)
MONOCYTES # BLD AUTO: 0.37 10*3/MM3 (ref 0.1–0.9)
MONOCYTES NFR BLD AUTO: 8.6 % (ref 5–12)
NEUTROPHILS NFR BLD AUTO: 3.05 10*3/MM3 (ref 1.7–7)
NEUTROPHILS NFR BLD AUTO: 70.5 % (ref 42.7–76)
NRBC BLD AUTO-RTO: 0 /100 WBC (ref 0–0.2)
PLATELET # BLD AUTO: 141 10*3/MM3 (ref 140–450)
PMV BLD AUTO: 9 FL (ref 6–12)
POTASSIUM SERPL-SCNC: 3.6 MMOL/L (ref 3.5–5.2)
PROT SERPL-MCNC: 6.5 G/DL (ref 6–8.5)
RBC # BLD AUTO: 3.88 10*6/MM3 (ref 3.77–5.28)
SODIUM SERPL-SCNC: 140 MMOL/L (ref 136–145)
WBC NRBC COR # BLD: 4.32 10*3/MM3 (ref 3.4–10.8)

## 2023-09-08 PROCEDURE — 87081 CULTURE SCREEN ONLY: CPT

## 2023-09-08 PROCEDURE — 80053 COMPREHEN METABOLIC PANEL: CPT

## 2023-09-08 PROCEDURE — 85025 COMPLETE CBC W/AUTO DIFF WBC: CPT

## 2023-09-08 PROCEDURE — 93005 ELECTROCARDIOGRAM TRACING: CPT

## 2023-09-08 PROCEDURE — 71045 X-RAY EXAM CHEST 1 VIEW: CPT

## 2023-09-08 PROCEDURE — 36415 COLL VENOUS BLD VENIPUNCTURE: CPT

## 2023-09-08 RX ORDER — POTASSIUM CHLORIDE 750 MG/1
1 TABLET, FILM COATED, EXTENDED RELEASE ORAL DAILY
COMMUNITY
Start: 2023-08-25

## 2023-09-08 RX ORDER — METHOCARBAMOL 750 MG/1
50000 TABLET ORAL
COMMUNITY
Start: 2023-08-25

## 2023-09-08 NOTE — DISCHARGE INSTRUCTIONS
Before you come to the hospital        Arrival time: AS DIRECTED BY OFFICE     YOU MAY TAKE THE FOLLOWING MEDICATION(S) THE MORNING OF SURGERY WITH A SIP OF WATER: pantoprazole  Hold lisinopril for 24 hours prior to surgery           ALL OTHER HOME MEDICATION CHECK WITH YOUR PHYSICIAN (especially if   you are taking diabetes medicines or blood thinners)    Do not take any Erectile Dysfunction medications (EX: CIALIS, VIAGRA) 24 hours prior to surgery.      If you were given and instructed to use a germ- killing soap, use as directed the night before surgery and again the morning of surgery or as directed by your surgeon. (Use one-half of the bottle with each shower.)   See attached information for How to Use Chlorhexidine for Bathing if applicable.            Eating and drinking restrictions prior to scheduled arrival time    2 Hours before arrival time STOP   Drinking Clear liquids (water, black coffee-NO CREAM,  apple juice-no pulp)      8 Hours before arrival time STOP   All food, full liquids, and dairy products    (It is extremely important that you follow these guidelines to prevent delay or cancelation of your procedure)     Clear Liquids  Water and flavored water                                                                      Clear Fruit juices, such as cranberry juice and apple juice.  Black coffee (NO cream of any kind, including powdered).  Plain tea  Clear bouillon or broth.  Flavored gelatin.  Soda.  Gatorade or Powerade.  Full liquid examples  Juices that have pulp.  Frozen ice pops that contain fruit pieces.  Coffee with creamer  Milk.  Yogurt.                MANAGING PAIN AFTER SURGERY    We know you are probably wondering what your pain will be like after surgery.  Following surgery it is unrealistic to expect you will not have pain.   Pain is how our bodies let us know that something is wrong or cautions us to be careful.  That said, our goal is to make your pain tolerable.    Methods we may  use to treat your pain include (oral or IV medications, PCAs, epidurals, nerve blocks, etc.)   While some procedures require IV pain medications for a short time after surgery, transitioning to pain medications by mouth allows for better management of pain.   Your nurse will encourage you to take oral pain medications whenever possible.  IV medications work almost immediately, but only last a short while.  Taking medications by mouth allows for a more constant level of medication in your blood stream for a longer period of time.      Once your pain is out of control it is harder to get back under control.  It is important you are aware when your next dose of pain medication is due.  If you are admitted, your nurse may write the time of your next dose on the white board in your room to help you remember.      We are interested in your pain and encourage you to inform us about aggravating factors during your visit.   Many times a simple repositioning every few hours can make a big difference.    If your physician says it is okay, do not let your pain prevent you from getting out of bed. Be sure to call your nurse for assistance prior to getting up so you do not fall.      Before surgery, please decide your tolerable pain goal.  These faces help describe the pain ratings we use on a 0-10 scale.   Be prepared to tell us your goal and whether or not you take pain or anxiety medications at home.          Preparing for Surgery  Preparing for surgery is an important part of your care. It can make things go more smoothly and help you avoid complications. The steps leading up to surgery may vary among hospitals. Follow all instructions given to you by your health care providers. Ask questions if you do not understand something. Talk about any concerns that you have.  Here are some questions to consider asking before your surgery:  If my surgery is not an emergency (is elective), when would be the best time to have the  surgery?  What arrangements do I need to make for work, home, or school?  What will my recovery be like? How long will it be before I can return to normal activities?  Will I need to prepare my home? Will I need to arrange care for me or my children?  Should I expect to have pain after surgery? What are my pain management options? Are there nonmedical options that I can try for pain?  Tell a health care provider about:  Any allergies you have.  All medicines you are taking, including vitamins, herbs, eye drops, creams, and over-the-counter medicines.  Any problems you or family members have had with anesthetic medicines.  Any blood disorders you have.  Any surgeries you have had.  Any medical conditions you have.  Whether you are pregnant or may be pregnant.  What are the risks?  The risks and complications of surgery depend on the specific procedure that you have. Discuss all the risks with your health care providers before your surgery. Ask about common surgical complications, which may include:  Infection.  Bleeding or a need for blood replacement (transfusion).  Allergic reactions to medicines.  Damage to surrounding nerves, tissues, or structures.  A blood clot.  Scarring.  Failure of the surgery to correct the problem.  Follow these instructions before the procedure:  Several days or weeks before your procedure  You may have a physical exam by your primary health care provider to make sure it is safe for you to have surgery.  You may have testing. This may include a chest X-ray, blood and urine tests, electrocardiogram (ECG), or other testing.  Ask your health care provider about:  Changing or stopping your regular medicines. This is especially important if you are taking diabetes medicines or blood thinners.  Taking medicines such as aspirin and ibuprofen. These medicines can thin your blood. Do not take these medicines unless your health care provider tells you to take them.  Taking over-the-counter  medicines, vitamins, herbs, and supplements.  Do not use any products that contain nicotine or tobacco, such as cigarettes and e-cigarettes. If you need help quitting, ask your health care provider.  Avoid alcohol.  Ask your health care provider if there are exercises you can do to prepare for surgery.  Eat a healthy diet.   Plan to have someone 18 years of age or older to take you home from the hospital. We will need to verify your ride on the morning of surgery if you are being discharged home on the same day. Tell your ride to be expecting a call from the hospital prior to your procedure.   Plan to have a responsible adult care for you for at least 24 hours after you leave the hospital or clinic. This is important.  The day before your procedure  You may be given antibiotic medicine to take by mouth to help prevent infection. Take it as told by your health care provider.  You may be asked to shower with a germ-killing soap.  Follow instructions from your health care provider about eating and drinking restrictions. This includes gum, mints and hard candy.  Pack comfortable clothes according to your procedure.   The day of your procedure  You may need to take another shower with a germ-killing soap before you leave home in the morning.  With a small sip of water, take only the medicines that you are told to take.  Remove all jewelry including rings.   Leave anything you consider valuable at home except hearing aids if needed.  You do not need to bring your home medications into the hospital.   Do not wear any makeup, nail polish, powder, deodorant, lotion, hair accessories, or anything on your skin or body except your clothes.  If you will be staying in the hospital, bring a case to hold your glasses, contacts, or dentures. You may also want to bring your robe and non-skid footwear.       (Do not use denture adhesives since you will be asked to remove them during  surgery).   If you wear oxygen at home, bring it  with you the day of surgery.  If instructed by your health care provider, bring your sleep apnea device with you on the day of your surgery (if this applies to you).  You may want to leave your suitcase and sleep apnea device in the car until after surgery.   Arrive at the hospital as scheduled.  Bring a friend or family member with you who can help to answer questions and be present while you meet with your health care provider.  At the hospital  When you arrive at the hospital:  Go to registration located at the main entrance of the hospital. You will be registered and given a beeper and a sticker sheet. Take the stickers to the Outpatient nurses desk and place in the black tray. This is to notify staff that you have arrived. Then return to the lobby to wait.   When your beeper lights up and vibrates proceed through the double doors, under the stairs, and a member of the Outpatient Surgery staff will escort you to your preoperative room.  You may have to wear compression sleeves. These help to prevent blood clots and reduce swelling in your legs.  An IV may be inserted into one of your veins.              In the operating room, you may be given one or more of the following:        A medicine to help you relax (sedative).        A medicine to numb the area (local anesthetic).        A medicine to make you fall asleep (general anesthetic).        A medicine that is injected into an area of your body to numb everything below the                      injection site (regional anesthetic).  You may be given an antibiotic through your IV to help prevent infection.  Your surgical site will be marked or identified.    Contact a health care provider if you:  Develop a fever of more than 100.4°F (38°C) or other feelings of illness during the 48 hours before your surgery.  Have symptoms that get worse.  Have questions or concerns about your surgery.  Summary  Preparing for surgery can make the procedure go more smoothly and  lower your risk of complications.  Before surgery, make a list of questions and concerns to discuss with your surgeon. Ask about the risks and possible complications.  In the days or weeks before your surgery, follow all instructions from your health care provider. You may need to stop smoking, avoid alcohol, follow eating restrictions, and change or stop your regular medicines.  Contact your surgeon if you develop a fever or other signs of illness during the few days before your surgery.  This information is not intended to replace advice given to you by your health care provider. Make sure you discuss any questions you have with your health care provider.  Document Revised: 12/21/2018 Document Reviewed: 10/23/2018  Elsevier Patient Education © 2021 [employer]

## 2023-09-09 LAB — MRSA SPEC QL CULT: NORMAL

## 2023-09-10 LAB
QT INTERVAL: 364 MS
QTC INTERVAL: 422 MS

## 2023-09-11 ENCOUNTER — OFFICE VISIT (OUTPATIENT)
Dept: FAMILY MEDICINE CLINIC | Facility: CLINIC | Age: 73
End: 2023-09-11
Payer: MEDICARE

## 2023-09-11 VITALS
RESPIRATION RATE: 18 BRPM | TEMPERATURE: 98.7 F | DIASTOLIC BLOOD PRESSURE: 69 MMHG | HEIGHT: 57 IN | SYSTOLIC BLOOD PRESSURE: 106 MMHG | WEIGHT: 157 LBS | BODY MASS INDEX: 33.87 KG/M2 | HEART RATE: 103 BPM

## 2023-09-11 DIAGNOSIS — G47.00 INSOMNIA, UNSPECIFIED TYPE: ICD-10-CM

## 2023-09-11 DIAGNOSIS — Z12.11 SCREENING FOR MALIGNANT NEOPLASM OF COLON: ICD-10-CM

## 2023-09-11 DIAGNOSIS — E66.09 CLASS 1 OBESITY DUE TO EXCESS CALORIES WITH SERIOUS COMORBIDITY IN ADULT, UNSPECIFIED BMI: Chronic | ICD-10-CM

## 2023-09-11 DIAGNOSIS — Z12.31 ENCOUNTER FOR SCREENING MAMMOGRAM FOR MALIGNANT NEOPLASM OF BREAST: ICD-10-CM

## 2023-09-11 DIAGNOSIS — Z11.59 ENCOUNTER FOR HEPATITIS C SCREENING TEST FOR LOW RISK PATIENT: ICD-10-CM

## 2023-09-11 DIAGNOSIS — N18.32 STAGE 3B CHRONIC KIDNEY DISEASE: Chronic | ICD-10-CM

## 2023-09-11 DIAGNOSIS — E03.9 ACQUIRED HYPOTHYROIDISM: ICD-10-CM

## 2023-09-11 DIAGNOSIS — E11.40 TYPE 2 DIABETES MELLITUS WITH DIABETIC NEUROPATHY, WITHOUT LONG-TERM CURRENT USE OF INSULIN: Primary | ICD-10-CM

## 2023-09-11 DIAGNOSIS — E78.49 OTHER HYPERLIPIDEMIA: Chronic | ICD-10-CM

## 2023-09-11 DIAGNOSIS — Z78.0 POSTMENOPAUSAL: ICD-10-CM

## 2023-09-11 DIAGNOSIS — Z01.818 PREOPERATIVE CLEARANCE: Primary | ICD-10-CM

## 2023-09-11 DIAGNOSIS — R60.9 PERIPHERAL EDEMA: ICD-10-CM

## 2023-09-11 DIAGNOSIS — I15.8 OTHER SECONDARY HYPERTENSION: Chronic | ICD-10-CM

## 2023-09-11 DIAGNOSIS — M67.432 GANGLION OF LEFT WRIST: ICD-10-CM

## 2023-09-11 DIAGNOSIS — F40.240 CLAUSTROPHOBIA: Primary | ICD-10-CM

## 2023-09-11 PROBLEM — I63.9 CEREBROVASCULAR ACCIDENT: Status: ACTIVE | Noted: 2021-06-28

## 2023-09-11 PROBLEM — J96.12 CHRONIC HYPERCAPNIC RESPIRATORY FAILURE: Status: ACTIVE | Noted: 2023-01-24

## 2023-09-11 PROBLEM — N39.0 URINARY TRACT INFECTIOUS DISEASE: Status: ACTIVE | Noted: 2020-08-12

## 2023-09-11 PROBLEM — R31.9 BLOOD IN URINE: Status: ACTIVE | Noted: 2020-08-12

## 2023-09-11 PROBLEM — I50.20 SYSTOLIC HEART FAILURE: Status: ACTIVE | Noted: 2021-06-28

## 2023-09-11 PROBLEM — I50.9 CONGESTIVE HEART FAILURE: Status: ACTIVE | Noted: 2020-01-27

## 2023-09-11 PROBLEM — J96.11 CHRONIC HYPOXEMIC RESPIRATORY FAILURE: Status: ACTIVE | Noted: 2023-01-24

## 2023-09-11 PROBLEM — M79.89 SWELLING OF LOWER LEG: Status: ACTIVE | Noted: 2023-07-10

## 2023-09-11 PROBLEM — R00.1 SINUS BRADYCARDIA: Status: ACTIVE | Noted: 2021-06-28

## 2023-09-11 PROBLEM — I21.9 MYOCARDIAL INFARCTION: Status: ACTIVE | Noted: 2020-01-27

## 2023-09-11 PROCEDURE — G0439 PPPS, SUBSEQ VISIT: HCPCS | Performed by: NURSE PRACTITIONER

## 2023-09-11 PROCEDURE — 1160F RVW MEDS BY RX/DR IN RCRD: CPT | Performed by: NURSE PRACTITIONER

## 2023-09-11 PROCEDURE — 1159F MED LIST DOCD IN RCRD: CPT | Performed by: NURSE PRACTITIONER

## 2023-09-11 PROCEDURE — 3044F HG A1C LEVEL LT 7.0%: CPT | Performed by: NURSE PRACTITIONER

## 2023-09-11 PROCEDURE — 99213 OFFICE O/P EST LOW 20 MIN: CPT | Performed by: NURSE PRACTITIONER

## 2023-09-11 RX ORDER — ESCITALOPRAM OXALATE 10 MG/1
TABLET ORAL DAILY
COMMUNITY

## 2023-09-11 RX ORDER — GABAPENTIN 300 MG/1
CAPSULE ORAL
COMMUNITY
End: 2023-09-11

## 2023-09-11 RX ORDER — METOPROLOL SUCCINATE 25 MG/1
25 TABLET, EXTENDED RELEASE ORAL DAILY
COMMUNITY

## 2023-09-11 RX ORDER — MONTELUKAST SODIUM 10 MG/1
TABLET ORAL
COMMUNITY
End: 2023-09-11

## 2023-09-11 RX ORDER — ALENDRONATE SODIUM 70 MG/1
70 TABLET ORAL
Status: ON HOLD | COMMUNITY
End: 2023-09-19

## 2023-09-11 RX ORDER — ALPRAZOLAM 1 MG/1
1 TABLET ORAL
Qty: 2 TABLET | Refills: 0 | Status: SHIPPED | OUTPATIENT
Start: 2023-09-11

## 2023-09-11 RX ORDER — FUROSEMIDE 40 MG/1
40 TABLET ORAL DAILY
COMMUNITY

## 2023-09-11 RX ORDER — TRAZODONE HYDROCHLORIDE 50 MG/1
50 TABLET ORAL NIGHTLY
Qty: 90 TABLET | Refills: 0 | Status: SHIPPED | OUTPATIENT
Start: 2023-09-11 | End: 2023-12-10

## 2023-09-11 RX ORDER — TIZANIDINE 4 MG/1
4 TABLET ORAL EVERY 8 HOURS PRN
COMMUNITY

## 2023-09-11 RX ORDER — TRAMADOL HYDROCHLORIDE 50 MG/1
TABLET ORAL
COMMUNITY
End: 2023-09-11

## 2023-09-11 RX ORDER — METHENAMINE HIPPURATE 1000 MG/1
TABLET ORAL
COMMUNITY
End: 2023-09-11

## 2023-09-11 RX ORDER — TRIAMCINOLONE ACETONIDE 1 MG/G
CREAM TOPICAL
COMMUNITY

## 2023-09-11 RX ORDER — SENNOSIDES 8.6 MG/1
TABLET ORAL
COMMUNITY
Start: 2023-06-21

## 2023-09-11 RX ORDER — TRAZODONE HYDROCHLORIDE 50 MG/1
TABLET ORAL
COMMUNITY
End: 2023-09-11 | Stop reason: SDUPTHER

## 2023-09-11 RX ORDER — ZOLPIDEM TARTRATE 5 MG/1
TABLET ORAL
COMMUNITY
End: 2023-09-11

## 2023-09-11 RX ORDER — TRIMETHOPRIM 100 MG/1
TABLET ORAL
COMMUNITY

## 2023-09-11 NOTE — PROGRESS NOTES
Subjective     Chief Complaint   Patient presents with    Pre-op Exam       History of Present Illness    Patient presents today for surgical clearance for baclofen pump replacement.  She is scheduled to have surgery on 9/19/23 with Dr. Reza at [facility].  She has already had lab drawn, EKG, and chest x-ray.      Patient's PMR from outside medical facility reviewed and noted.    Review of Systems   Respiratory:  Positive for shortness of breath.    Musculoskeletal:  Positive for back pain, gait problem, myalgias, neck pain and neck stiffness.      Otherwise complete ROS reviewed and negative except as mentioned in the HPI.    Past Medical History:   Past Medical History:   Diagnosis Date    Cancer     throat cancer, breast    Hypertension     Lung nodule     Neck fracture     Thyroid nodule      Past Surgical History:  Past Surgical History:   Procedure Laterality Date    APPENDECTOMY      BACK SURGERY N/A     BRAIN SURGERY      COLONOSCOPY      HYSTERECTOMY      TONSILLECTOMY       Social History:  reports that she quit smoking about 10 years ago. Her smoking use included cigarettes. She has a 20.00 pack-year smoking history. She has never used smokeless tobacco. She reports that she does not drink alcohol and does not use drugs.    Family History: family history includes No Known Problems in her father and mother.      Allergies:  Allergies   Allergen Reactions    Bee Venom Shortness Of Breath and Swelling    Ciprofloxacin Shortness Of Breath    Nitroglycerin Anaphylaxis    Penicillins Shortness Of Breath    Eggs Or Egg-Derived Products Nausea Only, Unknown - Low Severity and GI Intolerance    Codeine Unknown - High Severity    Contrast Dye (Echo Or Unknown Ct/Mr) Unknown - Low Severity    Iodinated Contrast Media Unknown - Low Severity    Latex, Natural Rubber Other (See Comments)    Methenamine GI Intolerance    Nylon Other (See Comments)    Oxycodone Other (See Comments) and Unknown - Low Severity     Oxycodone-Acetaminophen Unknown - High Severity    Sulfamethoxazole-Trimethoprim Other (See Comments)    Latex Rash and Other (See Comments)     Medications:  Prior to Admission medications    Medication Sig Start Date End Date Taking? Authorizing Provider   albuterol (PROVENTIL) (2.5 MG/3ML) 0.083% nebulizer solution Take 2.5 mg by nebulization Every 4 (Four) Hours As Needed for Wheezing. 3/30/23   Fabiola Morris APRN   albuterol sulfate  (90 Base) MCG/ACT inhaler Inhale 2 puffs Every 4 (Four) Hours As Needed for Wheezing. 2/28/23   Fabiola Morrsi APRN   alendronate (FOSAMAX) 70 MG tablet     Jacquie White MD   allopurinol (ZYLOPRIM) 100 MG tablet Take 1 tablet by mouth Daily. 12/21/22   Jacquie White MD   amLODIPine (NORVASC) 5 MG tablet amlodipine 5 mg tablet    Jacquie White MD   Aspirin Low Dose 81 MG EC tablet Take 1 tablet by mouth Daily. 12/8/22   Jacquie White MD   atorvastatin (LIPITOR) 40 MG tablet Take 1 tablet by mouth every night at bedtime. 12/22/22   Jacquie White MD   baclofen (LIORESAL) 10 MG/20ML injection 237.53 mcg by Intrathecal route 1 (One) Time. Pain pump    Jacquie White MD   betamethasone dipropionate 0.05 % cream Apply 1 application  topically to the appropriate area as directed 2 (Two) Times a Day.    Jacquie White MD   bisacodyl (DULCOLAX) 10 MG suppository Insert 1 suppository into the rectum Daily. 5/11/23   Fabiola Morris APRN   cloNIDine (DURACLON) 100 MCG/ML injection 156.09 mcg by Epidural route 1 (One) Time. Pain pump    Jacquie White MD   cyclobenzaprine (FLEXERIL) 10 MG tablet Take 1 tablet by mouth 3 (Three) Times a Day. 12/22/22   Jacquie White MD   D3-50 1.25 MG (68060 UT) capsule Take 1 capsule by mouth Every 14 (Fourteen) Days. 8/25/23   Jacquie White MD   escitalopram (LEXAPRO) 10 MG tablet Take  by mouth Daily.    Jacquie White MD   ezetimibe  (ZETIA) 10 MG tablet Take 1 tablet by mouth every night at bedtime. 12/21/22   Jacquie White MD   fluticasone (FLONASE) 50 MCG/ACT nasal spray 2 sprays into the nostril(s) as directed by provider Daily. 5/11/23   Fabiola Morris APRN   folic acid (FOLVITE) 1 MG tablet Take 1 tablet by mouth Daily. 4/16/23   Jacquie White MD   furosemide (LASIX) 40 MG tablet Take 1 tablet by mouth Daily.    Jacquie White MD   gabapentin (NEURONTIN) 300 MG capsule TAKE ONE CAPSULE BY MOUTH AT BEDTIME FOR PERIPHERAL NEUROPATHY    Jacquie White MD   Ericka-roxanna 8.6 MG tablet Take 1 tablet (8.6 mg total) by mouth 2 (two) times daily. 6/21/23   Jacquie White MD   HYDROmorphone (DILAUDID) 2 MG/ML injection Infuse 1.3573 mg into a venous catheter.    Jacquie White MD   ipratropium-albuterol (DUO-NEB) 0.5-2.5 mg/3 ml nebulizer Take 3 mL by nebulization Every 4 (Four) Hours As Needed for Wheezing or Shortness of Air. 5/1/23   Fabiola Morris APRN   levothyroxine (SYNTHROID, LEVOTHROID) 25 MCG tablet TAKE 1 TABLET BY MOUTH BEFORE BREAKFAST. 12/21/22   Jacquie White MD   linaclotide (LINZESS) 145 MCG capsule capsule Take 1 capsule every day by oral route.    Jacquie White MD   lisinopril (PRINIVIL,ZESTRIL) 10 MG tablet lisinopril 10 mg tablet   TAKE ONE TABLET BY MOUTH ONE TIME DAILY    Jacquie White MD   methenamine (HIPREX) 1 g tablet     Jacquie White MD   metoprolol succinate XL (TOPROL-XL) 25 MG 24 hr tablet     Jacquie White MD   montelukast (SINGULAIR) 10 MG tablet TAKE ONE TABLET BY MOUTH ONCE DAILY FOR ALLERGIES AND ASTHMA    Jacquie White MD   Morphine (MSIR) 15 MG tablet     Jacquie White MD   nystatin (MYCOSTATIN) 498540 UNIT/GM powder APPLY TO AFFECTED AREA EVERY DAY AND AS NEEDED 12/14/22   Jacquie White MD   pantoprazole (PROTONIX) 20 MG EC tablet Take 1 tablet by mouth Every Morning Before Breakfast.  "12/21/22   Jacquie White MD   potassium chloride 10 MEQ CR tablet Take 1 tablet by mouth Daily. 8/25/23   Jacquie White MD   predniSONE (DELTASONE) 20 MG tablet Take 1 tablet by mouth Daily.    Jacquie White MD   sennosides-docusate (senna-docusate sodium) 8.6-50 MG per tablet Take 1 tablet by mouth 2 (Two) Times a Day As Needed for Constipation. 5/11/23   Fabiola Morris APRN   sertraline (ZOLOFT) 50 MG tablet TAKE ONE TABLET BY MOUTH EVERYDAY    Jacquie White MD   tiZANidine (ZANAFLEX) 4 MG tablet     Jacquie White MD   traMADol (ULTRAM) 50 MG tablet     Jacquie White MD   traZODone (DESYREL) 50 MG tablet TAKE 1 TABLET BY MOUTH AT BEDTIME FOR INSOMNIA    Jacquie White MD   triamcinolone (KENALOG) 0.1 % cream APPLY TO AFFECTED AREA TWICE A DAY FOR DERMATITIS    Jacquie White MD   trimethoprim (TRIMPEX) 100 MG tablet Take 1 tablet (100 mg total) by mouth daily.    Jacquie White MD   zolpidem (AMBIEN) 5 MG tablet TK 1 T PO QHS    Jacquie White MD         Objective     Vital Signs: /69 (BP Location: Right arm, Patient Position: Sitting, Cuff Size: Adult)   Pulse 103   Temp 98.7 °F (37.1 °C) (Infrared)   Ht 144.8 cm (57.01\")   Wt 71.2 kg (156 lb 15.5 oz)   BMI 33.96 kg/m²   Vitals:    09/11/23 1431   BP: 106/69   Pulse: 103   Temp: 98.7 °F (37.1 °C)     Physical Exam  Vitals and nursing note reviewed.   Constitutional:       Appearance: Normal appearance.   HENT:      Head: Normocephalic.      Nose: Nose normal.      Mouth/Throat:      Mouth: Mucous membranes are moist.   Eyes:      Conjunctiva/sclera: Conjunctivae normal.   Cardiovascular:      Rate and Rhythm: Normal rate and regular rhythm.      Pulses: Normal pulses.      Heart sounds: Normal heart sounds.   Pulmonary:      Effort: Pulmonary effort is normal.      Breath sounds: Normal breath sounds.   Musculoskeletal:         General: Normal range of motion.      " Cervical back: Deformity present. Decreased range of motion.      Thoracic back: Decreased range of motion.      Lumbar back: Decreased range of motion.   Skin:     General: Skin is warm and dry.   Neurological:      General: No focal deficit present.      Mental Status: She is alert and oriented to person, place, and time.      GCS: GCS eye subscore is 4. GCS verbal subscore is 5. GCS motor subscore is 6.      Motor: Weakness present.      Gait: Gait abnormal (shuffling and wide gait).   Psychiatric:         Mood and Affect: Mood normal.         Behavior: Behavior normal.     Advance Care Planning   ACP discussion was held with the patient during this visit. Patient has an advance directive (not in EMR), copy requested.         Results Reviewed:  Glucose   Date Value Ref Range Status   09/11/2023 116 (H) 70 - 99 mg/dL Final   09/08/2023 153 (H) 65 - 99 mg/dL Final     BUN   Date Value Ref Range Status   09/11/2023 11 8 - 27 mg/dL Final   09/08/2023 13 8 - 23 mg/dL Final     Creatinine   Date Value Ref Range Status   09/11/2023 1.23 (H) 0.57 - 1.00 mg/dL Final   09/08/2023 1.19 (H) 0.57 - 1.00 mg/dL Final     Sodium   Date Value Ref Range Status   09/11/2023 139 134 - 144 mmol/L Final   09/08/2023 140 136 - 145 mmol/L Final     Potassium   Date Value Ref Range Status   09/11/2023 3.8 3.5 - 5.2 mmol/L Final   09/08/2023 3.6 3.5 - 5.2 mmol/L Final     Chloride   Date Value Ref Range Status   09/11/2023 98 96 - 106 mmol/L Final   09/08/2023 100 98 - 107 mmol/L Final     CO2   Date Value Ref Range Status   09/08/2023 29.0 22.0 - 29.0 mmol/L Final     Total CO2   Date Value Ref Range Status   09/11/2023 25 20 - 29 mmol/L Final     Calcium   Date Value Ref Range Status   09/11/2023 8.5 (L) 8.7 - 10.3 mg/dL Final   09/08/2023 8.8 8.6 - 10.5 mg/dL Final     ALT (SGPT)   Date Value Ref Range Status   09/11/2023 15 0 - 32 IU/L Final   09/08/2023 10 1 - 33 U/L Final     AST (SGOT)   Date Value Ref Range Status   09/11/2023 23  0 - 40 IU/L Final   09/08/2023 16 1 - 32 U/L Final     WBC   Date Value Ref Range Status   09/11/2023 5.2 3.4 - 10.8 x10E3/uL Final     Hematocrit   Date Value Ref Range Status   09/11/2023 34.9 34.0 - 46.6 % Final   09/08/2023 34.1 34.0 - 46.6 % Final     Platelets   Date Value Ref Range Status   09/11/2023 176 150 - 450 x10E3/uL Final   09/08/2023 141 140 - 450 10*3/mm3 Final     Triglycerides   Date Value Ref Range Status   09/11/2023 137 0 - 149 mg/dL Final     HDL Cholesterol   Date Value Ref Range Status   09/11/2023 41 >39 mg/dL Final     LDL Chol Calc (NIH)   Date Value Ref Range Status   09/11/2023 40 0 - 99 mg/dL Final     Hemoglobin A1C   Date Value Ref Range Status   09/11/2023 5.8 (H) 4.8 - 5.6 % Final     Comment:              Prediabetes: 5.7 - 6.4           Diabetes: >6.4           Glycemic control for adults with diabetes: <7.0           Assessment / Plan     Assessment/Plan     Diagnoses and all orders for this visit:    1. Preoperative clearance (Primary)  -     Cancel: XR Chest PA & Lateral; Future  -     Cancel: ECG 12 Lead  -     Cancel: CBC & Differential  -     Cancel: Comprehensive Metabolic Panel    2. Other secondary hypertension  -     Cancel: CBC & Differential  -     Cancel: Comprehensive Metabolic Panel    Assessment & Plan:  Preoperative clearance.  Patient is cleared from primary care stand point to proceed with surgery.  Risks and benefits of surgery discussed with patient.  Recent labs, chest x-ray and EKG reviewed.  NPO after midnight.  Hold aspirin as instructed by neurosurgery  Secondary hypertension.  Well controlled. Continue with current medications.       An After Visit Summary was printed and given to the patient at discharge.  Return in about 4 weeks (around 10/9/2023), or if symptoms worsen or fail to improve, for post op.    I have discussed the patient results/orders and plan/recommendation with them at today's visit.      Fabiola Morris, NISHANT   09/11/2023

## 2023-09-11 NOTE — TELEPHONE ENCOUNTER
Patients grandson is calling with concerns about upcoming scans. States patient would rather an open machine. I explained if we went that route, her surgery would have to be rescheduled. So we are going to try some xanax and see if she can get through the scans we have scheduled here on 9/17. He advised the rx needs to be sent to [facility] in [place].

## 2023-09-11 NOTE — PROGRESS NOTES
The ABCs of the Annual Wellness Visit  Subsequent Medicare Wellness Visit    Subjective    Christine Steven is a 73 y.o. female who presents for a Subsequent Medicare Wellness Visit.    The following portions of the patient's history were reviewed and   updated as appropriate: allergies, current medications, past family history, past medical history, past social history, past surgical history, and problem list.    Compared to one year ago, the patient feels her physical   health is worse.    Compared to one year ago, the patient feels her mental   health is the same.    Recent Hospitalizations:  She was admitted within the past 365 days at [facility] in [place], [place].       Current Medical Providers:  Patient Care Team:  Fabiola Morris APRN as PCP - General (Family Medicine)    Outpatient Medications Prior to Visit   Medication Sig Dispense Refill    albuterol (PROVENTIL) (2.5 MG/3ML) 0.083% nebulizer solution Take 2.5 mg by nebulization Every 4 (Four) Hours As Needed for Wheezing. 3 mL 12    albuterol sulfate  (90 Base) MCG/ACT inhaler Inhale 2 puffs Every 4 (Four) Hours As Needed for Wheezing. 18 g 11    alendronate (FOSAMAX) 70 MG tablet       allopurinol (ZYLOPRIM) 100 MG tablet Take 1 tablet by mouth Daily.      amLODIPine (NORVASC) 5 MG tablet amlodipine 5 mg tablet      Aspirin Low Dose 81 MG EC tablet Take 1 tablet by mouth Daily.      atorvastatin (LIPITOR) 40 MG tablet Take 1 tablet by mouth every night at bedtime.      baclofen (LIORESAL) 10 MG/20ML injection 237.53 mcg by Intrathecal route 1 (One) Time. Pain pump      betamethasone dipropionate 0.05 % cream Apply 1 application  topically to the appropriate area as directed 2 (Two) Times a Day.      bisacodyl (DULCOLAX) 10 MG suppository Insert 1 suppository into the rectum Daily. 10 each 2    cyclobenzaprine (FLEXERIL) 10 MG tablet Take 1 tablet by mouth 3 (Three) Times a Day.      D3-50 1.25 MG (75987 UT) capsule Take 1 capsule by  mouth Every 14 (Fourteen) Days.      escitalopram (LEXAPRO) 10 MG tablet Take  by mouth Daily.      ezetimibe (ZETIA) 10 MG tablet Take 1 tablet by mouth every night at bedtime.      fluticasone (FLONASE) 50 MCG/ACT nasal spray 2 sprays into the nostril(s) as directed by provider Daily. 11.1 mL 2    folic acid (FOLVITE) 1 MG tablet Take 1 tablet by mouth Daily.      furosemide (LASIX) 40 MG tablet Take 1 tablet by mouth Daily.      Ericka-roxanna 8.6 MG tablet Take 1 tablet (8.6 mg total) by mouth 2 (two) times daily.      HYDROmorphone (DILAUDID) 2 MG/ML injection Infuse 1.3573 mg into a venous catheter.      ipratropium-albuterol (DUO-NEB) 0.5-2.5 mg/3 ml nebulizer Take 3 mL by nebulization Every 4 (Four) Hours As Needed for Wheezing or Shortness of Air. 1080 mL 1    levothyroxine (SYNTHROID, LEVOTHROID) 25 MCG tablet TAKE 1 TABLET BY MOUTH BEFORE BREAKFAST.      linaclotide (LINZESS) 145 MCG capsule capsule Take 1 capsule every day by oral route.      lisinopril (PRINIVIL,ZESTRIL) 10 MG tablet lisinopril 10 mg tablet   TAKE ONE TABLET BY MOUTH ONE TIME DAILY      metoprolol succinate XL (TOPROL-XL) 25 MG 24 hr tablet       nystatin (MYCOSTATIN) 589957 UNIT/GM powder APPLY TO AFFECTED AREA EVERY DAY AND AS NEEDED      pantoprazole (PROTONIX) 20 MG EC tablet Take 1 tablet by mouth Every Morning Before Breakfast.      potassium chloride 10 MEQ CR tablet Take 1 tablet by mouth Daily.      sennosides-docusate (senna-docusate sodium) 8.6-50 MG per tablet Take 1 tablet by mouth 2 (Two) Times a Day As Needed for Constipation. 30 tablet 2    tiZANidine (ZANAFLEX) 4 MG tablet       triamcinolone (KENALOG) 0.1 % cream APPLY TO AFFECTED AREA TWICE A DAY FOR DERMATITIS      trimethoprim (TRIMPEX) 100 MG tablet Take 1 tablet (100 mg total) by mouth daily.      cloNIDine (DURACLON) 100 MCG/ML injection 156.09 mcg by Epidural route 1 (One) Time. Pain pump      gabapentin (NEURONTIN) 300 MG capsule TAKE ONE CAPSULE BY MOUTH AT BEDTIME  FOR PERIPHERAL NEUROPATHY      methenamine (HIPREX) 1 g tablet       montelukast (SINGULAIR) 10 MG tablet TAKE ONE TABLET BY MOUTH ONCE DAILY FOR ALLERGIES AND ASTHMA      Morphine (MSIR) 15 MG tablet       predniSONE (DELTASONE) 20 MG tablet Take 1 tablet by mouth Daily.      sertraline (ZOLOFT) 50 MG tablet TAKE ONE TABLET BY MOUTH EVERYDAY      traMADol (ULTRAM) 50 MG tablet       traZODone (DESYREL) 50 MG tablet TAKE 1 TABLET BY MOUTH AT BEDTIME FOR INSOMNIA      zolpidem (AMBIEN) 5 MG tablet TK 1 T PO QHS       No facility-administered medications prior to visit.       Opioid medication/s are on active medication list.  and I have evaluated her active treatment plan and pain score trends (see table).  There were no vitals filed for this visit.  I have reviewed the chart for potential of high risk medication and harmful drug interactions in the elderly.          Aspirin is on active medication list. Aspirin use is indicated based on review of current medical condition/s. Pros and cons of this therapy have been discussed today. Benefits of this medication outweigh potential harm.  Patient has been encouraged to continue taking this medication.  .      Patient Active Problem List   Diagnosis    Stage 3b chronic kidney disease    Acquired hypothyroidism    Other hyperlipidemia    Iron deficiency anemia    Other secondary hypertension    Vitamin B12 deficiency    Folate deficiency    COPD (chronic obstructive pulmonary disease)    Constipation    Class 2 severe obesity due to excess calories with serious comorbidity and body mass index (BMI) of 35.0 to 35.9 in adult    Former smoker    Failed back syndrome    Baclofen pump failure    Blood in urine    Cerebrovascular accident    Chronic hypercapnic respiratory failure    Chronic hypoxemic respiratory failure    Congestive heart failure    Systolic heart failure    Myocardial infarction    Sinus bradycardia    Swelling of lower leg    Urinary tract infectious disease  "   Peripheral polyneuropathy    Prediabetes    Peripheral edema    Ganglion of left wrist    Insomnia     Advance Care Planning   Advance Care Planning     Advance Directive is not on file.  ACP discussion was held with the patient during this visit. Patient has an advance directive (not in EMR), copy requested.     Objective    Vitals:    23 1430   BP: 106/69   BP Location: Right arm   Patient Position: Sitting   Cuff Size: Adult   Pulse: 103   Resp: 18   Temp: 98.7 °F (37.1 °C)   TempSrc: Infrared   Weight: 71.2 kg (157 lb)   Height: 144.8 cm (57\")     Estimated body mass index is 33.97 kg/m² as calculated from the following:    Height as of this encounter: 144.8 cm (57\").    Weight as of this encounter: 71.2 kg (157 lb).           Does the patient have evidence of cognitive impairment? No    Lab Results   Component Value Date    CHLPL 105 2023    TRIG 137 2023    HDL 41 2023    LDL 40 2023    VLDL 24 2023    HGBA1C 5.8 (H) 2023        HEALTH RISK ASSESSMENT    Smoking Status:  Social History     Tobacco Use   Smoking Status Former    Packs/day: 0.50    Years: 40.00    Pack years: 20.00    Types: Cigarettes    Quit date: 2013    Years since quitting: 10.7   Smokeless Tobacco Never     Alcohol Consumption:  Social History     Substance and Sexual Activity   Alcohol Use Never     Fall Risk Screen:    DANNI Fall Risk Assessment was completed, and patient is at HIGH risk for falls. Assessment completed on:2023    Depression Screenin/11/2023     2:34 PM   PHQ-2/PHQ-9 Depression Screening   Little Interest or Pleasure in Doing Things 0-->not at all   Feeling Down, Depressed or Hopeless 0-->not at all   PHQ-9: Brief Depression Severity Measure Score 0       Health Habits and Functional and Cognitive Screenin/11/2023     2:32 PM   Functional & Cognitive Status   Do you have difficulty preparing food and eating? No   Do you have difficulty bathing " yourself, getting dressed or grooming yourself? No   Do you have difficulty using the toilet? No   Do you have difficulty moving around from place to place? Yes   Do you have trouble with steps or getting out of a bed or a chair? Yes   Current Diet Well Balanced Diet   Dental Exam Not up to date   Eye Exam Not up to date   Exercise (times per week) 0 times per week   Current Exercises Include No Regular Exercise   Do you need help using the phone?  No   Are you deaf or do you have serious difficulty hearing?  No   Do you need help to go to places out of walking distance? Yes   Do you need help shopping? Yes   Do you need help preparing meals?  Yes   Do you need help with housework?  Yes   Do you need help with laundry? Yes   Do you need help taking your medications? Yes   Do you need help managing money? Yes   Do you ever drive or ride in a car without wearing a seat belt? No   Have you felt unusual stress, anger or loneliness in the last month? Yes   Who do you live with? Child   If you need help, do you have trouble finding someone available to you? No   Have you been bothered in the last four weeks by sexual problems? No   Do you have difficulty concentrating, remembering or making decisions? Yes       Age-appropriate Screening Schedule:  Refer to the list below for future screening recommendations based on patient's age, sex and/or medical conditions. Orders for these recommended tests are listed in the plan section. The patient has been provided with a written plan.    Health Maintenance   Topic Date Due    MAMMOGRAM  Never done    DXA SCAN  Never done    COLORECTAL CANCER SCREENING  Never done    TDAP/TD VACCINES (1 - Tdap) Never done    ZOSTER VACCINE (1 of 2) Never done    LUNG CANCER SCREENING  Never done    Pneumococcal Vaccine 65+ (2 - PPSV23 or PCV20) 12/07/2019    COVID-19 Vaccine (4 - Moderna series) 12/25/2021    INFLUENZA VACCINE  10/01/2023    BMI FOLLOWUP  08/23/2024    ANNUAL WELLNESS VISIT   09/11/2024    LIPID PANEL  09/11/2024    HEPATITIS C SCREENING  Completed                  CMS Preventative Services Quick Reference  Risk Factors Identified During Encounter  Chronic Pain:  Patient scheduled for baclofen pump replacement later this month  Fall Risk-High or Moderate: Discussed Fall Prevention in the home  Immunizations Discussed/Encouraged: Td, Tdap, Prevnar 20 (Pneumococcal 20-valent conjugate), Shingrix, and COVID19  CT chest low dose discussed/encouraged  The above risks/problems have been discussed with the patient.  Pertinent information has been shared with the patient in the After Visit Summary.  An After Visit Summary and PPPS were made available to the patient.    Follow Up:   Next Medicare Wellness visit to be scheduled in 1 year.       Additional E&M Note during same encounter follows:  Patient has multiple medical problems which are significant and separately identifiable that require additional work above and beyond the Medicare Wellness Visit.      Chief Complaint  Medicare Wellness-subsequent    Subjective        HPI  Christine Steven is also being seen today for left wrist pain.  Cyst on left wrist for 20 years.  Getting larger and more painful recently.  She states that she has reduced feeling in her thumb, 2nd, and 3rd finger.    Headache in back of head and neck.  Will talk to pain doctor.    Generalized swelling in ankles.  Previous evaluation by vascular surgery and US of bilateral lower extremities did not reveal venous insuffficency.  Told to wear compression stockings.      Having trouble falling asleep, wakes up after 20 minutes.  Takes tylenol PM but it doesn't work.  Used to take Ambien 5 mg and it worked well for her.      Needs a foot rail for her hospital bed.  Would like to use [employer].  She moves her feet over the rail while she sleeps and then falls out of bed.      Needs a renewal on disability parking tag.    Review of Systems   Cardiovascular:  Positive for leg  "swelling.   Musculoskeletal:  Positive for back pain and gait problem. Negative for joint swelling.   Neurological:  Positive for numbness.   Psychiatric/Behavioral:  Positive for sleep disturbance.      Objective   Vital Signs:  /69 (BP Location: Right arm, Patient Position: Sitting, Cuff Size: Adult)   Pulse 103   Temp 98.7 °F (37.1 °C) (Infrared)   Resp 18   Ht 144.8 cm (57\")   Wt 71.2 kg (157 lb)   BMI 33.97 kg/m²     Physical Exam  Vitals and nursing note reviewed.   Constitutional:       Appearance: She is obese.   HENT:      Head: Normocephalic.      Nose: Nose normal.      Mouth/Throat:      Mouth: Mucous membranes are moist.   Eyes:      Conjunctiva/sclera: Conjunctivae normal.   Cardiovascular:      Rate and Rhythm: Normal rate and regular rhythm.      Pulses: Normal pulses.      Heart sounds: Normal heart sounds.   Pulmonary:      Effort: Pulmonary effort is normal.      Breath sounds: Normal breath sounds.   Abdominal:      General: Bowel sounds are normal.      Palpations: Abdomen is soft.   Musculoskeletal:         General: Normal range of motion.      Left wrist: No tenderness, bony tenderness or snuff box tenderness.        Arms:       Cervical back: Normal range of motion.      Right lower le+      Left lower le+   Skin:     General: Skin is warm and dry.   Neurological:      General: No focal deficit present.      Mental Status: She is alert and oriented to person, place, and time.   Psychiatric:         Mood and Affect: Mood normal.         Behavior: Behavior normal.           Assessment and Plan   Diagnoses and all orders for this visit:    1. Type 2 diabetes mellitus with diabetic neuropathy, without long-term current use of insulin (Primary)  -     Hemoglobin A1c  -     Microalbumin / Creatinine Urine Ratio - Urine, Clean Catch    2. Other hyperlipidemia  -     Lipid Panel    3. Acquired hypothyroidism  -     TSH+Free T4    4. Stage 3b chronic kidney disease  -     " Comprehensive Metabolic Panel    5. Other secondary hypertension  -     CBC w AUTO Differential    6. Encounter for hepatitis C screening test for low risk patient  -     Hepatitis C Antibody    7. Insomnia, unspecified type  -     traZODone (DESYREL) 50 MG tablet; Take 1 tablet by mouth Every Night for 90 days.  Dispense: 90 tablet; Refill: 0    8. Encounter for screening mammogram for malignant neoplasm of breast  -     Mammo Screening Digital Tomosynthesis Bilateral With CAD; Future    9. Screening for malignant neoplasm of colon  -     Ambulatory Referral For Screening Colonoscopy    10. Postmenopausal  -     DEXA Bone Density Axial; Future    11. Ganglion of left wrist  -     Ambulatory Referral to Orthopedic Surgery    12. Peripheral edema  Assessment & Plan:  Use of compression stockings encouraged.       13. Class 1 obesity due to excess calories with serious comorbidity in adult, unspecified BMI         Follow Up   Return in about 3 months (around 12/11/2023) for recheck difficulty sleeping.  Patient was given instructions and counseling regarding her condition or for health maintenance advice. Please see specific information pulled into the AVS if appropriate.

## 2023-09-12 DIAGNOSIS — M96.1 FAILED BACK SYNDROME: Primary | ICD-10-CM

## 2023-09-12 DIAGNOSIS — T85.610A BACLOFEN PUMP FAILURE, INITIAL ENCOUNTER: ICD-10-CM

## 2023-09-12 LAB
ALBUMIN SERPL-MCNC: 3.9 G/DL (ref 3.8–4.8)
ALBUMIN/CREAT UR: 7 MG/G CREAT (ref 0–29)
ALBUMIN/GLOB SERPL: 1.8 {RATIO} (ref 1.2–2.2)
ALP SERPL-CCNC: 108 IU/L (ref 44–121)
ALT SERPL-CCNC: 15 IU/L (ref 0–32)
AST SERPL-CCNC: 23 IU/L (ref 0–40)
BASOPHILS # BLD AUTO: 0 X10E3/UL (ref 0–0.2)
BASOPHILS NFR BLD AUTO: 0 %
BILIRUB SERPL-MCNC: 0.5 MG/DL (ref 0–1.2)
BUN SERPL-MCNC: 11 MG/DL (ref 8–27)
BUN/CREAT SERPL: 9 (ref 12–28)
CALCIUM SERPL-MCNC: 8.5 MG/DL (ref 8.7–10.3)
CHLORIDE SERPL-SCNC: 98 MMOL/L (ref 96–106)
CHOLEST SERPL-MCNC: 105 MG/DL (ref 100–199)
CO2 SERPL-SCNC: 25 MMOL/L (ref 20–29)
CREAT SERPL-MCNC: 1.23 MG/DL (ref 0.57–1)
CREAT UR-MCNC: 80.7 MG/DL
EGFRCR SERPLBLD CKD-EPI 2021: 46 ML/MIN/1.73
EOSINOPHIL # BLD AUTO: 0.2 X10E3/UL (ref 0–0.4)
EOSINOPHIL NFR BLD AUTO: 4 %
ERYTHROCYTE [DISTWIDTH] IN BLOOD BY AUTOMATED COUNT: 15 % (ref 11.7–15.4)
GLOBULIN SER CALC-MCNC: 2.2 G/DL (ref 1.5–4.5)
GLUCOSE SERPL-MCNC: 116 MG/DL (ref 70–99)
HBA1C MFR BLD: 5.8 % (ref 4.8–5.6)
HCT VFR BLD AUTO: 34.9 % (ref 34–46.6)
HCV IGG SERPL QL IA: NON REACTIVE
HDLC SERPL-MCNC: 41 MG/DL
HGB BLD-MCNC: 11.7 G/DL (ref 11.1–15.9)
IMM GRANULOCYTES # BLD AUTO: 0 X10E3/UL (ref 0–0.1)
IMM GRANULOCYTES NFR BLD AUTO: 1 %
LDLC SERPL CALC-MCNC: 40 MG/DL (ref 0–99)
LYMPHOCYTES # BLD AUTO: 0.9 X10E3/UL (ref 0.7–3.1)
LYMPHOCYTES NFR BLD AUTO: 18 %
MCH RBC QN AUTO: 30.1 PG (ref 26.6–33)
MCHC RBC AUTO-ENTMCNC: 33.5 G/DL (ref 31.5–35.7)
MCV RBC AUTO: 90 FL (ref 79–97)
MICROALBUMIN UR-MCNC: 6 UG/ML
MONOCYTES # BLD AUTO: 0.4 X10E3/UL (ref 0.1–0.9)
MONOCYTES NFR BLD AUTO: 7 %
NEUTROPHILS # BLD AUTO: 3.6 X10E3/UL (ref 1.4–7)
NEUTROPHILS NFR BLD AUTO: 70 %
PLATELET # BLD AUTO: 176 X10E3/UL (ref 150–450)
POTASSIUM SERPL-SCNC: 3.8 MMOL/L (ref 3.5–5.2)
PROT SERPL-MCNC: 6.1 G/DL (ref 6–8.5)
RBC # BLD AUTO: 3.89 X10E6/UL (ref 3.77–5.28)
SODIUM SERPL-SCNC: 139 MMOL/L (ref 134–144)
T4 FREE SERPL-MCNC: 1.38 NG/DL (ref 0.82–1.77)
TRIGL SERPL-MCNC: 137 MG/DL (ref 0–149)
TSH SERPL DL<=0.005 MIU/L-ACNC: 2.98 UIU/ML (ref 0.45–4.5)
VLDLC SERPL CALC-MCNC: 24 MG/DL (ref 5–40)
WBC # BLD AUTO: 5.2 X10E3/UL (ref 3.4–10.8)

## 2023-09-15 ENCOUNTER — DOCUMENTATION (OUTPATIENT)
Dept: FAMILY MEDICINE CLINIC | Facility: CLINIC | Age: 73
End: 2023-09-15
Payer: MEDICARE

## 2023-09-15 DIAGNOSIS — R73.03 PREDIABETES: Primary | ICD-10-CM

## 2023-09-15 PROBLEM — G62.9 PERIPHERAL POLYNEUROPATHY: Status: ACTIVE | Noted: 2023-09-15

## 2023-09-15 PROBLEM — E11.40 TYPE 2 DIABETES MELLITUS WITH DIABETIC NEUROPATHY, WITHOUT LONG-TERM CURRENT USE OF INSULIN: Status: RESOLVED | Noted: 2023-08-25 | Resolved: 2023-09-15

## 2023-09-15 NOTE — PROGRESS NOTES
Patient has been erroneously marked as diabetic. Based on the available clinical information, she does not have diabetes and should therefore be excluded from diabetic health maintenance and quality measures for the remainder of the reporting period.

## 2023-09-18 ENCOUNTER — TELEPHONE (OUTPATIENT)
Dept: NEUROSURGERY | Facility: CLINIC | Age: 73
End: 2023-09-18
Payer: MEDICARE

## 2023-09-18 VITALS
HEART RATE: 103 BPM | DIASTOLIC BLOOD PRESSURE: 69 MMHG | TEMPERATURE: 98.7 F | HEIGHT: 57 IN | SYSTOLIC BLOOD PRESSURE: 106 MMHG | WEIGHT: 156.97 LBS | BODY MASS INDEX: 33.86 KG/M2

## 2023-09-18 DIAGNOSIS — M96.1 FAILED BACK SYNDROME: ICD-10-CM

## 2023-09-18 DIAGNOSIS — T85.610A BACLOFEN PUMP FAILURE, INITIAL ENCOUNTER: ICD-10-CM

## 2023-09-18 PROBLEM — M67.432 GANGLION OF LEFT WRIST: Status: ACTIVE | Noted: 2023-09-18

## 2023-09-18 PROBLEM — R60.0 PERIPHERAL EDEMA: Chronic | Status: ACTIVE | Noted: 2023-09-18

## 2023-09-18 PROBLEM — G47.00 INSOMNIA: Status: ACTIVE | Noted: 2023-09-18

## 2023-09-18 PROBLEM — R60.9 PERIPHERAL EDEMA: Status: ACTIVE | Noted: 2023-09-18

## 2023-09-18 PROBLEM — R60.0 PERIPHERAL EDEMA: Status: ACTIVE | Noted: 2023-09-18

## 2023-09-18 PROBLEM — R60.9 PERIPHERAL EDEMA: Chronic | Status: ACTIVE | Noted: 2023-09-18

## 2023-09-18 NOTE — TELEPHONE ENCOUNTER
Attempted to call [facility] to let him no that Dr Reza would like to proceed with surgery tomorrow and for them to bring the CT imaging disks with them. His phone had no answer and it cut off and went to a busy signal.    I was able to contact fouzia Watkins who voiced understanding and stated that he would make sure they have the imaging disks to bring with them in the morning.

## 2023-09-18 NOTE — TELEPHONE ENCOUNTER
Pt grandson calling to state that they went in for her MRI at the [facility] in [facility].     They have refused to do her mri because she has a pain pump. They would like to know what they need to do know. She does not have a card with her that states if the device is MRI compatible. They would like to discuss what needs to happen now.     Please call Roland back at [phone]

## 2023-09-19 ENCOUNTER — HOSPITAL ENCOUNTER (OUTPATIENT)
Facility: HOSPITAL | Age: 73
LOS: 1 days | Discharge: HOME | End: 2023-09-20
Attending: NEUROLOGICAL SURGERY | Admitting: NEUROLOGICAL SURGERY
Payer: MEDICARE

## 2023-09-19 ENCOUNTER — ANESTHESIA EVENT (OUTPATIENT)
Dept: PERIOP | Facility: HOSPITAL | Age: 73
End: 2023-09-19
Payer: MEDICARE

## 2023-09-19 ENCOUNTER — APPOINTMENT (OUTPATIENT)
Dept: GENERAL RADIOLOGY | Facility: HOSPITAL | Age: 73
End: 2023-09-19
Payer: MEDICARE

## 2023-09-19 ENCOUNTER — ANESTHESIA (OUTPATIENT)
Dept: PERIOP | Facility: HOSPITAL | Age: 73
End: 2023-09-19
Payer: MEDICARE

## 2023-09-19 DIAGNOSIS — M96.1 FAILED BACK SYNDROME: Primary | ICD-10-CM

## 2023-09-19 DIAGNOSIS — T85.610A BACLOFEN PUMP FAILURE, INITIAL ENCOUNTER: ICD-10-CM

## 2023-09-19 LAB
ABO GROUP BLD: NORMAL
APPEARANCE CSF: CLEAR
BLD GP AB SCN SERPL QL: NEGATIVE
COLOR CSF: COLORLESS
COLOR SPUN CSF: COLORLESS
GLUCOSE CSF-MCNC: 56 MG/DL (ref 40–70)
METHOD: ABNORMAL
NUC CELL # CSF MANUAL: 0 /MM3 (ref 0–8)
PROT CSF-MCNC: 31.5 MG/DL (ref 15–45)
RBC # CSF MANUAL: 2 /MM3 (ref 0–0)
RH BLD: POSITIVE
SPECIMEN VOL CSF: 2 ML
T&S EXPIRATION DATE: NORMAL
TUBE # CSF: 1

## 2023-09-19 PROCEDURE — A9270 NON-COVERED ITEM OR SERVICE: HCPCS | Performed by: NURSE PRACTITIONER

## 2023-09-19 PROCEDURE — 25010000002 FENTANYL CITRATE (PF) 100 MCG/2ML SOLUTION: Performed by: NURSE ANESTHETIST, CERTIFIED REGISTERED

## 2023-09-19 PROCEDURE — 89050 BODY FLUID CELL COUNT: CPT | Performed by: NEUROLOGICAL SURGERY

## 2023-09-19 PROCEDURE — 63710000001 ESCITALOPRAM 10 MG TABLET: Performed by: NURSE PRACTITIONER

## 2023-09-19 PROCEDURE — 63710000001 POLYETHYLENE GLYCOL 17 G PACK: Performed by: NURSE PRACTITIONER

## 2023-09-19 PROCEDURE — 86850 RBC ANTIBODY SCREEN: CPT | Performed by: NEUROLOGICAL SURGERY

## 2023-09-19 PROCEDURE — 86592 SYPHILIS TEST NON-TREP QUAL: CPT | Performed by: NEUROLOGICAL SURGERY

## 2023-09-19 PROCEDURE — C1772 INFUSION PUMP, PROGRAMMABLE: HCPCS | Performed by: NEUROLOGICAL SURGERY

## 2023-09-19 PROCEDURE — 86900 BLOOD TYPING SEROLOGIC ABO: CPT | Performed by: NEUROLOGICAL SURGERY

## 2023-09-19 PROCEDURE — 0 GADOBENATE DIMEGLUMINE 529 MG/ML SOLUTION: Performed by: NEUROLOGICAL SURGERY

## 2023-09-19 PROCEDURE — 63710000001 SENNOSIDES-DOCUSATE 8.6-50 MG TABLET: Performed by: NURSE PRACTITIONER

## 2023-09-19 PROCEDURE — 72070 X-RAY EXAM THORAC SPINE 2VWS: CPT

## 2023-09-19 PROCEDURE — 63710000001 CYCLOBENZAPRINE 10 MG TABLET: Performed by: NURSE PRACTITIONER

## 2023-09-19 PROCEDURE — 84157 ASSAY OF PROTEIN OTHER: CPT | Performed by: NEUROLOGICAL SURGERY

## 2023-09-19 PROCEDURE — A9577 INJ MULTIHANCE: HCPCS | Performed by: NEUROLOGICAL SURGERY

## 2023-09-19 PROCEDURE — 63710000001 FUROSEMIDE 40 MG TABLET: Performed by: NURSE PRACTITIONER

## 2023-09-19 PROCEDURE — C1755 CATHETER, INTRASPINAL: HCPCS | Performed by: NEUROLOGICAL SURGERY

## 2023-09-19 PROCEDURE — 86901 BLOOD TYPING SEROLOGIC RH(D): CPT | Performed by: NEUROLOGICAL SURGERY

## 2023-09-19 PROCEDURE — 25010000002 VANCOMYCIN 1 G RECONSTITUTED SOLUTION: Performed by: NEUROLOGICAL SURGERY

## 2023-09-19 PROCEDURE — 63710000001 TRAZODONE 50 MG TABLET: Performed by: NURSE PRACTITIONER

## 2023-09-19 PROCEDURE — 87015 SPECIMEN INFECT AGNT CONCNTJ: CPT | Performed by: NEUROLOGICAL SURGERY

## 2023-09-19 PROCEDURE — 63710000001 ATORVASTATIN 40 MG TABLET: Performed by: NURSE PRACTITIONER

## 2023-09-19 PROCEDURE — 99024 POSTOP FOLLOW-UP VISIT: CPT | Performed by: NURSE PRACTITIONER

## 2023-09-19 PROCEDURE — 62350 IMPLANT SPINAL CANAL CATH: CPT | Performed by: NEUROLOGICAL SURGERY

## 2023-09-19 PROCEDURE — 62362 IMPLANT SPINE INFUSION PUMP: CPT | Performed by: NEUROLOGICAL SURGERY

## 2023-09-19 PROCEDURE — 25010000002 DEXAMETHASONE PER 1 MG: Performed by: NURSE ANESTHETIST, CERTIFIED REGISTERED

## 2023-09-19 PROCEDURE — 76000 FLUOROSCOPY <1 HR PHYS/QHP: CPT

## 2023-09-19 PROCEDURE — 25010000002 PROPOFOL 10 MG/ML EMULSION: Performed by: NURSE ANESTHETIST, CERTIFIED REGISTERED

## 2023-09-19 PROCEDURE — 25010000002 ONDANSETRON PER 1 MG: Performed by: NURSE ANESTHETIST, CERTIFIED REGISTERED

## 2023-09-19 PROCEDURE — 63710000001 ASPIRIN 81 MG TABLET DELAYED-RELEASE: Performed by: NURSE PRACTITIONER

## 2023-09-19 PROCEDURE — 25010000002 VANCOMYCIN 1 G RECONSTITUTED SOLUTION 1 EACH VIAL: Performed by: NEUROLOGICAL SURGERY

## 2023-09-19 PROCEDURE — 63710000001 ALLOPURINOL 100 MG TABLET: Performed by: NURSE PRACTITIONER

## 2023-09-19 PROCEDURE — 87205 SMEAR GRAM STAIN: CPT | Performed by: NEUROLOGICAL SURGERY

## 2023-09-19 PROCEDURE — 63710000001 POTASSIUM CHLORIDE 10 MEQ CAPSULE CONTROLLED-RELEASE: Performed by: NURSE PRACTITIONER

## 2023-09-19 PROCEDURE — 87070 CULTURE OTHR SPECIMN AEROBIC: CPT | Performed by: NEUROLOGICAL SURGERY

## 2023-09-19 PROCEDURE — 82945 GLUCOSE OTHER FLUID: CPT | Performed by: NEUROLOGICAL SURGERY

## 2023-09-19 PROCEDURE — 25010000002 HEPARIN (PORCINE) PER 1000 UNITS: Performed by: NURSE PRACTITIONER

## 2023-09-19 DEVICE — PUMP NEURO PROGRAM SYNCHROMED2 FLTR 40ML: Type: IMPLANTABLE DEVICE | Site: ABDOMEN | Status: FUNCTIONAL

## 2023-09-19 DEVICE — CATH INTRATHCL ASCENDA SHRT 1PC16G114.3: Type: IMPLANTABLE DEVICE | Site: ABDOMEN | Status: FUNCTIONAL

## 2023-09-19 RX ORDER — IPRATROPIUM BROMIDE AND ALBUTEROL SULFATE 2.5; .5 MG/3ML; MG/3ML
3 SOLUTION RESPIRATORY (INHALATION) EVERY 4 HOURS PRN
Status: DISCONTINUED | OUTPATIENT
Start: 2023-09-19 | End: 2023-09-19 | Stop reason: SDUPTHER

## 2023-09-19 RX ORDER — ACETAMINOPHEN 160 MG/5ML
650 SOLUTION ORAL EVERY 4 HOURS PRN
Status: DISCONTINUED | OUTPATIENT
Start: 2023-09-19 | End: 2023-09-20 | Stop reason: HOSPADM

## 2023-09-19 RX ORDER — ONDANSETRON 2 MG/ML
4 INJECTION INTRAMUSCULAR; INTRAVENOUS EVERY 6 HOURS PRN
Status: DISCONTINUED | OUTPATIENT
Start: 2023-09-19 | End: 2023-09-20 | Stop reason: HOSPADM

## 2023-09-19 RX ORDER — ALBUTEROL SULFATE 2.5 MG/3ML
2.5 SOLUTION RESPIRATORY (INHALATION) EVERY 4 HOURS PRN
Status: DISCONTINUED | OUTPATIENT
Start: 2023-09-19 | End: 2023-09-19 | Stop reason: SDUPTHER

## 2023-09-19 RX ORDER — POLYETHYLENE GLYCOL 3350 17 G/17G
17 POWDER, FOR SOLUTION ORAL DAILY
Status: DISCONTINUED | OUTPATIENT
Start: 2023-09-19 | End: 2023-09-20 | Stop reason: HOSPADM

## 2023-09-19 RX ORDER — ONDANSETRON 4 MG/1
4 TABLET, FILM COATED ORAL EVERY 6 HOURS PRN
Status: DISCONTINUED | OUTPATIENT
Start: 2023-09-19 | End: 2023-09-20 | Stop reason: HOSPADM

## 2023-09-19 RX ORDER — SODIUM CHLORIDE 0.9 % (FLUSH) 0.9 %
3-10 SYRINGE (ML) INJECTION AS NEEDED
Status: DISCONTINUED | OUTPATIENT
Start: 2023-09-19 | End: 2023-09-19 | Stop reason: HOSPADM

## 2023-09-19 RX ORDER — CYCLOBENZAPRINE HCL 10 MG
10 TABLET ORAL 3 TIMES DAILY PRN
Status: DISCONTINUED | OUTPATIENT
Start: 2023-09-19 | End: 2023-09-20 | Stop reason: HOSPADM

## 2023-09-19 RX ORDER — ESCITALOPRAM OXALATE 10 MG/1
10 TABLET ORAL DAILY
Status: DISCONTINUED | OUTPATIENT
Start: 2023-09-19 | End: 2023-09-20 | Stop reason: HOSPADM

## 2023-09-19 RX ORDER — LABETALOL HYDROCHLORIDE 5 MG/ML
5 INJECTION, SOLUTION INTRAVENOUS
Status: DISCONTINUED | OUTPATIENT
Start: 2023-09-19 | End: 2023-09-19 | Stop reason: HOSPADM

## 2023-09-19 RX ORDER — TRAZODONE HYDROCHLORIDE 50 MG/1
50 TABLET ORAL NIGHTLY
Status: DISCONTINUED | OUTPATIENT
Start: 2023-09-19 | End: 2023-09-20 | Stop reason: HOSPADM

## 2023-09-19 RX ORDER — ONDANSETRON 2 MG/ML
INJECTION INTRAMUSCULAR; INTRAVENOUS AS NEEDED
Status: DISCONTINUED | OUTPATIENT
Start: 2023-09-19 | End: 2023-09-19 | Stop reason: SURG

## 2023-09-19 RX ORDER — FENTANYL CITRATE 50 UG/ML
INJECTION, SOLUTION INTRAMUSCULAR; INTRAVENOUS AS NEEDED
Status: DISCONTINUED | OUTPATIENT
Start: 2023-09-19 | End: 2023-09-19 | Stop reason: SURG

## 2023-09-19 RX ORDER — SODIUM CHLORIDE 9 MG/ML
40 INJECTION, SOLUTION INTRAVENOUS AS NEEDED
Status: DISCONTINUED | OUTPATIENT
Start: 2023-09-19 | End: 2023-09-20 | Stop reason: HOSPADM

## 2023-09-19 RX ORDER — ALLOPURINOL 100 MG/1
100 TABLET ORAL DAILY
Status: DISCONTINUED | OUTPATIENT
Start: 2023-09-19 | End: 2023-09-20 | Stop reason: HOSPADM

## 2023-09-19 RX ORDER — HYDROCODONE BITARTRATE AND ACETAMINOPHEN 10; 325 MG/1; MG/1
1 TABLET ORAL EVERY 4 HOURS PRN
Status: DISCONTINUED | OUTPATIENT
Start: 2023-09-19 | End: 2023-09-20 | Stop reason: HOSPADM

## 2023-09-19 RX ORDER — POTASSIUM CHLORIDE 750 MG/1
10 CAPSULE, EXTENDED RELEASE ORAL ONCE
Status: COMPLETED | OUTPATIENT
Start: 2023-09-19 | End: 2023-09-19

## 2023-09-19 RX ORDER — PROPOFOL 10 MG/ML
VIAL (ML) INTRAVENOUS AS NEEDED
Status: DISCONTINUED | OUTPATIENT
Start: 2023-09-19 | End: 2023-09-19 | Stop reason: SURG

## 2023-09-19 RX ORDER — LEVOTHYROXINE SODIUM 0.03 MG/1
25 TABLET ORAL
Status: DISCONTINUED | OUTPATIENT
Start: 2023-09-20 | End: 2023-09-20 | Stop reason: HOSPADM

## 2023-09-19 RX ORDER — SODIUM CHLORIDE, SODIUM LACTATE, POTASSIUM CHLORIDE, CALCIUM CHLORIDE 600; 310; 30; 20 MG/100ML; MG/100ML; MG/100ML; MG/100ML
1000 INJECTION, SOLUTION INTRAVENOUS CONTINUOUS
Status: DISCONTINUED | OUTPATIENT
Start: 2023-09-19 | End: 2023-09-19

## 2023-09-19 RX ORDER — ATORVASTATIN CALCIUM 40 MG/1
40 TABLET, FILM COATED ORAL NIGHTLY
Status: DISCONTINUED | OUTPATIENT
Start: 2023-09-19 | End: 2023-09-20 | Stop reason: HOSPADM

## 2023-09-19 RX ORDER — AMOXICILLIN 250 MG
2 CAPSULE ORAL 2 TIMES DAILY
Status: DISCONTINUED | OUTPATIENT
Start: 2023-09-19 | End: 2023-09-20 | Stop reason: HOSPADM

## 2023-09-19 RX ORDER — NALOXONE HCL 0.4 MG/ML
0.04 VIAL (ML) INJECTION AS NEEDED
Status: DISCONTINUED | OUTPATIENT
Start: 2023-09-19 | End: 2023-09-19 | Stop reason: HOSPADM

## 2023-09-19 RX ORDER — SODIUM CHLORIDE, SODIUM LACTATE, POTASSIUM CHLORIDE, CALCIUM CHLORIDE 600; 310; 30; 20 MG/100ML; MG/100ML; MG/100ML; MG/100ML
100 INJECTION, SOLUTION INTRAVENOUS CONTINUOUS
Status: DISCONTINUED | OUTPATIENT
Start: 2023-09-19 | End: 2023-09-19

## 2023-09-19 RX ORDER — METOPROLOL SUCCINATE 25 MG/1
25 TABLET, EXTENDED RELEASE ORAL DAILY
Status: DISCONTINUED | OUTPATIENT
Start: 2023-09-19 | End: 2023-09-20 | Stop reason: HOSPADM

## 2023-09-19 RX ORDER — HYDROMORPHONE HYDROCHLORIDE 1 MG/ML
0.25 INJECTION, SOLUTION INTRAMUSCULAR; INTRAVENOUS; SUBCUTANEOUS
Status: DISCONTINUED | OUTPATIENT
Start: 2023-09-19 | End: 2023-09-19 | Stop reason: HOSPADM

## 2023-09-19 RX ORDER — HEPARIN SODIUM 5000 [USP'U]/ML
5000 INJECTION, SOLUTION INTRAVENOUS; SUBCUTANEOUS EVERY 12 HOURS SCHEDULED
Status: DISCONTINUED | OUTPATIENT
Start: 2023-09-19 | End: 2023-09-20 | Stop reason: HOSPADM

## 2023-09-19 RX ORDER — FLUMAZENIL 0.1 MG/ML
0.2 INJECTION INTRAVENOUS AS NEEDED
Status: DISCONTINUED | OUTPATIENT
Start: 2023-09-19 | End: 2023-09-19 | Stop reason: HOSPADM

## 2023-09-19 RX ORDER — DEXAMETHASONE SODIUM PHOSPHATE 4 MG/ML
INJECTION, SOLUTION INTRA-ARTICULAR; INTRALESIONAL; INTRAMUSCULAR; INTRAVENOUS; SOFT TISSUE AS NEEDED
Status: DISCONTINUED | OUTPATIENT
Start: 2023-09-19 | End: 2023-09-19 | Stop reason: SURG

## 2023-09-19 RX ORDER — SODIUM CHLORIDE 0.9 % (FLUSH) 0.9 %
10 SYRINGE (ML) INJECTION AS NEEDED
Status: DISCONTINUED | OUTPATIENT
Start: 2023-09-19 | End: 2023-09-20 | Stop reason: HOSPADM

## 2023-09-19 RX ORDER — MAGNESIUM HYDROXIDE 1200 MG/15ML
LIQUID ORAL AS NEEDED
Status: DISCONTINUED | OUTPATIENT
Start: 2023-09-19 | End: 2023-09-19 | Stop reason: HOSPADM

## 2023-09-19 RX ORDER — PANTOPRAZOLE SODIUM 20 MG/1
20 TABLET, DELAYED RELEASE ORAL
Status: DISCONTINUED | OUTPATIENT
Start: 2023-09-20 | End: 2023-09-20 | Stop reason: HOSPADM

## 2023-09-19 RX ORDER — ALBUTEROL SULFATE 2.5 MG/3ML
2.5 SOLUTION RESPIRATORY (INHALATION) EVERY 4 HOURS PRN
Status: DISCONTINUED | OUTPATIENT
Start: 2023-09-19 | End: 2023-09-20 | Stop reason: HOSPADM

## 2023-09-19 RX ORDER — SODIUM CHLORIDE 9 MG/ML
40 INJECTION, SOLUTION INTRAVENOUS AS NEEDED
Status: DISCONTINUED | OUTPATIENT
Start: 2023-09-19 | End: 2023-09-19 | Stop reason: HOSPADM

## 2023-09-19 RX ORDER — HYDROCODONE BITARTRATE AND ACETAMINOPHEN 5; 325 MG/1; MG/1
1 TABLET ORAL ONCE AS NEEDED
Status: DISCONTINUED | OUTPATIENT
Start: 2023-09-19 | End: 2023-09-19 | Stop reason: HOSPADM

## 2023-09-19 RX ORDER — LISINOPRIL 10 MG/1
10 TABLET ORAL
Status: DISCONTINUED | OUTPATIENT
Start: 2023-09-19 | End: 2023-09-20 | Stop reason: HOSPADM

## 2023-09-19 RX ORDER — LIDOCAINE HYDROCHLORIDE 20 MG/ML
INJECTION, SOLUTION EPIDURAL; INFILTRATION; INTRACAUDAL; PERINEURAL AS NEEDED
Status: DISCONTINUED | OUTPATIENT
Start: 2023-09-19 | End: 2023-09-19 | Stop reason: SURG

## 2023-09-19 RX ORDER — HYDROCODONE BITARTRATE AND ACETAMINOPHEN 7.5; 325 MG/1; MG/1
1 TABLET ORAL EVERY 4 HOURS PRN
Status: DISCONTINUED | OUTPATIENT
Start: 2023-09-19 | End: 2023-09-20 | Stop reason: HOSPADM

## 2023-09-19 RX ORDER — FENTANYL CITRATE 50 UG/ML
25 INJECTION, SOLUTION INTRAMUSCULAR; INTRAVENOUS
Status: DISCONTINUED | OUTPATIENT
Start: 2023-09-19 | End: 2023-09-19 | Stop reason: HOSPADM

## 2023-09-19 RX ORDER — AMLODIPINE BESYLATE 5 MG/1
5 TABLET ORAL DAILY
Status: DISCONTINUED | OUTPATIENT
Start: 2023-09-19 | End: 2023-09-20 | Stop reason: HOSPADM

## 2023-09-19 RX ORDER — ACETAMINOPHEN 325 MG/1
650 TABLET ORAL EVERY 4 HOURS PRN
Status: DISCONTINUED | OUTPATIENT
Start: 2023-09-19 | End: 2023-09-20 | Stop reason: HOSPADM

## 2023-09-19 RX ORDER — SODIUM CHLORIDE 0.9 % (FLUSH) 0.9 %
3 SYRINGE (ML) INJECTION AS NEEDED
Status: DISCONTINUED | OUTPATIENT
Start: 2023-09-19 | End: 2023-09-19 | Stop reason: HOSPADM

## 2023-09-19 RX ORDER — LIDOCAINE HYDROCHLORIDE 10 MG/ML
0.5 INJECTION, SOLUTION EPIDURAL; INFILTRATION; INTRACAUDAL; PERINEURAL ONCE AS NEEDED
Status: DISCONTINUED | OUTPATIENT
Start: 2023-09-19 | End: 2023-09-19 | Stop reason: HOSPADM

## 2023-09-19 RX ORDER — SODIUM CHLORIDE 0.9 % (FLUSH) 0.9 %
10 SYRINGE (ML) INJECTION EVERY 12 HOURS SCHEDULED
Status: DISCONTINUED | OUTPATIENT
Start: 2023-09-19 | End: 2023-09-20 | Stop reason: HOSPADM

## 2023-09-19 RX ORDER — VANCOMYCIN HYDROCHLORIDE 1 G/20ML
INJECTION, POWDER, LYOPHILIZED, FOR SOLUTION INTRAVENOUS AS NEEDED
Status: DISCONTINUED | OUTPATIENT
Start: 2023-09-19 | End: 2023-09-19 | Stop reason: HOSPADM

## 2023-09-19 RX ORDER — ACETAMINOPHEN 650 MG/1
650 SUPPOSITORY RECTAL EVERY 4 HOURS PRN
Status: DISCONTINUED | OUTPATIENT
Start: 2023-09-19 | End: 2023-09-20 | Stop reason: HOSPADM

## 2023-09-19 RX ORDER — ASPIRIN 81 MG/1
81 TABLET ORAL DAILY
Status: DISCONTINUED | OUTPATIENT
Start: 2023-09-19 | End: 2023-09-20 | Stop reason: HOSPADM

## 2023-09-19 RX ORDER — SODIUM CHLORIDE 0.9 % (FLUSH) 0.9 %
3 SYRINGE (ML) INJECTION EVERY 12 HOURS SCHEDULED
Status: DISCONTINUED | OUTPATIENT
Start: 2023-09-19 | End: 2023-09-19 | Stop reason: HOSPADM

## 2023-09-19 RX ORDER — FUROSEMIDE 40 MG/1
40 TABLET ORAL DAILY
Status: DISCONTINUED | OUTPATIENT
Start: 2023-09-19 | End: 2023-09-20 | Stop reason: HOSPADM

## 2023-09-19 RX ADMIN — POLYETHYLENE GLYCOL 3350 17 G: 17 POWDER, FOR SOLUTION ORAL at 17:23

## 2023-09-19 RX ADMIN — ESCITSLOPRAM 10 MG: 10 TABLET ORAL at 17:23

## 2023-09-19 RX ADMIN — FENTANYL CITRATE 50 MCG: 50 INJECTION, SOLUTION INTRAMUSCULAR; INTRAVENOUS at 14:17

## 2023-09-19 RX ADMIN — ATORVASTATIN CALCIUM 40 MG: 40 TABLET, FILM COATED ORAL at 20:24

## 2023-09-19 RX ADMIN — VANCOMYCIN HYDROCHLORIDE 1000 MG: 1 INJECTION, POWDER, LYOPHILIZED, FOR SOLUTION INTRAVENOUS at 12:42

## 2023-09-19 RX ADMIN — HEPARIN SODIUM 5000 UNITS: 5000 INJECTION, SOLUTION INTRAVENOUS; SUBCUTANEOUS at 20:24

## 2023-09-19 RX ADMIN — FUROSEMIDE 40 MG: 40 TABLET ORAL at 17:23

## 2023-09-19 RX ADMIN — SODIUM CHLORIDE, POTASSIUM CHLORIDE, SODIUM LACTATE AND CALCIUM CHLORIDE 1000 ML: 600; 310; 30; 20 INJECTION, SOLUTION INTRAVENOUS at 10:56

## 2023-09-19 RX ADMIN — Medication 10 ML: at 20:24

## 2023-09-19 RX ADMIN — ASPIRIN 81 MG: 81 TABLET, COATED ORAL at 17:23

## 2023-09-19 RX ADMIN — ALLOPURINOL 100 MG: 100 TABLET ORAL at 17:23

## 2023-09-19 RX ADMIN — PROPOFOL INJECTABLE EMULSION 100 MG: 10 INJECTION, EMULSION INTRAVENOUS at 14:12

## 2023-09-19 RX ADMIN — POTASSIUM CHLORIDE 10 MEQ: 10 CAPSULE, COATED, EXTENDED RELEASE ORAL at 17:23

## 2023-09-19 RX ADMIN — LIDOCAINE HYDROCHLORIDE 100 MG: 20 INJECTION, SOLUTION EPIDURAL; INFILTRATION; INTRACAUDAL; PERINEURAL at 14:12

## 2023-09-19 RX ADMIN — DEXAMETHASONE SODIUM PHOSPHATE 4 MG: 4 INJECTION, SOLUTION INTRA-ARTICULAR; INTRALESIONAL; INTRAMUSCULAR; INTRAVENOUS; SOFT TISSUE at 14:16

## 2023-09-19 RX ADMIN — TRAZODONE HYDROCHLORIDE 50 MG: 50 TABLET ORAL at 20:24

## 2023-09-19 RX ADMIN — ONDANSETRON 4 MG: 2 INJECTION INTRAMUSCULAR; INTRAVENOUS at 14:16

## 2023-09-19 RX ADMIN — SENNOSIDES AND DOCUSATE SODIUM 2 TABLET: 50; 8.6 TABLET ORAL at 20:24

## 2023-09-19 RX ADMIN — CYCLOBENZAPRINE 10 MG: 10 TABLET, FILM COATED ORAL at 20:24

## 2023-09-19 NOTE — ANESTHESIA PROCEDURE NOTES
Airway  Date/Time: 9/19/2023 2:14 PM  Airway not difficult    General Information and Staff    Patient location during procedure: OR  CRNA/CAA: Bro Sanford CRNA  SRNA: Marilu Hoyos SRNA  Indications and Patient Condition  Indications for airway management: airway protection    Preoxygenated: yes  MILS maintained throughout  Mask difficulty assessment: 1 - vent by mask    Final Airway Details  Final airway type: supraglottic airway      Successful airway: I-gel  Size 4     Cormack-Lehane Classification: grade IIa - partial view of glottis  Number of attempts at approach: 1  Assessment: lips, teeth, and gum same as pre-op and atraumatic intubation

## 2023-09-19 NOTE — PROGRESS NOTES
NEUROSURGERY DAILY PROGRESS NOTE    ASSESSMENT:   Christine Steven is a 73 y.o. with  a significant comorbidity of hyperlipidemia, hypertension, B12 deficiency, class II obesity, stage IIIb chronic kidney disease, iron deficiency, COPD and former smoker. She presents with a new problem of persistent axial back pain with recent pain pump evaluation showing leakage of contrast into her pocket. Physical exam findings of sequela of prior surgeries.  Well-healed left abdominal pocket.  No evidence of baclofen withdrawal.     Past Medical History:   Diagnosis Date    Cancer     throat cancer, breast    CHF (congestive heart failure)     Chronic kidney disease (CKD), stage III (moderate)     Constipation     COPD (chronic obstructive pulmonary disease)     Coronary artery disease     Diverticulitis     GERD (gastroesophageal reflux disease)     History of transfusion     Hypertension     Lung nodule     Neck fracture     Oxygen dependent     wears 2lnc    Sleep apnea     cpap or bipap unsure which one she uses also uses oxygen with this    Stroke     left side face weakness residual    Thyroid nodule      Active Hospital Problems    Diagnosis     **Baclofen pump failure     Failed back syndrome      PLAN:   Neuro: Stable.  Lower extremity strength at baseline.  Good tone    Day of Surgery (9/19/2023) baclofen pump revision   Neurochecks every 4 hours.  Call for decline   Abdominal binder on at all times    CV: No acute issues.   Pulm: Maintaining O2 sat.  Continuous pulse oximetry.  Incentive spirometry.   : Remove Mcconnell ASAP, bladder scans and I/O cath per policy.   FEN: Regular diet.  Advance as tolerated.   GI: No acute issues.    ID: 23-hour postoperative prophylactic antibiotics.    Heme:  DVT prophylaxis; SCD's  Pain: Tolerable at present with oral meds.     Dispo: PT/OT.       CHIEF COMPLAINT:   Persistent axial back pain    Subjective  Symptom stable.  Doing well    Temp:  [97.2 °F (36.2 °C)-98.3 °F (36.8 °C)]  "97.3 °F (36.3 °C)  Heart Rate:  [65-76] 66  Resp:  [11-16] 14  BP: (100-133)/(44-63) 109/50    Objective:  General Appearance:  Well-appearing and in no acute distress.    Vital signs: (most recent): Blood pressure 109/50, pulse 66, temperature 97.3 °F (36.3 °C), temperature source Oral, resp. rate 14, height 149.9 cm (59\"), weight 74.5 kg (164 lb 3.2 oz), SpO2 94 %.      Neurologic Exam     Mental Status   Oriented to person, place, and time.   Attention: normal. Concentration: normal.   Speech: speech is normal   Level of consciousness: alert    Cranial Nerves     CN II   Visual acuity: normal    CN III, IV, VI   Pupils are equal, round, and reactive to light.  Extraocular motions are normal.   Diplopia: none    CN V   Facial sensation intact.   Right corneal reflex: normal  Left corneal reflex: normal    CN VII   Right facial weakness: none  Left facial weakness: none    CN VIII   Hearing: intact    CN IX, X   Palate: symmetric  Right gag reflex: normal  Left gag reflex: normal    CN XI   Right trapezius strength: normal  Left trapezius strength: normal    CN XII   Tongue deviation: none    Motor Exam   Right arm tone: normal  Left arm tone: normal  Right arm pronator drift: absent  Left arm pronator drift: absent  Right leg tone: normal  Left leg tone: normal    Strength   Right deltoid: 5/5  Left deltoid: 5/5  Right biceps: 5/5  Left biceps: 5/5  Right triceps: 5/5  Left triceps: 5/5  Right interossei: 5/5  Left interossei: 5/5  Right iliopsoas: 5/5  Left iliopsoas: 4/5  Right quadriceps: 5/5  Left quadriceps: 5/5  Right anterior tibial: 5/5  Left anterior tibial: 5/5  Right gastroc: 5/5  Left gastroc: 5/5  Right EHL 5/5   Left EHL 5/5     Sensory Exam   Right arm light touch: normal  Left arm light touch: normal  Right leg light touch: normal  Left leg light touch: normal  Proprioception normal.   Sensory deficit distribution on right: S1  Sensory deficit distribution on left: S1    Gait, Coordination, and " Reflexes     Tremor   Resting tremor: absent  Intention tremor: absent  Action tremor: absent    Drains: * No LDAs found *    Output by Drain (mL) 09/18/23 0701 - 09/18/23 1900 09/18/23 1901 - 09/19/23 0700 09/19/23 0701 - 09/19/23 1654 Range Total   Patient has no LDAs of requested type attached.       Imaging Results (Last 24 Hours)       Procedure Component Value Units Date/Time    XR Spine Thoracic 2 View [[id number]] Collected: 09/19/23 1635     Updated: 09/19/23 1639    Narrative:      EXAMINATION: XR SPINE THORACIC 2 VW-  9/19/2023 4:35 PM CDT     HISTORY: Pain pump revision.     Fluoroscopy time: 14.9 seconds.     Dose: 4.5383 mGy. 2 images     FINDINGS: C arm was used intraoperatively during performance of a pain  pump revision. A catheter tip projects over the lower thoracic spine  within the midline. The films are available to the OR for review.     This report was signed and finalized on 9/19/2023 4:36 PM CDT by Dr. Varun Villela MD.       FL C Arm During Surgery [[id number]] Resulted: 09/19/23 1453     Updated: 09/19/23 1453    Narrative:      This procedure was auto-finalized with no dictation required.          Lab Results (last 24 hours)       Procedure Component Value Units Date/Time    Protein, CSF - Cerebrospinal Fluid, Abdomen, Left [[id number]]  (Normal) Collected: 09/19/23 1439    Specimen: Cerebrospinal Fluid from Abdomen, Left Updated: 09/19/23 1514     Protein, Total (CSF) 31.5 mg/dL     Glucose, CSF - Cerebrospinal Fluid, Abdomen, Left [[id number]]  (Normal) Collected: 09/19/23 1439    Specimen: Cerebrospinal Fluid from Abdomen, Left Updated: 09/19/23 1514     Glucose, CSF 56 mg/dL     Cell Count With Differential, CSF [[id number]]  (Abnormal) Collected: 09/19/23 1439    Specimen: Cerebrospinal Fluid from Lumbar Puncture Updated: 09/19/23 1512    Narrative:      The following orders were created for panel order Cell Count With Differential, CSF.  Procedure                                Abnormality         Status                     ---------                               -----------         ------                     Cell Count, CSF - Cerebr...[[id number]]  Abnormal            Final result                 Please view results for these tests on the individual orders.    Cell Count, CSF - Cerebrospinal Fluid, Lumbar Puncture [[id number]]  (Abnormal) Collected: 09/19/23 1439    Specimen: Cerebrospinal Fluid from Lumbar Puncture Updated: 09/19/23 1512     Color, CSF Colorless     Supernatant Color, CSF Colorless     Appearance, CSF Clear     RBC, CSF 2 /mm3      Nucleated Cells, CSF 0 /mm3      Volume, CSF 2.0 mL      Tube Number, CSF 1     Method: Hemacytometer Method    Narrative:      Differential not indicated.    VDRL, CSF - Cerebrospinal Fluid, Abdomen, Left [[id number]] Collected: 09/19/23 1439    Specimen: Cerebrospinal Fluid from Abdomen, Left Updated: 09/19/23 1448    Culture, CSF - Cerebrospinal Fluid, Abdomen, Left [[id number]] Collected: 09/19/23 1439    Specimen: Cerebrospinal Fluid from Abdomen, Left Updated: 09/19/23 1448            [id number]  tOto Jhaveri, APRN

## 2023-09-19 NOTE — PLAN OF CARE
Goal Outcome Evaluation:  Plan of Care Reviewed With: patient            Patient is received from PACU. Alert and oriented x 4. VSS. O2 @ 3 L NC. Dressing site CDI, Abdomen binder present. Patient is able to void in BSC, 2 person assist with gait belt. LBM last Saturday per patient. Denies discomfort or bloating. Tolerated eating and drinking well. Denies neuro changes. Denies pain. Safety precautions maintained. Family at bedside. Will continue to monitor.

## 2023-09-19 NOTE — OP NOTE
NEUROSURGERY OPERATIVE NOTE    Christine Steven  OR Date: 9/19/2023    Pre-op Diagnosis:   Baclofen pump failure, initial encounter [T85.610A]    Post-op Diagnosis:     Post-Op Diagnosis Codes:     * Baclofen pump failure, initial encounter [T85.610A]          Surgeon(s):  Cristopher Reza MD    Anesthesia: General    Staff:   Circulator: Christ Rodriguez RN; Ryan Cantu RN  Scrub Person: Taya De Leon; Rachele Davies    Procedure(s):  PAIN PUMP and proximal catheter REVISION, left.  Supine position.    1 -implantation or placement of a device for intrathecal or epidural drug infusion; subcutaneous reservoir  2 -catheter dye study    INDICATIONS: Christine Steven is a 73 y.o. female with a significant comorbidity of hyperlipidemia, hypertension, B12 deficiency, class II obesity, stage IIIb chronic kidney disease, iron deficiency, COPD and former smoker. She presents with a new problem of persistent axial back pain with recent pain pump evaluation showing leakage of contrast into her pocket. Physical exam findings of sequela of prior surgeries.  Well-healed left abdominal pocket.  No evidence of baclofen withdrawal.       Failed back syndrome  Pain pump at end-of-life  Christine and JANA discussed revision of her pain pump.  It is unclear from the provided documentation whether or not she needs a pump revision with a proximal catheter revision versus a pump revision with complete catheter revision.  Additionally we have no imaging of her spine to guide if distal catheter placement is needed.  We discussed that the risk of distal catheter revision intrinsically has a higher risk of CSF leak and therefore should be avoided if necessary.  She consents to revision of proximal pain pump and proximal catheter revision, with posterior revision catheter dye study.    Based on these findings, we have decided to perform revision of her Synchromed II intrathecal baclofen pump.    The risks and benefits of the  procedure were discussed. The patient accepted and understood all potential risks and complications including overdose, withdrawal,  damage to the spinal cord, fracture of tubing or failure to reimplant new tubing, retained catheter in the spinal canal, bowel or bladder incontinence, difficulty walking or weakness.  After considering options, the patient requested that we proceed with the procedure.    Prior to the procedure the patient had their pain pump refilled by the pain management doctor and settings were returned to the lowest recent setting to prevent overdose.      PROCEDURE: The patient was brought to the operating room and general anesthesia with endotracheal intubation was performed. The patient was positioned supine on a standard operating table. The back was prepped and draped.     The old incision was opened sharply using a #10 blade.  Bovie cautery was used to dissect through old scar and identify the pump.  Care was taken not to Bovie directly on the pump tubing.  Once fully exposed the pump capsule was entered. This was found to be dry.    A finger was used to free the pump from its capsule taking care not to damage the tubing.  Stabilizing sutures were removed with Metzenbaum scissors.  The old pump was detached from the tubing and brisk backflow CSF was noted.  However there was some scar noted in the And therefore approximately 10 cm of the catheter were removed and a new proximal catheter was attached and found to be snug.  Approximately 1-1/2 mL or 20 drops were collected and a small amount of tissue was removed from the cap.     On the back table 13 ml of drug was removed from the patient's old pump.  Air bubbles were removed and the total amount was transplanted into the new pump.      The pocket scar was cut along the lateral wall to enlarge it slightly.  The new pump was securely connected to the old tubing and a good fit was ensured.      We next injected Omnipaque into the sideport.   This flowed freely.  Using fluoroscopy we saw intrathecal contrast at the level of T10 originating from the.  Therefore she was determined to have a functioning system.    Again the pocket was checked for strict hemostasis and irrigated with approximately 1 L of Bacitracin irrigation.  The new pump was placed into the expanded pocket and the pump was secured in place with 1 Prolene suture.  The pocket scar was closed with 2-0 Vicryl and the skin incision was closed with 3-0 Vicryl.  A 4-0 running Monocryl was used to close the epidermis and Steri-Strips were applied.  Once dry, Telfa was placed over the incision and an abdominal binder was placed.    Throughout the procedure, there were no intraoperative complications.  All counts were noted to be correct at the end of the case.      Estimated Blood Loss: minimal    Complications: None    Implants:   Implant Name Type Inv. Item Serial No.  Lot No. LRB No. Used Action   PUMP NEURO PROGRAM SYNCHROMED2 FLTR 40ML - [id number] - [id number] Implant PUMP NEURO PROGRAM SYNCHROMED2 FLTR 40ML [id number] [employer] NR Left 1 Implanted   CATH INTRATHCL ASCENDA SHRT [id number] - [id number] Implant CATH INTRATHCL ASCENDA SHRT [id number]  [employer] [id number] Left 1 Implanted       Specimens:                Specimens       ID Source Type Tests Collected By Collected At Frozen?    1 Lumbar Puncture Cerebrospinal Fluid GLUCOSE, CSF  PROTEIN, CSF  VDRL, CSF  CELL COUNT WITH DIFFERENTIAL, CSF  CULTURE, CSF   Cristopher Reza MD 9/19/23 7967     Description: around cath in abdomen              Drains: * No LDAs found *

## 2023-09-19 NOTE — ANESTHESIA POSTPROCEDURE EVALUATION
"Patient: Christine Steevn    Procedure Summary       Date: 09/19/23 Room / Location:  [facility] OR  /  [facility]    Anesthesia Start: 1410 Anesthesia Stop: 1511    Procedure: PAIN PUMP and proximal catheter REVISION, left.  Supine position. (Left) Diagnosis:       Baclofen pump failure, initial encounter      (Baclofen pump failure, initial encounter [T85.610A])    Surgeons: Cristopher Reza MD Provider: Bro Sanford CRNA    Anesthesia Type: general ASA Status: 3            Anesthesia Type: general    Vitals  Vitals Value Taken Time   BP 99/50 09/19/23 1541   Temp 97.2 °F (36.2 °C) 09/19/23 1536   Pulse 70 09/19/23 1544   Resp 11 09/19/23 1536   SpO2 95 % 09/19/23 1544   Vitals shown include unvalidated device data.        Post Anesthesia Care and Evaluation    Patient location during evaluation: PACU  Patient participation: complete - patient participated  Level of consciousness: awake and alert  Pain management: adequate    Airway patency: patent  Anesthetic complications: No anesthetic complications    Cardiovascular status: acceptable  Respiratory status: acceptable  Hydration status: acceptable    Comments: Blood pressure 105/55, pulse 66, temperature 97.2 °F (36.2 °C), resp. rate 11, height 144.7 cm (56.97\"), weight 70.5 kg (155 lb 6.8 oz), SpO2 94 %.    Pt discharged from PACU based on brian score >8    "

## 2023-09-19 NOTE — ANESTHESIA PREPROCEDURE EVALUATION
Anesthesia Evaluation     Patient summary reviewed   no history of anesthetic complications:   NPO Solid Status: > 8 hours  NPO Liquid Status: > 8 hours           Airway   Mallampati: II  TM distance: >3 FB  Neck ROM: full  Dental    (+) poor dentition    Pulmonary    (+) a smoker (Vapes), COPD,home oxygen (2L), sleep apnea  Cardiovascular   Exercise tolerance: poor (<4 METS)    (+) hypertension, past MI , CAD, CHF , hyperlipidemia  (-) dysrhythmias, cardiac stents      Neuro/Psych  (+) CVA  (-) seizures, TIA  GI/Hepatic/Renal/Endo    (+) obesity, GERD, renal disease CRI, thyroid problem   (-) liver disease, diabetes    Musculoskeletal     (+) back pain  Abdominal    Substance History      OB/GYN          Other                        Anesthesia Plan    ASA 3     general     (Patient on home oxygen, but did not bring supply with her. Room air 02 86-88%. I discussed the importance of having portable oxygen at time for discharge. Patient explains that her son can drive to their home to get her portable as she did not bring it with her today.)  intravenous induction     Anesthetic plan, risks, benefits, and alternatives have been provided, discussed and informed consent has been obtained with: patient.      CODE STATUS:

## 2023-09-19 NOTE — DISCHARGE INSTRUCTIONS
[facility] Neurosurgery    Postoperative care following insertion and/or revision of implantable device  Dear Patient,  You have recently undergone surgery (intrathecal pain pump revision) and are now ready to go home. These written instructions are intended to help you to recover quickly.  If you have ANY QUESTIONS about your condition prior to discharge please ask Dr. Reza. In particular, if you have concerns about going home discuss them now. We do not want you to go until you are completely comfortable leaving the hospital.   If you have ANY QUESTIONS about your condition after you go home call your doctor. The number is [phone] which is answered 24 hours a day. During regular working hours a  will connect you to your doctor, one of his partners, or one of our nurses. At night or on weekends the answering service will connect you with the physician on call. DO NOT HESITATE to call. We want to help you with any problems.     Deep vein thrombosis/ pulmonary embolus  Some patients who undergo surgery develop blood clots in the veins of the legs. These clots can cause pain or swelling in the legs, or may cause no obvious problem. They can break free from the legs and travel to the lungs causing shortness of breath and/or chest pain.you develop pain or swelling in your legs after surgery, call your doctor. If you develop breathing problems or chest pain after surgery, call 551.    Neurological Deficit  Neurological deficits are problems with brain function like speech difficulty, weakness, numbness, imbalance, etc. These deficits may be present before or after spine surgery. Prior to discharge your doctor will make sure that all treatment needed to help you recover from such deficits has been instituted. He will also make sure that these deficits are stable or improving. After you go home, if you think any of your spine problems are getting worse, not better, it may be a sign of bleeding,  infection, or other problems. Call your doctor. He will order tests and prescribe treatment as needed.    Activity Restrictions  In general after surgery you will be on restricted activities for several weeks to several months depending on the nature of your operation. For six weeks you should avoid heavy lifting (over 8 lbs or roughly a gallon of milk), bending, or stooping. You may be released by Dr. Reza earlier. You may return to driving when you feel comfortable enough that you can safely handle your vehicle AND you are not taking narcotic pain medications. This may be as early as 10 - 14 days, but could be longer as instructed by your neurosurgeon. After surgery you may gradually increase your activities, as you are able to tolerate. Walking is an excellent low impact exercise to begin after surgery. You should try to make regular walks of 15 to 30 minutes part of your postoperative recovery as you become able to do so. It typically requires a period of days to a few weeks to reach this level of activity and should be done in a gradual fashion. Your return to work will depend on your job requirements. In general, you may return to light duty work as soon as you feel comfortable and are not taking routine narcotic pain medications.    Medication  It is important to take your medication EXACTLY as prescribed. Some patients are reluctant to take pain medication. It is perfectly fine to take pain medication for several weeks after surgery. We want to eliminate pain whenever possible. Many pain medications can cause nausea (sick to your stomach), constipation (inability to poop), or itching. Nausea may be minimized by taking the medication with food. Constipation can be relieved by taking stool softeners and/ or laxatives that you can purchase over the counter as needed.    It is important to realize that no pain after surgery is an unrealistic expectation.  Pain medication will never reduce your pain score to  zero.  The goal of pain medicine is to reduce your pain to the point you can move, take care of yourself, and participate in therapy.  Make sure to work with your caregiver to determine what is an adequate level of pain control to promote healthy movement and then take your medication to reach this goal.      You have undergone a Intrathecal pain pump or spinal cord stimulator insertion/revision (2) surgery which you are expected to recover from over several weeks.     Use of opioids immediately following surgery is often necessary.  Pain after surgery results in decreased quality of life, surgical complications, and prolonged rehabilitation.  Thus in certain situations, the benefits of a limited course of opioids may outweigh the risk.      However, multiple studies have found that patients of all ages frequently take fewer opioid pills than the amount prescribed after common surgeries.  In some cases they do not take any of the prescribed medications at all.  This results in excess opioid pills that are accessible to others, raise a concern for misuse, can be stolen by family members, can result in later addiction, or can often be used in overdose.  Therefore we are going to make every effort to only prescribe as much pain medication as necessary to limit the amount of pills that are left over after you recover.      First-line treatment should include nonopioid analgesics.  Examples of these would be Tylenol or nonsteroidal anti-inflammatories such as ibuprofen or Aleve.  - Tylenol 1000 mg every six hours as needed    Second-line treatment. If the above first-line treatments are insufficient to control your pain you have also been provided a small dose of opioids.  In order to avoid addiction you should take the lowest amount possible.    Type II: Opioid prescription for 7 days or less (every 6 hours).  May consider an additional 7-day course (every 8 hours)    EXAMPLE IF APPLICABLE:  Please taper your pain  medications to avoid long term addiction.  Week 1: Take your pain medication no more than ever 6 hours as needed for severe pain.  Week 2: Take your pain medication no more than every 8 hours as needed for severe pain.  Week 3: Take your pain medication no more than every 12 hours as needed for unresolved pain.    These recommendations are based on ...  CDC guidelines for control and prevention of postsurgical pain,   [place] opioid prescribing engagement network (OPEN),   Mary guardado in [facility] medical directors group prescribing opioids for postoperative pain-supplemental guidance (2018)    Wound Care  Your incision is held together with dissolvable sutures that do not need to be removed.     Seventy-two hours after surgery it is OK to get the wound wet, so you can take a shower or bath.     You do not need to put any medication (like Neosporin or Vitamin E) on the wound. Scrubbing the wound should be avoided until the staples nondissolvable sutures come out.     Heating pads have the potential to cause very serious burns, especially in patients using narcotic pain medications (e.g. Oxycodone, Oxycontin, etc.) Do not use heating pads during your recovery.      No nicotine products, including second-hand smoke, gum or patches. Nicotine will delay healing and increase the likelihood of a surgical complication. For help quitting, call the Quitline: 1-370.229.6422     Potential wound problems include the following:  Infection--If the wound becomes red, tender, swollen, or warm it may be infected. Infection is often accompanied by fever. If you think your wound might be infected you should call your doctor. Often you can send us a picture of the wound so we can better evaluate it.   Drainage--Fluid should not drain from your wound. If it does, call your doctor. Colored fluid may indicate infection. Clear fluid may indicate leakage of spinal fluid.   Dehiscence--If the wound does not heal  properly it may open up along the staple line. This is called dehiscence. Call us immediately.   Sutures--Occasionally, one of the buried threads (sutures) may work through the skin. If you think this has happened call your doctor.   Swelling--Spinal fluid or blood may collect under the skin. This is usually harmless, but needs to be evaluated. Call your doctor.     How to contact your doctor  Dr. Reza and his team did your surgery and, therefore, are likely to know more about your condition than any other physicians. We are immediately available to help you with any problems after surgery. Please call us for any concerns at the following numbers:  Doctor’s office:  [phone] (answered 24 hours a day)   [facility] : [phone] (alternative emergency number for on-call neurosurgeon)     Specific instructions related to your surgery  Diet: no restrictions, eat a heart healthy diet. If you are diabetic, tightly controlling your blood sugar over the next 2 weeks is crucial in controlling infection.     Activity: as tolerated, no heavy lifting or strenuous exercise for at least 2 weeks.    Brace/collar instructions: Continue to wear abdominal binder until follow-up appointment.    Follow up:   Follow up with Otto DILLARD on 2/3/2023 for post op wound check in the neurosurgery clinic ([phone]).  We have scheduled these appointment(s) for you.          Sincerely,        Rusty Reza MD, PhD, MPH

## 2023-09-20 VITALS
OXYGEN SATURATION: 91 % | SYSTOLIC BLOOD PRESSURE: 134 MMHG | HEIGHT: 59 IN | DIASTOLIC BLOOD PRESSURE: 58 MMHG | RESPIRATION RATE: 18 BRPM | HEART RATE: 75 BPM | WEIGHT: 164.2 LBS | TEMPERATURE: 97.8 F | BODY MASS INDEX: 33.1 KG/M2

## 2023-09-20 LAB
ALBUMIN SERPL-MCNC: 3.5 G/DL (ref 3.5–5.2)
ALBUMIN/GLOB SERPL: 1.5 G/DL
ALP SERPL-CCNC: 95 U/L (ref 39–117)
ALT SERPL W P-5'-P-CCNC: 15 U/L (ref 1–33)
ANION GAP SERPL CALCULATED.3IONS-SCNC: 8 MMOL/L (ref 5–15)
AST SERPL-CCNC: 18 U/L (ref 1–32)
BASOPHILS # BLD AUTO: 0.01 10*3/MM3 (ref 0–0.2)
BASOPHILS NFR BLD AUTO: 0.3 % (ref 0–1.5)
BILIRUB SERPL-MCNC: 0.5 MG/DL (ref 0–1.2)
BUN SERPL-MCNC: 16 MG/DL (ref 8–23)
BUN/CREAT SERPL: 12.9 (ref 7–25)
CALCIUM SPEC-SCNC: 8.9 MG/DL (ref 8.6–10.5)
CHLORIDE SERPL-SCNC: 100 MMOL/L (ref 98–107)
CO2 SERPL-SCNC: 31 MMOL/L (ref 22–29)
CREAT SERPL-MCNC: 1.24 MG/DL (ref 0.57–1)
DEPRECATED RDW RBC AUTO: 52 FL (ref 37–54)
EGFRCR SERPLBLD CKD-EPI 2021: 45.8 ML/MIN/1.73
EOSINOPHIL # BLD AUTO: 0 10*3/MM3 (ref 0–0.4)
EOSINOPHIL NFR BLD AUTO: 0 % (ref 0.3–6.2)
ERYTHROCYTE [DISTWIDTH] IN BLOOD BY AUTOMATED COUNT: 15 % (ref 12.3–15.4)
GLOBULIN UR ELPH-MCNC: 2.4 GM/DL
GLUCOSE SERPL-MCNC: 160 MG/DL (ref 65–99)
HCT VFR BLD AUTO: 33.9 % (ref 34–46.6)
HGB BLD-MCNC: 10.8 G/DL (ref 12–15.9)
IMM GRANULOCYTES # BLD AUTO: 0.02 10*3/MM3 (ref 0–0.05)
IMM GRANULOCYTES NFR BLD AUTO: 0.6 % (ref 0–0.5)
LYMPHOCYTES # BLD AUTO: 0.32 10*3/MM3 (ref 0.7–3.1)
LYMPHOCYTES NFR BLD AUTO: 8.9 % (ref 19.6–45.3)
MCH RBC QN AUTO: 29.7 PG (ref 26.6–33)
MCHC RBC AUTO-ENTMCNC: 31.9 G/DL (ref 31.5–35.7)
MCV RBC AUTO: 93.1 FL (ref 79–97)
MONOCYTES # BLD AUTO: 0.18 10*3/MM3 (ref 0.1–0.9)
MONOCYTES NFR BLD AUTO: 5 % (ref 5–12)
NEUTROPHILS NFR BLD AUTO: 3.06 10*3/MM3 (ref 1.7–7)
NEUTROPHILS NFR BLD AUTO: 85.2 % (ref 42.7–76)
NRBC BLD AUTO-RTO: 0 /100 WBC (ref 0–0.2)
PLATELET # BLD AUTO: 144 10*3/MM3 (ref 140–450)
PMV BLD AUTO: 9.2 FL (ref 6–12)
POTASSIUM SERPL-SCNC: 4.6 MMOL/L (ref 3.5–5.2)
PROT SERPL-MCNC: 5.9 G/DL (ref 6–8.5)
RBC # BLD AUTO: 3.64 10*6/MM3 (ref 3.77–5.28)
SODIUM SERPL-SCNC: 139 MMOL/L (ref 136–145)
WBC NRBC COR # BLD: 3.59 10*3/MM3 (ref 3.4–10.8)

## 2023-09-20 PROCEDURE — 97165 OT EVAL LOW COMPLEX 30 MIN: CPT

## 2023-09-20 PROCEDURE — A9270 NON-COVERED ITEM OR SERVICE: HCPCS | Performed by: NURSE PRACTITIONER

## 2023-09-20 PROCEDURE — 63710000001 LEVOTHYROXINE 25 MCG TABLET: Performed by: NURSE PRACTITIONER

## 2023-09-20 PROCEDURE — 63710000001 ASPIRIN 81 MG TABLET DELAYED-RELEASE: Performed by: NURSE PRACTITIONER

## 2023-09-20 PROCEDURE — 63710000001 CYCLOBENZAPRINE 10 MG TABLET: Performed by: NURSE PRACTITIONER

## 2023-09-20 PROCEDURE — 97161 PT EVAL LOW COMPLEX 20 MIN: CPT | Performed by: PHYSICAL THERAPIST

## 2023-09-20 PROCEDURE — 63710000001 SENNOSIDES-DOCUSATE 8.6-50 MG TABLET: Performed by: NURSE PRACTITIONER

## 2023-09-20 PROCEDURE — 63710000001 ACETAMINOPHEN 325 MG TABLET: Performed by: NURSE PRACTITIONER

## 2023-09-20 PROCEDURE — 85025 COMPLETE CBC W/AUTO DIFF WBC: CPT | Performed by: NURSE PRACTITIONER

## 2023-09-20 PROCEDURE — 63710000001 FUROSEMIDE 40 MG TABLET: Performed by: NURSE PRACTITIONER

## 2023-09-20 PROCEDURE — 63710000001 AMLODIPINE 5 MG TABLET: Performed by: NURSE PRACTITIONER

## 2023-09-20 PROCEDURE — 63710000001 LISINOPRIL 10 MG TABLET: Performed by: NURSE PRACTITIONER

## 2023-09-20 PROCEDURE — 80053 COMPREHEN METABOLIC PANEL: CPT | Performed by: NURSE PRACTITIONER

## 2023-09-20 PROCEDURE — 63710000001 PANTOPRAZOLE 20 MG TABLET DELAYED-RELEASE: Performed by: NURSE PRACTITIONER

## 2023-09-20 PROCEDURE — 63710000001 ALLOPURINOL 100 MG TABLET: Performed by: NURSE PRACTITIONER

## 2023-09-20 PROCEDURE — 99024 POSTOP FOLLOW-UP VISIT: CPT | Performed by: NURSE PRACTITIONER

## 2023-09-20 PROCEDURE — 63710000001 ESCITALOPRAM 10 MG TABLET: Performed by: NURSE PRACTITIONER

## 2023-09-20 PROCEDURE — 63710000001 POLYETHYLENE GLYCOL 17 G PACK: Performed by: NURSE PRACTITIONER

## 2023-09-20 PROCEDURE — 63710000001 METOPROLOL SUCCINATE XL 25 MG TABLET SUSTAINED-RELEASE 24 HOUR: Performed by: NURSE PRACTITIONER

## 2023-09-20 RX ORDER — TRAMADOL HYDROCHLORIDE 50 MG/1
50 TABLET ORAL EVERY 6 HOURS PRN
Qty: 12 TABLET | Refills: 0 | Status: SHIPPED | OUTPATIENT
Start: 2023-09-20 | End: 2023-09-23

## 2023-09-20 RX ORDER — NALOXONE HYDROCHLORIDE 4 MG/.1ML
SPRAY NASAL
Qty: 2 EACH | Refills: 0 | Status: SHIPPED | OUTPATIENT
Start: 2023-09-20

## 2023-09-20 RX ADMIN — METOPROLOL SUCCINATE 25 MG: 25 TABLET, EXTENDED RELEASE ORAL at 08:10

## 2023-09-20 RX ADMIN — ESCITSLOPRAM 10 MG: 10 TABLET ORAL at 08:10

## 2023-09-20 RX ADMIN — AMLODIPINE BESYLATE 5 MG: 5 TABLET ORAL at 08:10

## 2023-09-20 RX ADMIN — ALLOPURINOL 100 MG: 100 TABLET ORAL at 08:10

## 2023-09-20 RX ADMIN — POLYETHYLENE GLYCOL 3350 17 G: 17 POWDER, FOR SOLUTION ORAL at 08:11

## 2023-09-20 RX ADMIN — FUROSEMIDE 40 MG: 40 TABLET ORAL at 08:10

## 2023-09-20 RX ADMIN — ACETAMINOPHEN 650 MG: 325 TABLET, FILM COATED ORAL at 08:37

## 2023-09-20 RX ADMIN — Medication 10 ML: at 08:12

## 2023-09-20 RX ADMIN — LISINOPRIL 10 MG: 10 TABLET ORAL at 08:11

## 2023-09-20 RX ADMIN — LEVOTHYROXINE SODIUM 25 MCG: 25 TABLET ORAL at 05:29

## 2023-09-20 RX ADMIN — ASPIRIN 81 MG: 81 TABLET, COATED ORAL at 08:10

## 2023-09-20 RX ADMIN — CYCLOBENZAPRINE 10 MG: 10 TABLET, FILM COATED ORAL at 08:38

## 2023-09-20 RX ADMIN — SENNOSIDES AND DOCUSATE SODIUM 2 TABLET: 50; 8.6 TABLET ORAL at 08:10

## 2023-09-20 RX ADMIN — PANTOPRAZOLE SODIUM 20 MG: 20 TABLET, DELAYED RELEASE ORAL at 08:10

## 2023-09-20 NOTE — THERAPY DISCHARGE NOTE
Acute Care - Occupational Therapy Discharge  [facility]    Patient Name: Christine Steven  : 1950    MRN: [id number]                              Today's Date: 2023       Admit Date: 2023    Visit Dx:     ICD-10-CM ICD-9-CM   1. Failed back syndrome [M96.1 (ICD-10-CM)]  M96.1 722.80   2. Baclofen pump failure, initial encounter  T85.610A 996.59     Patient Active Problem List   Diagnosis    Stage 3b chronic kidney disease    Acquired hypothyroidism    Other hyperlipidemia    Iron deficiency anemia    Other secondary hypertension    Vitamin B12 deficiency    Folate deficiency    COPD (chronic obstructive pulmonary disease)    Constipation    Class 2 severe obesity due to excess calories with serious comorbidity and body mass index (BMI) of 35.0 to 35.9 in adult    Former smoker    Failed back syndrome    Baclofen pump failure    Blood in urine    Cerebrovascular accident    Chronic hypercapnic respiratory failure    Chronic hypoxemic respiratory failure    Congestive heart failure    Systolic heart failure    Myocardial infarction    Sinus bradycardia    Swelling of lower leg    Urinary tract infectious disease    Peripheral polyneuropathy    Prediabetes    Peripheral edema    Ganglion of left wrist    Insomnia     Past Medical History:   Diagnosis Date    Cancer     throat cancer, breast    CHF (congestive heart failure)     Chronic kidney disease (CKD), stage III (moderate)     Constipation     COPD (chronic obstructive pulmonary disease)     Coronary artery disease     Diverticulitis     GERD (gastroesophageal reflux disease)     History of transfusion     Hypertension     Lung nodule     Neck fracture     Oxygen dependent     wears 2lnc    Sleep apnea     cpap or bipap unsure which one she uses also uses oxygen with this    Stroke     left side face weakness residual    Thyroid nodule      Past Surgical History:   Procedure Laterality Date    APPENDECTOMY      BACK SURGERY N/A     BRAIN SURGERY       chiari    CARDIAC CATHETERIZATION      no stents    CHOLECYSTECTOMY      COLONOSCOPY      HYSTERECTOMY      PAIN PUMP INSERTION/REVISION      PAIN PUMP INSERTION/REVISION Left 9/19/2023    Procedure: PAIN PUMP and proximal catheter REVISION, left.  Supine position.;  Surgeon: Cristopher Reza MD;  Location: [facility] OR;  Service: Neurosurgery;  Laterality: Left;    TONSILLECTOMY        General Information       Row Name 09/20/23 0838          OT Time and Intention    Document Type evaluation  Dx: Baclofen pump failure; now s/p pain pump and proximal catheter revision 9/19  -     Mode of Treatment occupational therapy  -       Row Name 09/20/23 0838          General Information    Patient Profile Reviewed yes  -LS     Prior Level of Function independent:;ADL's;all household mobility;dependent:;cleaning;driving;shopping  Utilized a rollator for fxl ambulation.  -     Existing Precautions/Restrictions fall;spinal;oxygen therapy device and L/min  abdombinal binder, 2L  -LS       Row Name 09/20/23 0838          Occupational Profile    Environmental Supports and Barriers (Occupational Profile) Tub shower combination with shower chair and grab bars. Grab bars next to toilet. Sleeps in a hospital bed. Other AD/DME: rollator  -LS       Row Name 09/20/23 0838          Living Environment    People in Home child(todd), adult;grandchild(todd)  -LS       Row Name 09/20/23 0838          Home Main Entrance    Number of Stairs, Main Entrance two  -LS     Stair Railings, Main Entrance railings on both sides of stairs  -LS       Row Name 09/20/23 0838          Stairs Within Home, Primary    Number of Stairs, Within Home, Primary none  -LS       Row Name 09/20/23 0838          Cognition    Orientation Status (Cognition) oriented x 4  -LS       Row Name 09/20/23 0838          Safety Issues, Functional Mobility    Safety Issues Affecting Function (Mobility) judgment;safety precautions follow-through/compliance;safety  precaution awareness;at risk behavior observed;insight into deficits/self-awareness  -     Impairments Affecting Function (Mobility) balance;endurance/activity tolerance  -               User Key  (r) = Recorded By, (t) = Taken By, (c) = Cosigned By      Initials Name Provider Type    Katja Ellington OTR/L Occupational Therapist                   Mobility/ADL's       Row Name 09/20/23 0838          Transfers    Transfers sit-stand transfer;stand-sit transfer;bed-chair transfer  -               User Key  (r) = Recorded By, (t) = Taken By, (c) = Cosigned By      Initials Name Provider Type    Katja Ellington OTR/L Occupational Therapist                   Obj/Interventions       Row Name 09/20/23 0838          Sensory Assessment (Somatosensory)    Sensory Assessment Pt reports numbness/tingling along median nerve distribution in L hand; BUE sensation otherwise intact  -       Row Name 09/20/23 0838          Vision Assessment/Intervention    Visual Impairment/Limitations WFL;corrective lenses for reading  -       Row Name 09/20/23 0838          Range of Motion Comprehensive    General Range of Motion bilateral upper extremity ROM WNL  -       Row Name 09/20/23 0838          Strength Comprehensive (MMT)    Comment, General Manual Muscle Testing (MMT) Assessment BUE strength grossly 5/5.  -       Row Name 09/20/23 0838          Balance    Balance Assessment sitting static balance;sitting dynamic balance;standing static balance;standing dynamic balance  -LS     Static Sitting Balance independent  -LS     Dynamic Sitting Balance independent  -LS     Position, Sitting Balance sitting edge of bed  -LS     Static Standing Balance supervision  -LS     Dynamic Standing Balance supervision  -LS     Position/Device Used, Standing Balance unsupported  -LS               User Key  (r) = Recorded By, (t) = Taken By, (c) = Cosigned By      Initials Name Provider Type    Katja Ellington OTR/L Occupational Therapist                    Goals/Plan    No documentation.                  Clinical Impression       Row Name 09/20/23 0838          Pain Assessment    Pretreatment Pain Rating 5/10  -LS     Posttreatment Pain Rating 5/10  -     Pain Location - abdomen  -LS       Row Name 09/20/23 0838          Plan of Care Review    Plan of Care Reviewed With patient  -LS     Progress no change  -LS     Outcome Evaluation OT eval completed. Pt seated EOB upon therapist arrival; A&Ox4; No pain reported; not wearing supplemental O2 per Pt choice, however, Pt reports that she does wear it all the time at home; SpO2 in low 90s throughout eval. Pt noted to be very impulsive and performed sit>stand prior to therapist's cue with supervision. Pt ambulated short distance to recliner utilizing no AD with supervision. Pt reports that she has been taking herself to the BR and performing all BADLs in room I. BUE strength WNL. Skilled OT intervention not indicated at this time. Recommend Pt to return home with assist at discharge.  -       Row Name 09/20/23 0838          Therapy Assessment/Plan (OT)    Criteria for Skilled Therapeutic Interventions Met (OT) no;does not meet criteria for skilled intervention  -LS     Therapy Frequency (OT) evaluation only  -LS       Row Name 09/20/23 0838          Therapy Plan Review/Discharge Plan (OT)    Anticipated Discharge Disposition (OT) home with assist  -       Row Name 09/20/23 0838          Positioning and Restraints    Pre-Treatment Position in bed  -LS     Post Treatment Position chair  -LS     In Chair sitting;call light within reach;encouraged to call for assist  abdominal binder in place  -LS               User Key  (r) = Recorded By, (t) = Taken By, (c) = Cosigned By      Initials Name Provider Type    Katja Ellington OTR/L Occupational Therapist                   Outcome Measures       Row Name 09/20/23 0838          How much help from another is currently needed...    Putting on and taking off  regular lower body clothing? 4  -LS     Bathing (including washing, rinsing, and drying) 4  -LS     Toileting (which includes using toilet bed pan or urinal) 4  -LS     Putting on and taking off regular upper body clothing 4  -LS     Taking care of personal grooming (such as brushing teeth) 4  -LS     Eating meals 4  -LS     AM-PAC 6 Clicks Score (OT) 24  -LS       Row Name 09/20/23 0835 09/20/23 0722       How much help from another person do you currently need...    Turning from your back to your side while in flat bed without using bedrails? 4  -MS 3  -SP    Moving from lying on back to sitting on the side of a flat bed without bedrails? 4  -MS 3  -SP    Moving to and from a bed to a chair (including a wheelchair)? 4  -MS 3  -SP    Standing up from a chair using your arms (e.g., wheelchair, bedside chair)? 4  -MS 3  -SP    Climbing 3-5 steps with a railing? 3  -MS 2  -SP    To walk in hospital room? 4  -MS 2  -SP    AM-PAC 6 Clicks Score (PT) 23  -MS 16  -SP    Highest level of mobility 7 --> Walked 25 feet or more  -MS 5 --> Static standing  -SP      Row Name 09/20/23 0838 09/20/23 0835       Functional Assessment    Outcome Measure Options AM-PAC 6 Clicks Daily Activity (OT)  -LS AM-PAC 6 Clicks Basic Mobility (PT)  -MS              User Key  (r) = Recorded By, (t) = Taken By, (c) = Cosigned By      Initials Name Provider Type    MS Ibrahim Sandra R, PT, DPT, NCS Physical Therapist    Aj Stark, RN Registered Nurse    Katja Ellington, OTR/L Occupational Therapist                  Occupational Therapy Education       Title: PT OT SLP Therapies (In Progress)       Topic: Occupational Therapy (In Progress)       Point: ADL training (Not Started)       Description:   Instruct learner(s) on proper safety adaptation and remediation techniques during self care or transfers.   Instruct in proper use of assistive devices.                  Learner Progress:  Not documented in this visit.              Point:  Home exercise program (Not Started)       Description:   Instruct learner(s) on appropriate technique for monitoring, assisting and/or progressing therapeutic exercises/activities.                  Learner Progress:  Not documented in this visit.              Point: Precautions (Done)       Description:   Instruct learner(s) on prescribed precautions during self-care and functional transfers.                  Learning Progress Summary             Patient Acceptance, E, VU by BENNETT at 9/20/2023 1127                         Point: Body mechanics (Not Started)       Description:   Instruct learner(s) on proper positioning and spine alignment during self-care, functional mobility activities and/or exercises.                  Learner Progress:  Not documented in this visit.                              User Key       Initials Effective Dates Name Provider Type Discipline    BENNETT 06/20/22 -  Katja Schofield, OTR/L Occupational Therapist OT                  OT Recommendation and Plan  Therapy Frequency (OT): evaluation only  Plan of Care Review  Plan of Care Reviewed With: patient  Progress: no change  Outcome Evaluation: OT eval completed. Pt seated EOB upon therapist arrival; A&Ox4; No pain reported; not wearing supplemental O2 per Pt choice, however, Pt reports that she does wear it all the time at home; SpO2 in low 90s throughout eval. Pt noted to be very impulsive and performed sit>stand prior to therapist's cue with supervision. Pt ambulated short distance to recliner utilizing no AD with supervision. Pt reports that she has been taking herself to the BR and performing all BADLs in room I. BUE strength WNL. Skilled OT intervention not indicated at this time. Recommend Pt to return home with assist at discharge.  Plan of Care Reviewed With: patient  Outcome Evaluation: OT eval completed. Pt seated EOB upon therapist arrival; A&Ox4; No pain reported; not wearing supplemental O2 per Pt choice, however, Pt reports that she does  wear it all the time at home; SpO2 in low 90s throughout eval. Pt noted to be very impulsive and performed sit>stand prior to therapist's cue with supervision. Pt ambulated short distance to recliner utilizing no AD with supervision. Pt reports that she has been taking herself to the BR and performing all BADLs in room I. BUE strength WNL. Skilled OT intervention not indicated at this time. Recommend Pt to return home with assist at discharge.     Time Calculation:         Time Calculation- OT       Row Name 09/20/23 0838             Time Calculation- OT    OT Start Time 0838  +10 minutes chart review  -LS      OT Stop Time 0858  -      OT Time Calculation (min) 20 min  -      OT Non-Billable Time (min) 30 min  -      OT Received On 09/20/23  -                User Key  (r) = Recorded By, (t) = Taken By, (c) = Cosigned By      Initials Name Provider Type     Katja Schofield OTR/L Occupational Therapist                  Therapy Charges for Today       Code Description Service Date Service Provider Modifiers Qty    [id number]  OT EVAL LOW COMPLEXITY 2 9/20/2023 Katja Schofield OTR/L GO 1               OT Discharge Summary  Anticipated Discharge Disposition (OT): home with assist  Reason for Discharge: At baseline function  Outcomes Achieved: Other (Eval only)  Discharge Destination: Home with assist    HAYDEN Suero/DYLAN  9/20/2023

## 2023-09-20 NOTE — DISCHARGE SUMMARY
DISCHARGE SUMMARY FROM HOSPITAL    Date of Discharge:  9/20/2023    Presenting Diagnosis/History of Present Illness  Baclofen pump failure, initial encounter [T85.610A]  Failed back syndrome [M96.1]  Baclofen pump failure [T85.610A]    Medical History   Patient Active Problem List   Diagnosis    Stage 3b chronic kidney disease    Acquired hypothyroidism    Other hyperlipidemia    Iron deficiency anemia    Other secondary hypertension    Vitamin B12 deficiency    Folate deficiency    COPD (chronic obstructive pulmonary disease)    Constipation    Class 2 severe obesity due to excess calories with serious comorbidity and body mass index (BMI) of 35.0 to 35.9 in adult    Former smoker    Failed back syndrome    Baclofen pump failure    Blood in urine    Cerebrovascular accident    Chronic hypercapnic respiratory failure    Chronic hypoxemic respiratory failure    Congestive heart failure    Systolic heart failure    Myocardial infarction    Sinus bradycardia    Swelling of lower leg    Urinary tract infectious disease    Peripheral polyneuropathy    Prediabetes    Peripheral edema    Ganglion of left wrist    Insomnia     Discharge Diagnosis/ Active Hospital Problems:   Active Hospital Problems    Diagnosis     **Baclofen pump failure     Failed back syndrome      Hospital Course  Patient is a 73 y.o. female presented with a significant medical history of hyperlipidemia, hypertension, B12 deficiency, class II obesity, stage IIIb chronic kidney disease, iron deficiency, COPD and former smoker. She presents with a new problem of persistent axial back pain with recent pain pump evaluation showing leakage of contrast into her pocket. Physical exam findings of sequela of prior surgeries.  Well-healed left abdominal pocket.  No evidence of baclofen withdrawal.      On 9/19/2023 the patient was brought to the main operating room where she underwent an uneventful left baclofen pump revision.  Postoperatively she did extremely  well and her neurological exam improved.  She was kept in the hospital for close neurologic monitoring, airway observation, and for pain control.  She has been able to ambulate, tolerate oral diet, oral pain medications, void, and felt a significant improvement in lower extremity pain .   She has been evaluated by physical therapy and occupational therapy and deemed safe for discharge home with family.   Postoperative education was performed at bedside which included wound care instructions, maximal physical activity, use of postoperative pain medication including tapering, and indications for contacting the neurosurgical office vs the emergency department.  Arrangements for postoperative follow-up should be provided prior to hospital discharge.  She will be provided with an appropriate course of oral medication for pain control.  Additional information provided in hospital discharge instructions.    Procedures Performed  Procedure(s):  PAIN PUMP and proximal catheter REVISION, left.  Supine position.     Consults:   Consults       No orders found for last 30 day(s).          Pertinent Test Results:   CT Lumbar Spine Without Contrast    Result Date: 9/15/2023  IMPRESSION: Posterior fusion hardware of the L4-5 level. Fused spinous processes involving L1-L2. Anterolisthesis of L3 relative to L4. Moderate multilevel degenerative lumbar spondylosis. Small infrarenal abdominal aortic aneurysm.       XR Chest 1 View    Result Date: 9/8/2023   No acute cardiopulmonary abnormality.   This report was finalized on 09/08/2023 15:01 by  Gaurav Batres DO.    CT Spine Thoracic Without Contrast    Result Date: 9/15/2023  IMPRESSION: No acute osseous abnormality. Intrathecal catheter terminating posterior to T9 vertebra. Fused lower cervical segments.        CBC:   Results from last 7 days   Lab Units 09/20/23  0521   WBC 10*3/mm3 3.59   HEMOGLOBIN g/dL 10.8*   HEMATOCRIT % 33.9*   PLATELETS 10*3/mm3 144     BMP:  Results from  last 7 days   Lab Units 09/20/23  0521   SODIUM mmol/L 139   POTASSIUM mmol/L 4.6   CHLORIDE mmol/L 100   CO2 mmol/L 31.0*   BUN mg/dL 16   CREATININE mg/dL 1.24*   GLUCOSE mg/dL 160*   CALCIUM mg/dL 8.9   ALT (SGPT) U/L 15     Culture Results: No results found for: BLOODCX, URINECX, WOUNDCX, MRSACX, RESPCX, STOOLCX    Condition on Discharge:  Stable    Vital Signs  Temp:  [97.2 °F (36.2 °C)-98.3 °F (36.8 °C)] 97.8 °F (36.6 °C)  Heart Rate:  [65-84] 75  Resp:  [11-18] 18  BP: (100-136)/(44-78) 134/58    Physical Exam:   Physical Exam  Vitals and nursing note reviewed.   Constitutional:       General: She is not in acute distress.     Appearance: Normal appearance. She is well-developed and well-groomed. She is obese. She is not ill-appearing, toxic-appearing or diaphoretic.          Comments: BMI 33.16   HENT:      Head: Normocephalic and atraumatic.      Right Ear: Hearing normal.      Left Ear: Hearing normal.   Eyes:      Extraocular Movements: EOM normal.      Conjunctiva/sclera: Conjunctivae normal.      Pupils: Pupils are equal, round, and reactive to light.   Neck:      Trachea: Trachea normal.   Cardiovascular:      Rate and Rhythm: Normal rate and regular rhythm.   Pulmonary:      Effort: Pulmonary effort is normal. No tachypnea, bradypnea, accessory muscle usage or respiratory distress.   Abdominal:      Palpations: Abdomen is soft.   Musculoskeletal:      Cervical back: Full passive range of motion without pain and neck supple.   Skin:     General: Skin is warm and dry.   Neurological:      Mental Status: She is alert and oriented to person, place, and time.      GCS: GCS eye subscore is 4. GCS verbal subscore is 5. GCS motor subscore is 6.   Psychiatric:         Speech: Speech normal.         Behavior: Behavior normal. Behavior is cooperative.        Neurologic Exam     Mental Status   Oriented to person, place, and time.   Attention: normal. Concentration: normal.   Speech: speech is normal   Level of  consciousness: alert    Cranial Nerves     CN II   Visual fields full to confrontation.     CN III, IV, VI   Pupils are equal, round, and reactive to light.  Extraocular motions are normal.     CN V   Facial sensation intact.     CN VII   Facial expression full, symmetric.     CN VIII   CN VIII normal.     CN IX, X   CN IX normal.     CN XI   CN XI normal.     Motor Exam   Right arm tone: normal  Left arm tone: normal  Right leg tone: normal  Left leg tone: normal    Strength   Right deltoid: 5/5  Left deltoid: 5/5  Right biceps: 5/5  Left biceps: 5/5  Right triceps: 5/5  Left triceps: 5/5  Right wrist extension: 5/5  Left wrist extension: 5/5  Right iliopsoas: 5/5  Left iliopsoas: 5/5  Right quadriceps: 5/5  Left quadriceps: 5/5  Right anterior tibial: 5/5  Left anterior tibial: 5/5  Right gastroc: 5/5  Left gastroc: 5/5  Right EHL 5/5  Left EHL 5/5       Sensory Exam   Right arm light touch: normal  Left arm light touch: normal  Right leg light touch: normal  Left leg light touch: normal    Gait, Coordination, and Reflexes     Tremor   Resting tremor: absent  Intention tremor: absent  Action tremor: absent    Discharge Disposition  Home or Self Care    Discharge Medications     Discharge Medications        New Medications        Instructions Start Date   naloxone 4 MG/0.1ML nasal spray  Commonly known as: NARCAN   Call 911. Don't prime. Fine in 1 nostril for overdose. Repeat in 2-3 minutes in other nostril if no or minimal breathing/responsiveness.      traMADol 50 MG tablet  Commonly known as: ULTRAM   50 mg, Oral, Every 6 Hours PRN             Continue These Medications        Instructions Start Date   albuterol sulfate  (90 Base) MCG/ACT inhaler  Commonly known as: PROVENTIL HFA;VENTOLIN HFA;PROAIR HFA   2 puffs, Inhalation, Every 4 Hours PRN      albuterol (2.5 MG/3ML) 0.083% nebulizer solution  Commonly known as: PROVENTIL   2.5 mg, Nebulization, Every 4 Hours PRN      allopurinol 100 MG  tablet  Commonly known as: ZYLOPRIM   100 mg, Oral, Daily      amLODIPine 5 MG tablet  Commonly known as: NORVASC   Take 1 tablet by mouth Daily.      Aspirin Low Dose 81 MG EC tablet  Generic drug: aspirin   81 mg, Oral, Daily      atorvastatin 40 MG tablet  Commonly known as: LIPITOR   40 mg, Oral, Every Night at Bedtime      baclofen 10 MG/20ML injection  Commonly known as: LIORESAL   237.53 mcg, Intrathecal, Once, Pain pump      betamethasone dipropionate 0.05 % cream   1 application , Topical, 2 Times Daily      bisacodyl 10 MG suppository  Commonly known as: DULCOLAX   10 mg, Rectal, Daily      cyclobenzaprine 10 MG tablet  Commonly known as: FLEXERIL   10 mg, Oral, 3 Times Daily PRN      D3-50 1.25 MG (41236 UT) capsule  Generic drug: cholecalciferol   50,000 Units, Oral, Every 14 Days      escitalopram 10 MG tablet  Commonly known as: LEXAPRO   Oral, Daily      ezetimibe 10 MG tablet  Commonly known as: ZETIA   10 mg, Oral, Every Night at Bedtime      fluticasone 50 MCG/ACT nasal spray  Commonly known as: FLONASE   2 sprays, Nasal, Daily      folic acid 1 MG tablet  Commonly known as: FOLVITE   1 tablet, Oral, Daily      furosemide 40 MG tablet  Commonly known as: LASIX   40 mg, Oral, Daily      Ericka-roxanna 8.6 MG tablet  Generic drug: senna   Take 1 tablet (8.6 mg total) by mouth 2 (two) times daily.      HYDROmorphone 2 MG/ML injection  Commonly known as: DILAUDID   1.3573 mg, Intravenous      ipratropium-albuterol 0.5-2.5 mg/3 ml nebulizer  Commonly known as: DUO-NEB   3 mL, Nebulization, Every 4 Hours PRN      levothyroxine 25 MCG tablet  Commonly known as: SYNTHROID, LEVOTHROID   TAKE 1 TABLET BY MOUTH BEFORE BREAKFAST.      linaclotide 145 MCG capsule capsule  Commonly known as: LINZESS   Take 1 capsule every day by oral route.      lisinopril 10 MG tablet  Commonly known as: PRINIVIL,ZESTRIL   lisinopril 10 mg tablet   TAKE ONE TABLET BY MOUTH ONE TIME DAILY      metoprolol succinate XL 25 MG 24 hr  tablet  Commonly known as: TOPROL-XL   Take 1 tablet by mouth Daily.      nystatin 000137 UNIT/GM powder  Commonly known as: MYCOSTATIN   APPLY TO AFFECTED AREA EVERY DAY AND AS NEEDED      pantoprazole 20 MG EC tablet  Commonly known as: PROTONIX   20 mg, Oral, Every Morning Before Breakfast      potassium chloride 10 MEQ CR tablet   1 tablet, Oral, Daily      sennosides-docusate 8.6-50 MG per tablet  Commonly known as: PERICOLACE   1 tablet, Oral, 2 Times Daily PRN      tiZANidine 4 MG tablet  Commonly known as: ZANAFLEX   Take 1 tablet by mouth Every 8 (Eight) Hours As Needed.      triamcinolone 0.1 % cream  Commonly known as: KENALOG   APPLY TO AFFECTED AREA TWICE A DAY FOR DERMATITIS             ASK your doctor about these medications        Instructions Start Date   ALPRAZolam 1 MG tablet  Commonly known as: XANAX   1 mg, Oral, 30 min pre-op, Take 2nd tablet when arriving in radiology department. **MUST HAVE A **      traZODone 50 MG tablet  Commonly known as: DESYREL   50 mg, Oral, Nightly      trimethoprim 100 MG tablet  Commonly known as: TRIMPEX   Take 1 tablet (100 mg total) by mouth daily.               Discharge Diet:   Diet Instructions       Advance Diet As Tolerated -Target Diet: regular diet.  Advance as tolerated      Target Diet: regular diet.  Advance as tolerated             Activity at Discharge:   Activity Instructions       Activity as Tolerated      Bathing Restrictions      May shower 72 hours postoperatively.  No tub baths.  Showers only.  Allow clean soapy water to cleanse wound.  Do not scrub incision.    Type of Restriction: Bathing    Bathing Restrictions: No Tub Bath    Driving Restrictions      Type of Restriction: Driving    Driving Restrictions: No Driving While Taking Narcotics    Gradually Increase Activity Until at Pre-Hospitalization Level      Lifting Restrictions      Type of Restriction: Lifting    Lifting Restrictions: Lifting Restriction (Indicate Limit)    Weight  Limit (Pounds): 8    Length of Lifting Restriction: 2-3 WEEKS             Follow-up Appointments  Future Appointments   Date Time Provider Department Center   9/26/2023  1:30 PM PAD CT 1 [facility] PAD [facility]   10/3/2023  9:30 AM Otto Jhaveri APRN [facility]   11/20/2023  9:30 AM Otto Jhaveri APRN [facility]     Additional Instructions for the Follow-ups that You Need to Schedule       Call MD With Problems / Concerns   As directed      Instructions: Call for worsening pain or a concern for a postoperative incision infection (increased incisional redness, swelling, warmth, or drainage/discharge).    Order Comments: Instructions: Call for worsening pain or a concern for a postoperative incision infection (increased incisional redness, swelling, warmth, or drainage/discharge).         Discharge Follow-up with Specified Provider: Dr. Reza; 6 Weeks   As directed      To: Dr. Reza   Follow Up: 6 Weeks   Follow Up Details: 6-8 WEEKS        Discharge Follow-up with Specified Provider: NISHANT Barrow; 2 Weeks   As directed      To: NISHANT Barrow   Follow Up: 2 Weeks   Follow Up Details: Postop wound check              Test Results Pending at Discharge  Pending Labs       Order Current Status    VDRL, CSF - Cerebrospinal Fluid, Abdomen, Left In process    Culture, CSF - Cerebrospinal Fluid, Abdomen, Left Preliminary result          NISHANT Retana  09/20/23  08:37 CDT    [id number]

## 2023-09-20 NOTE — PLAN OF CARE
Problem: Adult Inpatient Plan of Care  Goal: Plan of Care Review  Outcome: Ongoing, Progressing  Flowsheets (Taken 9/20/2023 7076)  Progress: no change  Plan of Care Reviewed With: patient  Outcome Evaluation: Pt is A&Ox4. Complaints of muscle cramps relieved by PRN meds. Up with standby assist to BR. ABD binder in place. VSS. Safety maintained.

## 2023-09-20 NOTE — PROGRESS NOTES
NEUROSURGERY DAILY PROGRESS NOTE    ASSESSMENT:   Christine Steven is a 73 y.o. with  a significant comorbidity of hyperlipidemia, hypertension, B12 deficiency, class II obesity, stage IIIb chronic kidney disease, iron deficiency, COPD and former smoker. She presents with a new problem of persistent axial back pain with recent pain pump evaluation showing leakage of contrast into her pocket. Physical exam findings of sequela of prior surgeries.  Well-healed left abdominal pocket.  No evidence of baclofen withdrawal.     Past Medical History:   Diagnosis Date    Cancer     throat cancer, breast    CHF (congestive heart failure)     Chronic kidney disease (CKD), stage III (moderate)     Constipation     COPD (chronic obstructive pulmonary disease)     Coronary artery disease     Diverticulitis     GERD (gastroesophageal reflux disease)     History of transfusion     Hypertension     Lung nodule     Neck fracture     Oxygen dependent     wears 2lnc    Sleep apnea     cpap or bipap unsure which one she uses also uses oxygen with this    Stroke     left side face weakness residual    Thyroid nodule      Active Hospital Problems    Diagnosis     **Baclofen pump failure     Failed back syndrome      PLAN:   Neuro: Stable.  Lower extremity strength at baseline.  Good tone    1 Day Post-Op (9/19/2023) baclofen pump revision   Neurochecks every 4 hours.  Call for decline   Abdominal binder on at all times    CV: No acute issues.   Pulm: Maintaining O2 sat.  Continuous pulse oximetry.  Incentive spirometry.   : Voiding spontaneously.  FEN: Tolerating regular diet.   GI: No acute issues.    ID: 23-hour postoperative prophylactic antibiotics.    Heme:  DVT prophylaxis; SCD's  Pain: Tolerable at present with oral meds.     Dispo: PT/OT.      DC home today    CHIEF COMPLAINT:   Persistent axial back pain    Subjective  Symptom stable.  Doing well    Temp:  [97.2 °F (36.2 °C)-98.3 °F (36.8 °C)] 97.8 °F (36.6 °C)  Heart Rate:   "[65-84] 75  Resp:  [11-18] 18  BP: (100-136)/(44-78) 134/58    Objective:  General Appearance:  Well-appearing and in no acute distress.    Vital signs: (most recent): Blood pressure 134/58, pulse 75, temperature 97.8 °F (36.6 °C), temperature source Oral, resp. rate 18, height 149.9 cm (59\"), weight 74.5 kg (164 lb 3.2 oz), SpO2 91 %.      Neurologic Exam     Mental Status   Oriented to person, place, and time.   Attention: normal. Concentration: normal.   Speech: speech is normal   Level of consciousness: alert    Cranial Nerves     CN II   Visual acuity: normal    CN III, IV, VI   Pupils are equal, round, and reactive to light.  Extraocular motions are normal.   Diplopia: none    CN V   Facial sensation intact.   Right corneal reflex: normal  Left corneal reflex: normal    CN VII   Right facial weakness: none  Left facial weakness: none    CN VIII   Hearing: intact    CN IX, X   Palate: symmetric  Right gag reflex: normal  Left gag reflex: normal    CN XI   Right trapezius strength: normal  Left trapezius strength: normal    CN XII   Tongue deviation: none    Motor Exam   Right arm tone: normal  Left arm tone: normal  Right arm pronator drift: absent  Left arm pronator drift: absent  Right leg tone: normal  Left leg tone: normal    Strength   Right deltoid: 5/5  Left deltoid: 5/5  Right biceps: 5/5  Left biceps: 5/5  Right triceps: 5/5  Left triceps: 5/5  Right interossei: 5/5  Left interossei: 5/5  Right iliopsoas: 5/5  Left iliopsoas: 4/5  Right quadriceps: 5/5  Left quadriceps: 5/5  Right anterior tibial: 5/5  Left anterior tibial: 5/5  Right gastroc: 5/5  Left gastroc: 5/5  Right EHL 5/5   Left EHL 5/5     Sensory Exam   Right arm light touch: normal  Left arm light touch: normal  Right leg light touch: normal  Left leg light touch: normal  Proprioception normal.   Sensory deficit distribution on right: S1  Sensory deficit distribution on left: S1    Gait, Coordination, and Reflexes     Tremor   Resting " tremor: absent  Intention tremor: absent  Action tremor: absent    Drains: * No LDAs found *    Output by Drain (mL) 09/19/23 0701 - 09/19/23 1900 09/19/23 1901 - 09/20/23 0700 09/20/23 0701 - 09/20/23 0833 Range Total   Patient has no LDAs of requested type attached.       Imaging Results (Last 24 Hours)       Procedure Component Value Units Date/Time    XR Spine Thoracic 2 View [[id number]] Collected: 09/19/23 1635     Updated: 09/19/23 1639    Narrative:      EXAMINATION: XR SPINE THORACIC 2 VW-  9/19/2023 4:35 PM CDT     HISTORY: Pain pump revision.     Fluoroscopy time: 14.9 seconds.     Dose: 4.5383 mGy. 2 images     FINDINGS: C arm was used intraoperatively during performance of a pain  pump revision. A catheter tip projects over the lower thoracic spine  within the midline. The films are available to the OR for review.     This report was signed and finalized on 9/19/2023 4:36 PM CDT by Dr. Varun Villela MD.       FL C Arm During Surgery [[id number]] Resulted: 09/19/23 1453     Updated: 09/19/23 1453    Narrative:      This procedure was auto-finalized with no dictation required.          Lab Results (last 24 hours)       Procedure Component Value Units Date/Time    Culture, CSF - Cerebrospinal Fluid, Abdomen, Left [[id number]] Collected: 09/19/23 1439    Specimen: Cerebrospinal Fluid from Abdomen, Left Updated: 09/20/23 0818     CSF Culture No growth     Gram Stain No WBCs or organisms seen    Comprehensive Metabolic Panel [[id number]]  (Abnormal) Collected: 09/20/23 0521    Specimen: Blood Updated: 09/20/23 0619     Glucose 160 mg/dL      BUN 16 mg/dL      Creatinine 1.24 mg/dL      Sodium 139 mmol/L      Potassium 4.6 mmol/L      Chloride 100 mmol/L      CO2 31.0 mmol/L      Calcium 8.9 mg/dL      Total Protein 5.9 g/dL      Albumin 3.5 g/dL      ALT (SGPT) 15 U/L      AST (SGOT) 18 U/L      Alkaline Phosphatase 95 U/L      Total Bilirubin 0.5 mg/dL      Globulin 2.4 gm/dL      A/G Ratio 1.5 g/dL       BUN/Creatinine Ratio 12.9     Anion Gap 8.0 mmol/L      eGFR 45.8 mL/min/1.73     Narrative:      GFR Normal >60  Chronic Kidney Disease <60  Kidney Failure <15    The GFR formula is only valid for adults with stable renal function between ages 18 and 70.    CBC Auto Differential [[id number]]  (Abnormal) Collected: 09/20/23 0521    Specimen: Blood Updated: 09/20/23 0558     WBC 3.59 10*3/mm3      RBC 3.64 10*6/mm3      Hemoglobin 10.8 g/dL      Hematocrit 33.9 %      MCV 93.1 fL      MCH 29.7 pg      MCHC 31.9 g/dL      RDW 15.0 %      RDW-SD 52.0 fl      MPV 9.2 fL      Platelets 144 10*3/mm3      Neutrophil % 85.2 %      Lymphocyte % 8.9 %      Monocyte % 5.0 %      Eosinophil % 0.0 %      Basophil % 0.3 %      Immature Grans % 0.6 %      Neutrophils, Absolute 3.06 10*3/mm3      Lymphocytes, Absolute 0.32 10*3/mm3      Monocytes, Absolute 0.18 10*3/mm3      Eosinophils, Absolute 0.00 10*3/mm3      Basophils, Absolute 0.01 10*3/mm3      Immature Grans, Absolute 0.02 10*3/mm3      nRBC 0.0 /100 WBC     Protein, CSF - Cerebrospinal Fluid, Abdomen, Left [[id number]]  (Normal) Collected: 09/19/23 1439    Specimen: Cerebrospinal Fluid from Abdomen, Left Updated: 09/19/23 1514     Protein, Total (CSF) 31.5 mg/dL     Glucose, CSF - Cerebrospinal Fluid, Abdomen, Left [[id number]]  (Normal) Collected: 09/19/23 1439    Specimen: Cerebrospinal Fluid from Abdomen, Left Updated: 09/19/23 1514     Glucose, CSF 56 mg/dL     Cell Count With Differential, CSF [[id number]]  (Abnormal) Collected: 09/19/23 1439    Specimen: Cerebrospinal Fluid from Lumbar Puncture Updated: 09/19/23 1512    Narrative:      The following orders were created for panel order Cell Count With Differential, CSF.  Procedure                               Abnormality         Status                     ---------                               -----------         ------                     Cell Count, CSF - Cerebr...[[id number]]  Abnormal            Final result                  Please view results for these tests on the individual orders.    Cell Count, CSF - Cerebrospinal Fluid, Lumbar Puncture [[id number]]  (Abnormal) Collected: 09/19/23 1439    Specimen: Cerebrospinal Fluid from Lumbar Puncture Updated: 09/19/23 1512     Color, CSF Colorless     Supernatant Color, CSF Colorless     Appearance, CSF Clear     RBC, CSF 2 /mm3      Nucleated Cells, CSF 0 /mm3      Volume, CSF 2.0 mL      Tube Number, CSF 1     Method: Hemacytometer Method    Narrative:      Differential not indicated.    VDRL, CSF - Cerebrospinal Fluid, Abdomen, Left [[id number]] Collected: 09/19/23 1439    Specimen: Cerebrospinal Fluid from Abdomen, Left Updated: 09/19/23 1448            [id number]  Otto Jhaveri, APRN

## 2023-09-20 NOTE — PLAN OF CARE
Problem: Adult Inpatient Plan of Care  Goal: Plan of Care Review  9/20/2023 1007 by Aj Browne, RN  Outcome: Met  Flowsheets (Taken 9/20/2023 1007)  Plan of Care Reviewed With: patient  9/20/2023 0711 by Aj Browne, RN  Outcome: Ongoing, Progressing  Flowsheets (Taken 9/20/2023 0711)  Plan of Care Reviewed With: patient   Goal Outcome Evaluation:  Plan of Care Reviewed With: patient              Patient is alert and oriented x 4. VSS. RA. Incision site CDI . Abdomen binder continue. Mild pain, PRN pain med given with good relief. 1 person assist with BR. Voiding. LBM last night per patient. Tolerated eating and drinking well. Safety precautions maintained. Reviewed discharge education with patient. Education provided on medication, follow up appointments, infection prevention on incision site. Restrictions on weight, driving, lifting and shower. Verbalized understanding. Will remove piror to discharge. Son on the way to pick her up.

## 2023-09-20 NOTE — PLAN OF CARE
Goal Outcome Evaluation:  Plan of Care Reviewed With: patient        Progress: no change  Outcome Evaluation: OT eval completed. Pt seated EOB upon therapist arrival; A&Ox4; No pain reported; not wearing supplemental O2 per Pt choice, however, Pt reports that she does wear it all the time at home; SpO2 in low 90s throughout eval. Pt noted to be very impulsive and performed sit>stand prior to therapist's cue with supervision. Pt ambulated short distance to recliner utilizing no AD with supervision. Pt reports that she has been taking herself to the BR and performing all BADLs in room I. BUE strength WNL. Skilled OT intervention not indicated at this time. Recommend Pt to return home with assist at discharge.      Anticipated Discharge Disposition (OT): home with assist

## 2023-09-20 NOTE — THERAPY DISCHARGE NOTE
Patient Name: Christine Steven  : 1950    MRN: [id number]                              Today's Date: 2023       Admit Date: 2023    Visit Dx:     ICD-10-CM ICD-9-CM   1. Failed back syndrome [M96.1 (ICD-10-CM)]  M96.1 722.80   2. Baclofen pump failure, initial encounter  T85.610A 996.59     Patient Active Problem List   Diagnosis    Stage 3b chronic kidney disease    Acquired hypothyroidism    Other hyperlipidemia    Iron deficiency anemia    Other secondary hypertension    Vitamin B12 deficiency    Folate deficiency    COPD (chronic obstructive pulmonary disease)    Constipation    Class 2 severe obesity due to excess calories with serious comorbidity and body mass index (BMI) of 35.0 to 35.9 in adult    Former smoker    Failed back syndrome    Baclofen pump failure    Blood in urine    Cerebrovascular accident    Chronic hypercapnic respiratory failure    Chronic hypoxemic respiratory failure    Congestive heart failure    Systolic heart failure    Myocardial infarction    Sinus bradycardia    Swelling of lower leg    Urinary tract infectious disease    Peripheral polyneuropathy    Prediabetes    Peripheral edema    Ganglion of left wrist    Insomnia     Past Medical History:   Diagnosis Date    Cancer     throat cancer, breast    CHF (congestive heart failure)     Chronic kidney disease (CKD), stage III (moderate)     Constipation     COPD (chronic obstructive pulmonary disease)     Coronary artery disease     Diverticulitis     GERD (gastroesophageal reflux disease)     History of transfusion     Hypertension     Lung nodule     Neck fracture     Oxygen dependent     wears 2lnc    Sleep apnea     cpap or bipap unsure which one she uses also uses oxygen with this    Stroke     left side face weakness residual    Thyroid nodule      Past Surgical History:   Procedure Laterality Date    APPENDECTOMY      BACK SURGERY N/A     BRAIN SURGERY      chiari    CARDIAC CATHETERIZATION      no stents     CHOLECYSTECTOMY      COLONOSCOPY      HYSTERECTOMY      PAIN PUMP INSERTION/REVISION      PAIN PUMP INSERTION/REVISION Left 9/19/2023    Procedure: PAIN PUMP and proximal catheter REVISION, left.  Supine position.;  Surgeon: Cristopher Reza MD;  Location: [facility] OR;  Service: Neurosurgery;  Laterality: Left;    TONSILLECTOMY        General Information       Row Name 09/20/23 0835          Physical Therapy Time and Intention    Document Type evaluation  Dx: Baclofen pump failure; now s/p pain pump and proximal catheter revision 9/19  -MS     Mode of Treatment physical therapy;co-treatment  -MS       Row Name 09/20/23 0835          General Information    Patient Profile Reviewed yes  -MS     Prior Level of Function independent:;ADL's;dependent:;cleaning;driving;shopping  pt walked short distances at home  -MS     Existing Precautions/Restrictions fall;spinal;oxygen therapy device and L/min  abdominal binder. 2L O2  -MS     Barriers to Rehab previous functional deficit;physical barrier  -MS       Row Name 09/20/23 0835          Living Environment    People in Home child(todd), adult;grandchild(todd)  -MS       Row Name 09/20/23 0835          Home Main Entrance    Number of Stairs, Main Entrance two  -MS     Stair Railings, Main Entrance railings on both sides of stairs  -MS       Row Name 09/20/23 0835          Stairs Within Home, Primary    Number of Stairs, Within Home, Primary none  -MS       Row Name 09/20/23 0835          Cognition    Orientation Status (Cognition) oriented x 4  -MS       Row Name 09/20/23 0835          Safety Issues, Functional Mobility    Impairments Affecting Function (Mobility) balance  -MS               User Key  (r) = Recorded By, (t) = Taken By, (c) = Cosigned By      Initials Name Provider Type    MS Sandra Ibrhaim R, PT, DPT, NCS Physical Therapist                   Mobility       Row Name 09/20/23 0835          Bed Mobility    Comment, (Bed Mobility) pt sitting EOB upon entering  the room  -MS       Row Name 09/20/23 0835          Sit-Stand Transfer    Sit-Stand Rock (Transfers) independent  -MS       Row Name 09/20/23 0835          Gait/Stairs (Locomotion)    Rock Level (Gait) supervision  -MS     Distance in Feet (Gait) 3ft bed to chair with unsteady gait. forward flexed, kyphotic and scolisis posture  -MS               User Key  (r) = Recorded By, (t) = Taken By, (c) = Cosigned By      Initials Name Provider Type    MS Sandra Ibrahim, PT, DPT, NCS Physical Therapist                   Obj/Interventions       Row Name 09/20/23 0835          Range of Motion Comprehensive    General Range of Motion bilateral upper extremity ROM WFL;bilateral lower extremity ROM WFL  -MS       Row Name 09/20/23 0835          Strength Comprehensive (MMT)    Comment, General Manual Muscle Testing (MMT) Assessment B LEs 5/5  -MS       Row Name 09/20/23 0835          Balance    Balance Assessment sitting static balance;sitting dynamic balance;standing static balance;standing dynamic balance  -MS     Static Sitting Balance independent  -MS     Dynamic Sitting Balance independent  -MS     Position, Sitting Balance sitting edge of bed  -MS     Static Standing Balance supervision  -MS     Dynamic Standing Balance supervision  -MS       Row Name 09/20/23 0835          Sensory Assessment (Somatosensory)    Sensory Assessment (Somatosensory) --  L LE impaired chronically to light touch  -MS               User Key  (r) = Recorded By, (t) = Taken By, (c) = Cosigned By      Initials Name Provider Type    MS Sandra Ibrahim, PT, DPT, NCS Physical Therapist                   Goals/Plan    No documentation.                  Clinical Impression       Row Name 09/20/23 0835          Pain    Pretreatment Pain Rating 5/10  -MS     Posttreatment Pain Rating 5/10  -MS     Pain Location - abdomen  -MS     Pain Intervention(s) Medication (See MAR);Repositioned;Ambulation/increased activity  -MS       Row Name  09/20/23 0835          Plan of Care Review    Plan of Care Reviewed With patient  -MS     Progress no change  -MS     Outcome Evaluation The patient presents alert and oriented x4 sitting EOB with abdominal binder in place without supplemental O2 in place. She states that she wears O2 at home at all times. She was encouraged to place her O2 back on, but she refused until she was in the chair. Her O2 sat was in the low 90's the entire time. She demonstrates an unsteady gait pattern with LOB. She only walks short distances at home and relies on a wheelchair otherwise. She has no new focal neurological deficits in strength, sensation, or coordination. She has no further needs for PT at this time. Recommend discharge home with assist.  -MS       Row Name 09/20/23 0835          Therapy Assessment/Plan (PT)    Criteria for Skilled Interventions Met (PT) no problems identified which require skilled intervention;does not meet criteria for skilled intervention  -MS     Therapy Frequency (PT) evaluation only  -MS       Row Name 09/20/23 0835          Vital Signs    O2 Delivery Pre Treatment room air  -MS     O2 Delivery Intra Treatment room air  -MS     O2 Delivery Post Treatment supplemental O2  -MS       Row Name 09/20/23 0835          Positioning and Restraints    Post Treatment Position chair  -MS     In Chair sitting;call light within reach;encouraged to call for assist  abdominal binder  -MS               User Key  (r) = Recorded By, (t) = Taken By, (c) = Cosigned By      Initials Name Provider Type    Sandra Leon R, PT, DPT, NCS Physical Therapist                   Outcome Measures       Row Name 09/20/23 0835 09/20/23 0722       How much help from another person do you currently need...    Turning from your back to your side while in flat bed without using bedrails? 4  -MS 3  -SP    Moving from lying on back to sitting on the side of a flat bed without bedrails? 4  -MS 3  -SP    Moving to and from a bed to a  chair (including a wheelchair)? 4  -MS 3  -SP    Standing up from a chair using your arms (e.g., wheelchair, bedside chair)? 4  -MS 3  -SP    Climbing 3-5 steps with a railing? 3  -MS 2  -SP    To walk in hospital room? 4  -MS 2  -SP    AM-PAC 6 Clicks Score (PT) 23  -MS 16  -SP    Highest level of mobility 7 --> Walked 25 feet or more  -MS 5 --> Static standing  -SP      Row Name 09/20/23 0835          Functional Assessment    Outcome Measure Options AM-PAC 6 Clicks Basic Mobility (PT)  -MS               User Key  (r) = Recorded By, (t) = Taken By, (c) = Cosigned By      Initials Name Provider Type    MS Sandra Ibrahim, PT, DPT, NCS Physical Therapist    Aj Strak, RN Registered Nurse                    PT Recommendation and Plan     Plan of Care Reviewed With: patient  Progress: no change  Outcome Evaluation: The patient presents alert and oriented x4 sitting EOB with abdominal binder in place without supplemental O2 in place. She states that she wears O2 at home at all times. She was encouraged to place her O2 back on, but she refused until she was in the chair. Her O2 sat was in the low 90's the entire time. She demonstrates an unsteady gait pattern with LOB. She only walks short distances at home and relies on a wheelchair otherwise. She has no new focal neurological deficits in strength, sensation, or coordination. She has no further needs for PT at this time. Recommend discharge home with assist.     Time Calculation:         PT Charges       Row Name 09/20/23 0835             Time Calculation    Start Time 0835  -MS      Stop Time 0900  -MS      Time Calculation (min) 25 min  -MS      PT Received On 09/20/23  -MS         Untimed Charges    PT Eval/Re-eval Minutes 25  -MS         Total Minutes    Untimed Charges Total Minutes 25  -MS       Total Minutes 25  -MS                User Key  (r) = Recorded By, (t) = Taken By, (c) = Cosigned By      Initials Name Provider Type    Sandra Leon,  PT, DPT, NCS Physical Therapist                      PT G-Codes  Outcome Measure Options: AM-PAC 6 Clicks Basic Mobility (PT)  AM-PAC 6 Clicks Score (PT): 23    PT Discharge Summary  Anticipated Discharge Disposition (PT): home with assist    Sandra Ibrahim, PT, DPT, NCS  9/20/2023

## 2023-09-20 NOTE — PLAN OF CARE
Goal Outcome Evaluation:  Plan of Care Reviewed With: patient        Progress: no change  Outcome Evaluation: The patient presents alert and oriented x4 sitting EOB with abdominal binder in place without supplemental O2 in place. She states that she wears O2 at home at all times. She was encouraged to place her O2 back on, but she refused until she was in the chair. Her O2 sat was in the low 90's the entire time. She demonstrates an unsteady gait pattern with LOB. She only walks short distances at home and relies on a wheelchair otherwise. She has no new focal neurological deficits in strength, sensation, or coordination. She has no further needs for PT at this time. Recommend discharge home with assist.      Anticipated Discharge Disposition (PT): home with assist

## 2023-09-21 ENCOUNTER — TELEPHONE (OUTPATIENT)
Dept: FAMILY MEDICINE CLINIC | Facility: CLINIC | Age: 73
End: 2023-09-21

## 2023-09-21 NOTE — TELEPHONE ENCOUNTER
Caller: Roland Steven    Relationship: Emergency Contact    Best call back number: [phone]     Who are you requesting to speak with (clinical staff, provider,  specific staff member): CLINICAL    What was the call regarding: PATIENTS SON REQUESTING CALLBACK REGARDING PATIENTS DISCHARGE FROM THE HOSPITAL

## 2023-09-21 NOTE — TELEPHONE ENCOUNTER
Spoke with patients son and he stated patient had surgery yesterday with Dr. Reza and was not sent home with any antibiotics or medications at all. Asked if she needed to be taking anything. I advised her son that they needed to call Dr. Reza's office and discuss this with them.

## 2023-09-22 LAB
BACTERIA SPEC AEROBE CULT: NORMAL
GRAM STN SPEC: NORMAL

## 2023-09-23 LAB — REAGIN AB CSF QL: NON REACTIVE

## 2023-09-27 ENCOUNTER — OFFICE VISIT (OUTPATIENT)
Dept: FAMILY MEDICINE CLINIC | Facility: CLINIC | Age: 73
End: 2023-09-27
Payer: MEDICARE

## 2023-09-27 VITALS
BODY MASS INDEX: 31.04 KG/M2 | TEMPERATURE: 98 F | RESPIRATION RATE: 18 BRPM | DIASTOLIC BLOOD PRESSURE: 71 MMHG | HEART RATE: 86 BPM | SYSTOLIC BLOOD PRESSURE: 113 MMHG | HEIGHT: 59 IN | WEIGHT: 154 LBS

## 2023-09-27 DIAGNOSIS — G89.29 CHRONIC MIDLINE LOW BACK PAIN WITHOUT SCIATICA: Chronic | ICD-10-CM

## 2023-09-27 DIAGNOSIS — M54.50 CHRONIC MIDLINE LOW BACK PAIN WITHOUT SCIATICA: Chronic | ICD-10-CM

## 2023-09-27 DIAGNOSIS — Z92.89 HISTORY OF RECENT HOSPITALIZATION: Primary | ICD-10-CM

## 2023-09-27 DIAGNOSIS — T85.610D BACLOFEN PUMP FAILURE, SUBSEQUENT ENCOUNTER: ICD-10-CM

## 2023-09-27 RX ORDER — PREDNISONE 10 MG/1
TABLET ORAL
COMMUNITY

## 2023-09-27 RX ORDER — GABAPENTIN 100 MG/1
CAPSULE ORAL
COMMUNITY

## 2023-09-27 NOTE — PROGRESS NOTES
Transitional Care Follow Up Visit      Subjective     Chief Complaint   Patient presents with    Hospital Follow Up Visit     [facility] - pain pump leaking          History of Present Illness  Christine Steven is a 73 y.o. female who presents for a transitional care management visit.    Within 48 business hours after discharge our office contacted her via telephone to coordinate her care and needs.      I reviewed and discussed the details of that call along with the discharge summary, hospital problems, inpatient lab results, inpatient diagnostic studies, and consultation reports with Christine.     Current outpatient and discharge medications have been reconciled for the patient.  Reviewed by: NISHANT Moreno           No data to display              Risk for Readmission (LACE) Score: 10 (9/20/2023  5:00 AM)    Course During Hospital Stay:  Patient was admitted to [facility] on 09/19/2023 to undergo a left baclofen pump revision by Dr. Reza.  A recent evaluation of her previous baclofen pump showed leakage of contrast.  Patient was kept in the hospital for neurologic monitor, airway observation and pain control.  She was able to ambulate, tolerated a regular diet, tolerated pain medications, was able to void, and reported a significant decrease in lower extremity pain.  She was cleared by physical therapy and occupational therapy as safe for discharge home.  She was discharged home with tramadol and narcan nasal spray.  She was discharged on 9/20/23 and is to follow up with neurosurgery in 2 weeks.     Patient's PMR from outside medical facility reviewed and noted.    Review of Systems   Constitutional:  Negative for fatigue and fever.   Respiratory:  Positive for shortness of breath.    Gastrointestinal:  Positive for constipation. Negative for abdominal distention, abdominal pain, blood in stool, diarrhea and nausea.      Otherwise complete ROS reviewed and negative except as  mentioned in the HPI.    Past Medical History:   Past Medical History:   Diagnosis Date    Cancer     throat cancer, breast    CHF (congestive heart failure)     Chronic kidney disease (CKD), stage III (moderate)     Constipation     COPD (chronic obstructive pulmonary disease)     Coronary artery disease     Diverticulitis     GERD (gastroesophageal reflux disease)     History of transfusion     Hypertension     Low back pain     Lung nodule     Neck fracture     Oxygen dependent     wears 2lnc    Sleep apnea     cpap or bipap unsure which one she uses also uses oxygen with this    Stroke     left side face weakness residual    Thyroid nodule      Past Surgical History:  Past Surgical History:   Procedure Laterality Date    APPENDECTOMY      BACK SURGERY N/A     BRAIN SURGERY      chiari    CARDIAC CATHETERIZATION      no stents    CHOLECYSTECTOMY      COLONOSCOPY      HYSTERECTOMY      PAIN PUMP INSERTION/REVISION      PAIN PUMP INSERTION/REVISION Left 9/19/2023    Procedure: PAIN PUMP and proximal catheter REVISION, left.  Supine position.;  Surgeon: Cristopher Reza MD;  Location: [facility] OR;  Service: Neurosurgery;  Laterality: Left;    TONSILLECTOMY       Social History:  reports that she quit smoking about 10 years ago. Her smoking use included cigarettes. She has a 20.00 pack-year smoking history. She has never used smokeless tobacco. She reports that she does not drink alcohol and does not use drugs.    Family History: family history includes No Known Problems in her father and mother.      Allergies:  Allergies   Allergen Reactions    Bee Venom Shortness Of Breath and Swelling    Ciprofloxacin Shortness Of Breath    Nitroglycerin Anaphylaxis    Penicillins Shortness Of Breath    Sulfamethoxazole-Trimethoprim Anaphylaxis, Swelling and Angioedema    Eggs Or Egg-Derived Products Nausea Only, Unknown - Low Severity and GI Intolerance    Codeine Itching    Contrast Dye (Echo Or Unknown Ct/Mr) Unknown -  Low Severity     Stage 3 kidney disease cannot take     Iodinated Contrast Media Unknown - Low Severity     Stage 3 kidney disease     Latex, Natural Rubber Hives    Methenamine GI Intolerance    Nylon Hives    Oxycodone-Acetaminophen Nausea And Vomiting    Latex Rash and Other (See Comments)    Oxycodone Nausea And Vomiting     Medications:  Prior to Admission medications    Medication Sig Start Date End Date Taking? Authorizing Provider   albuterol (PROVENTIL) (2.5 MG/3ML) 0.083% nebulizer solution Take 2.5 mg by nebulization Every 4 (Four) Hours As Needed for Wheezing. 3/30/23   Fabiola Morris APRN   albuterol sulfate  (90 Base) MCG/ACT inhaler Inhale 2 puffs Every 4 (Four) Hours As Needed for Wheezing. 2/28/23   Fabiola Morris APRN   allopurinol (ZYLOPRIM) 100 MG tablet Take 1 tablet by mouth Daily. 12/21/22   Jacquie White MD   ALPRAZolam (XANAX) 1 MG tablet Take 1 tablet by mouth 30 Min Pre-Op for 1 dose. Take 2nd tablet when arriving in radiology department. **MUST HAVE A **  Patient not taking: Reported on 9/19/2023 9/11/23   Cristopher Reza MD   amLODIPine (NORVASC) 5 MG tablet Take 1 tablet by mouth Daily.    Jacquie White MD   Aspirin Low Dose 81 MG EC tablet Take 1 tablet by mouth Daily. 12/8/22   Jacquie White MD   atorvastatin (LIPITOR) 40 MG tablet Take 1 tablet by mouth every night at bedtime. 12/22/22   Jacquie White MD   baclofen (LIORESAL) 10 MG/20ML injection 237.53 mcg by Intrathecal route 1 (One) Time. Pain pump    Jacquie White MD   betamethasone dipropionate 0.05 % cream Apply 1 application  topically to the appropriate area as directed 2 (Two) Times a Day.    Jacquie White MD   bisacodyl (DULCOLAX) 10 MG suppository Insert 1 suppository into the rectum Daily. 5/11/23   Fabiola Morris APRN   cyclobenzaprine (FLEXERIL) 10 MG tablet Take 1 tablet by mouth 3 (Three) Times a Day As Needed.  12/22/22   Jacquie White MD   D3-50 1.25 MG (91814 UT) capsule Take 1 capsule by mouth Every 14 (Fourteen) Days. 8/25/23   Jacquie White MD   escitalopram (LEXAPRO) 10 MG tablet Take  by mouth Daily.    Jacquie Whiet MD   ezetimibe (ZETIA) 10 MG tablet Take 1 tablet by mouth every night at bedtime. 12/21/22   Jacquie White MD   fluticasone (FLONASE) 50 MCG/ACT nasal spray 2 sprays into the nostril(s) as directed by provider Daily. 5/11/23   Fabiola Morris APRN   folic acid (FOLVITE) 1 MG tablet Take 1 tablet by mouth Daily. 4/16/23   Jacquie White MD   furosemide (LASIX) 40 MG tablet Take 1 tablet by mouth Daily.    Jacquie White MD   gabapentin (NEURONTIN) 100 MG capsule Take 1 capsule every day by oral route at bedtime.    Jacquie White MD   Ericka-roxanna 8.6 MG tablet Take 1 tablet (8.6 mg total) by mouth 2 (two) times daily. 6/21/23   Jacquie White MD   HYDROmorphone (DILAUDID) 2 MG/ML injection Infuse 1.3573 mg into a venous catheter.    Jacquie White MD   ipratropium-albuterol (DUO-NEB) 0.5-2.5 mg/3 ml nebulizer Take 3 mL by nebulization Every 4 (Four) Hours As Needed for Wheezing or Shortness of Air. 5/1/23   Fabiola Morris APRN   levothyroxine (SYNTHROID, LEVOTHROID) 25 MCG tablet TAKE 1 TABLET BY MOUTH BEFORE BREAKFAST. 12/21/22   Jacquie White MD   linaclotide (LINZESS) 145 MCG capsule capsule Take 1 capsule every day by oral route.    Jacquie White MD   lisinopril (PRINIVIL,ZESTRIL) 10 MG tablet lisinopril 10 mg tablet   TAKE ONE TABLET BY MOUTH ONE TIME DAILY    Jacquie White MD   metoprolol succinate XL (TOPROL-XL) 25 MG 24 hr tablet Take 1 tablet by mouth Daily.    Jacquie White MD   naloxone (NARCAN) 4 MG/0.1ML nasal spray Call 911. Don't prime. [place] in 1 nostril for overdose. Repeat in 2-3 minutes in other nostril if no or minimal breathing/responsiveness. 9/20/23   Otto Jhaveri,  NISHANT   nystatin (MYCOSTATIN) 268012 UNIT/GM powder APPLY TO AFFECTED AREA EVERY DAY AND AS NEEDED 12/14/22   Jacquie White MD   pantoprazole (PROTONIX) 20 MG EC tablet Take 1 tablet by mouth Every Morning Before Breakfast. 12/21/22   Jacquie White MD   potassium chloride 10 MEQ CR tablet Take 1 tablet by mouth Daily. 8/25/23   Jacquie White MD   predniSONE (DELTASONE) 10 MG tablet TAKE 4 TABLETS ORALLY EVERY DAY FOR 5 DAYS, THEN 2 EVERY DAY FOR 3 DAYS, THEN 1 EVERY DAY FOR 2 DAYS    Jacquie White MD   sennosides-docusate (senna-docusate sodium) 8.6-50 MG per tablet Take 1 tablet by mouth 2 (Two) Times a Day As Needed for Constipation. 5/11/23   Fabiola Morris APRN   tiZANidine (ZANAFLEX) 4 MG tablet Take 1 tablet by mouth Every 8 (Eight) Hours As Needed.    Jacquie White MD   traZODone (DESYREL) 50 MG tablet Take 1 tablet by mouth Every Night for 90 days.  Patient not taking: Reported on 9/19/2023 9/11/23 12/10/23  Fabiola Morris APRN   triamcinolone (KENALOG) 0.1 % cream APPLY TO AFFECTED AREA TWICE A DAY FOR DERMATITIS    Jacquie White MD   trimethoprim (TRIMPEX) 100 MG tablet Take 1 tablet (100 mg total) by mouth daily.  Patient not taking: Reported on 9/19/2023    Jacquie White MD     PHQ-9 Depression Screening  Little interest or pleasure in doing things? 0-->not at all   Feeling down, depressed, or hopeless? 0-->not at all   Trouble falling or staying asleep, or sleeping too much?     Feeling tired or having little energy?     Poor appetite or overeating?     Feeling bad about yourself - or that you are a failure or have let yourself or your family down?     Trouble concentrating on things, such as reading the newspaper or watching television?     Moving or speaking so slowly that other people could have noticed? Or the opposite - being so fidgety or restless that you have been moving around a lot more than usual?     Thoughts that  "you would be better off dead, or of hurting yourself in some way?     PHQ-9 Total Score 0   If you checked off any problems, how difficult have these problems made it for you to do your work, take care of things at home, or get along with other people?         PHQ-9 Total Score: 0   0 (Negative screening for depression)  Support given, observe for worsening symptoms    Objective     Vital Signs: /71 (BP Location: Right arm, Patient Position: Sitting, Cuff Size: Adult)   Pulse 86   Temp 98 °F (36.7 °C) (Infrared)   Resp 18   Ht 149.9 cm (59\")   Wt 69.9 kg (154 lb)   BMI 31.10 kg/m²   Physical Exam  Vitals and nursing note reviewed.   Constitutional:       Appearance: She is obese.   HENT:      Head: Normocephalic.      Nose: Nose normal.      Mouth/Throat:      Mouth: Mucous membranes are moist.   Eyes:      Conjunctiva/sclera: Conjunctivae normal.   Cardiovascular:      Rate and Rhythm: Normal rate and regular rhythm.      Pulses: Normal pulses.      Heart sounds: Normal heart sounds.   Pulmonary:      Effort: Pulmonary effort is normal.      Breath sounds: Normal breath sounds.   Abdominal:      General: Bowel sounds are normal.      Palpations: Abdomen is soft.      Tenderness: There is no abdominal tenderness.   Musculoskeletal:         General: Normal range of motion.      Cervical back: Normal range of motion.      Left knee: Bony tenderness present.   Skin:     General: Skin is warm and dry.             Comments: Well healing surgical incision, no erythema, no drainage   Neurological:      General: No focal deficit present.      Mental Status: She is alert and oriented to person, place, and time.   Psychiatric:         Mood and Affect: Mood normal.         Behavior: Behavior normal.              Results Reviewed:  Glucose   Date Value Ref Range Status   09/20/2023 160 (H) 65 - 99 mg/dL Final     BUN   Date Value Ref Range Status   09/20/2023 16 8 - 23 mg/dL Final     Creatinine   Date Value Ref " Range Status   09/20/2023 1.24 (H) 0.57 - 1.00 mg/dL Final     Sodium   Date Value Ref Range Status   09/20/2023 139 136 - 145 mmol/L Final     Potassium   Date Value Ref Range Status   09/20/2023 4.6 3.5 - 5.2 mmol/L Final     Chloride   Date Value Ref Range Status   09/20/2023 100 98 - 107 mmol/L Final     CO2   Date Value Ref Range Status   09/20/2023 31.0 (H) 22.0 - 29.0 mmol/L Final     Calcium   Date Value Ref Range Status   09/20/2023 8.9 8.6 - 10.5 mg/dL Final     ALT (SGPT)   Date Value Ref Range Status   09/20/2023 15 1 - 33 U/L Final     AST (SGOT)   Date Value Ref Range Status   09/20/2023 18 1 - 32 U/L Final     WBC   Date Value Ref Range Status   09/20/2023 3.59 3.40 - 10.80 10*3/mm3 Final   09/11/2023 5.2 3.4 - 10.8 x10E3/uL Final     Hematocrit   Date Value Ref Range Status   09/20/2023 33.9 (L) 34.0 - 46.6 % Final     Platelets   Date Value Ref Range Status   09/20/2023 144 140 - 450 10*3/mm3 Final     Triglycerides   Date Value Ref Range Status   09/11/2023 137 0 - 149 mg/dL Final     HDL Cholesterol   Date Value Ref Range Status   09/11/2023 41 >39 mg/dL Final     LDL Chol Calc (NIH)   Date Value Ref Range Status   09/11/2023 40 0 - 99 mg/dL Final     Hemoglobin A1C   Date Value Ref Range Status   09/11/2023 5.8 (H) 4.8 - 5.6 % Final     Comment:              Prediabetes: 5.7 - 6.4           Diabetes: >6.4           Glycemic control for adults with diabetes: <7.0           Assessment / Plan     Assessment/Plan     Diagnoses and all orders for this visit:    1. History of recent hospitalization (Primary)    2. Baclofen pump failure, subsequent encounter  Assessment & Plan:  Surgical incision is healing well.  Pain is well controlled on tramadol.  Patient to follow up with neurosurgery in 2 weeks.       3. Chronic midline low back pain without sciatica  Assessment & Plan:  Pain is improving.  Patient is tolerating tramadol well.               An After Visit Summary was printed and given to the  patient at discharge.  Return in about 6 months (around 3/27/2024) for Recheck  prediabetes, HTN.    I have discussed the patient results/orders and plan/recommendation with them at today's visit.      Fabiola Morris, NISHANT   09/27/2023

## 2023-10-03 ENCOUNTER — OFFICE VISIT (OUTPATIENT)
Dept: NEUROSURGERY | Facility: CLINIC | Age: 73
End: 2023-10-03
Payer: MEDICARE

## 2023-10-03 VITALS — HEIGHT: 59 IN | BODY MASS INDEX: 31.04 KG/M2 | WEIGHT: 154 LBS

## 2023-10-03 DIAGNOSIS — T85.610A BACLOFEN PUMP FAILURE, INITIAL ENCOUNTER: ICD-10-CM

## 2023-10-03 DIAGNOSIS — E66.09 CLASS 1 OBESITY DUE TO EXCESS CALORIES WITH SERIOUS COMORBIDITY AND BODY MASS INDEX (BMI) OF 31.0 TO 31.9 IN ADULT: ICD-10-CM

## 2023-10-03 DIAGNOSIS — M96.1 FAILED BACK SYNDROME: Primary | ICD-10-CM

## 2023-10-03 DIAGNOSIS — Z91.81 AT HIGH RISK FOR FALLS: ICD-10-CM

## 2023-10-03 PROCEDURE — 1160F RVW MEDS BY RX/DR IN RCRD: CPT | Performed by: NURSE PRACTITIONER

## 2023-10-03 PROCEDURE — 1159F MED LIST DOCD IN RCRD: CPT | Performed by: NURSE PRACTITIONER

## 2023-10-03 PROCEDURE — 99024 POSTOP FOLLOW-UP VISIT: CPT | Performed by: NURSE PRACTITIONER

## 2023-10-03 NOTE — PATIENT INSTRUCTIONS
"DASH Eating Plan  DASH stands for Dietary Approaches to Stop Hypertension. The DASH eating plan is a healthy eating plan that has been shown to:  Reduce high blood pressure (hypertension).  Reduce your risk for type 2 diabetes, heart disease, and stroke.  Help with weight loss.  What are tips for following this plan?  Reading food labels  Check food labels for the amount of salt (sodium) per serving. Choose foods with less than 5 percent of the Daily Value of sodium. Generally, foods with less than 300 milligrams (mg) of sodium per serving fit into this eating plan.  To find whole grains, look for the word \"whole\" as the first word in the ingredient list.  Shopping  Buy products labeled as \"low-sodium\" or \"no salt added.\"  Buy fresh foods. Avoid canned foods and pre-made or frozen meals.  Cooking  Avoid adding salt when cooking. Use salt-free seasonings or herbs instead of table salt or sea salt. Check with your health care provider or pharmacist before using salt substitutes.  Do not han foods. Cook foods using healthy methods such as baking, boiling, grilling, roasting, and broiling instead.  Cook with heart-healthy oils, such as olive, canola, avocado, soybean, or sunflower oil.  Meal planning    Eat a balanced diet that includes:  4 or more servings of fruits and 4 or more servings of vegetables each day. Try to fill one-half of your plate with fruits and vegetables.  6-8 servings of whole grains each day.  Less than 6 oz (170 g) of lean meat, poultry, or fish each day. A 3-oz (85-g) serving of meat is about the same size as a deck of cards. One egg equals 1 oz (28 g).  2-3 servings of low-fat dairy each day. One serving is 1 cup (237 mL).  1 serving of nuts, seeds, or beans 5 times each week.  2-3 servings of heart-healthy fats. Healthy fats called omega-3 fatty acids are found in foods such as walnuts, flaxseeds, fortified milks, and eggs. These fats are also found in cold-water fish, such as sardines, salmon, " and mackerel.  Limit how much you eat of:  Canned or prepackaged foods.  Food that is high in trans fat, such as some fried foods.  Food that is high in saturated fat, such as fatty meat.  Desserts and other sweets, sugary drinks, and other foods with added sugar.  Full-fat dairy products.  Do not salt foods before eating.  Do not eat more than 4 egg yolks a week.  Try to eat at least 2 vegetarian meals a week.  Eat more home-cooked food and less restaurant, buffet, and fast food.  Lifestyle  When eating at a restaurant, ask that your food be prepared with less salt or no salt, if possible.  If you drink alcohol:  Limit how much you use to:  0-1 drink a day for women who are not pregnant.  0-2 drinks a day for men.  Be aware of how much alcohol is in your drink. In the [place] one drink equals one 12 oz bottle of beer (355 mL), one 5 oz glass of wine (148 mL), or one 1½ oz glass of hard liquor (44 mL).  General information  Avoid eating more than 2,300 mg of salt a day. If you have hypertension, you may need to reduce your sodium intake to 1,500 mg a day.  Work with your health care provider to maintain a healthy body weight or to lose weight. Ask what an ideal weight is for you.  Get at least 30 minutes of exercise that causes your heart to beat faster (aerobic exercise) most days of the week. Activities may include walking, swimming, or biking.  Work with your health care provider or dietitian to adjust your eating plan to your individual calorie needs.  What foods should I eat?  Fruits  All fresh, dried, or frozen fruit. Canned fruit in natural juice (without added sugar).  Vegetables  Fresh or frozen vegetables (raw, steamed, roasted, or grilled). Low-sodium or reduced-sodium tomato and vegetable juice. Low-sodium or reduced-sodium tomato sauce and tomato paste. Low-sodium or reduced-sodium canned vegetables.  Grains  Whole-grain or whole-wheat bread. Whole-grain or whole-wheat pasta. Brown rice. Oatmeal. Quinoa.  Bulgur. Whole-grain and low-sodium cereals. Zaida bread. Low-fat, low-sodium crackers. Whole-wheat flour tortillas.  Meats and other proteins  Skinless chicken or turkey. Ground chicken or turkey. Pork with fat trimmed off. Fish and seafood. Egg whites. Dried beans, peas, or lentils. Unsalted nuts, nut butters, and seeds. Unsalted canned beans. Lean cuts of beef with fat trimmed off. Low-sodium, lean precooked or cured meat, such as sausages or meat loaves.  Dairy  Low-fat (1%) or fat-free (skim) milk. Reduced-fat, low-fat, or fat-free cheeses. Nonfat, low-sodium ricotta or cottage cheese. Low-fat or nonfat yogurt. Low-fat, low-sodium cheese.  Fats and oils  Soft margarine without trans fats. Vegetable oil. Reduced-fat, low-fat, or light mayonnaise and salad dressings (reduced-sodium). Canola, safflower, olive, avocado, soybean, and sunflower oils. Avocado.  Seasonings and condiments  Herbs. Spices. Seasoning mixes without salt.  Other foods  Unsalted popcorn and pretzels. Fat-free sweets.  The items listed above may not be a complete list of foods and beverages you can eat. Contact a dietitian for more information.  What foods should I avoid?  Fruits  Canned fruit in a light or heavy syrup. Fried fruit. Fruit in cream or butter sauce.  Vegetables  Creamed or fried vegetables. Vegetables in a cheese sauce. Regular canned vegetables (not low-sodium or reduced-sodium). Regular canned tomato sauce and paste (not low-sodium or reduced-sodium). Regular tomato and vegetable juice (not low-sodium or reduced-sodium). Pickles. Olives.  Grains  Baked goods made with fat, such as croissants, muffins, or some breads. Dry pasta or rice meal packs.  Meats and other proteins  Fatty cuts of meat. Ribs. Fried meat. Alatorre. Bologna, salami, and other precooked or cured meats, such as sausages or meat loaves. Fat from the back of a pig (fatback). Bratwurst. Salted nuts and seeds. Canned beans with added salt. Canned or smoked fish.  Whole eggs or egg yolks. Chicken or turkey with skin.  Dairy  Whole or 2% milk, cream, and half-and-half. Whole or full-fat cream cheese. Whole-fat or sweetened yogurt. Full-fat cheese. Nondairy creamers. Whipped toppings. Processed cheese and cheese spreads.  Fats and oils  Butter. Stick margarine. Lard. Shortening. Ghee. Alatorre fat. Tropical oils, such as coconut, palm kernel, or palm oil.  Seasonings and condiments  Onion salt, garlic salt, seasoned salt, table salt, and sea salt. Worcestershire sauce. Tartar sauce. Barbecue sauce. Teriyaki sauce. Soy sauce, including reduced-sodium. Steak sauce. Canned and packaged gravies. Fish sauce. Oyster sauce. Cocktail sauce. Store-bought horseradish. Ketchup. Mustard. Meat flavorings and tenderizers. Bouillon cubes. Hot sauces. Pre-made or packaged marinades. Pre-made or packaged taco seasonings. Relishes. Regular salad dressings.  Other foods  Salted popcorn and pretzels.  The items listed above may not be a complete list of foods and beverages you should avoid. Contact a dietitian for more information.  Where to find more information  National Heart, Lung, and Blood [place]: www.nhlbi.nih.gov  American Heart Association: www.heart.org  Academy of Nutrition and Dietetics: www.eatright.org  National Kidney Foundation: www.kidney.org  Summary  The DASH eating plan is a healthy eating plan that has been shown to reduce high blood pressure (hypertension). It may also reduce your risk for type 2 diabetes, heart disease, and stroke.  When on the DASH eating plan, aim to eat more fresh fruits and vegetables, whole grains, lean proteins, low-fat dairy, and heart-healthy fats.  With the DASH eating plan, you should limit salt (sodium) intake to 2,300 mg a day. If you have hypertension, you may need to reduce your sodium intake to 1,500 mg a day.  Work with your health care provider or dietitian to adjust your eating plan to your individual calorie needs.  This information is not  intended to replace advice given to you by your health care provider. Make sure you discuss any questions you have with your health care provider.  Document Revised: 11/20/2020 Document Reviewed: 11/20/2020  Elsevier Patient Education © 2023 [employer]    Advance Care Planning and Advance Directives     You make decisions on a daily basis - decisions about where you want to live, your career, your home, your life. Perhaps one of the most important decisions you face is your choice for future medical care. Take time to talk with your family and your healthcare team and start planning today.  Advance Care Planning is a process that can help you:  Understand possible future healthcare decisions in light of your own experiences  Reflect on those decision in light of your goals and values  Discuss your decisions with those closest to you and the healthcare professionals that care for you  Make a plan by creating a document that reflects your wishes    Surrogate Decision Maker  In the event of a medical emergency, which has left you unable to communicate or to make your own decisions, you would need someone to make decisions for you.  It is important to discuss your preferences for medical treatment with this person while you are in good health.     Qualities of a surrogate decision maker:  Willing to take on this role and responsibility  Knows what you want for future medical care  Willing to follow your wishes even if they don't agree with them  Able to make difficult medical decisions under stressful circumstances    Advance Directives  These are legal documents you can create that will guide your healthcare team and decision maker(s) when needed. These documents can be stored in the electronic medical record.    Living Will - a legal document to guide your care if you have a terminal condition or a serious illness and are unable to communicate. States vary by statute in document names/types, but most forms may  include one or more of the following:        -  Directions regarding life-prolonging treatments        -  Directions regarding artificially provided nutrition/hydration        -  Choosing a healthcare decision maker        -  Direction regarding organ/tissue donation    Durable Power of  for Healthcare - this document names an -in-fact to make medical decisions for you, but it may also allow this person to make personal and financial decisions for you. Please seek the advice of an  if you need this type of document.    **Advance Directives are not required and no one may discriminate against you if you do not sign one.    Medical Orders  Many states allow specific forms/orders signed by your physician to record your wishes for medical treatment in your current state of health. This form, signed in personal communication with your physician, addresses resuscitation and other medical interventions that you may or may not want.      For more information or to schedule a time with a [facility] Advance Care Planning Facilitator contact: [email or web address] or call [phone] and someone will contact you directly.Fall Prevention in the Home, Adult  Falls can cause injuries and can happen to people of all ages. There are many things you can do to make your home safe and to help prevent falls. Ask for help when making these changes.  What actions can I take to prevent falls?  General Instructions  Use good lighting in all rooms. Replace any light bulbs that burn out.  Turn on the lights in dark areas. Use night-lights.  Keep items that you use often in easy-to-reach places. Lower the shelves around your home if needed.  Set up your furniture so you have a clear path. Avoid moving your furniture around.  Do not have throw rugs or other things on the floor that can make you trip.  Avoid walking on wet floors.  If any of your floors are uneven, fix them.  Add color or contrast paint or tape to  clearly carla and help you see:  Grab bars or handrails.  First and last steps of staircases.  Where the edge of each step is.  If you use a stepladder:  Make sure that it is fully opened. Do not climb a closed stepladder.  Make sure the sides of the stepladder are locked in place.  Ask someone to hold the stepladder while you use it.  Know where your pets are when moving through your home.  What can I do in the bathroom?         Keep the floor dry. Clean up any water on the floor right away.  Remove soap buildup in the tub or shower.  Use nonskid mats or decals on the floor of the tub or shower.  Attach bath mats securely with double-sided, nonslip rug tape.  If you need to sit down in the shower, use a plastic, nonslip stool.  Install grab bars by the toilet and in the tub and shower. Do not use towel bars as grab bars.  What can I do in the bedroom?  Make sure that you have a light by your bed that is easy to reach.  Do not use any sheets or blankets for your bed that hang to the floor.  Have a firm chair with side arms that you can use for support when you get dressed.  What can I do in the kitchen?  Clean up any spills right away.  If you need to reach something above you, use a step stool with a grab bar.  Keep electrical cords out of the way.  Do not use floor polish or wax that makes floors slippery.  What can I do with my stairs?  Do not leave any items on the stairs.  Make sure that you have a light switch at the top and the bottom of the stairs.  Make sure that there are handrails on both sides of the stairs. Fix handrails that are broken or loose.  Install nonslip stair treads on all your stairs.  Avoid having throw rugs at the top or bottom of the stairs.  Choose a carpet that does not hide the edge of the steps on the stairs.  Check carpeting to make sure that it is firmly attached to the stairs. Fix carpet that is loose or worn.  What can I do on the outside of my home?  Use bright outdoor  lighting.  Fix the edges of walkways and driveways and fix any cracks.  Remove anything that might make you trip as you walk through a door, such as a raised step or threshold.  Trim any bushes or trees on paths to your home.  Check to see if handrails are loose or broken and that both sides of all steps have handrails.  Install guardrails along the edges of any raised decks and porches.  Clear paths of anything that can make you trip, such as tools or rocks.  Have leaves, snow, or ice cleared regularly.  Use sand or salt on paths during winter.  Clean up any spills in your garage right away. This includes grease or oil spills.  What other actions can I take?  Wear shoes that:  Have a low heel. Do not wear high heels.  Have rubber bottoms.  Feel good on your feet and fit well.  Are closed at the toe. Do not wear open-toe sandals.  Use tools that help you move around if needed. These include:  Canes.  Walkers.  Scooters.  Crutches.  Review your medicines with your doctor. Some medicines can make you feel dizzy. This can increase your chance of falling.  Ask your doctor what else you can do to help prevent falls.  Where to find more information  Centers for Disease Control and Prevention, [place]: www.cdc.gov  National [place] on Aging: www.hill.nih.gov  Contact a doctor if:  You are afraid of falling at home.  You feel weak, drowsy, or dizzy at home.  You fall at home.  Summary  There are many simple things that you can do to make your home safe and to help prevent falls.  Ways to make your home safe include removing things that can make you trip and installing grab bars in the bathroom.  Ask for help when making these changes in your home.  This information is not intended to replace advice given to you by your health care provider. Make sure you discuss any questions you have with your health care provider.  Document Revised: 09/19/2022 Document Reviewed: 07/21/2021  Elsevier Patient Education © 2023 [employer]

## 2023-10-03 NOTE — PROGRESS NOTES
Chief complaint:   Chief Complaint   Patient presents with    Post-op     Pt is here for 2 wk PO visit.        Subjective     HPI:   Interval History: Christine Steven is a 73 y.o.  female who presents today for post operative follow-up from a PAIN PUMP and proximal catheter REVISION, left.  Supine position. - Left on 9/19/2023.  Ms. Steven has done well since we last saw her.  Her overall incisional pain is much improved postoperatively.  She continues to complain of bilateral leg pain, however improved by 30-40% since pump revision.  She has yet to follow-up with pain management postoperatively.  She denies fevers, chills, or concern for postoperative incision infection.  She currently rates the severity of her symptoms 7/10.  No additional concerns at this time.    PFSH:  Past Medical History:   Diagnosis Date    Cancer     throat cancer, breast    CHF (congestive heart failure)     Chronic kidney disease (CKD), stage III (moderate)     Constipation     COPD (chronic obstructive pulmonary disease)     Coronary artery disease     Diverticulitis     GERD (gastroesophageal reflux disease)     History of transfusion     Hypertension     Low back pain     Lung nodule     Neck fracture     Oxygen dependent     wears 2lnc    Sleep apnea     cpap or bipap unsure which one she uses also uses oxygen with this    Stroke     left side face weakness residual    Thyroid nodule      Past Surgical History:   Procedure Laterality Date    APPENDECTOMY      BACK SURGERY N/A     BRAIN SURGERY      chiari    CARDIAC CATHETERIZATION      no stents    CHOLECYSTECTOMY      COLONOSCOPY      HYSTERECTOMY      PAIN PUMP INSERTION/REVISION      PAIN PUMP INSERTION/REVISION Left 9/19/2023    Procedure: PAIN PUMP and proximal catheter REVISION, left.  Supine position.;  Surgeon: Cristopher Reza MD;  Location: [facility];  Service: Neurosurgery;  Laterality: Left;    TONSILLECTOMY       Objective      Current Outpatient  Medications   Medication Sig Dispense Refill    albuterol (PROVENTIL) (2.5 MG/3ML) 0.083% nebulizer solution Take 2.5 mg by nebulization Every 4 (Four) Hours As Needed for Wheezing. 3 mL 12    albuterol sulfate  (90 Base) MCG/ACT inhaler Inhale 2 puffs Every 4 (Four) Hours As Needed for Wheezing. 18 g 11    allopurinol (ZYLOPRIM) 100 MG tablet Take 1 tablet by mouth Daily.      ALPRAZolam (XANAX) 1 MG tablet Take 1 tablet by mouth 30 Min Pre-Op for 1 dose. Take 2nd tablet when arriving in radiology department. **MUST HAVE A ** 2 tablet 0    amLODIPine (NORVASC) 5 MG tablet Take 1 tablet by mouth Daily.      Aspirin Low Dose 81 MG EC tablet Take 1 tablet by mouth Daily.      atorvastatin (LIPITOR) 40 MG tablet Take 1 tablet by mouth every night at bedtime.      baclofen (LIORESAL) 10 MG/20ML injection 237.53 mcg by Intrathecal route 1 (One) Time. Pain pump      betamethasone dipropionate 0.05 % cream Apply 1 application  topically to the appropriate area as directed 2 (Two) Times a Day.      bisacodyl (DULCOLAX) 10 MG suppository Insert 1 suppository into the rectum Daily. 10 each 2    cyclobenzaprine (FLEXERIL) 10 MG tablet Take 1 tablet by mouth 3 (Three) Times a Day As Needed.      D3-50 1.25 MG (67145 UT) capsule Take 1 capsule by mouth Every 14 (Fourteen) Days.      escitalopram (LEXAPRO) 10 MG tablet Take  by mouth Daily.      ezetimibe (ZETIA) 10 MG tablet Take 1 tablet by mouth every night at bedtime.      fluticasone (FLONASE) 50 MCG/ACT nasal spray 2 sprays into the nostril(s) as directed by provider Daily. 11.1 mL 2    folic acid (FOLVITE) 1 MG tablet Take 1 tablet by mouth Daily.      furosemide (LASIX) 40 MG tablet Take 1 tablet by mouth Daily.      gabapentin (NEURONTIN) 100 MG capsule Take 1 capsule every day by oral route at bedtime.      Ericka-roxanna 8.6 MG tablet Take 1 tablet (8.6 mg total) by mouth 2 (two) times daily.      HYDROmorphone (DILAUDID) 2 MG/ML injection Infuse 1.3573 mg into  "a venous catheter.      ipratropium-albuterol (DUO-NEB) 0.5-2.5 mg/3 ml nebulizer Take 3 mL by nebulization Every 4 (Four) Hours As Needed for Wheezing or Shortness of Air. 1080 mL 1    levothyroxine (SYNTHROID, LEVOTHROID) 25 MCG tablet TAKE 1 TABLET BY MOUTH BEFORE BREAKFAST.      linaclotide (LINZESS) 145 MCG capsule capsule Take 1 capsule every day by oral route.      lisinopril (PRINIVIL,ZESTRIL) 10 MG tablet lisinopril 10 mg tablet   TAKE ONE TABLET BY MOUTH ONE TIME DAILY      metoprolol succinate XL (TOPROL-XL) 25 MG 24 hr tablet Take 1 tablet by mouth Daily.      naloxone (NARCAN) 4 MG/0.1ML nasal spray Call 911. Don't prime. [place] in 1 nostril for overdose. Repeat in 2-3 minutes in other nostril if no or minimal breathing/responsiveness. 2 each 0    nystatin (MYCOSTATIN) 076743 UNIT/GM powder APPLY TO AFFECTED AREA EVERY DAY AND AS NEEDED      pantoprazole (PROTONIX) 20 MG EC tablet Take 1 tablet by mouth Every Morning Before Breakfast.      potassium chloride 10 MEQ CR tablet Take 1 tablet by mouth Daily.      predniSONE (DELTASONE) 10 MG tablet TAKE 4 TABLETS ORALLY EVERY DAY FOR 5 DAYS, THEN 2 EVERY DAY FOR 3 DAYS, THEN 1 EVERY DAY FOR 2 DAYS      sennosides-docusate (senna-docusate sodium) 8.6-50 MG per tablet Take 1 tablet by mouth 2 (Two) Times a Day As Needed for Constipation. 30 tablet 2    tiZANidine (ZANAFLEX) 4 MG tablet Take 1 tablet by mouth Every 8 (Eight) Hours As Needed.      traZODone (DESYREL) 50 MG tablet Take 1 tablet by mouth Every Night for 90 days. 90 tablet 0    triamcinolone (KENALOG) 0.1 % cream APPLY TO AFFECTED AREA TWICE A DAY FOR DERMATITIS      trimethoprim (TRIMPEX) 100 MG tablet        No current facility-administered medications for this visit.     Vital Signs  Ht 149.9 cm (59\")   Wt 69.9 kg (154 lb)   BMI 31.10 kg/m²   Physical Exam  Vitals and nursing note reviewed.   Constitutional:       General: She is not in acute distress.     Appearance: Normal appearance. She is " well-developed and well-groomed. She is obese. She is not ill-appearing, toxic-appearing or diaphoretic.          Comments: BMI 31.10   HENT:      Head: Normocephalic and atraumatic.      Right Ear: Hearing normal.      Left Ear: Hearing normal.   Eyes:      Extraocular Movements: EOM normal.      Conjunctiva/sclera: Conjunctivae normal.      Pupils: Pupils are equal, round, and reactive to light.   Neck:      Trachea: Trachea normal.   Cardiovascular:      Rate and Rhythm: Normal rate and regular rhythm.   Pulmonary:      Effort: Pulmonary effort is normal. No tachypnea, bradypnea, accessory muscle usage or respiratory distress.   Abdominal:      Palpations: Abdomen is soft.   Musculoskeletal:      Cervical back: Full passive range of motion without pain and neck supple.   Skin:     General: Skin is warm and dry.   Neurological:      Mental Status: She is alert and oriented to person, place, and time.      GCS: GCS eye subscore is 4. GCS verbal subscore is 5. GCS motor subscore is 6.   Psychiatric:         Speech: Speech normal.         Behavior: Behavior normal. Behavior is cooperative.     Neurologic Exam     Mental Status   Oriented to person, place, and time.   Attention: normal. Concentration: normal.   Speech: speech is normal   Level of consciousness: alert    Cranial Nerves     CN II   Visual fields full to confrontation.     CN III, IV, VI   Pupils are equal, round, and reactive to light.  Extraocular motions are normal.     CN V   Facial sensation intact.     CN VII   Facial expression full, symmetric.     CN VIII   CN VIII normal.     CN IX, X   CN IX normal.     CN XI   CN XI normal.     Motor Exam   Right arm tone: normal  Left arm tone: normal  Right leg tone: normal  Left leg tone: normal    Strength   Right deltoid: 5/5  Left deltoid: 5/5  Right biceps: 5/5  Left biceps: 5/5  Right triceps: 5/5  Left triceps: 5/5  Right wrist extension: 5/5  Left wrist extension: 5/5  Right iliopsoas: 5/5  Left  iliopsoas: 5/5  Right quadriceps: 5/5  Left quadriceps: 5/5  Right anterior tibial: 5/5  Left anterior tibial: 5/5  Right gastroc: 5/5  Left gastroc: 5/5  Right EHL 5/5  Left EHL 5/5       Sensory Exam   Right arm light touch: normal  Left arm light touch: normal  Right leg light touch: normal  Left leg light touch: normal    Gait, Coordination, and Reflexes     Tremor   Resting tremor: absent  Intention tremor: absent  Action tremor: absent    Incision: WOUND IMAGE - Left ABD SP baclofen pump revision (10/03/2023)   (Consent to obtain photo of postoperative site for documentation purposes only obtained verbally by Ms. Steven)    Results Review:   No relevant radiologic imaging.      Assessment/Plan:   Baclofen pump revision, left  Ms. Steven presents today for a postoperative wound evaluation following a PAIN PUMP and proximal catheter REVISION, left.  Supine position. - Left on 9/19/2023.  Christine has done extremely well since we last saw her.  She has been compliant with their abdominal binder to date.  Her symptoms are stable and her incision(s) is clean, dry, intact, and well approximated without signs of a soft tissue infection.   We discussed signs and symptoms of a soft tissue infection and I recommended he call immediately for any concerns.  I recommended she follow-up with pain management for intrathecal baclofen pump adjustment.  We will have him return for reassessment as previously scheduled on 11/20/2023.  Ms. Steven knows to call the neurosurgical clinic to return for any new or additional concerns.  Christine agrees with this plan of care.    Class 1 obesity (BMI 30.0-34.9) due to excessive calories with serious comorbidities BMI of 31.0-31.9 in adult  Body mass index is 31.1 kg/m². Information on the DASH diet provided in the AVS.  We will continue to provided diet and exercise information with the goal of weight loss at each scheduled appointment.     STEADI Fall Risk Assessment was completed, and  patient is at HIGH risk for falls. Assessment completed on:9/11/2023  Fall risk information provided in AVS    Diagnoses and all orders for this visit:    1. Failed back syndrome (Primary)    2. Baclofen pump failure, initial encounter    3. Class 1 obesity due to excess calories with serious comorbidity and body mass index (BMI) of 31.0 to 31.9 in adult    4. At high risk for falls      Return for KEEP SCHEDULED APPT WITH DR. DAILEY.    I discussed the patients findings and my recommendations with patient    NISHANT Retana

## 2023-10-04 PROBLEM — M54.50 CHRONIC MIDLINE LOW BACK PAIN WITHOUT SCIATICA: Chronic | Status: ACTIVE | Noted: 2023-10-04

## 2023-10-04 PROBLEM — G89.29 CHRONIC MIDLINE LOW BACK PAIN WITHOUT SCIATICA: Chronic | Status: ACTIVE | Noted: 2023-10-04

## 2023-10-04 NOTE — ASSESSMENT & PLAN NOTE
Surgical incision is healing well.  Pain is well controlled on tramadol.  Patient to follow up with neurosurgery in 2 weeks.

## 2023-11-02 ENCOUNTER — OFFICE VISIT (OUTPATIENT)
Dept: FAMILY MEDICINE CLINIC | Facility: CLINIC | Age: 73
End: 2023-11-02
Payer: MEDICARE

## 2023-11-02 VITALS
OXYGEN SATURATION: 96 % | RESPIRATION RATE: 18 BRPM | BODY MASS INDEX: 29.88 KG/M2 | HEIGHT: 59 IN | SYSTOLIC BLOOD PRESSURE: 100 MMHG | WEIGHT: 148.2 LBS | TEMPERATURE: 98.6 F | DIASTOLIC BLOOD PRESSURE: 67 MMHG | HEART RATE: 77 BPM

## 2023-11-02 DIAGNOSIS — R09.02 HYPOXIA: ICD-10-CM

## 2023-11-02 DIAGNOSIS — R05.9 COUGH, UNSPECIFIED TYPE: ICD-10-CM

## 2023-11-02 DIAGNOSIS — J40 BRONCHITIS: Primary | ICD-10-CM

## 2023-11-02 DIAGNOSIS — R06.02 SHORTNESS OF BREATH: ICD-10-CM

## 2023-11-02 DIAGNOSIS — E66.3 OVERWEIGHT (BMI 25.0-29.9): Chronic | ICD-10-CM

## 2023-11-02 DIAGNOSIS — R09.89 CHEST CONGESTION: ICD-10-CM

## 2023-11-02 LAB
EXPIRATION DATE: NORMAL
FLUAV AG UPPER RESP QL IA.RAPID: NOT DETECTED
FLUBV AG UPPER RESP QL IA.RAPID: NOT DETECTED
INTERNAL CONTROL: NORMAL
Lab: NORMAL
SARS-COV-2 AG UPPER RESP QL IA.RAPID: NOT DETECTED

## 2023-11-02 RX ORDER — PREDNISONE 10 MG/1
TABLET ORAL
Qty: 21 TABLET | Refills: 0 | Status: SHIPPED | OUTPATIENT
Start: 2023-11-02 | End: 2023-11-11

## 2023-11-02 RX ORDER — AZITHROMYCIN 250 MG/1
TABLET, FILM COATED ORAL
Qty: 6 TABLET | Refills: 0 | Status: SHIPPED | OUTPATIENT
Start: 2023-11-02 | End: 2023-11-09

## 2023-11-02 NOTE — PROGRESS NOTES
Subjective     Chief Complaint   Patient presents with    URI    Cough    Shortness of Breath    Nasal Congestion       History of Present Illness    Patient is accompanied by her son at her request.  Symptoms started a few days ago.  Productive cough, nasal drainage, shortness of breath, wheezing.  Son states that patient's oxygen levels have been in the upper 80s to low 90s for the last few days, even while she is wearing oxygen.  Patient states that she does not feel well, that she is very tired.     Patient's PMR from outside medical facility reviewed and noted.    Review of Systems   Constitutional:  Positive for fatigue.   HENT:  Positive for congestion and rhinorrhea.    Respiratory:  Positive for cough, shortness of breath and wheezing.         Otherwise complete ROS reviewed and negative except as mentioned in the HPI.    Past Medical History:   Past Medical History:   Diagnosis Date    Cancer     throat cancer, breast    CHF (congestive heart failure)     Chronic kidney disease (CKD), stage III (moderate)     Constipation     COPD (chronic obstructive pulmonary disease)     Coronary artery disease     Diverticulitis     GERD (gastroesophageal reflux disease)     History of transfusion     Hypertension     Low back pain     Lung nodule     Neck fracture     Oxygen dependent     wears 2lnc    Sleep apnea     cpap or bipap unsure which one she uses also uses oxygen with this    Stroke     left side face weakness residual    Thyroid nodule      Past Surgical History:  Past Surgical History:   Procedure Laterality Date    APPENDECTOMY      BACK SURGERY N/A     BRAIN SURGERY      chiari    CARDIAC CATHETERIZATION      no stents    CHOLECYSTECTOMY      COLONOSCOPY      HYSTERECTOMY      PAIN PUMP INSERTION/REVISION      PAIN PUMP INSERTION/REVISION Left 9/19/2023    Procedure: PAIN PUMP and proximal catheter REVISION, left.  Supine position.;  Surgeon: Cristopher Reza MD;  Location: [facility] OR;   Service: Neurosurgery;  Laterality: Left;    TONSILLECTOMY       Social History:  reports that she quit smoking about 10 years ago. Her smoking use included cigarettes. She has a 20.00 pack-year smoking history. She has never used smokeless tobacco. She reports that she does not drink alcohol and does not use drugs.    Family History: family history includes No Known Problems in her father and mother.      Allergies:  Allergies   Allergen Reactions    Bee Venom Shortness Of Breath and Swelling    Ciprofloxacin Shortness Of Breath    Nitroglycerin Anaphylaxis    Penicillins Shortness Of Breath    Sulfamethoxazole-Trimethoprim Anaphylaxis, Swelling and Angioedema    Eggs Or Egg-Derived Products Nausea Only, Unknown - Low Severity and GI Intolerance    Codeine Itching    Contrast Dye (Echo Or Unknown Ct/Mr) Unknown - Low Severity     Stage 3 kidney disease cannot take     Iodinated Contrast Media Unknown - Low Severity     Stage 3 kidney disease     Latex, Natural Rubber Hives    Methenamine GI Intolerance    Nylon Hives    Oxycodone-Acetaminophen Nausea And Vomiting    Latex Rash and Other (See Comments)    Oxycodone Nausea And Vomiting     Medications:  Prior to Admission medications    Medication Sig Start Date End Date Taking? Authorizing Provider   albuterol (PROVENTIL) (2.5 MG/3ML) 0.083% nebulizer solution Take 2.5 mg by nebulization Every 4 (Four) Hours As Needed for Wheezing. 3/30/23  Yes Fabiola Morris APRN   albuterol sulfate  (90 Base) MCG/ACT inhaler Inhale 2 puffs Every 4 (Four) Hours As Needed for Wheezing. 2/28/23  Yes Fabiola Morris APRN   allopurinol (ZYLOPRIM) 100 MG tablet Take 1 tablet by mouth Daily. 12/21/22  Yes Jacquie White MD   ALPRAZolam (XANAX) 1 MG tablet Take 1 tablet by mouth 30 Min Pre-Op for 1 dose. Take 2nd tablet when arriving in radiology department. **MUST HAVE A ** 9/11/23  Yes Cristopher Reza MD   amLODIPine (NORVASC) 5 MG tablet  Take 1 tablet by mouth Daily.   Yes Jacquie White MD   Aspirin Low Dose 81 MG EC tablet Take 1 tablet by mouth Daily. 12/8/22  Yes Jacquie White MD   atorvastatin (LIPITOR) 40 MG tablet Take 1 tablet by mouth every night at bedtime. 12/22/22  Yes Jacquie White MD   baclofen (LIORESAL) 10 MG/20ML injection 237.53 mcg by Intrathecal route 1 (One) Time. Pain pump   Yes Jacquie White MD   betamethasone dipropionate 0.05 % cream Apply 1 application  topically to the appropriate area as directed 2 (Two) Times a Day.   Yes Jacquie White MD   bisacodyl (DULCOLAX) 10 MG suppository Insert 1 suppository into the rectum Daily. 5/11/23  Yes Fabiola Morris APRN   cyclobenzaprine (FLEXERIL) 10 MG tablet Take 1 tablet by mouth 3 (Three) Times a Day As Needed. 12/22/22  Yes Jacquie White MD   D3-50 1.25 MG (16620 UT) capsule Take 1 capsule by mouth Every 14 (Fourteen) Days. 8/25/23  Yes Jacquie White MD   escitalopram (LEXAPRO) 10 MG tablet Take  by mouth Daily.   Yes Jacquie White MD   ezetimibe (ZETIA) 10 MG tablet Take 1 tablet by mouth every night at bedtime. 12/21/22  Yes Jacquie White MD   fluticasone (FLONASE) 50 MCG/ACT nasal spray 2 sprays into the nostril(s) as directed by provider Daily. 5/11/23  Yes Fabiola Morris APRN   folic acid (FOLVITE) 1 MG tablet Take 1 tablet by mouth Daily. 4/16/23  Yes Jacquie White MD   furosemide (LASIX) 40 MG tablet Take 1 tablet by mouth Daily.   Yes ProviderJacquie MD   gabapentin (NEURONTIN) 100 MG capsule Take 1 capsule every day by oral route at bedtime.   Yes ProviderJacquie MD   Ericka-roxanna 8.6 MG tablet Take 1 tablet (8.6 mg total) by mouth 2 (two) times daily. 6/21/23  Yes Jacquie White MD   HYDROmorphone (DILAUDID) 2 MG/ML injection Infuse 1.3573 mg into a venous catheter.   Yes ProviderJacquie MD   ipratropium-albuterol (DUO-NEB) 0.5-2.5 mg/3 ml nebulizer  Take 3 mL by nebulization Every 4 (Four) Hours As Needed for Wheezing or Shortness of Air. 5/1/23  Yes Fabiola Morris APRN   levothyroxine (SYNTHROID, LEVOTHROID) 25 MCG tablet TAKE 1 TABLET BY MOUTH BEFORE BREAKFAST. 12/21/22  Yes Jacquie White MD   linaclotide (LINZESS) 145 MCG capsule capsule Take 1 capsule every day by oral route.   Yes Jacquie White MD   lisinopril (PRINIVIL,ZESTRIL) 10 MG tablet lisinopril 10 mg tablet   TAKE ONE TABLET BY MOUTH ONE TIME DAILY   Yes Jacquie White MD   metoprolol succinate XL (TOPROL-XL) 25 MG 24 hr tablet Take 1 tablet by mouth Daily.   Yes ProviderJacquie MD   naloxone (NARCAN) 4 MG/0.1ML nasal spray Call 911. Don't prime. [place] in 1 nostril for overdose. Repeat in 2-3 minutes in other nostril if no or minimal breathing/responsiveness. 9/20/23  Yes Otto Jhaveri APRN   nystatin (MYCOSTATIN) 801719 UNIT/GM powder APPLY TO AFFECTED AREA EVERY DAY AND AS NEEDED 12/14/22  Yes ProviderJacquie MD   pantoprazole (PROTONIX) 20 MG EC tablet Take 1 tablet by mouth Every Morning Before Breakfast. 12/21/22  Yes Jacquie White MD   potassium chloride 10 MEQ CR tablet Take 1 tablet by mouth Daily. 8/25/23  Yes Jacquie White MD   predniSONE (DELTASONE) 10 MG tablet TAKE 4 TABLETS ORALLY EVERY DAY FOR 5 DAYS, THEN 2 EVERY DAY FOR 3 DAYS, THEN 1 EVERY DAY FOR 2 DAYS   Yes ProviderJacquie MD   sennosides-docusate (senna-docusate sodium) 8.6-50 MG per tablet Take 1 tablet by mouth 2 (Two) Times a Day As Needed for Constipation. 5/11/23  Yes Fabiola Morris APRN   tiZANidine (ZANAFLEX) 4 MG tablet Take 1 tablet by mouth Every 8 (Eight) Hours As Needed.   Yes Jacquie White MD   traZODone (DESYREL) 50 MG tablet Take 1 tablet by mouth Every Night for 90 days. 9/11/23 12/10/23 Yes Alexandra, Fabiola Nidih, APRN   triamcinolone (KENALOG) 0.1 % cream APPLY TO AFFECTED AREA TWICE A DAY FOR DERMATITIS   Yes Provider,  "MD Jacquie   trimethoprim (TRIMPEX) 100 MG tablet    Yes Provider, MD Jacquie       REBEKAH:        PHQ-9 Depression Screening  Little interest or pleasure in doing things? 0-->not at all   Feeling down, depressed, or hopeless? 0-->not at all   Trouble falling or staying asleep, or sleeping too much?     Feeling tired or having little energy?     Poor appetite or overeating?     Feeling bad about yourself - or that you are a failure or have let yourself or your family down?     Trouble concentrating on things, such as reading the newspaper or watching television?     Moving or speaking so slowly that other people could have noticed? Or the opposite - being so fidgety or restless that you have been moving around a lot more than usual?     Thoughts that you would be better off dead, or of hurting yourself in some way?     PHQ-9 Total Score 0   If you checked off any problems, how difficult have these problems made it for you to do your work, take care of things at home, or get along with other people?         PHQ-9 Total Score: 0   0 (Negative screening for depression)  Support given, observe for worsening symptoms    Objective     Vital Signs: /67 (BP Location: Right arm, Patient Position: Sitting, Cuff Size: Adult)   Pulse 77   Temp 98.6 °F (37 °C) (Infrared)   Resp 18   Ht 149.9 cm (59\")   Wt 67.2 kg (148 lb 3.2 oz)   SpO2 96%   BMI 29.93 kg/m²   Physical Exam  Vitals and nursing note reviewed.   Constitutional:       Appearance: She is overweight. She is ill-appearing.   HENT:      Head: Normocephalic.      Right Ear: Tympanic membrane normal.      Left Ear: Tympanic membrane normal.      Nose: Nose normal.      Mouth/Throat:      Mouth: Mucous membranes are moist.   Eyes:      Conjunctiva/sclera: Conjunctivae normal.   Cardiovascular:      Rate and Rhythm: Normal rate and regular rhythm.      Pulses: Normal pulses.      Heart sounds: Normal heart sounds.   Pulmonary:      Effort: Accessory muscle " usage and prolonged expiration present.      Breath sounds: Examination of the right-upper field reveals wheezing. Examination of the left-upper field reveals wheezing. Examination of the right-lower field reveals rhonchi. Examination of the left-lower field reveals rhonchi.   Musculoskeletal:         General: Normal range of motion.      Cervical back: Normal range of motion.   Skin:     General: Skin is warm and dry.   Neurological:      General: No focal deficit present.      Mental Status: She is alert and oriented to person, place, and time.   Psychiatric:         Mood and Affect: Mood normal.         Behavior: Behavior normal.           Results Reviewed:  Glucose   Date Value Ref Range Status   09/20/2023 160 (H) 65 - 99 mg/dL Final     BUN   Date Value Ref Range Status   09/20/2023 16 8 - 23 mg/dL Final     Creatinine   Date Value Ref Range Status   09/20/2023 1.24 (H) 0.57 - 1.00 mg/dL Final     Sodium   Date Value Ref Range Status   09/20/2023 139 136 - 145 mmol/L Final     Potassium   Date Value Ref Range Status   09/20/2023 4.6 3.5 - 5.2 mmol/L Final     Chloride   Date Value Ref Range Status   09/20/2023 100 98 - 107 mmol/L Final     CO2   Date Value Ref Range Status   09/20/2023 31.0 (H) 22.0 - 29.0 mmol/L Final     Calcium   Date Value Ref Range Status   09/20/2023 8.9 8.6 - 10.5 mg/dL Final     ALT (SGPT)   Date Value Ref Range Status   09/20/2023 15 1 - 33 U/L Final     AST (SGOT)   Date Value Ref Range Status   09/20/2023 18 1 - 32 U/L Final     WBC   Date Value Ref Range Status   09/20/2023 3.59 3.40 - 10.80 10*3/mm3 Final   09/11/2023 5.2 3.4 - 10.8 x10E3/uL Final     Hematocrit   Date Value Ref Range Status   09/20/2023 33.9 (L) 34.0 - 46.6 % Final     Platelets   Date Value Ref Range Status   09/20/2023 144 140 - 450 10*3/mm3 Final     Triglycerides   Date Value Ref Range Status   09/11/2023 137 0 - 149 mg/dL Final     HDL Cholesterol   Date Value Ref Range Status   09/11/2023 41 >39 mg/dL Final      LDL Chol Calc (NIH)   Date Value Ref Range Status   09/11/2023 40 0 - 99 mg/dL Final     Hemoglobin A1C   Date Value Ref Range Status   09/11/2023 5.8 (H) 4.8 - 5.6 % Final     Comment:              Prediabetes: 5.7 - 6.4           Diabetes: >6.4           Glycemic control for adults with diabetes: <7.0           Assessment / Plan     Assessment/Plan     Diagnoses and all orders for this visit:    1. Bronchitis (Primary)  -     Discontinue: azithromycin (Zithromax Z-Eddy) 250 MG tablet; Take 2 tablets by mouth on day 1, then 1 tablet daily on days 2-5  Dispense: 6 tablet; Refill: 0  -     cefTRIAXone (ROCEPHIN) 350 mg/ml in lidocaine 1% IM syringe (1 gm vial)  -     predniSONE (DELTASONE) 10 MG tablet; Take 4 tablets by mouth Daily for 3 days, THEN 2 tablets Daily for 3 days, THEN 1 tablet Daily for 3 days.  Dispense: 21 tablet; Refill: 0  -     Discontinue: doxycycline (VIBRAMYCIN) 100 MG capsule; Take 1 capsule by mouth 2 (Two) Times a Day for 7 days.  Dispense: 14 capsule; Refill: 0    2. Chest congestion  -     POCT SARS-CoV-2 Antigen AMNA + Flu    3. Cough, unspecified type  -     POCT SARS-CoV-2 Antigen AMNA + Flu    4. Shortness of breath  -     POCT SARS-CoV-2 Antigen AMNA + Flu    5. Overweight (BMI 25.0-29.9)    6. Hypoxia      Assessment & Plan:  - Patient instructed to go to ER given poor respiratory status and hypoxia at home.  Patient refused to go to ER but stated that she would go if she got any worse.  He son agreed to take her if her respiratory status continued at this level or declined.  I explained to patient the risks of not going to ER including worsening hypoxia, respiratory distress, and death.  Patient verbalized understanding.         An After Visit Summary was printed and given to the patient at discharge.  Return in about 3 days (around 11/5/2023) for Follow up.    I have discussed the patient results/orders and plan/recommendation with them at today's visit.      NISHANT Bess    11/02/2023

## 2023-11-07 ENCOUNTER — TELEPHONE (OUTPATIENT)
Dept: FAMILY MEDICINE CLINIC | Facility: CLINIC | Age: 73
End: 2023-11-07

## 2023-11-07 ENCOUNTER — READMISSION MANAGEMENT (OUTPATIENT)
Dept: CALL CENTER | Facility: HOSPITAL | Age: 73
End: 2023-11-07
Payer: MEDICARE

## 2023-11-07 NOTE — OUTREACH NOTE
Prep Survey      Flowsheet Row Responses   Denominational facility patient discharged from? Non-BH   Is LACE score < 7 ? Non-BH Discharge   Eligibility [facility]   Date of Discharge 11/07/23   Discharge Disposition Home or Self Care   Discharge diagnosis Low lab value   Does the patient have one of the following disease processes/diagnoses(primary or secondary)? Other   Prep survey completed? Yes            Reva DAVILA - Registered Nurse

## 2023-11-07 NOTE — TELEPHONE ENCOUNTER
Caller: ALEXANDER    Relationship to patient: NURSE AT [facility]    Best call back number: [phone]    New or established patient?  [] New  [x] Established    Date of discharge: 11.7.23    Facility discharged from: [facility]    Diagnosis/Symptoms: HYPOCHLOREMIA    Length of stay (If applicable): 3 DAYS

## 2023-11-08 ENCOUNTER — TRANSITIONAL CARE MANAGEMENT TELEPHONE ENCOUNTER (OUTPATIENT)
Dept: CALL CENTER | Facility: HOSPITAL | Age: 73
End: 2023-11-08
Payer: MEDICARE

## 2023-11-08 NOTE — OUTREACH NOTE
Call Center TCM Note      Flowsheet Row Responses   [facility] [place], [facility] by [facility] patient discharged from? Non-BH  [Araiza]   Does the patient have one of the following disease processes/diagnoses(primary or secondary)? Other   TCM attempt successful? Yes   Call start time 1416   Call end time 1418   Discharge diagnosis Low lab value   Person spoke with today (if not patient) and relationship Gregory/son   Is the patient taking all medications as directed (includes completed medication regime)? Yes   Comments Appt with NISHANT Mack on 11/13   Does the patient have an appointment with their PCP within 7-14 days of discharge? Yes   Psychosocial issues? No   What is the patient's perception of their health status since discharge? Same   Is the patient/caregiver able to teach back signs and symptoms related to disease process for when to call PCP? Yes   Is the patient/caregiver able to teach back signs and symptoms related to disease process for when to call 911? Yes   Is the patient/caregiver able to teach back the hierarchy of who to call/visit for symptoms/problems? PCP, Specialist, Home health nurse, Urgent Care, ED, 911 Yes   Additional teach back comments States she is very weak and not eating.  Will try boost or ensure.  Advised if no improvement or worsening to go to ED.   TCM call completed? Yes   Wrap up additional comments Will message PCP office to see if sooner appt is available. Pt has appt on 11/13   Call end time 1418            Halie Blount LPN    11/8/2023, 14:21 CST

## 2023-11-09 ENCOUNTER — OFFICE VISIT (OUTPATIENT)
Dept: FAMILY MEDICINE CLINIC | Facility: CLINIC | Age: 73
End: 2023-11-09
Payer: MEDICARE

## 2023-11-09 VITALS
OXYGEN SATURATION: 94 % | RESPIRATION RATE: 19 BRPM | HEART RATE: 90 BPM | TEMPERATURE: 97.8 F | DIASTOLIC BLOOD PRESSURE: 74 MMHG | SYSTOLIC BLOOD PRESSURE: 133 MMHG | BODY MASS INDEX: 29.84 KG/M2 | WEIGHT: 148 LBS | HEIGHT: 59 IN

## 2023-11-09 DIAGNOSIS — E87.6 HYPOKALEMIA: ICD-10-CM

## 2023-11-09 DIAGNOSIS — J40 BRONCHITIS: ICD-10-CM

## 2023-11-09 DIAGNOSIS — Z92.89 HISTORY OF RECENT HOSPITALIZATION: Primary | ICD-10-CM

## 2023-11-09 DIAGNOSIS — E66.3 OVERWEIGHT (BMI 25.0-29.9): ICD-10-CM

## 2023-11-09 RX ORDER — DOXYCYCLINE HYCLATE 100 MG/1
100 CAPSULE ORAL 2 TIMES DAILY
Qty: 14 CAPSULE | Refills: 0 | Status: SHIPPED | OUTPATIENT
Start: 2023-11-09 | End: 2023-11-16

## 2023-11-09 RX ORDER — DOXYCYCLINE HYCLATE 100 MG/1
100 CAPSULE ORAL 2 TIMES DAILY
Qty: 14 CAPSULE | Refills: 0 | Status: SHIPPED | OUTPATIENT
Start: 2023-11-09 | End: 2023-11-09

## 2023-11-09 NOTE — PROGRESS NOTES
Transitional Care Follow Up Visit   [unfilled]         Subjective     Chief Complaint   Patient presents with    Hypochloremia     Hospital follow up.     Insomnia       History of Present Illness  Christine Steven is a 73 y.o. female who presents for a transitional care management visit.    Within 48 business hours after discharge our office contacted her via telephone to coordinate her care and needs.      I reviewed and discussed the details of that call along with the discharge summary, hospital problems, inpatient lab results, inpatient diagnostic studies, and consultation reports with Christine.     Current outpatient and discharge medications have been reconciled for the patient.  Reviewed by: Fabiola Morris, NISHANT          11/7/2023    10:16 AM   Date of TCM Phone Call   [facility]   Date of Discharge 11/7/2023   Discharge Disposition Home or Self Care     Risk for Readmission (LACE) No data recorded    Course During Hospital Stay:  Patient presented to [facility] ER on November 4 with increasing shortness of breath.  Chest x-ray in the ER showed mild emphysema but was otherwise negative.  White blood count in the ER was normal, potassium was low at 2.8, and glucose was elevated at 246.  Patient was admitted to the hospital for closer observation, IV steroids and antibiotics, DuoNeb treatments, potassium replacement, sputum culture, and respiratory panel.  Respiratory panel was positive for human metapneumovirus.  Patient was empirically treated with Rocephin and steroids and maintained on 2 L of oxygen by nasal cannula.  Patient was found to be stable with vital signs and respiratory status and her oxygenation was well-maintained at her normal baseline of 2 L.  She was discharged on November 7 with a prescription for prednisone taper.    HPI: Patient reports today that she does still have a productive cough and is still short of breath but that both this has improved  significantly.  She is still wearing 2 L of oxygen via nasal cannula at home at baseline but is not wearing it in the office today.  She reports that she is still taking the prednisone taper as prescribed.    Patient's PMR from outside medical facility reviewed and noted.    Review of Systems   Respiratory:  Positive for cough and shortness of breath. Negative for wheezing.         Otherwise complete ROS reviewed and negative except as mentioned in the HPI.    Past Medical History:   Past Medical History:   Diagnosis Date    Cancer     throat cancer, breast    CHF (congestive heart failure)     Chronic kidney disease (CKD), stage III (moderate)     Constipation     COPD (chronic obstructive pulmonary disease)     Coronary artery disease     Diverticulitis     GERD (gastroesophageal reflux disease)     History of transfusion     Hypertension     Low back pain     Lung nodule     Neck fracture     Oxygen dependent     wears 2lnc    Sleep apnea     cpap or bipap unsure which one she uses also uses oxygen with this    Stroke     left side face weakness residual    Thyroid nodule      Past Surgical History:  Past Surgical History:   Procedure Laterality Date    APPENDECTOMY      BACK SURGERY N/A     BRAIN SURGERY      chiari    CARDIAC CATHETERIZATION      no stents    CHOLECYSTECTOMY      COLONOSCOPY      HYSTERECTOMY      PAIN PUMP INSERTION/REVISION      PAIN PUMP INSERTION/REVISION Left 9/19/2023    Procedure: PAIN PUMP and proximal catheter REVISION, left.  Supine position.;  Surgeon: Cristopher Reza MD;  Location: [facility] OR;  Service: Neurosurgery;  Laterality: Left;    TONSILLECTOMY       Social History:  reports that she quit smoking about 10 years ago. Her smoking use included cigarettes. She has a 20.00 pack-year smoking history. She has never used smokeless tobacco. She reports that she does not drink alcohol and does not use drugs.    Family History: family history includes No Known Problems in her  father and mother.      Allergies:  Allergies   Allergen Reactions    Bee Venom Shortness Of Breath and Swelling    Ciprofloxacin Shortness Of Breath    Nitroglycerin Anaphylaxis    Penicillins Shortness Of Breath    Sulfamethoxazole-Trimethoprim Anaphylaxis, Swelling and Angioedema    Eggs Or Egg-Derived Products Nausea Only, Unknown - Low Severity and GI Intolerance    Codeine Itching    Contrast Dye (Echo Or Unknown Ct/Mr) Unknown - Low Severity     Stage 3 kidney disease cannot take     Iodinated Contrast Media Unknown - Low Severity     Stage 3 kidney disease     Latex, Natural Rubber Hives    Methenamine GI Intolerance    Nylon Hives    Oxycodone-Acetaminophen Nausea And Vomiting    Latex Rash and Other (See Comments)    Oxycodone Nausea And Vomiting     Medications:  Prior to Admission medications    Medication Sig Start Date End Date Taking? Authorizing Provider   albuterol (PROVENTIL) (2.5 MG/3ML) 0.083% nebulizer solution Take 2.5 mg by nebulization Every 4 (Four) Hours As Needed for Wheezing. 3/30/23  Yes Fabiola Morris APRN   albuterol sulfate  (90 Base) MCG/ACT inhaler Inhale 2 puffs Every 4 (Four) Hours As Needed for Wheezing. 2/28/23  Yes Fabiola Morris APRN   allopurinol (ZYLOPRIM) 100 MG tablet Take 1 tablet by mouth Daily. 12/21/22  Yes Jacquie White MD   ALPRAZolam (XANAX) 1 MG tablet Take 1 tablet by mouth 30 Min Pre-Op for 1 dose. Take 2nd tablet when arriving in radiology department. **MUST HAVE A ** 9/11/23  Yes Cristopher Reza MD   amLODIPine (NORVASC) 5 MG tablet Take 1 tablet by mouth Daily.   Yes ProviderJacquie MD   Aspirin Low Dose 81 MG EC tablet Take 1 tablet by mouth Daily. 12/8/22  Yes Jacquie White MD   atorvastatin (LIPITOR) 40 MG tablet Take 1 tablet by mouth every night at bedtime. 12/22/22  Yes Jacquie White MD   azithromycin (Zithromax Z-Eddy) 250 MG tablet Take 2 tablets by mouth on day 1, then 1 tablet  daily on days 2-5 11/2/23  Yes Fabiola Morris APRN   baclofen (LIORESAL) 10 MG/20ML injection 237.53 mcg by Intrathecal route 1 (One) Time. Pain pump   Yes ProviderJacquie MD   betamethasone dipropionate 0.05 % cream Apply 1 application  topically to the appropriate area as directed 2 (Two) Times a Day.   Yes Jacquie White MD   bisacodyl (DULCOLAX) 10 MG suppository Insert 1 suppository into the rectum Daily. 5/11/23  Yes Fabiola Morris APRN   cyclobenzaprine (FLEXERIL) 10 MG tablet Take 1 tablet by mouth 3 (Three) Times a Day As Needed. 12/22/22  Yes Jacquie White MD   D3-50 1.25 MG (49621 UT) capsule Take 1 capsule by mouth Every 14 (Fourteen) Days. 8/25/23  Yes Jacquie Whtie MD   escitalopram (LEXAPRO) 10 MG tablet Take  by mouth Daily.   Yes Provider, MD Jacquie   ezetimibe (ZETIA) 10 MG tablet Take 1 tablet by mouth every night at bedtime. 12/21/22  Yes Jacquie White MD   fluticasone (FLONASE) 50 MCG/ACT nasal spray 2 sprays into the nostril(s) as directed by provider Daily. 5/11/23  Yes Fabiola Morris APRN   folic acid (FOLVITE) 1 MG tablet Take 1 tablet by mouth Daily. 4/16/23  Yes ProviderJacquie MD   furosemide (LASIX) 40 MG tablet Take 1 tablet by mouth Daily.   Yes Provider, MD Jacquie   gabapentin (NEURONTIN) 100 MG capsule Take 1 capsule every day by oral route at bedtime.   Yes ProviderJacquie MD   Ericka-roxanna 8.6 MG tablet Take 1 tablet (8.6 mg total) by mouth 2 (two) times daily. 6/21/23  Yes Jacquie White MD   HYDROmorphone (DILAUDID) 2 MG/ML injection Infuse 1.3573 mg into a venous catheter.   Yes ProviderJacquie MD   ipratropium-albuterol (DUO-NEB) 0.5-2.5 mg/3 ml nebulizer Take 3 mL by nebulization Every 4 (Four) Hours As Needed for Wheezing or Shortness of Air. 5/1/23  Yes Fabiola Morris APRN   levothyroxine (SYNTHROID, LEVOTHROID) 25 MCG tablet TAKE 1 TABLET BY MOUTH BEFORE  BREAKFAST. 12/21/22  Yes Jacquie White MD   linaclotide (LINZESS) 145 MCG capsule capsule Take 1 capsule every day by oral route.   Yes Jacquie White MD   lisinopril (PRINIVIL,ZESTRIL) 10 MG tablet lisinopril 10 mg tablet   TAKE ONE TABLET BY MOUTH ONE TIME DAILY   Yes Jacquie White MD   metoprolol succinate XL (TOPROL-XL) 25 MG 24 hr tablet Take 1 tablet by mouth Daily.   Yes Jacquie White MD   naloxone (NARCAN) 4 MG/0.1ML nasal spray Call 911. Don't prime. [place] in 1 nostril for overdose. Repeat in 2-3 minutes in other nostril if no or minimal breathing/responsiveness. 9/20/23  Yes Otot Jhaveri APRN   nystatin (MYCOSTATIN) 218147 UNIT/GM powder APPLY TO AFFECTED AREA EVERY DAY AND AS NEEDED 12/14/22  Yes Jacquie White MD   pantoprazole (PROTONIX) 20 MG EC tablet Take 1 tablet by mouth Every Morning Before Breakfast. 12/21/22  Yes Jacquie White MD   potassium chloride 10 MEQ CR tablet Take 1 tablet by mouth Daily. 8/25/23  Yes Jacquie White MD   predniSONE (DELTASONE) 10 MG tablet Take 4 tablets by mouth Daily for 3 days, THEN 2 tablets Daily for 3 days, THEN 1 tablet Daily for 3 days. 11/2/23 11/11/23 Yes Fabiola Morris APRN   sennosides-docusate (senna-docusate sodium) 8.6-50 MG per tablet Take 1 tablet by mouth 2 (Two) Times a Day As Needed for Constipation. 5/11/23  Yes Fabiola Morris APRN   tiZANidine (ZANAFLEX) 4 MG tablet Take 1 tablet by mouth Every 8 (Eight) Hours As Needed.   Yes Jacquie White MD   traZODone (DESYREL) 50 MG tablet Take 1 tablet by mouth Every Night for 90 days. 9/11/23 12/10/23 Yes Fabiola Morris APRN   triamcinolone (KENALOG) 0.1 % cream APPLY TO AFFECTED AREA TWICE A DAY FOR DERMATITIS   Yes Jacquie White MD   trimethoprim (TRIMPEX) 100 MG tablet    Yes Provider, Historical, MD       Objective     Vital Signs: /74 (BP Location: Left arm, Patient Position: Sitting, Cuff Size:  "Large Adult)   Pulse 90   Temp 97.8 °F (36.6 °C) (Skin)   Resp 19   Ht 149.9 cm (59\")   Wt 67.1 kg (148 lb)   SpO2 94%   BMI 29.89 kg/m²   Physical Exam  Vitals and nursing note reviewed.   Constitutional:       Appearance: She is overweight.   HENT:      Head: Normocephalic.      Nose: Nose normal.      Mouth/Throat:      Mouth: Mucous membranes are moist.   Eyes:      Conjunctiva/sclera: Conjunctivae normal.   Cardiovascular:      Rate and Rhythm: Normal rate and regular rhythm.      Pulses: Normal pulses.      Heart sounds: Normal heart sounds.   Pulmonary:      Effort: Pulmonary effort is normal.      Breath sounds: Normal breath sounds.   Musculoskeletal:         General: Normal range of motion.      Cervical back: Normal range of motion.   Skin:     General: Skin is warm and dry.   Neurological:      General: No focal deficit present.      Mental Status: She is alert and oriented to person, place, and time.   Psychiatric:         Mood and Affect: Mood normal.         Behavior: Behavior normal.          Results Reviewed:  Glucose   Date Value Ref Range Status   09/20/2023 160 (H) 65 - 99 mg/dL Final     BUN   Date Value Ref Range Status   09/20/2023 16 8 - 23 mg/dL Final     Creatinine   Date Value Ref Range Status   09/20/2023 1.24 (H) 0.57 - 1.00 mg/dL Final     Sodium   Date Value Ref Range Status   09/20/2023 139 136 - 145 mmol/L Final     Potassium   Date Value Ref Range Status   09/20/2023 4.6 3.5 - 5.2 mmol/L Final     Chloride   Date Value Ref Range Status   09/20/2023 100 98 - 107 mmol/L Final     CO2   Date Value Ref Range Status   09/20/2023 31.0 (H) 22.0 - 29.0 mmol/L Final     Calcium   Date Value Ref Range Status   09/20/2023 8.9 8.6 - 10.5 mg/dL Final     ALT (SGPT)   Date Value Ref Range Status   09/20/2023 15 1 - 33 U/L Final     AST (SGOT)   Date Value Ref Range Status   09/20/2023 18 1 - 32 U/L Final     WBC   Date Value Ref Range Status   09/20/2023 3.59 3.40 - 10.80 10*3/mm3 Final "   09/11/2023 5.2 3.4 - 10.8 x10E3/uL Final     Hematocrit   Date Value Ref Range Status   09/20/2023 33.9 (L) 34.0 - 46.6 % Final     Platelets   Date Value Ref Range Status   09/20/2023 144 140 - 450 10*3/mm3 Final     Triglycerides   Date Value Ref Range Status   09/11/2023 137 0 - 149 mg/dL Final     HDL Cholesterol   Date Value Ref Range Status   09/11/2023 41 >39 mg/dL Final     LDL Chol Calc (NIH)   Date Value Ref Range Status   09/11/2023 40 0 - 99 mg/dL Final     Hemoglobin A1C   Date Value Ref Range Status   09/11/2023 5.8 (H) 4.8 - 5.6 % Final     Comment:              Prediabetes: 5.7 - 6.4           Diabetes: >6.4           Glycemic control for adults with diabetes: <7.0           Assessment / Plan     Assessment/Plan     Diagnoses and all orders for this visit:    1. History of recent hospitalization (Primary)    2. Bronchitis  -     doxycycline (VIBRAMYCIN) 100 MG capsule; Take 1 capsule by mouth 2 (Two) Times a Day for 7 days.  Dispense: 14 capsule; Refill: 0    3. Overweight (BMI 25.0-29.9)    4. Hypokalemia      Continue with continuous use of oxygen at 2 L by nasal cannula.  Doxycycline for bronchitis.  Continue with prednisone taper as prescribed by ER.  Continue with Mucinex that patient has at home.  Hypokalemia resolved during hospital stay.       An After Visit Summary was printed and given to the patient at discharge.  Return in about 4 weeks (around 12/7/2023) for Recheck.    I have discussed the patient results/orders and plan/recommendation with them at today's visit.      Fabiola Morris, APRN   11/09/2023

## 2024-03-12 ENCOUNTER — OFFICE VISIT (OUTPATIENT)
Dept: FAMILY MEDICINE CLINIC | Facility: CLINIC | Age: 74
End: 2024-03-12
Payer: MEDICARE

## 2024-03-12 VITALS
DIASTOLIC BLOOD PRESSURE: 74 MMHG | HEIGHT: 59 IN | BODY MASS INDEX: 30.04 KG/M2 | HEART RATE: 105 BPM | TEMPERATURE: 97.9 F | WEIGHT: 149 LBS | RESPIRATION RATE: 18 BRPM | SYSTOLIC BLOOD PRESSURE: 123 MMHG

## 2024-03-12 DIAGNOSIS — E66.09 CLASS 1 OBESITY DUE TO EXCESS CALORIES WITH SERIOUS COMORBIDITY AND BODY MASS INDEX (BMI) OF 30.0 TO 30.9 IN ADULT: ICD-10-CM

## 2024-03-12 DIAGNOSIS — M54.2 CERVICALGIA: ICD-10-CM

## 2024-03-12 DIAGNOSIS — M67.432 GANGLION OF LEFT WRIST: ICD-10-CM

## 2024-03-12 DIAGNOSIS — Q82.8 PALMOPLANTAR KERATODERMA: Primary | ICD-10-CM

## 2024-03-12 DIAGNOSIS — E78.49 OTHER HYPERLIPIDEMIA: Chronic | ICD-10-CM

## 2024-03-12 PROCEDURE — 99213 OFFICE O/P EST LOW 20 MIN: CPT | Performed by: NURSE PRACTITIONER

## 2024-03-12 PROCEDURE — 1160F RVW MEDS BY RX/DR IN RCRD: CPT | Performed by: NURSE PRACTITIONER

## 2024-03-12 PROCEDURE — 1159F MED LIST DOCD IN RCRD: CPT | Performed by: NURSE PRACTITIONER

## 2024-03-12 NOTE — PROGRESS NOTES
Subjective     Chief Complaint   Patient presents with   • Arm Pain       History of Present Illness    Hands started turning orange 2 weeks ago.  It comes and goes, sometimes improving for a day and then spontaneously returning. Takes atorvastatin 40 mg nightly.      She has a cyst that may have ruptured on her left wrist.  Her wrist is swollen and tender to touch since she bumped her wrist and possibly rupture of the cyst.  She previously saw orthopedic about the cyst but they stated that they were unable to perform surgery due to its location.    Pain in right side of posterior neck for last 3-4 months.  It has decreased in size.  It is tender to touch but not constantly tender.  The pain started after she received an injection in neck by Dr. Hunter.  Patient denies redness, drainage, fever.    Patient's PMR from outside medical facility reviewed and noted.    Review of Systems     Otherwise complete ROS reviewed and negative except as mentioned in the HPI.    Past Medical History:   Past Medical History:   Diagnosis Date   • Cancer     throat cancer, breast   • CHF (congestive heart failure)    • Chronic kidney disease (CKD), stage III (moderate)    • Constipation    • COPD (chronic obstructive pulmonary disease)    • Coronary artery disease    • Diverticulitis    • GERD (gastroesophageal reflux disease)    • History of transfusion    • Hypertension    • Low back pain    • Lung nodule    • Neck fracture    • Oxygen dependent     wears 2lnc   • Sleep apnea     cpap or bipap unsure which one she uses also uses oxygen with this   • Stroke     left side face weakness residual   • Thyroid nodule      Past Surgical History:  Past Surgical History:   Procedure Laterality Date   • APPENDECTOMY     • BACK SURGERY N/A    • BRAIN SURGERY      chiari   • CARDIAC CATHETERIZATION      no stents   • CHOLECYSTECTOMY     • COLONOSCOPY     • HYSTERECTOMY     • PAIN PUMP INSERTION/REVISION     • PAIN PUMP  INSERTION/REVISION Left 9/19/2023    Procedure: PAIN PUMP and proximal catheter REVISION, left.  Supine position.;  Surgeon: Cristopher Reza MD;  Location: [facility];  Service: Neurosurgery;  Laterality: Left;   • TONSILLECTOMY       Social History:  reports that she quit smoking about 11 years ago. Her smoking use included cigarettes. She started smoking about 51 years ago. She has a 20 pack-year smoking history. She has never used smokeless tobacco. She reports that she does not drink alcohol and does not use drugs.    Family History: family history includes No Known Problems in her father and mother.      Allergies:  Allergies   Allergen Reactions   • Bee Venom Shortness Of Breath and Swelling   • Ciprofloxacin Shortness Of Breath   • Nitroglycerin Anaphylaxis   • Penicillins Shortness Of Breath   • Sulfamethoxazole-Trimethoprim Anaphylaxis, Swelling and Angioedema   • Eggs Or Egg-Derived Products Nausea Only, Unknown - Low Severity and GI Intolerance   • Codeine Itching   • Contrast Dye (Echo Or Unknown Ct/Mr) Unknown - Low Severity     Stage 3 kidney disease cannot take    • Iodinated Contrast Media Unknown - Low Severity     Stage 3 kidney disease    • Latex, Natural Rubber Hives   • Methenamine GI Intolerance   • Nylon Hives   • Oxycodone-Acetaminophen Nausea And Vomiting   • Latex Rash and Other (See Comments)   • Oxycodone Nausea And Vomiting     Medications:  Prior to Admission medications    Medication Sig Start Date End Date Taking? Authorizing Provider   albuterol (PROVENTIL) (2.5 MG/3ML) 0.083% nebulizer solution Take 2.5 mg by nebulization Every 4 (Four) Hours As Needed for Wheezing. 3/30/23  Yes Fabiola Morris APRN   albuterol sulfate  (90 Base) MCG/ACT inhaler Inhale 2 puffs Every 4 (Four) Hours As Needed for Wheezing. 2/28/23  Yes Fabiola Morris APRN   allopurinol (ZYLOPRIM) 100 MG tablet Take 1 tablet by mouth Daily. 12/21/22  Yes Provider, MD Jacquie    ALPRAZolam (XANAX) 1 MG tablet Take 1 tablet by mouth 30 Min Pre-Op for 1 dose. Take 2nd tablet when arriving in radiology department. **MUST HAVE A ** 9/11/23  Yes Cristopher Reza MD   amLODIPine (NORVASC) 5 MG tablet Take 1 tablet by mouth Daily.   Yes ProviderJacquie MD   Aspirin Low Dose 81 MG EC tablet Take 1 tablet by mouth Daily. 12/8/22  Yes Jacquie White MD   atorvastatin (LIPITOR) 40 MG tablet Take 1 tablet by mouth every night at bedtime. 12/22/22  Yes ProviderJacquie MD   baclofen (LIORESAL) 10 MG/20ML injection 237.53 mcg by Intrathecal route 1 (One) Time. Pain pump   Yes Jacquie White MD   betamethasone dipropionate 0.05 % cream Apply 1 Application topically to the appropriate area as directed 2 (Two) Times a Day.   Yes ProviderJacquie MD   bisacodyl (DULCOLAX) 10 MG suppository Insert 1 suppository into the rectum Daily. 5/11/23  Yes Fabiola Morris APRN   cyclobenzaprine (FLEXERIL) 10 MG tablet Take 1 tablet by mouth 3 (Three) Times a Day As Needed. 12/22/22  Yes ProviderJacquie MD   D3-50 1.25 MG (08638 UT) capsule Take 1 capsule by mouth Every 14 (Fourteen) Days. 8/25/23  Yes Jacquie White MD   escitalopram (LEXAPRO) 10 MG tablet Take  by mouth Daily.   Yes ProviderJacquie MD   ezetimibe (ZETIA) 10 MG tablet Take 1 tablet by mouth every night at bedtime. 12/21/22  Yes Jacquie White MD   fluticasone (FLONASE) 50 MCG/ACT nasal spray 2 sprays into the nostril(s) as directed by provider Daily. 5/11/23  Yes Fabiola Morris APRN   folic acid (FOLVITE) 1 MG tablet Take 1 tablet by mouth Daily. 4/16/23  Yes Jacquie White MD   furosemide (LASIX) 40 MG tablet Take 1 tablet by mouth Daily.   Yes ProviderJacquie MD   Ericka-roxanna 8.6 MG tablet Take 1 tablet (8.6 mg total) by mouth 2 (two) times daily. 6/21/23  Yes Provider, MD Jacquie   HYDROmorphone (DILAUDID) 2 MG/ML injection Infuse 1.3573 mg into  a venous catheter.   Yes Jacquie White MD   ipratropium-albuterol (DUO-NEB) 0.5-2.5 mg/3 ml nebulizer Take 3 mL by nebulization Every 4 (Four) Hours As Needed for Wheezing or Shortness of Air. 5/1/23  Yes Fabiola Morris APRN   levothyroxine (SYNTHROID, LEVOTHROID) 25 MCG tablet TAKE 1 TABLET BY MOUTH BEFORE BREAKFAST. 12/21/22  Yes Jacquie White MD   linaclotide (LINZESS) 145 MCG capsule capsule Take 1 capsule every day by oral route.   Yes Jacquie White MD   lisinopril (PRINIVIL,ZESTRIL) 10 MG tablet lisinopril 10 mg tablet   TAKE ONE TABLET BY MOUTH ONE TIME DAILY   Yes Jacquie White MD   metoprolol succinate XL (TOPROL-XL) 25 MG 24 hr tablet Take 1 tablet by mouth Daily.   Yes Jacquie White MD   naloxone (NARCAN) 4 MG/0.1ML nasal spray Call 911. Don't prime. [place] in 1 nostril for overdose. Repeat in 2-3 minutes in other nostril if no or minimal breathing/responsiveness. 9/20/23  Yes Otto Jhaveri APRN   nystatin (MYCOSTATIN) 370010 UNIT/GM powder APPLY TO AFFECTED AREA EVERY DAY AND AS NEEDED 12/14/22  Yes Jacquie White MD   pantoprazole (PROTONIX) 20 MG EC tablet Take 1 tablet by mouth Every Morning Before Breakfast. 12/21/22  Yes Jacquie White MD   potassium chloride 10 MEQ CR tablet Take 1 tablet by mouth Daily. 8/25/23  Yes Jacquie White MD   sennosides-docusate (senna-docusate sodium) 8.6-50 MG per tablet Take 1 tablet by mouth 2 (Two) Times a Day As Needed for Constipation. 5/11/23  Yes Fabiola Morris APRN   tiZANidine (ZANAFLEX) 4 MG tablet Take 1 tablet by mouth Every 8 (Eight) Hours As Needed.   Yes Jacquie White MD   triamcinolone (KENALOG) 0.1 % cream APPLY TO AFFECTED AREA TWICE A DAY FOR DERMATITIS   Yes Jacquie White MD   trimethoprim (TRIMPEX) 100 MG tablet    Yes Jacquie White MD   gabapentin (NEURONTIN) 100 MG capsule Take 1 capsule every day by oral route at bedtime.  Patient not  "taking: Reported on 3/12/2024    Provider, MD Jacquie   traZODone (DESYREL) 50 MG tablet Take 1 tablet by mouth Every Night for 90 days. 9/11/23 12/10/23  Fabiola Morris APRN       Objective     Vital Signs: /74 (BP Location: Right arm, Patient Position: Sitting, Cuff Size: Adult)   Pulse 105   Temp 97.9 °F (36.6 °C) (Infrared)   Resp 18   Ht 149.9 cm (59\")   Wt 67.6 kg (149 lb)   BMI 30.09 kg/m²   Physical Exam  Vitals and nursing note reviewed.   Constitutional:       Appearance: She is obese.   HENT:      Head: Normocephalic.      Nose: Nose normal.      Mouth/Throat:      Mouth: Mucous membranes are moist.   Eyes:      Conjunctiva/sclera: Conjunctivae normal.   Neck:     Cardiovascular:      Rate and Rhythm: Normal rate and regular rhythm.      Pulses: Normal pulses.      Heart sounds: Normal heart sounds.   Pulmonary:      Effort: Pulmonary effort is normal.      Breath sounds: Normal breath sounds.   Musculoskeletal:         General: Normal range of motion.      Left wrist: Swelling (medial wrist), effusion and tenderness (medial wrist) present. Normal pulse.      Cervical back: Normal range of motion. Deformity (loss of cervical lordosis post fusion) and tenderness present. No swelling, edema or erythema. Decreased range of motion (baseline).   Skin:     General: Skin is warm and dry.   Neurological:      General: No focal deficit present.      Mental Status: She is alert and oriented to person, place, and time.   Psychiatric:         Mood and Affect: Mood normal.         Behavior: Behavior normal.           Results Reviewed:  Glucose   Date Value Ref Range Status   09/20/2023 160 (H) 65 - 99 mg/dL Final     BUN   Date Value Ref Range Status   09/20/2023 16 8 - 23 mg/dL Final     Creatinine   Date Value Ref Range Status   09/20/2023 1.24 (H) 0.57 - 1.00 mg/dL Final     Sodium   Date Value Ref Range Status   09/20/2023 139 136 - 145 mmol/L Final     Potassium   Date Value Ref Range " Status   09/20/2023 4.6 3.5 - 5.2 mmol/L Final     Chloride   Date Value Ref Range Status   09/20/2023 100 98 - 107 mmol/L Final     CO2   Date Value Ref Range Status   09/20/2023 31.0 (H) 22.0 - 29.0 mmol/L Final     Calcium   Date Value Ref Range Status   09/20/2023 8.9 8.6 - 10.5 mg/dL Final     ALT (SGPT)   Date Value Ref Range Status   09/20/2023 15 1 - 33 U/L Final     AST (SGOT)   Date Value Ref Range Status   09/20/2023 18 1 - 32 U/L Final     WBC   Date Value Ref Range Status   09/20/2023 3.59 3.40 - 10.80 10*3/mm3 Final   09/11/2023 5.2 3.4 - 10.8 x10E3/uL Final     Hematocrit   Date Value Ref Range Status   09/20/2023 33.9 (L) 34.0 - 46.6 % Final     Platelets   Date Value Ref Range Status   09/20/2023 144 140 - 450 10*3/mm3 Final     Triglycerides   Date Value Ref Range Status   09/11/2023 137 0 - 149 mg/dL Final     HDL Cholesterol   Date Value Ref Range Status   09/11/2023 41 >39 mg/dL Final     LDL Chol Calc (NIH)   Date Value Ref Range Status   09/11/2023 40 0 - 99 mg/dL Final     Hemoglobin A1C   Date Value Ref Range Status   09/11/2023 5.8 (H) 4.8 - 5.6 % Final     Comment:              Prediabetes: 5.7 - 6.4           Diabetes: >6.4           Glycemic control for adults with diabetes: <7.0           Assessment / Plan     Assessment/Plan     Diagnoses and all orders for this visit:    1. Palmoplantar keratoderma (Primary)  Comments:  Decrease atorvastatin to 20 mg nightly as this may potentially be a side effect of her medication.  Will refer to dermatology for further evaluation.  Orders:  -     Ambulatory Referral to Dermatology    2. Other hyperlipidemia  Assessment & Plan:   Decrease atorvastatin to 20 mg nightly.        3. Class 1 obesity due to excess calories with serious comorbidity and body mass index (BMI) of 30.0 to 30.9 in adult    4. Ganglion of left wrist  Comments:  Ice rest for swelling and pain.  Effusion should dissipate in the next couple of weeks.  Follow-up if not  improving.    5. Cervicalgia         An After Visit Summary was printed and given to the patient at discharge.  Return in about 3 months (around 6/12/2024) for Follow up palmoplantar keratoderma and fasting lipid panel. .    I have discussed the patient results/orders and plan/recommendation with them at today's visit.      Fabiola Morris, NISHANT   03/12/2024

## 2024-03-20 ENCOUNTER — TELEPHONE (OUTPATIENT)
Dept: FAMILY MEDICINE CLINIC | Facility: CLINIC | Age: 74
End: 2024-03-20
Payer: MEDICARE

## 2024-05-22 ENCOUNTER — HOSPITAL ENCOUNTER (OUTPATIENT)
Dept: LAB | Facility: HOSPITAL | Age: 74
Discharge: HOME | End: 2024-05-22
Attending: NURSE PRACTITIONER

## 2024-05-22 LAB
ALBUMIN SERPL-MCNC: 3.2 G/DL (ref 3.4–5)
ALBUMIN/GLOB SERPL: 1 {RATIO} (ref 1–2)
ALP LIVER SERPL-CCNC: 158 U/L
ALT SERPL-CCNC: 16 U/L
ANION GAP SERPL CALC-SCNC: 5 MMOL/L (ref 0–18)
AST SERPL-CCNC: 19 U/L (ref 15–37)
BASOPHILS # BLD AUTO: 0.02 X10(3) UL (ref 0–0.2)
BASOPHILS NFR BLD AUTO: 0.5 %
BILIRUB SERPL-MCNC: 0.4 MG/DL (ref 0.1–2)
BUN BLD-MCNC: 15 MG/DL (ref 9–23)
CALCIUM BLD-MCNC: 9.2 MG/DL (ref 8.5–10.1)
CHLORIDE SERPL-SCNC: 108 MMOL/L (ref 98–112)
CHOLEST SERPL-MCNC: 124 MG/DL (ref ?–200)
CO2 SERPL-SCNC: 28 MMOL/L (ref 21–32)
CREAT BLD-MCNC: 1.43 MG/DL
EGFRCR SERPLBLD CKD-EPI 2021: 38 ML/MIN/1.73M2 (ref 60–?)
EOSINOPHIL # BLD AUTO: 0.01 X10(3) UL (ref 0–0.7)
EOSINOPHIL NFR BLD AUTO: 0.3 %
ERYTHROCYTE [DISTWIDTH] IN BLOOD BY AUTOMATED COUNT: 15.3 %
EST. AVERAGE GLUCOSE BLD GHB EST-MCNC: 143 MG/DL (ref 68–126)
FASTING PATIENT LIPID ANSWER: YES
FASTING STATUS PATIENT QL REPORTED: YES
GLOBULIN PLAS-MCNC: 3.1 G/DL (ref 2.8–4.4)
GLUCOSE BLD-MCNC: 142 MG/DL (ref 70–99)
HBA1C MFR BLD: 6.6 % (ref ?–5.7)
HCT VFR BLD AUTO: 35.7 %
HDLC SERPL-MCNC: 56 MG/DL (ref 40–59)
HGB BLD-MCNC: 11.8 G/DL
IMM GRANULOCYTES # BLD AUTO: 0.04 X10(3) UL (ref 0–1)
IMM GRANULOCYTES NFR BLD: 1 %
LDLC SERPL CALC-MCNC: 52 MG/DL (ref ?–100)
LYMPHOCYTES # BLD AUTO: 0.42 X10(3) UL (ref 1–4)
LYMPHOCYTES NFR BLD AUTO: 10.5 %
MCH RBC QN AUTO: 29.8 PG (ref 26–34)
MCHC RBC AUTO-ENTMCNC: 33.1 G/DL (ref 31–37)
MCV RBC AUTO: 90.2 FL
MONOCYTES # BLD AUTO: 0.12 X10(3) UL (ref 0.1–1)
MONOCYTES NFR BLD AUTO: 3 %
NEUTROPHILS # BLD AUTO: 3.39 X10 (3) UL (ref 1.5–7.7)
NEUTROPHILS # BLD AUTO: 3.39 X10(3) UL (ref 1.5–7.7)
NEUTROPHILS NFR BLD AUTO: 84.7 %
NONHDLC SERPL-MCNC: 68 MG/DL (ref ?–130)
NT-PROBNP SERPL-MCNC: 400 PG/ML (ref ?–125)
OSMOLALITY SERPL CALC.SUM OF ELEC: 295 MOSM/KG (ref 275–295)
PLATELET # BLD AUTO: 172 10(3)UL (ref 150–450)
POTASSIUM SERPL-SCNC: 4.3 MMOL/L (ref 3.5–5.1)
PROT SERPL-MCNC: 6.3 G/DL (ref 6.4–8.2)
RBC # BLD AUTO: 3.96 X10(6)UL
SODIUM SERPL-SCNC: 141 MMOL/L (ref 136–145)
TRIGL SERPL-MCNC: 81 MG/DL (ref 30–149)
TSI SER-ACNC: 0.79 MIU/ML (ref 0.36–3.74)
VLDLC SERPL CALC-MCNC: 12 MG/DL (ref 0–30)
WBC # BLD AUTO: 4 X10(3) UL (ref 4–11)

## 2024-05-22 PROCEDURE — 84443 ASSAY THYROID STIM HORMONE: CPT | Performed by: NURSE PRACTITIONER

## 2024-05-22 PROCEDURE — 83880 ASSAY OF NATRIURETIC PEPTIDE: CPT | Performed by: NURSE PRACTITIONER

## 2024-05-22 PROCEDURE — 80061 LIPID PANEL: CPT | Performed by: NURSE PRACTITIONER

## 2024-05-22 PROCEDURE — 36415 COLL VENOUS BLD VENIPUNCTURE: CPT | Performed by: NURSE PRACTITIONER

## 2024-05-22 PROCEDURE — 85025 COMPLETE CBC W/AUTO DIFF WBC: CPT | Performed by: NURSE PRACTITIONER

## 2024-05-22 PROCEDURE — 83036 HEMOGLOBIN GLYCOSYLATED A1C: CPT | Performed by: NURSE PRACTITIONER

## 2024-05-22 PROCEDURE — 80053 COMPREHEN METABOLIC PANEL: CPT | Performed by: NURSE PRACTITIONER

## 2024-05-30 NOTE — PROGRESS NOTES
[facility] Note    Visit Diagnosis:  1. Pancytopenia (HCC)    2. Abnormal CT of the chest        History of Present Illness:   72F with a PMH of Chronic pain, COPD, CAD, Peptic ulcers, COVID-19 (11/2020 s/p CCP), CVA, HLD, HTN, CHF, hearing loss and \"neck cancer\" here for follow up. She has a history of intermittent pancytopenia. She has a history of folic acid deficiency, iron deficiency, mild splenomegaly.     -She lives in [place] for 6 months and [place] for 6 months a year. She alternates every 2 weeks    Hematology History   01/2021: She fell about 3 months ago and hit her head. Denies LOC. She started to feel weak about 4 weeks ago. About 1 week ago, she noticed worsening Right sided weakness. No chest pain, dyspnea, abdominal pain, diarrhea or hematochezia/melena. She has a pain pump for chronic pain. For the past week she has felt nauseated with poor appetite. Due to lack of appetite, she feels like she has lost weight. No fevers. She states that she has had occasional night sweats since she initiated her morphine pump. She states that she was a smoker for \"years\" and quit a couple years ago. She states that she was treated for \"neck\" cancer about 15 years ago in [place]. She does not recall the MD or facility. She was treated with 6 rounds of chemotherapy (not sure which one), surgery and radiation. She states that she had a mammogram and colonoscopy done at [facility] which were normal. She does not recall a history of low blood counts. Her CBC on admission: WBC 3.4, Hb 11 and Plt 113. After IVF --> WBC 2.5, HB 9.5, Plt 88, MCV 87, lymphocytopenia on differential. She has had leukopenia/thrombocytopenia since 11/2020. TSH normal. Normal LFTs. B12 403, Ferritin 316.  She has a history of elevated BNPs. CXR without acute process. CTA Chest from 11/11/20 was unremarkable (no lymphadenopathy, masses). Her CBC was normal in 05/2020. During work up: she had normal B12, Ferritin,  TSH, copper, LDH and peripheral flow. Path review of smear was unremarkable.       05/23/22: Follow up for pancytopenia. Noted to have Folic acid deficiency and mild splenomegaly. Has not been seen since 02/2021. She lives in [place] every other month. She states that she recently received IV Iron in [place]. She denies any bleeding. Energy level is about the same. No fevers or chills. Has occasional night sweats since having the pain pump.     CT AP from 05/2022: Spleen 13-14 cm. Labs from May 2022: normal LDH, Fe studies, Folate, SPEP, YUE and CRP.     Labs on 6/20/23: WBC 3.6, Hb 12.3, Plt 110. ANC normal.   Labs from 5/22/24: WBC 4, Hb 11.8, Plt 172    Interval History: She was recently in the ER in [place] for a COPD exacerbation. Per patient she had a CT scan done which showed an abnormal spot. She does not have the report or disc. She is on a prednisone taper for COPD.     Review of Systems: 12 Point ROS was completed and pertinent positives are in the HPI    Current Outpatient Medications on File Prior to Visit   Medication Sig Dispense Refill    torsemide 10 MG Oral Tab torsemide 10 mg tablet, [RxNorm: 740147]      cyclobenzaprine 10 MG Oral Tab Take 1 tablet (10 mg total) by mouth 3 (three) times daily.      predniSONE 5 MG Oral Tab 1 tab daily for 2 weeks, 1/2 tab daily for 2 week, then every other day for 2 week 42 tablet 0    ezetimibe 10 MG Oral Tab Take 1 tablet (10 mg total) by mouth nightly. 30 tablet 2    cholecalciferol (D3-50) 1.25 MG (71017 UT) Oral Cap Take 1 capsule (50,000 Units total) by mouth every 14 (fourteen) days. 4 capsule 5    pantoprazole 20 MG Oral Tab EC Take 1 tablet (20 mg total) by mouth before breakfast. 30 tablet 2    levothyroxine 25 MCG Oral Tab Take 1 tablet (25 mcg total) by mouth before breakfast. 30 tablet 2    albuterol 108 (90 Base) MCG/ACT Inhalation Aero Soln Inhale 2 puffs into the lungs every 4 (four) hours as needed for Wheezing. 3 each 3    allopurinol 100  MG Oral Tab Take 1 tablet (100 mg total) by mouth daily. 30 tablet 2    aspirin 81 MG Oral Tab EC Take 1 tablet (81 mg total) by mouth daily. 90 tablet 3    atorvastatin 40 MG Oral Tab Take 1 tablet (40 mg total) by mouth nightly. 30 tablet 2    folic acid 1 MG Oral Tab Take 1 tablet (1 mg total) by mouth daily. 90 tablet 3    Potassium Chloride ER 10 MEQ Oral Tab CR Take 1 tablet (10 mEq total) by mouth daily. 30 tablet 3    LINZESS 145 MCG Oral Cap Take 145 mcg by mouth daily. 30 capsule 3    lisinopril 5 MG Oral Tab Take 1 tablet (5 mg total) by mouth daily. 30 tablet 5    Nystatin 736495 UNIT/GM External Powder nystatin 100,000 unit/gram topical powder- apply affected areas daily and PRN 60 g 5    nystatin 650276 UNIT/ML Mouth/Throat Suspension Take 5 mL (500,000 Units total) by mouth 4 (four) times daily as needed. 60 mL 5    ipratropium-albuterol 0.5-2.5 (3) MG/3ML Inhalation Solution Take 3 mL by nebulization every 4 (four) hours as needed. 100 each 5    sennosides 8.6 MG Oral Tab Take 1 tablet (8.6 mg total) by mouth 2 (two) times daily. 180 tablet 0    Betamethasone Dipropionate Aug (DIPROLENE AF) 0.05 % External Cream Apply 1 Application topically 2 (two) times daily. 50 g 1    budeson-glycopyrrol-formoterol (BREZTRI AEROSPHERE) 160-9-4.8 MCG/ACT Inhalation Aerosol Inhale 2 puffs into the lungs 2 (two) times daily. 3 each 3    cyclobenzaprine 5 MG Oral Tab Take 1 tablet (5 mg total) by mouth 3 (three) times daily as needed for Muscle spasms. (Patient not taking: Reported on 5/28/2024) 30 tablet 0    fluticasone propionate 50 MCG/ACT Nasal Suspension  (Patient not taking: Reported on 5/28/2024)       No current facility-administered medications on file prior to visit.     Past Medical History:    Asthma (HCC)    Atherosclerosis of coronary artery    Back problem    C. difficile diarrhea    Cancer (HCC)    throat    Cancer (HCC)    Breast    Congestive heart disease (HCC)    COPD (chronic obstructive  pulmonary disease) (HCC)    Disorder of thyroid    Esophageal reflux    Exposure to medical diagnostic radiation    Hearing impairment    Heart attack (HCC)    3 heart attacks    High blood pressure    High cholesterol    History of blood clots    Heart clot     History of stomach ulcers    Localized swelling of both lower legs    Obstructive chronic bronchitis without exacerbation (HCC)    Personal history of antineoplastic chemotherapy    Polyp of ascending colon    Stroke (HCC)    tia    Visual impairment     Past Surgical History:   Procedure Laterality Date    Angioplasty (coronary)      x 2 ballon    Hernia surgery      Hysterectomy      Other surgical history      pain pump L side [employer]    Other surgical history      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    Repair ing hernia,4+y/o,reducibl      Spine surgery procedure unlisted      Umbilical hernia repair       Social History     Socioeconomic History    Marital status:    Tobacco Use    Smoking status: Former     Current packs/day: 0.00     Types: Cigarettes     Quit date: 2017     Years since quittin.5    Smokeless tobacco: Never    Tobacco comments:     started smoking, but just lasted for a month and she quit again   Vaping Use    Vaping status: Some Days    Devices: Pre-filled pod   Substance and Sexual Activity    Alcohol use: No    Drug use: No      Family History   Problem Relation Age of Onset    Cancer Father     Cancer Mother     No Known Problems Daughter     No Known Problems Son     No Known Problems Maternal Grandmother     No Known Problems Maternal Grandfather     No Known Problems Paternal Grandmother     No Known Problems Paternal Grandfather     No Known Problems Sister     No Known Problems Brother     No Known Problems Other        Physical Exam  Height: --  Weight: 70.3 kg (155 lb) (1057)  BSA (Calculated - sq m): --  Pulse: 83 (1057)  BP: 149/82 (1057)  Temp: 97.3 °F (36.3 °C) (1057)  Do Not  Use - Resp Rate: --  SpO2: 92 % (06/03 1057)     General: NAD, AOX3  HEENT: clear op, mmm, no jvd, no scleral icterus  CV: RR  Extremities: bilateral mild swelling   Lungs: no increased work of breathing  Abd: non-distended   Neuro: CN: II-XII grossly intact    Results:  Lab Results   Component Value Date    WBC 4.0 05/22/2024    HGB 11.8 (L) 05/22/2024    HCT 35.7 05/22/2024    MCV 90.2 05/22/2024    .0 05/22/2024     Lab Results   Component Value Date     05/22/2024    K 4.3 05/22/2024    CO2 28.0 05/22/2024     05/22/2024    BUN 15 05/22/2024    PHOS 4.4 07/22/2022    ALB 3.2 (L) 05/22/2024       Radiology: I personally reviewed the imaging  CTA Chest 02/2021  1. No acute findings.  No acute pulmonary embolus.     2. Emphysematous changes of the aorta.       CT AP 05/2022  1. Spleen is borderline enlarged at 13-14 cm.     2. No evidence of an acute inflammatory process.    3. Infrarenal abdominal aortic aneurysm measures 2.9 x 2.9 cm.    4. Sequelae of right ventral hernia repair is noted.  Sequelae central ventral hernia repair is also noted.  Left ventral battery pack is causing streak artifact.     Pathology:   Path review of smear:  Pathologist review of CBC for clinician request.   Normocytic anemia.   Thrombocytopenia.   Neutrophils are adequate in number and show unremarkable morphology.   Absolute lymphopenia composed of small mature lymphocytes.   Circulating blasts are not identified.     Assessment and Plan:  72F with a PMH of Chronic pain, COPD, CAD, Peptic ulcers, COVID-19 (11/2020 s/p CCP), CVA, HLD, HTN, CHF, former smoker, hearing loss and \"neck cancer\" presented on 1/29/21 with right sided weakness.     Mild Pancytopenia: she has intermittent and mild leukopenia/lymphocytopenia, anemia and thrombocytopenia. This has been present since at 2019. She also has a history of mild folate deficiency, fe deficiency which might be contributing. She may also have a component of ITP and  anemia 2/2 CKD, chronic disease. She had a normal B12, TSH, LDH and copper levels. Peripheral flow was negative. Of note, T cell PCR was not approved by insurance. Given the stability of her cell counts we can hold off of a bone marrow biopsy at this time. Most recent CBC stable. Ok to repeat CBC, Fe studies, B12, Folate in 12 months.  As previously discussed, will consider a bone marrow if she develops worsening cytopenias, B sx or macrocytosis.     Folic acid deficiency: Folic acid 1 mg daily      Mild Splenomegaly: 13-14 cm. Normal LDH. Possibly from recent COVID. Can continue to observe. Peripheral flow unremarkable.     Abnormal  CT Chest: [place] an abnormal CT chest [place] We will request records. We will repeat a CT chest without contrast given her smoking history/     RTC in 12 months     [facility]

## 2024-06-03 ENCOUNTER — OFFICE VISIT (OUTPATIENT)
Dept: HEMATOLOGY/ONCOLOGY | Age: 74
End: 2024-06-03
Attending: INTERNAL MEDICINE
Payer: MEDICARE

## 2024-06-03 ENCOUNTER — TELEPHONE (OUTPATIENT)
Dept: HEMATOLOGY/ONCOLOGY | Facility: HOSPITAL | Age: 74
End: 2024-06-03

## 2024-06-03 VITALS
HEART RATE: 83 BPM | BODY MASS INDEX: 35 KG/M2 | DIASTOLIC BLOOD PRESSURE: 82 MMHG | SYSTOLIC BLOOD PRESSURE: 149 MMHG | OXYGEN SATURATION: 92 % | TEMPERATURE: 97 F | WEIGHT: 155 LBS | RESPIRATION RATE: 18 BRPM

## 2024-06-03 DIAGNOSIS — R93.89 ABNORMAL CT OF THE CHEST: ICD-10-CM

## 2024-06-03 DIAGNOSIS — D61.818 PANCYTOPENIA (HCC): Primary | ICD-10-CM

## 2024-06-03 PROCEDURE — 99215 OFFICE O/P EST HI 40 MIN: CPT | Performed by: INTERNAL MEDICINE

## 2024-06-03 NOTE — PROGRESS NOTES
Patient here for follow-up. States she went to ER in [place] last month for dyspnea. States she had an abnormal CT. No records or disc provided. Will work on receiving records. Energy levels are poor. Having easy bruising, dyspnea, and lightheadedness. Remains on folic acid.

## 2024-06-03 NOTE — TELEPHONE ENCOUNTER
Fax sent to medical records department @ [facility]. Requested CT report and disc. FAX#:[phone].

## 2024-07-25 ENCOUNTER — OFFICE VISIT (OUTPATIENT)
Dept: FAMILY MEDICINE CLINIC | Facility: CLINIC | Age: 74
End: 2024-07-25
Payer: MEDICARE

## 2024-07-25 VITALS
DIASTOLIC BLOOD PRESSURE: 81 MMHG | HEIGHT: 59 IN | WEIGHT: 152.4 LBS | TEMPERATURE: 98.4 F | BODY MASS INDEX: 30.72 KG/M2 | HEART RATE: 80 BPM | SYSTOLIC BLOOD PRESSURE: 132 MMHG

## 2024-07-25 DIAGNOSIS — R05.1 ACUTE COUGH: ICD-10-CM

## 2024-07-25 DIAGNOSIS — J06.9 UPPER RESPIRATORY TRACT INFECTION, UNSPECIFIED TYPE: Primary | ICD-10-CM

## 2024-07-25 DIAGNOSIS — E66.09 CLASS 1 OBESITY DUE TO EXCESS CALORIES WITH SERIOUS COMORBIDITY AND BODY MASS INDEX (BMI) OF 30.0 TO 30.9 IN ADULT: ICD-10-CM

## 2024-07-25 DIAGNOSIS — J02.9 SORE THROAT: ICD-10-CM

## 2024-07-25 LAB
EXPIRATION DATE: NORMAL
INTERNAL CONTROL: NORMAL
Lab: NORMAL
S PYO AG THROAT QL: NEGATIVE

## 2024-07-25 PROCEDURE — 87880 STREP A ASSAY W/OPTIC: CPT | Performed by: NURSE PRACTITIONER

## 2024-07-25 PROCEDURE — 1160F RVW MEDS BY RX/DR IN RCRD: CPT | Performed by: NURSE PRACTITIONER

## 2024-07-25 PROCEDURE — 99213 OFFICE O/P EST LOW 20 MIN: CPT | Performed by: NURSE PRACTITIONER

## 2024-07-25 PROCEDURE — 1125F AMNT PAIN NOTED PAIN PRSNT: CPT | Performed by: NURSE PRACTITIONER

## 2024-07-25 PROCEDURE — 1159F MED LIST DOCD IN RCRD: CPT | Performed by: NURSE PRACTITIONER

## 2024-07-25 RX ORDER — CLINDAMYCIN HYDROCHLORIDE 300 MG/1
1 CAPSULE ORAL EVERY 12 HOURS SCHEDULED
COMMUNITY
Start: 2024-07-17

## 2024-07-25 RX ORDER — GUAIFENESIN 600 MG/1
1200 TABLET, EXTENDED RELEASE ORAL 2 TIMES DAILY
Qty: 28 TABLET | Refills: 0 | Status: SHIPPED | OUTPATIENT
Start: 2024-07-25 | End: 2024-08-01

## 2024-07-25 RX ORDER — BUDESONIDE, GLYCOPYRROLATE, AND FORMOTEROL FUMARATE 160; 9; 4.8 UG/1; UG/1; UG/1
2 AEROSOL, METERED RESPIRATORY (INHALATION) EVERY 12 HOURS SCHEDULED
COMMUNITY

## 2024-07-25 RX ORDER — METHYLPREDNISOLONE 4 MG/1
TABLET ORAL
Qty: 21 TABLET | Refills: 0 | Status: SHIPPED | OUTPATIENT
Start: 2024-07-25

## 2024-07-25 RX ORDER — ONDANSETRON 4 MG/1
4 TABLET, ORALLY DISINTEGRATING ORAL EVERY 6 HOURS PRN
COMMUNITY
Start: 2024-07-17

## 2024-07-25 RX ORDER — AZITHROMYCIN 250 MG/1
TABLET, FILM COATED ORAL
Qty: 6 TABLET | Refills: 0 | Status: SHIPPED | OUTPATIENT
Start: 2024-07-25

## 2024-07-25 NOTE — PROGRESS NOTES
Subjective     Chief Complaint   Patient presents with    Cough     Cold symptoms hit patient last night     Headache       History of Present Illness    Symptoms started last night.  She has right ear pain, increased shortness of breath, headache, and productive cough with yellow/green sputum.  Denies fever.  Is taking clindamycin for dental infection with one week left of treatment.  Went to [facility] ER for dental infection last week.  Son is also sick.     Patient's PMR from outside medical facility reviewed and noted.    Review of Systems     Otherwise complete ROS reviewed and negative except as mentioned in the HPI.    Past Medical History:   Past Medical History:   Diagnosis Date    Cancer     throat cancer, breast    CHF (congestive heart failure)     Chronic kidney disease (CKD), stage III (moderate)     Constipation     COPD (chronic obstructive pulmonary disease)     Coronary artery disease     Diverticulitis     GERD (gastroesophageal reflux disease)     History of transfusion     Hypertension     Low back pain     Lung nodule     Neck fracture     Oxygen dependent     wears 2lnc    Sleep apnea     cpap or bipap unsure which one she uses also uses oxygen with this    Stroke     left side face weakness residual    Thyroid nodule      Past Surgical History:  Past Surgical History:   Procedure Laterality Date    APPENDECTOMY      BACK SURGERY N/A     BRAIN SURGERY      chiari    CARDIAC CATHETERIZATION      no stents    CHOLECYSTECTOMY      COLONOSCOPY      HYSTERECTOMY      PAIN PUMP INSERTION/REVISION      PAIN PUMP INSERTION/REVISION Left 9/19/2023    Procedure: PAIN PUMP and proximal catheter REVISION, left.  Supine position.;  Surgeon: Cristopher Reza MD;  Location: [facility] OR;  Service: Neurosurgery;  Laterality: Left;    TONSILLECTOMY       Social History:  reports that she quit smoking about 11 years ago. Her smoking use included cigarettes. She started smoking about 51 years ago. She has  a 20 pack-year smoking history. She has been exposed to tobacco smoke. She has never used smokeless tobacco. She reports that she does not drink alcohol and does not use drugs.    Family History: family history includes No Known Problems in her father and mother.      Allergies:  Allergies   Allergen Reactions    Bee Venom Shortness Of Breath and Swelling    Ciprofloxacin Shortness Of Breath    Nitroglycerin Anaphylaxis    Penicillins Shortness Of Breath    Sulfamethoxazole-Trimethoprim Anaphylaxis, Swelling and Angioedema    Egg-Derived Products Nausea Only, Unknown - Low Severity and GI Intolerance    Codeine Itching    Contrast Dye (Echo Or Unknown Ct/Mr) Unknown - Low Severity     Stage 3 kidney disease cannot take     Iodinated Contrast Media Unknown - Low Severity     Stage 3 kidney disease     Latex, Natural Rubber Hives    Methenamine GI Intolerance    Nylon Hives    Oxycodone-Acetaminophen Nausea And Vomiting    Latex Rash and Other (See Comments)    Oxycodone Nausea And Vomiting     Medications:  Prior to Admission medications    Medication Sig Start Date End Date Taking? Authorizing Provider   albuterol (PROVENTIL) (2.5 MG/3ML) 0.083% nebulizer solution Take 2.5 mg by nebulization Every 4 (Four) Hours As Needed for Wheezing. 3/30/23  Yes Fabiola Morris APRN   albuterol sulfate  (90 Base) MCG/ACT inhaler Inhale 2 puffs Every 4 (Four) Hours As Needed for Wheezing. 2/28/23  Yes Fabiola Morris APRN   allopurinol (ZYLOPRIM) 100 MG tablet Take 1 tablet by mouth Daily. 12/21/22  Yes ProviderJaqcuie MD   amLODIPine (NORVASC) 5 MG tablet Take 1 tablet by mouth Daily.   Yes Provider, MD Jacquie   Aspirin Low Dose 81 MG EC tablet Take 1 tablet by mouth Daily. 12/8/22  Yes ProviderJacquie MD   atorvastatin (LIPITOR) 40 MG tablet Take 1 tablet by mouth every night at bedtime. 12/22/22  Yes ProviderJacquie MD   baclofen (LIORESAL) 10 MG/20ML injection 237.53 mcg by  Intrathecal route 1 (One) Time. Pain pump   Yes Provider, MD Jacquie   betamethasone dipropionate 0.05 % cream Apply 1 Application topically to the appropriate area as directed 2 (Two) Times a Day.   Yes ProviderJacquie MD   bisacodyl (DULCOLAX) 10 MG suppository Insert 1 suppository into the rectum Daily. 5/11/23  Yes Fabiola Morris APRN   cyclobenzaprine (FLEXERIL) 10 MG tablet Take 1 tablet by mouth 3 (Three) Times a Day As Needed. 12/22/22  Yes Jacquie White MD   escitalopram (LEXAPRO) 10 MG tablet Take  by mouth Daily.   Yes Provider, MD Jacquie   ezetimibe (ZETIA) 10 MG tablet Take 1 tablet by mouth every night at bedtime. 12/21/22  Yes ProviderJacquie MD   furosemide (LASIX) 40 MG tablet Take 1 tablet by mouth Daily.   Yes Provider, MD Jacquie   ipratropium-albuterol (DUO-NEB) 0.5-2.5 mg/3 ml nebulizer Take 3 mL by nebulization Every 4 (Four) Hours As Needed for Wheezing or Shortness of Air. 5/1/23  Yes Fabiola Morris APRN   levothyroxine (SYNTHROID, LEVOTHROID) 25 MCG tablet TAKE 1 TABLET BY MOUTH BEFORE BREAKFAST. 12/21/22  Yes ProviderJacquie MD   linaclotide (LINZESS) 145 MCG capsule capsule Take 1 capsule every day by oral route.   Yes Provider, MD Jacquie   lisinopril (PRINIVIL,ZESTRIL) 10 MG tablet lisinopril 10 mg tablet   TAKE ONE TABLET BY MOUTH ONE TIME DAILY   Yes ProviderJacquie MD   metoprolol succinate XL (TOPROL-XL) 25 MG 24 hr tablet Take 1 tablet by mouth Daily.   Yes Provider, MD Jacquie   nystatin (MYCOSTATIN) 613553 UNIT/GM powder APPLY TO AFFECTED AREA EVERY DAY AND AS NEEDED 12/14/22  Yes ProviderJacquie MD   pantoprazole (PROTONIX) 20 MG EC tablet Take 1 tablet by mouth Every Morning Before Breakfast. 12/21/22  Yes ProviderJacquie MD   sennosides-docusate (senna-docusate sodium) 8.6-50 MG per tablet Take 1 tablet by mouth 2 (Two) Times a Day As Needed for Constipation. 5/11/23  Yes Fabiola Morris  NISHANT Terrell   tiZANidine (ZANAFLEX) 4 MG tablet Take 1 tablet by mouth Every 8 (Eight) Hours As Needed.   Yes ProviderJacquie MD   triamcinolone (KENALOG) 0.1 % cream APPLY TO AFFECTED AREA TWICE A DAY FOR DERMATITIS   Yes Jacquie White MD   ALPRAZolam (XANAX) 1 MG tablet Take 1 tablet by mouth 30 Min Pre-Op for 1 dose. Take 2nd tablet when arriving in radiology department. **MUST HAVE A **  Patient not taking: Reported on 7/25/2024 9/11/23   Cristopher Reza MD   D3-50 1.25 MG (63658 UT) capsule Take 1 capsule by mouth Every 14 (Fourteen) Days.  Patient not taking: Reported on 7/25/2024 8/25/23   Jacquie White MD   fluticasone (FLONASE) 50 MCG/ACT nasal spray 2 sprays into the nostril(s) as directed by provider Daily.  Patient not taking: Reported on 7/25/2024 5/11/23   Fabiola Morris APRN   folic acid (FOLVITE) 1 MG tablet Take 1 tablet by mouth Daily.  Patient not taking: Reported on 7/25/2024 4/16/23   Jacquie White MD   gabapentin (NEURONTIN) 100 MG capsule Take 1 capsule every day by oral route at bedtime.  Patient not taking: Reported on 3/12/2024    Jacquie White MD Geri-roxanna 8.6 MG tablet Take 1 tablet (8.6 mg total) by mouth 2 (two) times daily.  Patient not taking: Reported on 7/25/2024 6/21/23   ProviderJacquie MD   HYDROmorphone (DILAUDID) 2 MG/ML injection Infuse 1.3573 mg into a venous catheter.    ProviderJacquie MD   naloxone (NARCAN) 4 MG/0.1ML nasal spray Call 911. Don't prime. [place] in 1 nostril for overdose. Repeat in 2-3 minutes in other nostril if no or minimal breathing/responsiveness.  Patient not taking: Reported on 7/25/2024 9/20/23   Otto Jhaveri APRN   potassium chloride 10 MEQ CR tablet Take 1 tablet by mouth Daily.  Patient not taking: Reported on 7/25/2024 8/25/23   Provider, MD Jacquie   traZODone (DESYREL) 50 MG tablet Take 1 tablet by mouth Every Night for 90 days.  Patient not taking: Reported on  "7/25/2024 9/11/23 12/10/23  Fabiola Morris APRN   trimethoprim (TRIMPEX) 100 MG tablet     Provider, MD Jacquie       REBEKAH:        PHQ-9 Depression Screening  Little interest or pleasure in doing things? 0-->not at all   Feeling down, depressed, or hopeless? 0-->not at all   Trouble falling or staying asleep, or sleeping too much?     Feeling tired or having little energy?     Poor appetite or overeating?     Feeling bad about yourself - or that you are a failure or have let yourself or your family down?     Trouble concentrating on things, such as reading the newspaper or watching television?     Moving or speaking so slowly that other people could have noticed? Or the opposite - being so fidgety or restless that you have been moving around a lot more than usual?     Thoughts that you would be better off dead, or of hurting yourself in some way?     PHQ-9 Total Score 0   If you checked off any problems, how difficult have these problems made it for you to do your work, take care of things at home, or get along with other people?         PHQ-9 Total Score: 0   0 (Negative screening for depression)  Support given, observe for worsening symptoms    Objective     Vital Signs: /81 (BP Location: Left arm, Patient Position: Sitting, Cuff Size: Adult)   Pulse 80   Temp 98.4 °F (36.9 °C) (Infrared)   Ht 149.9 cm (59.02\")   Wt 69.1 kg (152 lb 6.4 oz)   BMI 30.76 kg/m²   Physical Exam  Vitals and nursing note reviewed.   Constitutional:       Appearance: She is obese.   HENT:      Head: Normocephalic.      Right Ear: Tympanic membrane normal.      Left Ear: Tympanic membrane normal.      Nose: Nose normal.      Mouth/Throat:      Dentition: Abnormal dentition. Dental caries present.      Pharynx: Posterior oropharyngeal erythema present. No oropharyngeal exudate.     Eyes:      Conjunctiva/sclera: Conjunctivae normal.   Cardiovascular:      Rate and Rhythm: Normal rate and regular rhythm.      Pulses: " Normal pulses.      Heart sounds: Normal heart sounds.   Pulmonary:      Effort: Pulmonary effort is normal.      Breath sounds: Normal breath sounds.   Musculoskeletal:         General: Normal range of motion.   Lymphadenopathy:      Cervical: Cervical adenopathy present.      Right cervical: Superficial cervical adenopathy present.   Skin:     General: Skin is warm and dry.   Neurological:      General: No focal deficit present.      Mental Status: She is alert and oriented to person, place, and time.   Psychiatric:         Mood and Affect: Mood normal.         Behavior: Behavior normal.         Advance Care Planning            Results Reviewed:  Glucose   Date Value Ref Range Status   09/20/2023 160 (H) 65 - 99 mg/dL Final     BUN   Date Value Ref Range Status   09/20/2023 16 8 - 23 mg/dL Final     Creatinine   Date Value Ref Range Status   09/20/2023 1.24 (H) 0.57 - 1.00 mg/dL Final     Sodium   Date Value Ref Range Status   09/20/2023 139 136 - 145 mmol/L Final     Potassium   Date Value Ref Range Status   09/20/2023 4.6 3.5 - 5.2 mmol/L Final     Chloride   Date Value Ref Range Status   09/20/2023 100 98 - 107 mmol/L Final     CO2   Date Value Ref Range Status   09/20/2023 31.0 (H) 22.0 - 29.0 mmol/L Final     Calcium   Date Value Ref Range Status   09/20/2023 8.9 8.6 - 10.5 mg/dL Final     ALT (SGPT)   Date Value Ref Range Status   09/20/2023 15 1 - 33 U/L Final     AST (SGOT)   Date Value Ref Range Status   09/20/2023 18 1 - 32 U/L Final     WBC   Date Value Ref Range Status   09/20/2023 3.59 3.40 - 10.80 10*3/mm3 Final   09/11/2023 5.2 3.4 - 10.8 x10E3/uL Final     Hematocrit   Date Value Ref Range Status   09/20/2023 33.9 (L) 34.0 - 46.6 % Final     Platelets   Date Value Ref Range Status   09/20/2023 144 140 - 450 10*3/mm3 Final     Triglycerides   Date Value Ref Range Status   09/11/2023 137 0 - 149 mg/dL Final     HDL Cholesterol   Date Value Ref Range Status   09/11/2023 41 >39 mg/dL Final     LDL Chol  Calc (NIH)   Date Value Ref Range Status   09/11/2023 40 0 - 99 mg/dL Final     Hemoglobin A1C   Date Value Ref Range Status   09/11/2023 5.8 (H) 4.8 - 5.6 % Final     Comment:              Prediabetes: 5.7 - 6.4           Diabetes: >6.4           Glycemic control for adults with diabetes: <7.0           Assessment / Plan     Assessment/Plan     Diagnoses and all orders for this visit:    1. Upper respiratory tract infection, unspecified type (Primary)  -     azithromycin (Zithromax Z-Eddy) 250 MG tablet; Take 2 tablets by mouth on day 1, then 1 tablet daily on days 2-5  Dispense: 6 tablet; Refill: 0  -     methylPREDNISolone (MEDROL) 4 MG dose pack; Take as directed on package instructions.  Dispense: 21 tablet; Refill: 0  -     guaiFENesin (Mucinex) 600 MG 12 hr tablet; Take 2 tablets by mouth 2 (Two) Times a Day for 7 days.  Dispense: 28 tablet; Refill: 0    2. Sore throat  -     POCT rapid strep A    3. Acute cough  -     guaiFENesin (Mucinex) 600 MG 12 hr tablet; Take 2 tablets by mouth 2 (Two) Times a Day for 7 days.  Dispense: 28 tablet; Refill: 0    4. Class 1 obesity due to excess calories with serious comorbidity and body mass index (BMI) of 30.0 to 30.9 in adult           An After Visit Summary was printed and given to the patient at discharge.  Return if symptoms worsen or fail to improve.    I have discussed the patient results/orders and plan/recommendation with them at today's visit.      Fabiola Morris, NISHANT   07/25/2024

## 2024-07-30 DIAGNOSIS — R05.1 ACUTE COUGH: ICD-10-CM

## 2024-07-30 DIAGNOSIS — J06.9 UPPER RESPIRATORY TRACT INFECTION, UNSPECIFIED TYPE: ICD-10-CM

## 2024-07-30 RX ORDER — AZITHROMYCIN 250 MG/1
TABLET, FILM COATED ORAL
Qty: 6 TABLET | Refills: 0 | Status: CANCELLED | OUTPATIENT
Start: 2024-07-30

## 2024-07-30 RX ORDER — PREDNISONE 20 MG/1
TABLET ORAL
Qty: 18 TABLET | Refills: 0 | Status: SHIPPED | OUTPATIENT
Start: 2024-07-30 | End: 2024-08-10

## 2024-07-30 RX ORDER — GUAIFENESIN 600 MG/1
1200 TABLET, EXTENDED RELEASE ORAL 2 TIMES DAILY
Qty: 28 TABLET | Refills: 0 | Status: SHIPPED | OUTPATIENT
Start: 2024-07-30 | End: 2024-08-06

## 2024-07-30 RX ORDER — GUAIFENESIN 600 MG/1
1200 TABLET, EXTENDED RELEASE ORAL 2 TIMES DAILY
Qty: 28 TABLET | Refills: 0 | Status: CANCELLED | OUTPATIENT
Start: 2024-07-30 | End: 2024-08-06

## 2024-07-30 RX ORDER — METHYLPREDNISOLONE 4 MG/1
TABLET ORAL
Qty: 21 TABLET | Refills: 0 | Status: CANCELLED | OUTPATIENT
Start: 2024-07-30

## 2024-07-30 NOTE — TELEPHONE ENCOUNTER
Caller: Maurizio Christine A    Relationship: Self    Best call back number: [phone]     Requested Prescriptions:   Requested Prescriptions     Pending Prescriptions Disp Refills    azithromycin (Zithromax Z-Eddy) 250 MG tablet 6 tablet 0     Sig: Take 2 tablets by mouth on day 1, then 1 tablet daily on days 2-5    methylPREDNISolone (MEDROL) 4 MG dose pack 21 tablet 0     Sig: Take as directed on package instructions.    guaiFENesin (Mucinex) 600 MG 12 hr tablet 28 tablet 0     Sig: Take 2 tablets by mouth 2 (Two) Times a Day for 7 days.        Pharmacy where request should be sent: [facility]/PHARMACY #[id number] - [place], [place] - [address] [phone] [facility] [phone] FX     Last office visit with prescribing clinician: 7/25/2024   Last telemedicine visit with prescribing clinician: Visit date not found   Next office visit with prescribing clinician: Visit date not found     Additional details provided by patient: PATIENT FINISHED MEDICATION BUT IS NOT BETTER WOULD LIKE THE MEDICATION REFILLED TO GET BETTER.    Does the patient have less than a 3 day supply:  [x] Yes  [] No    Would you like a call back once the refill request has been completed: [x] Yes [] No    If the office needs to give you a call back, can they leave a voicemail: [x] Yes [] No    Columba Nix Rep   07/30/24 10:34 CDT

## 2024-07-30 NOTE — TELEPHONE ENCOUNTER
Pending Prescriptions:                       Disp   Refills    azithromycin (Zithromax Z-Eddy) 250 MG tabl*6 tabl*0        Sig: Take 2 tablets by mouth on day 1, then 1 tablet daily           on days 2-5    methylPREDNISolone (MEDROL) 4 MG dose pack 21 tab*0        Sig: Take as directed on package instructions.    guaiFENesin (Mucinex) 600 MG 12 hr tablet  28 tab*0        Sig: Take 2 tablets by mouth 2 (Two) Times a Day for 7           days.

## 2024-08-12 ENCOUNTER — OFFICE VISIT (OUTPATIENT)
Dept: FAMILY MEDICINE CLINIC | Facility: CLINIC | Age: 74
End: 2024-08-12
Payer: MEDICARE

## 2024-08-12 VITALS
TEMPERATURE: 98 F | OXYGEN SATURATION: 94 % | HEIGHT: 59 IN | SYSTOLIC BLOOD PRESSURE: 135 MMHG | RESPIRATION RATE: 18 BRPM | DIASTOLIC BLOOD PRESSURE: 75 MMHG | HEART RATE: 102 BPM | BODY MASS INDEX: 31.45 KG/M2 | WEIGHT: 156 LBS

## 2024-08-12 DIAGNOSIS — Z92.89 HISTORY OF RECENT HOSPITALIZATION: Primary | ICD-10-CM

## 2024-08-12 DIAGNOSIS — M79.604 PAIN AND SWELLING OF LOWER EXTREMITY, RIGHT: ICD-10-CM

## 2024-08-12 DIAGNOSIS — M79.89 PAIN AND SWELLING OF LOWER EXTREMITY, RIGHT: ICD-10-CM

## 2024-08-12 DIAGNOSIS — I50.22 CHRONIC SYSTOLIC HEART FAILURE: ICD-10-CM

## 2024-08-12 DIAGNOSIS — G47.419 SLEEP ATTACK: ICD-10-CM

## 2024-08-12 DIAGNOSIS — J18.9 PNEUMONIA DUE TO INFECTIOUS ORGANISM, UNSPECIFIED LATERALITY, UNSPECIFIED PART OF LUNG: ICD-10-CM

## 2024-08-12 DIAGNOSIS — G47.33 OSA (OBSTRUCTIVE SLEEP APNEA): Chronic | ICD-10-CM

## 2024-08-12 DIAGNOSIS — E66.09 CLASS 1 OBESITY DUE TO EXCESS CALORIES WITH SERIOUS COMORBIDITY AND BODY MASS INDEX (BMI) OF 30.0 TO 30.9 IN ADULT: Chronic | ICD-10-CM

## 2024-08-12 DIAGNOSIS — J45.41 MODERATE PERSISTENT ASTHMA WITH ACUTE EXACERBATION: ICD-10-CM

## 2024-08-12 RX ORDER — TORSEMIDE 10 MG/1
10 TABLET ORAL DAILY PRN
Qty: 90 TABLET | Refills: 0 | Status: SHIPPED | OUTPATIENT
Start: 2024-08-12

## 2024-08-12 RX ORDER — TORSEMIDE 10 MG/1
TABLET ORAL
COMMUNITY
Start: 2024-08-08

## 2024-08-12 RX ORDER — CEFDINIR 300 MG/1
300 CAPSULE ORAL 2 TIMES DAILY
Qty: 14 CAPSULE | Refills: 0 | Status: SHIPPED | OUTPATIENT
Start: 2024-08-12 | End: 2024-08-19

## 2024-08-12 RX ORDER — ASCORBIC ACID 500 MG
2 TABLET ORAL DAILY
COMMUNITY
Start: 2024-08-04

## 2024-08-12 RX ORDER — METHOCARBAMOL 750 MG/1
TABLET ORAL
COMMUNITY

## 2024-08-12 RX ORDER — PLECANATIDE 3 MG/1
1 TABLET ORAL DAILY
COMMUNITY

## 2024-08-12 RX ORDER — POTASSIUM CHLORIDE 750 MG/1
10 CAPSULE, EXTENDED RELEASE ORAL DAILY PRN
Qty: 90 CAPSULE | Refills: 0 | Status: SHIPPED | OUTPATIENT
Start: 2024-08-12

## 2024-08-12 RX ORDER — DOXYCYCLINE HYCLATE 100 MG/1
100 CAPSULE ORAL 2 TIMES DAILY
Qty: 14 CAPSULE | Refills: 0 | Status: SHIPPED | OUTPATIENT
Start: 2024-08-12 | End: 2024-08-19

## 2024-08-12 NOTE — PROGRESS NOTES
Transitional Care Follow Up Visit          Subjective     Chief Complaint   Patient presents with    Hospital Follow Up Visit     Covid and pneumonia. Still having cough, congestion       History of Present Illness  Christine Steven is a 74 y.o. female who presents for a transitional care management visit.    Within 48 business hours after discharge our office contacted her via telephone to coordinate her care and needs.      I reviewed and discussed the details of that call along with the discharge summary, hospital problems, inpatient lab results, inpatient diagnostic studies, and consultation reports with Christine.     Current outpatient and discharge medications have been reconciled for the patient.  Reviewed by: NISHANT Moreno          11/7/2023    10:16 AM   Date of TCM Phone Call   [facility]   Date of Discharge 11/7/2023   Discharge Disposition Home or Self Care     Risk for Readmission (LACE) No data recorded    Course During Hospital Stay: Patient was admitted to [facility] from August 2 through August 4 with diagnosis of pneumonia due to COVID-19, COPD with acute exacerbation, chronic respiratory failure with hypoxia.  She originally presented to ER for evaluation of shortness of breath and coughing.  She had had a fever of 104 degrees at home as well as a positive exposure to COVID-19.  Chest x-ray in ER showed pneumonia and COVID-19 test was positive.  She was treated with IV antibiotics, IV steroids, IV remdesivir and was placed on supplemental oxygen.  She was also started on vitamin D3, zinc sulfate, and vitamin C.  White blood cell count on admission was low but improved at time of discharge.  She was discharged on azithromycin, cefdinir, zinc sulfate, ascorbic acid, dexamethasone.    HPI:  Patient presents today for hospital follow up.  She is accompanied by her son.  She states that she is feeling worse since discharge.  She is using her inhaler and  nebulizer 4 times a day.  She has minimal improvement with breathing treatments.  Congested, productive cough.  Has completed z roberto and omnicef and steroid.      She has a history of obstructive sleep apnea. She is oxygen dependent.  She has chronic fatigue.  Son states that patient randomly falls asleep.  Patient states that she has fallen asleep while eating at the table before.  Denies feeling like she is about to fall asleep prior to these events.  She has not been tested for other sleep disorders.      She also has pain and swelling in her right lower leg since her hospitalization.      Patient's PMR from outside medical facility reviewed and noted.    Review of Systems     Otherwise complete ROS reviewed and negative except as mentioned in the HPI.    Past Medical History:   Past Medical History:   Diagnosis Date    Cancer     throat cancer, breast    CHF (congestive heart failure)     Chronic kidney disease (CKD), stage III (moderate)     Constipation     COPD (chronic obstructive pulmonary disease)     Coronary artery disease     Diverticulitis     GERD (gastroesophageal reflux disease)     History of transfusion     Hypertension     Low back pain     Lung nodule     Neck fracture     Oxygen dependent     wears 2lnc    Sleep apnea     cpap or bipap unsure which one she uses also uses oxygen with this    Stroke     left side face weakness residual    Thyroid nodule      Past Surgical History:  Past Surgical History:   Procedure Laterality Date    APPENDECTOMY      BACK SURGERY N/A     BRAIN SURGERY      chiari    CARDIAC CATHETERIZATION      no stents    CHOLECYSTECTOMY      COLONOSCOPY      HYSTERECTOMY      PAIN PUMP INSERTION/REVISION      PAIN PUMP INSERTION/REVISION Left 9/19/2023    Procedure: PAIN PUMP and proximal catheter REVISION, left.  Supine position.;  Surgeon: Cristopher Reza MD;  Location: [facility] OR;  Service: Neurosurgery;  Laterality: Left;    TONSILLECTOMY       Social History:   reports that she quit smoking about 11 years ago. Her smoking use included cigarettes. She started smoking about 51 years ago. She has a 20 pack-year smoking history. She has been exposed to tobacco smoke. She has never used smokeless tobacco. She reports that she does not drink alcohol and does not use drugs.    Family History: family history includes No Known Problems in her father and mother.      Allergies:  Allergies   Allergen Reactions    Bee Venom Shortness Of Breath and Swelling    Ciprofloxacin Shortness Of Breath    Nitroglycerin Anaphylaxis    Penicillins Shortness Of Breath    Sulfamethoxazole-Trimethoprim Anaphylaxis, Swelling and Angioedema    Egg-Derived Products Nausea Only, Unknown - Low Severity and GI Intolerance    Codeine Itching    Contrast Dye (Echo Or Unknown Ct/Mr) Unknown - Low Severity     Stage 3 kidney disease cannot take     Iodinated Contrast Media Unknown - Low Severity     Stage 3 kidney disease     Latex, Natural Rubber Hives    Methenamine GI Intolerance    Nylon Hives    Oxycodone-Acetaminophen Nausea And Vomiting    Latex Rash and Other (See Comments)    Oxycodone Nausea And Vomiting     Medications:  Prior to Admission medications    Medication Sig Start Date End Date Taking? Authorizing Provider   albuterol (PROVENTIL) (2.5 MG/3ML) 0.083% nebulizer solution Take 2.5 mg by nebulization Every 4 (Four) Hours As Needed for Wheezing. 3/30/23  Yes Fabiola Morris APRN   albuterol sulfate  (90 Base) MCG/ACT inhaler Inhale 2 puffs Every 4 (Four) Hours As Needed for Wheezing. 2/28/23  Yes Fabiola Morris APRN   allopurinol (ZYLOPRIM) 100 MG tablet Take 1 tablet by mouth Daily. 12/21/22  Yes ProviderJacquie MD   amLODIPine (NORVASC) 5 MG tablet Take 1 tablet by mouth Daily.   Yes Provider, MD Jacquie   Aspirin Low Dose 81 MG EC tablet Take 1 tablet by mouth Daily. 12/8/22  Yes ProviderJacquie MD   atorvastatin (LIPITOR) 40 MG tablet Take 1  tablet by mouth every night at bedtime. 12/22/22  Yes Jacquie White MD   azithromycin (Zithromax Z-Eddy) 250 MG tablet Take 2 tablets by mouth on day 1, then 1 tablet daily on days 2-5 7/25/24  Yes Fabiola Morris APRN   baclofen (LIORESAL) 10 MG/20ML injection 237.53 mcg by Intrathecal route 1 (One) Time. Pain pump   Yes Jacquie White MD   betamethasone dipropionate 0.05 % cream Apply 1 Application topically to the appropriate area as directed 2 (Two) Times a Day.   Yes Jacquie White MD   bisacodyl (DULCOLAX) 10 MG suppository Insert 1 suppository into the rectum Daily. 5/11/23  Yes Fabiola Morris APRN   Budeson-Glycopyrrol-Formoterol (Breztri Aerosphere) 160-9-4.8 MCG/ACT aerosol inhaler Inhale 2 puffs Every 12 (Twelve) Hours.   Yes Jacquie White MD   clindamycin (CLEOCIN) 300 MG capsule Take 1 capsule by mouth Every 12 (Twelve) Hours. 7/17/24  Yes Jacquie White MD   cyclobenzaprine (FLEXERIL) 10 MG tablet Take 1 tablet by mouth 3 (Three) Times a Day As Needed. 12/22/22  Yes Jacquie White MD   escitalopram (LEXAPRO) 10 MG tablet Take  by mouth Daily.   Yes Jacquie White MD   ezetimibe (ZETIA) 10 MG tablet Take 1 tablet by mouth every night at bedtime. 12/21/22  Yes Jacquie White MD   furosemide (LASIX) 40 MG tablet Take 1 tablet by mouth Daily.   Yes ProviderJacquie MD   HYDROmorphone (DILAUDID) 2 MG/ML injection Infuse 1.3573 mg into a venous catheter.   Yes Jacquie White MD   ipratropium-albuterol (DUO-NEB) 0.5-2.5 mg/3 ml nebulizer Take 3 mL by nebulization Every 4 (Four) Hours As Needed for Wheezing or Shortness of Air. 5/1/23  Yes Fabiola Morris APRN   levothyroxine (SYNTHROID, LEVOTHROID) 25 MCG tablet TAKE 1 TABLET BY MOUTH BEFORE BREAKFAST. 12/21/22  Yes Provider, MD Jacquie   linaclotide (LINZESS) 145 MCG capsule capsule Take 1 capsule every day by oral route.   Yes Provider, MD Jacquie  "  lisinopril (PRINIVIL,ZESTRIL) 10 MG tablet lisinopril 10 mg tablet   TAKE ONE TABLET BY MOUTH ONE TIME DAILY   Yes Jacquie White MD   metoprolol succinate XL (TOPROL-XL) 25 MG 24 hr tablet Take 1 tablet by mouth Daily.   Yes Jacquie White MD   nystatin (MYCOSTATIN) 200651 UNIT/GM powder APPLY TO AFFECTED AREA EVERY DAY AND AS NEEDED 12/14/22  Yes Jacquie White MD   ondansetron ODT (ZOFRAN-ODT) 4 MG disintegrating tablet Take 1 tablet by mouth Every 6 (Six) Hours As Needed. for nausea 7/17/24  Yes Jacquie White MD   pantoprazole (PROTONIX) 20 MG EC tablet Take 1 tablet by mouth Every Morning Before Breakfast. 12/21/22  Yes Jacquie White MD   sennosides-docusate (senna-docusate sodium) 8.6-50 MG per tablet Take 1 tablet by mouth 2 (Two) Times a Day As Needed for Constipation. 5/11/23  Yes Fabiola Morris APRN   tiZANidine (ZANAFLEX) 4 MG tablet Take 1 tablet by mouth Every 8 (Eight) Hours As Needed.   Yes Jacquie White MD   torsemide (DEMADEX) 10 MG tablet  8/8/24  Yes Jacquie White MD   triamcinolone (KENALOG) 0.1 % cream APPLY TO AFFECTED AREA TWICE A DAY FOR DERMATITIS   Yes Jacquie White MD   trimethoprim (TRIMPEX) 100 MG tablet    Yes Jacquie White MD   vitamin C (ASCORBIC ACID) 500 MG tablet Take 2 tablets by mouth Daily. 8/4/24  Yes Jacquie White MD   D3-50 1.25 MG (27086 UT) capsule TAKE 1 CAPSULE (50,000 UNITS TOTAL) BY MOUTH EVERY 14 DAYS    Jacquie White MD   Plecanatide (Trulance) 3 MG tablet Take 1 tablet by mouth Daily.    Jacquie White MD         Objective     Vital Signs: /75 (BP Location: Right arm, Patient Position: Sitting, Cuff Size: Adult)   Pulse 102   Temp 98 °F (36.7 °C) (Infrared)   Resp 18   Ht 149.9 cm (59\")   Wt 70.8 kg (156 lb)   SpO2 94%   BMI 31.51 kg/m²   Physical Exam  Vitals and nursing note reviewed.   Constitutional:       Appearance: She is obese.   HENT:      " Head: Normocephalic.      Right Ear: Tympanic membrane normal.      Left Ear: Tympanic membrane normal.      Nose: Nose normal.      Mouth/Throat:      Mouth: Mucous membranes are moist.   Eyes:      Conjunctiva/sclera: Conjunctivae normal.   Cardiovascular:      Rate and Rhythm: Normal rate and regular rhythm.      Pulses: Normal pulses.      Heart sounds: Normal heart sounds.   Pulmonary:      Effort: Pulmonary effort is normal. No respiratory distress.      Breath sounds: Normal breath sounds.   Abdominal:      General: Bowel sounds are normal.      Palpations: Abdomen is soft.   Musculoskeletal:         General: Normal range of motion.      Cervical back: Normal range of motion.   Skin:     General: Skin is warm and dry.   Neurological:      General: No focal deficit present.      Mental Status: She is alert and oriented to person, place, and time.   Psychiatric:         Mood and Affect: Mood normal.         Behavior: Behavior normal.         Advance Care Planning   ACP discussion was held with the patient during this visit. Patient has an advance directive (not in EMR), copy requested.         Results Reviewed:  Glucose   Date Value Ref Range Status   09/20/2023 160 (H) 65 - 99 mg/dL Final     BUN   Date Value Ref Range Status   09/20/2023 16 8 - 23 mg/dL Final     Creatinine   Date Value Ref Range Status   09/20/2023 1.24 (H) 0.57 - 1.00 mg/dL Final     Sodium   Date Value Ref Range Status   09/20/2023 139 136 - 145 mmol/L Final     Potassium   Date Value Ref Range Status   09/20/2023 4.6 3.5 - 5.2 mmol/L Final     Chloride   Date Value Ref Range Status   09/20/2023 100 98 - 107 mmol/L Final     CO2   Date Value Ref Range Status   09/20/2023 31.0 (H) 22.0 - 29.0 mmol/L Final     Calcium   Date Value Ref Range Status   09/20/2023 8.9 8.6 - 10.5 mg/dL Final     ALT (SGPT)   Date Value Ref Range Status   09/20/2023 15 1 - 33 U/L Final     AST (SGOT)   Date Value Ref Range Status   09/20/2023 18 1 - 32 U/L Final      WBC   Date Value Ref Range Status   09/20/2023 3.59 3.40 - 10.80 10*3/mm3 Final   09/11/2023 5.2 3.4 - 10.8 x10E3/uL Final     Hematocrit   Date Value Ref Range Status   09/20/2023 33.9 (L) 34.0 - 46.6 % Final     Platelets   Date Value Ref Range Status   09/20/2023 144 140 - 450 10*3/mm3 Final     Triglycerides   Date Value Ref Range Status   09/11/2023 137 0 - 149 mg/dL Final     HDL Cholesterol   Date Value Ref Range Status   09/11/2023 41 >39 mg/dL Final     LDL Chol Calc (NIH)   Date Value Ref Range Status   09/11/2023 40 0 - 99 mg/dL Final     Hemoglobin A1C   Date Value Ref Range Status   09/11/2023 5.8 (H) 4.8 - 5.6 % Final     Comment:              Prediabetes: 5.7 - 6.4           Diabetes: >6.4           Glycemic control for adults with diabetes: <7.0           Assessment / Plan     Assessment/Plan     Diagnoses and all orders for this visit:    1. History of recent hospitalization (Primary)    2. Moderate persistent asthma with acute exacerbation  -     Albuterol-Budesonide 90-80 MCG/ACT aerosol; Inhale 2 Inhalations As Needed (shortness of breath). Two inhalations as needed; maximum daily dose 12 inhalations a day.  Dispense: 10.7 g; Refill: 0    3. Pneumonia due to infectious organism, unspecified laterality, unspecified part of lung  -     doxycycline (VIBRAMYCIN) 100 MG capsule; Take 1 capsule by mouth 2 (Two) Times a Day for 7 days.  Dispense: 14 capsule; Refill: 0  -     cefdinir (OMNICEF) 300 MG capsule; Take 1 capsule by mouth 2 (Two) Times a Day for 7 days.  Dispense: 14 capsule; Refill: 0    4. Pain and swelling of lower extremity, right  -     US Venous Doppler Lower Extremity Right (duplex); Future  -     torsemide (DEMADEX) 10 MG tablet; Take 1 tablet by mouth Daily As Needed (lower extremity edema).  Dispense: 90 tablet; Refill: 0  -     potassium chloride (MICRO-K) 10 MEQ CR capsule; Take 1 capsule by mouth Daily As Needed (lower extremity edema). Take with prn dose of torsemide   Dispense: 90 capsule; Refill: 0    5. Chronic systolic heart failure  -     torsemide (DEMADEX) 10 MG tablet; Take 1 tablet by mouth Daily As Needed (lower extremity edema).  Dispense: 90 tablet; Refill: 0  -     potassium chloride (MICRO-K) 10 MEQ CR capsule; Take 1 capsule by mouth Daily As Needed (lower extremity edema). Take with prn dose of torsemide  Dispense: 90 capsule; Refill: 0  -     Ambulatory Referral to Cardiology    6. CAIO (obstructive sleep apnea)  -     Ambulatory Referral to Sleep Medicine    7. Sleep attack  -     Ambulatory Referral to Sleep Medicine    8. Class 1 obesity due to excess calories with serious comorbidity and body mass index (BMI) of 30.0 to 30.9 in adult         An After Visit Summary was printed and given to the patient at discharge.  Return in about 4 weeks (around 9/9/2024) for Medicare Wellness.    I have discussed the patient results/orders and plan/recommendation with them at today's visit.      Fabiola Morris, NISHANT   08/12/2024

## 2024-08-21 PROBLEM — E66.811 CLASS 1 OBESITY DUE TO EXCESS CALORIES WITH SERIOUS COMORBIDITY AND BODY MASS INDEX (BMI) OF 30.0 TO 30.9 IN ADULT: Chronic | Status: ACTIVE | Noted: 2024-08-21

## 2024-08-21 PROBLEM — E66.09 CLASS 1 OBESITY DUE TO EXCESS CALORIES WITH SERIOUS COMORBIDITY AND BODY MASS INDEX (BMI) OF 30.0 TO 30.9 IN ADULT: Chronic | Status: ACTIVE | Noted: 2024-08-21

## 2024-08-21 PROBLEM — G47.33 OSA (OBSTRUCTIVE SLEEP APNEA): Chronic | Status: ACTIVE | Noted: 2024-08-21

## 2024-10-07 ENCOUNTER — TELEPHONE (OUTPATIENT)
Dept: FAMILY MEDICINE CLINIC | Facility: CLINIC | Age: 74
End: 2024-10-07
Payer: MEDICARE

## 2024-10-09 ENCOUNTER — OFFICE VISIT (OUTPATIENT)
Dept: FAMILY MEDICINE CLINIC | Facility: CLINIC | Age: 74
End: 2024-10-09
Payer: MEDICARE

## 2024-10-09 VITALS
WEIGHT: 153.2 LBS | HEART RATE: 83 BPM | DIASTOLIC BLOOD PRESSURE: 80 MMHG | TEMPERATURE: 97.8 F | OXYGEN SATURATION: 97 % | HEIGHT: 59 IN | SYSTOLIC BLOOD PRESSURE: 132 MMHG | BODY MASS INDEX: 30.88 KG/M2

## 2024-10-09 DIAGNOSIS — I87.2 STASIS DERMATITIS: Chronic | ICD-10-CM

## 2024-10-09 DIAGNOSIS — M79.89 PAIN AND SWELLING OF RIGHT LOWER LEG: Primary | Chronic | ICD-10-CM

## 2024-10-09 DIAGNOSIS — M79.661 PAIN AND SWELLING OF RIGHT LOWER LEG: Primary | Chronic | ICD-10-CM

## 2024-10-09 DIAGNOSIS — M62.838 MUSCLE SPASM: Chronic | ICD-10-CM

## 2024-10-09 DIAGNOSIS — L03.116 CELLULITIS OF LEFT LOWER EXTREMITY: Chronic | ICD-10-CM

## 2024-10-09 PROCEDURE — 1125F AMNT PAIN NOTED PAIN PRSNT: CPT | Performed by: INTERNAL MEDICINE

## 2024-10-09 PROCEDURE — 99214 OFFICE O/P EST MOD 30 MIN: CPT | Performed by: INTERNAL MEDICINE

## 2024-10-09 PROCEDURE — 1159F MED LIST DOCD IN RCRD: CPT | Performed by: INTERNAL MEDICINE

## 2024-10-09 PROCEDURE — 1160F RVW MEDS BY RX/DR IN RCRD: CPT | Performed by: INTERNAL MEDICINE

## 2024-10-09 RX ORDER — TRIAMCINOLONE ACETONIDE 1 MG/G
1 OINTMENT TOPICAL 2 TIMES DAILY
Qty: 80 G | Refills: 1 | Status: SHIPPED | OUTPATIENT
Start: 2024-10-09 | End: 2024-10-19

## 2024-10-09 RX ORDER — DIAZEPAM 2 MG
2 TABLET ORAL EVERY 12 HOURS PRN
Qty: 30 TABLET | Refills: 0 | Status: SHIPPED | OUTPATIENT
Start: 2024-10-09

## 2024-10-09 RX ORDER — CEPHALEXIN 500 MG/1
500 CAPSULE ORAL 3 TIMES DAILY
Qty: 30 CAPSULE | Refills: 0 | Status: SHIPPED | OUTPATIENT
Start: 2024-10-09 | End: 2024-10-19

## 2024-10-09 NOTE — PROGRESS NOTES
Subjective     Chief Complaint   Patient presents with    Leg Swelling     Swelling, feet and ankles as well - has some weeping happening      Lung Nodule     Needing US on lungs          History of Present Illness  Ms. Steven is a chronically ill 74-year-old lady with multiple endorgan diseases advanced presents today with asymmetric swelling of her distal right lower extremity with reddish discoloration and area of what appears to be cellulitis in the mid tibial region anteriorly.  She is also complaining of severe spasm next muscle spasms in her neck and the muscle relaxers he has not at home have been of no benefit she has chronic dyspnea but no new worsening of her dyspnea on exertion she does not have orthopnea or PND denies any chest pain she is chronically short of breath however.  She is accompanied today by her son she lives with her son and his wife in [place]    Past Medical History:   Past Medical History:   Diagnosis Date    Cancer     throat cancer, breast    CHF (congestive heart failure)     Chronic kidney disease (CKD), stage III (moderate)     Constipation     COPD (chronic obstructive pulmonary disease)     Coronary artery disease     Diverticulitis     GERD (gastroesophageal reflux disease)     History of transfusion     Hypertension     Low back pain     Lung nodule     Neck fracture     Oxygen dependent     wears 2lnc    Sleep apnea     cpap or bipap unsure which one she uses also uses oxygen with this    Stroke     left side face weakness residual    Thyroid nodule      Past Surgical History:  Past Surgical History:   Procedure Laterality Date    APPENDECTOMY      BACK SURGERY N/A     BRAIN SURGERY      chiari    CARDIAC CATHETERIZATION      no stents    CHOLECYSTECTOMY      COLONOSCOPY      HYSTERECTOMY      PAIN PUMP INSERTION/REVISION      PAIN PUMP INSERTION/REVISION Left 9/19/2023    Procedure: PAIN PUMP and proximal catheter REVISION, left.  Supine position.;  Surgeon: Juaquin  Cristopher Ojeda MD;  Location: [facility];  Service: Neurosurgery;  Laterality: Left;    TONSILLECTOMY       Social History:  reports that she quit smoking about 11 years ago. Her smoking use included cigarettes. She started smoking about 51 years ago. She has a 20 pack-year smoking history. She has been exposed to tobacco smoke. She has never used smokeless tobacco. She reports that she does not drink alcohol and does not use drugs.    Family History: family history includes No Known Problems in her father and mother.      Allergies:  Allergies   Allergen Reactions    Bee Venom Shortness Of Breath and Swelling    Ciprofloxacin Shortness Of Breath    Nitroglycerin Anaphylaxis    Penicillins Shortness Of Breath    Sulfamethoxazole-Trimethoprim Anaphylaxis, Swelling and Angioedema    Egg-Derived Products Nausea Only, Unknown - Low Severity and GI Intolerance    Codeine Itching    Contrast Dye (Echo Or Unknown Ct/Mr) Unknown - Low Severity     Stage 3 kidney disease cannot take     Iodinated Contrast Media Unknown - Low Severity     Stage 3 kidney disease     Latex, Natural Rubber Hives    Methenamine GI Intolerance    Nylon Hives    Oxycodone-Acetaminophen Nausea And Vomiting    Latex Rash and Other (See Comments)    Oxycodone Nausea And Vomiting     Medications:  Prior to Admission medications    Medication Sig Start Date End Date Taking? Authorizing Provider   albuterol (PROVENTIL) (2.5 MG/3ML) 0.083% nebulizer solution Take 2.5 mg by nebulization Every 4 (Four) Hours As Needed for Wheezing. 3/30/23   Fabiola Morris APRN   Albuterol-Budesonide 90-80 MCG/ACT aerosol Inhale 2 Inhalations As Needed (shortness of breath). Two inhalations as needed; maximum daily dose 12 inhalations a day. 8/12/24   Fabiola Morris APRN   allopurinol (ZYLOPRIM) 100 MG tablet Take 1 tablet by mouth Daily. 12/21/22   Provider, MD Jacquie   amLODIPine (NORVASC) 5 MG tablet Take 1 tablet by mouth Daily.    Provider,  MD Jacquie   Aspirin Low Dose 81 MG EC tablet Take 1 tablet by mouth Daily. 12/8/22   Jacquie White MD   atorvastatin (LIPITOR) 40 MG tablet Take 1 tablet by mouth every night at bedtime. 12/22/22   Jacquie White MD   baclofen (LIORESAL) 10 MG/20ML injection 237.53 mcg by Intrathecal route 1 (One) Time. Pain pump    Jacquie White MD   bisacodyl (DULCOLAX) 10 MG suppository Insert 1 suppository into the rectum Daily. 5/11/23   Fabiola Morris APRN   Budeson-Glycopyrrol-Formoterol (Breztri Aerosphere) 160-9-4.8 MCG/ACT aerosol inhaler Inhale 2 puffs Every 12 (Twelve) Hours.    Jacquie White MD   cephalexin (Keflex) 500 MG capsule Take 1 capsule by mouth 3 (Three) Times a Day for 10 days. 10/9/24 10/19/24  Kai Richardson MD   cyclobenzaprine (FLEXERIL) 10 MG tablet Take 1 tablet by mouth 3 (Three) Times a Day As Needed. 12/22/22   Jacquie White MD   D3-50 1.25 MG (04049 UT) capsule TAKE 1 CAPSULE (50,000 UNITS TOTAL) BY MOUTH EVERY 14 DAYS    Jacquie White MD   diazePAM (Valium) 2 MG tablet Take 1 tablet by mouth Every 12 (Twelve) Hours As Needed for Muscle Spasms (prn muscle spams) for up to 30 doses. 10/9/24   Kai Richardson MD   escitalopram (LEXAPRO) 10 MG tablet Take  by mouth Daily.    Jacquie White MD   ezetimibe (ZETIA) 10 MG tablet Take 1 tablet by mouth every night at bedtime. 12/21/22   Jacquie White MD   HYDROmorphone (DILAUDID) 2 MG/ML injection Infuse 1.3573 mg into a venous catheter.    Jacquie White MD   ipratropium-albuterol (DUO-NEB) 0.5-2.5 mg/3 ml nebulizer Take 3 mL by nebulization Every 4 (Four) Hours As Needed for Wheezing or Shortness of Air. 5/1/23   Fabiola Morris APRN   levothyroxine (SYNTHROID, LEVOTHROID) 25 MCG tablet TAKE 1 TABLET BY MOUTH BEFORE BREAKFAST. 12/21/22   Provider, MD Jacquie   linaclotide (LINZESS) 145 MCG capsule capsule Take 1 capsule every day by oral route.     Jacquie White MD   lisinopril (PRINIVIL,ZESTRIL) 10 MG tablet lisinopril 10 mg tablet   TAKE ONE TABLET BY MOUTH ONE TIME DAILY    Jacquie White MD   metoprolol succinate XL (TOPROL-XL) 25 MG 24 hr tablet Take 1 tablet by mouth Daily.    Jacquie White MD   nystatin (MYCOSTATIN) 021034 UNIT/GM powder APPLY TO AFFECTED AREA EVERY DAY AND AS NEEDED 12/14/22   Jacquie White MD   ondansetron ODT (ZOFRAN-ODT) 4 MG disintegrating tablet Take 1 tablet by mouth Every 6 (Six) Hours As Needed. for nausea 7/17/24   Jacquie White MD   pantoprazole (PROTONIX) 20 MG EC tablet Take 1 tablet by mouth Every Morning Before Breakfast. 12/21/22   Jacquie White MD   Plecanatide (Trulance) 3 MG tablet Take 1 tablet by mouth Daily.    Jacquie White MD   potassium chloride (MICRO-K) 10 MEQ CR capsule Take 1 capsule by mouth Daily As Needed (lower extremity edema). Take with prn dose of torsemide 8/12/24   Fabiola Morris APRN   sennosides-docusate (senna-docusate sodium) 8.6-50 MG per tablet Take 1 tablet by mouth 2 (Two) Times a Day As Needed for Constipation. 5/11/23   Fabiola Morris APRN   tiZANidine (ZANAFLEX) 4 MG tablet Take 1 tablet by mouth Every 8 (Eight) Hours As Needed.    Jacquie White MD   torsemide (DEMADEX) 10 MG tablet  8/8/24   Jacquie White MD   torsemide (DEMADEX) 10 MG tablet Take 1 tablet by mouth Daily As Needed (lower extremity edema). 8/12/24   Fabiola Morris APRN   triamcinolone (KENALOG) 0.1 % ointment Apply 1 Application topically to the appropriate area as directed 2 (Two) Times a Day for 10 days. 10/9/24 10/19/24  Kai Richardson MD   vitamin C (ASCORBIC ACID) 500 MG tablet Take 2 tablets by mouth Daily. 8/4/24   Jacquie White MD   betamethasone dipropionate 0.05 % cream Apply 1 Application topically to the appropriate area as directed 2 (Two) Times a Day.  10/9/24  Provider, MD Jacquie  "  triamcinolone (KENALOG) 0.1 % cream APPLY TO AFFECTED AREA TWICE A DAY FOR DERMATITIS  10/9/24  Provider, MD Jacquie         PHQ-9 Depression Screening  Little interest or pleasure in doing things?     Feeling down, depressed, or hopeless?     Trouble falling or staying asleep, or sleeping too much?     Feeling tired or having little energy?     Poor appetite or overeating?     Feeling bad about yourself - or that you are a failure or have let yourself or your family down?     Trouble concentrating on things, such as reading the newspaper or watching television?     Moving or speaking so slowly that other people could have noticed? Or the opposite - being so fidgety or restless that you have been moving around a lot more than usual?     Thoughts that you would be better off dead, or of hurting yourself in some way?     PHQ-9 Total Score     If you checked off any problems, how difficult have these problems made it for you to do your work, take care of things at home, or get along with other people?         PHQ-9 Total Score:       Support given, observe for worsening symptoms    Review of systems   negative unless otherwise specified above in HPI    Objective     Vital Signs: /80 (BP Location: Right arm, Patient Position: Sitting, Cuff Size: Adult)   Pulse 83   Temp 97.8 °F (36.6 °C) (Infrared)   Ht 149.9 cm (59.02\")   Wt 69.5 kg (153 lb 3.2 oz)   SpO2 97%   BMI 30.93 kg/m²     Physical Exam  Vitals and nursing note reviewed.   Constitutional:       Appearance: She is obese.   HENT:      Head: Normocephalic.      Right Ear: Tympanic membrane normal.      Left Ear: Tympanic membrane normal.      Nose: Nose normal.      Mouth/Throat:      Mouth: Mucous membranes are moist.   Eyes:      Conjunctiva/sclera: Conjunctivae normal.   Cardiovascular:      Rate and Rhythm: Normal rate and regular rhythm.      Pulses: Normal pulses.      Heart sounds: Normal heart sounds.   Pulmonary:      Effort: Pulmonary " effort is normal. No respiratory distress.      Breath sounds: Normal breath sounds.   Abdominal:      General: Bowel sounds are normal.      Palpations: Abdomen is soft.   Musculoskeletal:         General: Normal range of motion.      Cervical back: Normal range of motion.   Skin:     General: Skin is warm and dry.      Capillary Refill: Capillary refill takes less than 2 seconds.      Findings: Wound present. No lesion or rash.             Comments: The circled area is the involved skin, there is assymetric edema of the RLE with 1-2 + PITTING EDEMA ON THE RIGHT,there is also a scabbed over wound with surrounding cellulitis affecting the mid anterior pretibial region   Neurological:      General: No focal deficit present.      Mental Status: She is alert and oriented to person, place, and time.   Psychiatric:         Mood and Affect: Mood normal.         Behavior: Behavior normal.         BMI is >= 30 and <35. (Class 1 Obesity). The following options were offered after discussion;: exercise counseling/recommendations and nutrition counseling/recommendations        Results Reviewed:  Glucose   Date Value Ref Range Status   09/20/2023 160 (H) 65 - 99 mg/dL Final     BUN   Date Value Ref Range Status   09/20/2023 16 8 - 23 mg/dL Final     Creatinine   Date Value Ref Range Status   09/20/2023 1.24 (H) 0.57 - 1.00 mg/dL Final     Sodium   Date Value Ref Range Status   09/20/2023 139 136 - 145 mmol/L Final     Potassium   Date Value Ref Range Status   09/20/2023 4.6 3.5 - 5.2 mmol/L Final     Chloride   Date Value Ref Range Status   09/20/2023 100 98 - 107 mmol/L Final     CO2   Date Value Ref Range Status   09/20/2023 31.0 (H) 22.0 - 29.0 mmol/L Final     Calcium   Date Value Ref Range Status   09/20/2023 8.9 8.6 - 10.5 mg/dL Final     ALT (SGPT)   Date Value Ref Range Status   09/20/2023 15 1 - 33 U/L Final     AST (SGOT)   Date Value Ref Range Status   09/20/2023 18 1 - 32 U/L Final     WBC   Date Value Ref Range  Status   09/20/2023 3.59 3.40 - 10.80 10*3/mm3 Final   09/11/2023 5.2 3.4 - 10.8 x10E3/uL Final     Hematocrit   Date Value Ref Range Status   09/20/2023 33.9 (L) 34.0 - 46.6 % Final     Platelets   Date Value Ref Range Status   09/20/2023 144 140 - 450 10*3/mm3 Final     Triglycerides   Date Value Ref Range Status   09/11/2023 137 0 - 149 mg/dL Final     HDL Cholesterol   Date Value Ref Range Status   09/11/2023 41 >39 mg/dL Final     LDL Chol Calc (NIH)   Date Value Ref Range Status   09/11/2023 40 0 - 99 mg/dL Final     Hemoglobin A1C   Date Value Ref Range Status   09/11/2023 5.8 (H) 4.8 - 5.6 % Final     Comment:              Prediabetes: 5.7 - 6.4           Diabetes: >6.4           Glycemic control for adults with diabetes: <7.0                   Assessment / Plan     Assessment/Plan:   Diagnosis Plan   1. Pain and swelling of right lower leg  US Venous Doppler Lower Extremity Right (duplex)      2. Muscle spasm  diazePAM (Valium) 2 MG tablet      3. Cellulitis of left lower extremity  cephalexin (Keflex) 500 MG capsule      4. Stasis dermatitis  triamcinolone (KENALOG) 0.1 % ointment      Because it is assymetric, we must rule out DVT, that will be scheduled, I sent in some 0.1% TMC ointment to apply BID to rash. She can take cephalexin and that we sent in. Finally for her neck muscle spasms I sent in 2mg diazepam, #30 1/2-1 tablet once or twice daily prn.      Return in about 2 weeks (around 10/23/2024). unless patient needs to be seen sooner or acute issues arise.      I have discussed the patient results/orders and and plan/recommendation with them at today's visit.      Signed by:    Dr. Kai Richardson Date: 10/09/24    EMR Dictation/Transcription disclaimer:   Some of this note may be an electronic transcription/translation of spoken language to printed text. The electronic translation of spoken language may permit erroneous, or at times, nonsensical words or phrases to be inadvertently transcribed;  Although I have reviewed the note for such errors, some may still exist.

## 2024-10-10 ENCOUNTER — TELEPHONE (OUTPATIENT)
Dept: FAMILY MEDICINE CLINIC | Facility: CLINIC | Age: 74
End: 2024-10-10
Payer: MEDICARE

## 2024-10-10 DIAGNOSIS — M79.661 PAIN AND SWELLING OF RIGHT LOWER LEG: Chronic | ICD-10-CM

## 2024-10-10 DIAGNOSIS — M79.89 PAIN AND SWELLING OF RIGHT LOWER LEG: Chronic | ICD-10-CM

## 2024-10-10 PROBLEM — E04.1 THYROID NODULE: Status: ACTIVE | Noted: 2024-09-05

## 2024-10-10 PROBLEM — R79.89 HIGH SERUM CREATININE: Status: ACTIVE | Noted: 2024-05-15

## 2024-10-10 PROBLEM — I25.2 HISTORY OF MYOCARDIAL INFARCTION: Status: ACTIVE | Noted: 2024-05-15

## 2024-10-10 PROBLEM — D64.9 ANEMIA: Status: ACTIVE | Noted: 2024-05-15

## 2024-10-10 PROBLEM — M62.838 MUSCLE SPASM: Chronic | Status: ACTIVE | Noted: 2024-10-10

## 2024-10-10 PROBLEM — K14.0 GLOSSITIS: Status: ACTIVE | Noted: 2024-05-15

## 2024-10-10 PROBLEM — I87.2 STASIS DERMATITIS: Chronic | Status: ACTIVE | Noted: 2024-10-10

## 2024-10-10 PROBLEM — L03.116 CELLULITIS OF LEFT LOWER EXTREMITY: Chronic | Status: ACTIVE | Noted: 2024-10-10

## 2024-10-10 PROBLEM — E78.5 HYPERLIPIDEMIA: Status: ACTIVE | Noted: 2023-01-04

## 2024-10-10 PROBLEM — R60.0 EDEMA OF BOTH LOWER LEGS: Status: ACTIVE | Noted: 2024-05-15

## 2024-10-10 NOTE — TELEPHONE ENCOUNTER
[facility] called stating they could get pt in for stat referral if she was able to make it by 11 today with them, pt was called and advised of message. She stated she would be able to make it up there by then.

## 2024-10-10 NOTE — TELEPHONE ENCOUNTER
Called [facility] imaging/scheduling to make sure they can see patient today for her doppler. They advised yes as long as the order is faxed stat with info needed.  Order was not listed as referral but faxed order over with requested information.     MARISOL

## 2024-10-11 ENCOUNTER — TELEPHONE (OUTPATIENT)
Dept: FAMILY MEDICINE CLINIC | Facility: CLINIC | Age: 74
End: 2024-10-11
Payer: MEDICARE

## 2024-10-11 NOTE — TELEPHONE ENCOUNTER
----- Message from Kai Richardson sent at 10/10/2024  4:06 PM CDT -----  No blood clot in the leg please notify  ----- Message -----  From: Edgar Hicks Incoming  Sent: 10/10/2024   3:09 PM CDT  To: Kai Richardson MD

## 2024-10-24 ENCOUNTER — OFFICE VISIT (OUTPATIENT)
Dept: FAMILY MEDICINE CLINIC | Facility: CLINIC | Age: 74
End: 2024-10-24
Payer: MEDICARE

## 2024-10-24 VITALS
RESPIRATION RATE: 16 BRPM | SYSTOLIC BLOOD PRESSURE: 131 MMHG | HEIGHT: 56 IN | HEART RATE: 79 BPM | DIASTOLIC BLOOD PRESSURE: 84 MMHG | TEMPERATURE: 98.2 F | WEIGHT: 152 LBS | BODY MASS INDEX: 34.19 KG/M2

## 2024-10-24 DIAGNOSIS — I25.2 HISTORY OF MYOCARDIAL INFARCTION: ICD-10-CM

## 2024-10-24 DIAGNOSIS — E55.9 VITAMIN D DEFICIENCY: ICD-10-CM

## 2024-10-24 DIAGNOSIS — I50.20 SYSTOLIC HEART FAILURE, UNSPECIFIED HF CHRONICITY: ICD-10-CM

## 2024-10-24 DIAGNOSIS — G89.29 CHRONIC BACK PAIN, UNSPECIFIED BACK LOCATION, UNSPECIFIED BACK PAIN LATERALITY: ICD-10-CM

## 2024-10-24 DIAGNOSIS — E78.2 MODERATE MIXED HYPERLIPIDEMIA NOT REQUIRING STATIN THERAPY: ICD-10-CM

## 2024-10-24 DIAGNOSIS — E03.9 ACQUIRED HYPOTHYROIDISM: ICD-10-CM

## 2024-10-24 DIAGNOSIS — N18.32 STAGE 3B CHRONIC KIDNEY DISEASE: Chronic | ICD-10-CM

## 2024-10-24 DIAGNOSIS — I21.9 MYOCARDIAL INFARCTION, UNSPECIFIED MI TYPE, UNSPECIFIED ARTERY: ICD-10-CM

## 2024-10-24 DIAGNOSIS — R00.1 SINUS BRADYCARDIA: ICD-10-CM

## 2024-10-24 DIAGNOSIS — I50.43 CHF (CONGESTIVE HEART FAILURE), NYHA CLASS I, ACUTE ON CHRONIC, COMBINED: ICD-10-CM

## 2024-10-24 DIAGNOSIS — M54.9 CHRONIC BACK PAIN, UNSPECIFIED BACK LOCATION, UNSPECIFIED BACK PAIN LATERALITY: ICD-10-CM

## 2024-10-24 DIAGNOSIS — E66.09 CLASS 1 OBESITY DUE TO EXCESS CALORIES WITH SERIOUS COMORBIDITY AND BODY MASS INDEX (BMI) OF 30.0 TO 30.9 IN ADULT: Chronic | ICD-10-CM

## 2024-10-24 DIAGNOSIS — I87.2 STASIS DERMATITIS: Primary | Chronic | ICD-10-CM

## 2024-10-24 DIAGNOSIS — E66.811 CLASS 1 OBESITY DUE TO EXCESS CALORIES WITH SERIOUS COMORBIDITY AND BODY MASS INDEX (BMI) OF 30.0 TO 30.9 IN ADULT: Chronic | ICD-10-CM

## 2024-10-24 DIAGNOSIS — K59.03 DRUG-INDUCED CONSTIPATION: ICD-10-CM

## 2024-10-24 PROCEDURE — 1125F AMNT PAIN NOTED PAIN PRSNT: CPT | Performed by: INTERNAL MEDICINE

## 2024-10-24 PROCEDURE — 99214 OFFICE O/P EST MOD 30 MIN: CPT | Performed by: INTERNAL MEDICINE

## 2024-10-24 RX ORDER — ALPRAZOLAM 1 MG
1 TABLET ORAL AS NEEDED
COMMUNITY

## 2024-10-24 RX ORDER — CYCLOBENZAPRINE HCL 10 MG
10 TABLET ORAL 3 TIMES DAILY PRN
Qty: 270 TABLET | Refills: 2 | Status: SHIPPED | OUTPATIENT
Start: 2024-10-24 | End: 2025-01-22

## 2024-10-24 RX ORDER — FOLIC ACID 1 MG/1
1000 TABLET ORAL DAILY
COMMUNITY

## 2024-10-24 RX ORDER — METHOCARBAMOL 750 MG/1
50000 TABLET ORAL
Qty: 4 CAPSULE | Refills: 2 | Status: SHIPPED | OUTPATIENT
Start: 2024-10-24 | End: 2025-01-16

## 2024-10-24 NOTE — PROGRESS NOTES
Subjective     Chief Complaint   Patient presents with    Edema     Follow up on right leg swelling, swelling has went down a little but still leaking       History of Present Illness  Ms. Steven is leg looks a lot better today the right leg is still somewhat swollen although not as much and the erythema has largely cleared up there is under what else to do for her stasis dermatitis other than keeping it elevated and using compression stockings and topical triamcinolone cream when it turns red I think they understand this.  She was accompanied by her son different son and her is.her daughter-in-law today and had a stream of concerns she said she was told she had a nodule in her lungs recently but I found a CTA angio of the chest and August that was did not show any nodules that have been ordered for a to rule out pulmonary embolism he also then spoke of heart failure and several heart attacks that she has had in the past yet we have nothing on her chart that would explain or even mention coronary artery disease.  Her medication list is completely inaccurate and we tried to go through it corrected as it sits right now.    Past Medical History:   Past Medical History:   Diagnosis Date    Cancer     throat cancer, breast    CHF (congestive heart failure)     Chronic kidney disease (CKD), stage III (moderate)     Constipation     COPD (chronic obstructive pulmonary disease)     Coronary artery disease     Diverticulitis     GERD (gastroesophageal reflux disease)     History of transfusion     Hypertension     Low back pain     Lung nodule     Neck fracture     Oxygen dependent     wears 2lnc    Sleep apnea     cpap or bipap unsure which one she uses also uses oxygen with this    Stroke     left side face weakness residual    Thyroid nodule      Past Surgical History:  Past Surgical History:   Procedure Laterality Date    APPENDECTOMY      BACK SURGERY N/A     BRAIN SURGERY      chiari    CARDIAC CATHETERIZATION       no stents    CHOLECYSTECTOMY      COLONOSCOPY      HYSTERECTOMY      PAIN PUMP INSERTION/REVISION      PAIN PUMP INSERTION/REVISION Left 9/19/2023    Procedure: PAIN PUMP and proximal catheter REVISION, left.  Supine position.;  Surgeon: Cristopher Reza MD;  Location: [facility] OR;  Service: Neurosurgery;  Laterality: Left;    TONSILLECTOMY       Social History:  reports that she quit smoking about 11 years ago. Her smoking use included cigarettes. She started smoking about 51 years ago. She has a 20 pack-year smoking history. She has been exposed to tobacco smoke. She has never used smokeless tobacco. She reports that she does not drink alcohol and does not use drugs.    Family History: family history includes No Known Problems in her father and mother.      Allergies:  Allergies   Allergen Reactions    Bee Venom Shortness Of Breath and Swelling    Ciprofloxacin Shortness Of Breath    Nitroglycerin Anaphylaxis    Penicillins Shortness Of Breath    Sulfamethoxazole-Trimethoprim Anaphylaxis, Swelling and Angioedema    Egg-Derived Products Nausea Only, Unknown - Low Severity and GI Intolerance    Codeine Itching    Contrast Dye (Echo Or Unknown Ct/Mr) Unknown - Low Severity     Stage 3 kidney disease cannot take     Iodinated Contrast Media Unknown - Low Severity     Stage 3 kidney disease     Lactose Other (See Comments)    Latex, Natural Rubber Hives    Methenamine GI Intolerance    Nylon Hives    Oxycodone-Acetaminophen Nausea And Vomiting    Latex Rash and Other (See Comments)    Oxycodone Nausea And Vomiting     Medications:  Prior to Admission medications    Medication Sig Start Date End Date Taking? Authorizing Provider   albuterol (PROVENTIL) (2.5 MG/3ML) 0.083% nebulizer solution Take 2.5 mg by nebulization Every 4 (Four) Hours As Needed for Wheezing. 3/30/23  Yes Fabiola Morris APRN   Albuterol-Budesonide 90-80 MCG/ACT aerosol Inhale 2 Inhalations As Needed (shortness of breath). Two  inhalations as needed; maximum daily dose 12 inhalations a day. 8/12/24  Yes Fabiola Morris APRN   allopurinol (ZYLOPRIM) 100 MG tablet Take 1 tablet by mouth Daily. 12/21/22  Yes Jacquie White MD   ALPRAZolam (XANAX) 1 MG tablet Take 1 tablet by mouth As Needed. Please see attached for detailed directions   Yes Jacquie White MD   amLODIPine (NORVASC) 5 MG tablet Take 1 tablet by mouth Daily.   Yes Jacquie White MD   Aspirin Low Dose 81 MG EC tablet Take 1 tablet by mouth Daily. 12/8/22  Yes Jacquie White MD   atorvastatin (LIPITOR) 40 MG tablet Take 1 tablet by mouth every night at bedtime. 12/22/22  Yes Jacquie Wihte MD   baclofen (LIORESAL) 10 MG/20ML injection 237.53 mcg by Intrathecal route 1 (One) Time. Pain pump   Yes Jacquie White MD   bisacodyl (DULCOLAX) 10 MG suppository Insert 1 suppository into the rectum Daily. 5/11/23  Yes Fabiola Morris APRN   Budeson-Glycopyrrol-Formoterol (Breztri Aerosphere) 160-9-4.8 MCG/ACT aerosol inhaler Inhale 2 puffs Every 12 (Twelve) Hours.   Yes Jacquie White MD   cyclobenzaprine (FLEXERIL) 10 MG tablet Take 1 tablet by mouth 3 (Three) Times a Day As Needed. 12/22/22  Yes Jacquie White MD   D3-50 1.25 MG (05293 UT) capsule TAKE 1 CAPSULE (50,000 UNITS TOTAL) BY MOUTH EVERY 14 DAYS   Yes Jacquie White MD   diazePAM (Valium) 2 MG tablet Take 1 tablet by mouth Every 12 (Twelve) Hours As Needed for Muscle Spasms (prn muscle spams) for up to 30 doses. 10/9/24  Yes Kai Richardson MD   escitalopram (LEXAPRO) 10 MG tablet Take  by mouth Daily.   Yes Jacquie White MD   ezetimibe (ZETIA) 10 MG tablet Take 1 tablet by mouth every night at bedtime. 12/21/22  Yes Jacquie White MD   folic acid (FOLVITE) 1 MG tablet Take 1 tablet by mouth Daily.   Yes Jacquie White MD   HYDROmorphone (DILAUDID) 2 MG/ML injection Infuse 1.3573 mg into a venous catheter.   Yes  ProviderJacquie MD   ipratropium-albuterol (DUO-NEB) 0.5-2.5 mg/3 ml nebulizer Take 3 mL by nebulization Every 4 (Four) Hours As Needed for Wheezing or Shortness of Air. 5/1/23  Yes Fabiola Morris APRN   levothyroxine (SYNTHROID, LEVOTHROID) 25 MCG tablet TAKE 1 TABLET BY MOUTH BEFORE BREAKFAST. 12/21/22  Yes Jacquie White MD   linaclotide (LINZESS) 145 MCG capsule capsule Take 1 capsule every day by oral route.   Yes Jacquie White MD   lisinopril (PRINIVIL,ZESTRIL) 10 MG tablet lisinopril 10 mg tablet   TAKE ONE TABLET BY MOUTH ONE TIME DAILY   Yes Jacquie White MD   metoprolol succinate XL (TOPROL-XL) 25 MG 24 hr tablet Take 1 tablet by mouth Daily.   Yes ProviderJacquie MD   nystatin (MYCOSTATIN) 829065 UNIT/GM powder APPLY TO AFFECTED AREA EVERY DAY AND AS NEEDED 12/14/22  Yes Jacquie White MD   ondansetron ODT (ZOFRAN-ODT) 4 MG disintegrating tablet Take 1 tablet by mouth Every 6 (Six) Hours As Needed. for nausea 7/17/24  Yes Jacquie White MD   pantoprazole (PROTONIX) 20 MG EC tablet Take 1 tablet by mouth Every Morning Before Breakfast. 12/21/22  Yes Jacquie White MD   Plecanatide (Trulance) 3 MG tablet Take 1 tablet by mouth Daily.   Yes ProviderJacquie MD   potassium chloride (MICRO-K) 10 MEQ CR capsule Take 1 capsule by mouth Daily As Needed (lower extremity edema). Take with prn dose of torsemide 8/12/24  Yes Fabiola Morris APRN   sennosides-docusate (senna-docusate sodium) 8.6-50 MG per tablet Take 1 tablet by mouth 2 (Two) Times a Day As Needed for Constipation. 5/11/23  Yes Fabiola Morris APRN   tiZANidine (ZANAFLEX) 4 MG tablet Take 1 tablet by mouth Every 8 (Eight) Hours As Needed.   Yes Jacquie White MD   torsemide (DEMADEX) 10 MG tablet  8/8/24  Yes Jacquie White MD   torsemide (DEMADEX) 10 MG tablet Take 1 tablet by mouth Daily As Needed (lower extremity edema). 8/12/24  Yes Alexandra,  "Fabiola Terrell APRBLANCA   vitamin C (ASCORBIC ACID) 500 MG tablet Take 2 tablets by mouth Daily. 8/4/24  Yes Provider, Historical, MD           Review of systems   negative unless otherwise specified above in HPI    Objective     Vital Signs: /84 (BP Location: Right arm, Patient Position: Sitting, Cuff Size: Adult)   Pulse 79   Temp 98.2 °F (36.8 °C) (Infrared)   Resp 16   Ht 142.2 cm (56\")   Wt 68.9 kg (152 lb)   BMI 34.08 kg/m²     Physical Exam  Vitals reviewed.   Constitutional:       Appearance: She is obese.   HENT:      Head: Normocephalic and atraumatic.      Mouth/Throat:      Mouth: Mucous membranes are moist.      Pharynx: Oropharynx is clear.   Eyes:      Extraocular Movements: Extraocular movements intact.      Pupils: Pupils are equal, round, and reactive to light.   Neck:      Vascular: No carotid bruit.   Cardiovascular:      Rate and Rhythm: Normal rate and regular rhythm.   Pulmonary:      Effort: Pulmonary effort is normal.      Breath sounds: Normal breath sounds.   Abdominal:      General: Abdomen is flat.      Palpations: Abdomen is soft.   Musculoskeletal:         General: Normal range of motion.   Skin:     General: Skin is warm and dry.      Capillary Refill: Capillary refill takes less than 2 seconds.   Neurological:      General: No focal deficit present.      Mental Status: She is alert.   Psychiatric:         Mood and Affect: Mood normal.                  Results Reviewed:  Glucose   Date Value Ref Range Status   09/20/2023 160 (H) 65 - 99 mg/dL Final     BUN   Date Value Ref Range Status   09/20/2023 16 8 - 23 mg/dL Final     Creatinine   Date Value Ref Range Status   09/20/2023 1.24 (H) 0.57 - 1.00 mg/dL Final     Sodium   Date Value Ref Range Status   09/20/2023 139 136 - 145 mmol/L Final     Potassium   Date Value Ref Range Status   09/20/2023 4.6 3.5 - 5.2 mmol/L Final     Chloride   Date Value Ref Range Status   09/20/2023 100 98 - 107 mmol/L Final     CO2   Date Value " Ref Range Status   09/20/2023 31.0 (H) 22.0 - 29.0 mmol/L Final     Calcium   Date Value Ref Range Status   09/20/2023 8.9 8.6 - 10.5 mg/dL Final     ALT (SGPT)   Date Value Ref Range Status   09/20/2023 15 1 - 33 U/L Final     AST (SGOT)   Date Value Ref Range Status   09/20/2023 18 1 - 32 U/L Final     WBC   Date Value Ref Range Status   09/20/2023 3.59 3.40 - 10.80 10*3/mm3 Final   09/11/2023 5.2 3.4 - 10.8 x10E3/uL Final     Hematocrit   Date Value Ref Range Status   09/20/2023 33.9 (L) 34.0 - 46.6 % Final     Platelets   Date Value Ref Range Status   09/20/2023 144 140 - 450 10*3/mm3 Final     Triglycerides   Date Value Ref Range Status   09/11/2023 137 0 - 149 mg/dL Final     HDL Cholesterol   Date Value Ref Range Status   09/11/2023 41 >39 mg/dL Final     LDL Chol Calc (NIH)   Date Value Ref Range Status   09/11/2023 40 0 - 99 mg/dL Final     Hemoglobin A1C   Date Value Ref Range Status   09/11/2023 5.8 (H) 4.8 - 5.6 % Final     Comment:              Prediabetes: 5.7 - 6.4           Diabetes: >6.4           Glycemic control for adults with diabetes: <7.0                   Assessment / Plan     Assessment/Plan:   Diagnosis Plan   1. Stasis dermatitis  CBC & Differential      2. Moderate mixed hyperlipidemia not requiring statin therapy  Lipid Panel      3. Myocardial infarction, unspecified MI type, unspecified artery        4. History of myocardial infarction  Comprehensive Metabolic Panel    Thyroid Cascade Profile      5. Sinus bradycardia        6. Systolic heart failure, unspecified HF chronicity  Adult Transthoracic Echo Complete W/ Cont if Necessary Per Protocol    BNP      7. Class 1 obesity due to excess calories with serious comorbidity and body mass index (BMI) of 30.0 to 30.9 in adult        8. Stage 3b chronic kidney disease        9. Drug-induced constipation  linaclotide (LINZESS) 145 MCG capsule capsule      10. Vitamin D deficiency  D3-50 1.25 MG (41734 UT) capsule      11. Chronic back pain,  unspecified back location, unspecified back pain laterality  cyclobenzaprine (FLEXERIL) 10 MG tablet      12. CHF (congestive heart failure), NYHA class I, acute on chronic, combined  Adult Transthoracic Echo Complete W/ Cont if Necessary Per Protocol      13. Acquired hypothyroidism  Thyroid Cascade Profile        Patient has been erroneously marked as diabetic. Based on the available clinical information, she does not have diabetes and should therefore be excluded from diabetic health maintenance and quality measures for the remainder of the reporting period.   He can get blood drawn tomorrow or some@a later date and have also ordered a 2D echocardiogram for her as we went through her medicines are refilled those that she needed plan to see her back in a month and we will adjust treatment medications as indicated as I review her studies.  She is also seeing Dr. Corral for chronic pain she has a pain pump with dilaudid and baclofen.  Not a great historian her family tries to help and they are to some extent we will just gather information and try to tidy up her chart with frequent visits for now  Return in about 1 month (around 11/24/2024). unless patient needs to be seen sooner or acute issues arise.      I have discussed the patient results/orders and and plan/recommendation with them at today's visit.      Signed by:    Dr. Kai Richardson Date: 10/24/24    EMR Dictation/Transcription disclaimer:   Some of this note may be an electronic transcription/translation of spoken language to printed text. The electronic translation of spoken language may permit erroneous, or at times, nonsensical words or phrases to be inadvertently transcribed; Although I have reviewed the note for such errors, some may still exist.

## 2024-10-28 ENCOUNTER — CLINICAL SUPPORT (OUTPATIENT)
Dept: FAMILY MEDICINE CLINIC | Facility: CLINIC | Age: 74
End: 2024-10-28
Payer: MEDICARE

## 2024-10-28 DIAGNOSIS — E78.2 MODERATE MIXED HYPERLIPIDEMIA NOT REQUIRING STATIN THERAPY: ICD-10-CM

## 2024-10-28 DIAGNOSIS — I87.2 STASIS DERMATITIS: Chronic | ICD-10-CM

## 2024-10-28 DIAGNOSIS — E03.9 ACQUIRED HYPOTHYROIDISM: ICD-10-CM

## 2024-10-28 DIAGNOSIS — I25.2 HISTORY OF MYOCARDIAL INFARCTION: ICD-10-CM

## 2024-10-28 DIAGNOSIS — I50.20 SYSTOLIC HEART FAILURE, UNSPECIFIED HF CHRONICITY: ICD-10-CM

## 2024-10-28 LAB
BILIRUB BLD-MCNC: NEGATIVE MG/DL
CLARITY, POC: CLEAR
COLOR UR: YELLOW
GLUCOSE UR STRIP-MCNC: NEGATIVE MG/DL
KETONES UR QL: NEGATIVE
LEUKOCYTE EST, POC: NEGATIVE
NITRITE UR-MCNC: NEGATIVE MG/ML
PH UR: 5.5 [PH] (ref 5–8)
PROT UR STRIP-MCNC: NEGATIVE MG/DL
RBC # UR STRIP: NEGATIVE /UL
SP GR UR: 1.01 (ref 1–1.03)
UROBILINOGEN UR QL: NORMAL

## 2024-10-28 PROCEDURE — 36415 COLL VENOUS BLD VENIPUNCTURE: CPT | Performed by: INTERNAL MEDICINE

## 2024-10-28 PROCEDURE — 81003 URINALYSIS AUTO W/O SCOPE: CPT | Performed by: INTERNAL MEDICINE

## 2024-10-28 NOTE — PROGRESS NOTES
Venipuncture Blood Specimen Collection  Venipuncture performed in right AC by Jazmyne Don MA with good hemostasis. Patient tolerated the procedure well without complications.   10/28/24   Jazmyne Don MA

## 2024-10-29 ENCOUNTER — TELEPHONE (OUTPATIENT)
Dept: FAMILY MEDICINE CLINIC | Facility: CLINIC | Age: 74
End: 2024-10-29
Payer: MEDICARE

## 2024-10-29 LAB
ALBUMIN SERPL-MCNC: 3.8 G/DL (ref 3.8–4.8)
ALP SERPL-CCNC: 139 IU/L (ref 44–121)
ALT SERPL-CCNC: 8 IU/L (ref 0–32)
AST SERPL-CCNC: 17 IU/L (ref 0–40)
BASOPHILS # BLD AUTO: 0 X10E3/UL (ref 0–0.2)
BASOPHILS NFR BLD AUTO: 1 %
BILIRUB SERPL-MCNC: 0.3 MG/DL (ref 0–1.2)
BNP SERPL-MCNC: 138.7 PG/ML (ref 0–100)
BUN SERPL-MCNC: 19 MG/DL (ref 8–27)
BUN/CREAT SERPL: 14 (ref 12–28)
CALCIUM SERPL-MCNC: 8.9 MG/DL (ref 8.7–10.3)
CHLORIDE SERPL-SCNC: 104 MMOL/L (ref 96–106)
CHOLEST SERPL-MCNC: 113 MG/DL (ref 100–199)
CO2 SERPL-SCNC: 27 MMOL/L (ref 20–29)
CREAT SERPL-MCNC: 1.39 MG/DL (ref 0.57–1)
EGFRCR SERPLBLD CKD-EPI 2021: 40 ML/MIN/1.73
EOSINOPHIL # BLD AUTO: 0.1 X10E3/UL (ref 0–0.4)
EOSINOPHIL NFR BLD AUTO: 3 %
ERYTHROCYTE [DISTWIDTH] IN BLOOD BY AUTOMATED COUNT: 13.8 % (ref 11.7–15.4)
GLOBULIN SER CALC-MCNC: 1.9 G/DL (ref 1.5–4.5)
GLUCOSE SERPL-MCNC: 148 MG/DL (ref 70–99)
HCT VFR BLD AUTO: 38.7 % (ref 34–46.6)
HDLC SERPL-MCNC: 43 MG/DL
HGB BLD-MCNC: 11.7 G/DL (ref 11.1–15.9)
IMM GRANULOCYTES # BLD AUTO: 0 X10E3/UL (ref 0–0.1)
IMM GRANULOCYTES NFR BLD AUTO: 1 %
LDLC SERPL CALC-MCNC: 46 MG/DL (ref 0–99)
LYMPHOCYTES # BLD AUTO: 0.6 X10E3/UL (ref 0.7–3.1)
LYMPHOCYTES NFR BLD AUTO: 17 %
MCH RBC QN AUTO: 28.1 PG (ref 26.6–33)
MCHC RBC AUTO-ENTMCNC: 30.2 G/DL (ref 31.5–35.7)
MCV RBC AUTO: 93 FL (ref 79–97)
MONOCYTES # BLD AUTO: 0.2 X10E3/UL (ref 0.1–0.9)
MONOCYTES NFR BLD AUTO: 6 %
NEUTROPHILS # BLD AUTO: 2.7 X10E3/UL (ref 1.4–7)
NEUTROPHILS NFR BLD AUTO: 72 %
PLATELET # BLD AUTO: 156 X10E3/UL (ref 150–450)
POTASSIUM SERPL-SCNC: 4.5 MMOL/L (ref 3.5–5.2)
PROT SERPL-MCNC: 5.7 G/DL (ref 6–8.5)
RBC # BLD AUTO: 4.17 X10E6/UL (ref 3.77–5.28)
SODIUM SERPL-SCNC: 145 MMOL/L (ref 134–144)
TRIGL SERPL-MCNC: 135 MG/DL (ref 0–149)
TSH SERPL DL<=0.005 MIU/L-ACNC: 1.84 UIU/ML (ref 0.45–4.5)
VLDLC SERPL CALC-MCNC: 24 MG/DL (ref 5–40)
WBC # BLD AUTO: 3.8 X10E3/UL (ref 3.4–10.8)

## 2024-10-29 NOTE — TELEPHONE ENCOUNTER
----- Message from Kai Richardson sent at 10/29/2024  8:28 AM CDT -----  Blood count and chemistry panel were ok, lipids are good, at goal, thyroid was normal, heart failure blood test is mildly elevated.

## 2024-11-02 DIAGNOSIS — M79.89 PAIN AND SWELLING OF LOWER EXTREMITY, RIGHT: ICD-10-CM

## 2024-11-02 DIAGNOSIS — I50.22 CHRONIC SYSTOLIC HEART FAILURE: ICD-10-CM

## 2024-11-02 DIAGNOSIS — M79.604 PAIN AND SWELLING OF LOWER EXTREMITY, RIGHT: ICD-10-CM

## 2024-11-04 RX ORDER — POTASSIUM CHLORIDE 750 MG/1
CAPSULE, EXTENDED RELEASE ORAL
Qty: 90 CAPSULE | Refills: 1 | Status: SHIPPED | OUTPATIENT
Start: 2024-11-04

## 2024-11-04 NOTE — TELEPHONE ENCOUNTER
Pending Prescriptions:                       Disp   Refills    potassium chloride (MICRO-K) 10 MEQ CR cap*30 cap*2        Sig: TAKE 1 CAPSULE BY MOUTH DAILY AS NEEDED FOR LOWER           EXTREMITY EDEMA, WITH AS NEEDED DOSE OF TORSEMIDE

## 2024-11-18 ENCOUNTER — TELEPHONE (OUTPATIENT)
Dept: FAMILY MEDICINE CLINIC | Facility: CLINIC | Age: 74
End: 2024-11-18
Payer: MEDICARE

## 2024-11-18 NOTE — TELEPHONE ENCOUNTER
----- Message from Kai Richardson sent at 11/18/2024 12:00 PM CST -----  Her cervical spine xray shows a lot of degenerative changes and an abnormal curvature, very abnormal but nothing acute. I would suggest she discuss this wiith Dr Corral.

## 2024-11-25 ENCOUNTER — OFFICE VISIT (OUTPATIENT)
Dept: FAMILY MEDICINE CLINIC | Facility: CLINIC | Age: 74
End: 2024-11-25
Payer: MEDICARE

## 2024-11-25 ENCOUNTER — TELEPHONE (OUTPATIENT)
Dept: FAMILY MEDICINE CLINIC | Facility: CLINIC | Age: 74
End: 2024-11-25

## 2024-11-25 VITALS
BODY MASS INDEX: 33.74 KG/M2 | TEMPERATURE: 98 F | HEIGHT: 56 IN | SYSTOLIC BLOOD PRESSURE: 150 MMHG | DIASTOLIC BLOOD PRESSURE: 81 MMHG | WEIGHT: 150 LBS | OXYGEN SATURATION: 100 % | HEART RATE: 84 BPM

## 2024-11-25 DIAGNOSIS — E03.9 ACQUIRED HYPOTHYROIDISM: Chronic | ICD-10-CM

## 2024-11-25 DIAGNOSIS — R79.89 HIGH SERUM CREATININE: Chronic | ICD-10-CM

## 2024-11-25 DIAGNOSIS — K59.03 DRUG-INDUCED CONSTIPATION: Chronic | ICD-10-CM

## 2024-11-25 DIAGNOSIS — E66.812 CLASS 2 SEVERE OBESITY DUE TO EXCESS CALORIES WITH SERIOUS COMORBIDITY AND BODY MASS INDEX (BMI) OF 35.0 TO 35.9 IN ADULT: Chronic | ICD-10-CM

## 2024-11-25 DIAGNOSIS — E78.2 MIXED HYPERLIPIDEMIA: Chronic | ICD-10-CM

## 2024-11-25 DIAGNOSIS — I10 PRIMARY HYPERTENSION: Primary | Chronic | ICD-10-CM

## 2024-11-25 DIAGNOSIS — G89.29 CHRONIC MIDLINE LOW BACK PAIN WITHOUT SCIATICA: Chronic | ICD-10-CM

## 2024-11-25 DIAGNOSIS — F33.0 MAJOR DEPRESSIVE DISORDER, RECURRENT, MILD: Chronic | ICD-10-CM

## 2024-11-25 DIAGNOSIS — D63.1 ANEMIA DUE TO CHRONIC KIDNEY DISEASE, UNSPECIFIED CKD STAGE: Chronic | ICD-10-CM

## 2024-11-25 DIAGNOSIS — I50.42 CHRONIC COMBINED SYSTOLIC AND DIASTOLIC CONGESTIVE HEART FAILURE: Chronic | ICD-10-CM

## 2024-11-25 DIAGNOSIS — B37.0 ORAL THRUSH: Chronic | ICD-10-CM

## 2024-11-25 DIAGNOSIS — M96.1 FAILED BACK SYNDROME: Chronic | ICD-10-CM

## 2024-11-25 DIAGNOSIS — N18.9 ANEMIA DUE TO CHRONIC KIDNEY DISEASE, UNSPECIFIED CKD STAGE: Chronic | ICD-10-CM

## 2024-11-25 DIAGNOSIS — K14.0 GLOSSITIS: Chronic | ICD-10-CM

## 2024-11-25 DIAGNOSIS — M54.50 CHRONIC MIDLINE LOW BACK PAIN WITHOUT SCIATICA: Chronic | ICD-10-CM

## 2024-11-25 DIAGNOSIS — E66.01 CLASS 2 SEVERE OBESITY DUE TO EXCESS CALORIES WITH SERIOUS COMORBIDITY AND BODY MASS INDEX (BMI) OF 35.0 TO 35.9 IN ADULT: Chronic | ICD-10-CM

## 2024-11-25 DIAGNOSIS — Z78.0 POSTMENOPAUSE: ICD-10-CM

## 2024-11-25 DIAGNOSIS — J42 CHRONIC BRONCHITIS, UNSPECIFIED CHRONIC BRONCHITIS TYPE: Chronic | ICD-10-CM

## 2024-11-25 PROBLEM — Z12.2 ENCOUNTER FOR SCREENING FOR LUNG CANCER: Status: ACTIVE | Noted: 2024-11-25

## 2024-11-25 RX ORDER — FLUTICASONE PROPIONATE 50 MCG
2 SPRAY, SUSPENSION (ML) NASAL DAILY
COMMUNITY
Start: 2024-11-03

## 2024-11-25 RX ORDER — NYSTATIN 100000 [USP'U]/ML
500000 SUSPENSION ORAL 4 TIMES DAILY
Qty: 200 ML | Refills: 1 | Status: SHIPPED | OUTPATIENT
Start: 2024-11-25 | End: 2024-12-15

## 2024-11-25 RX ORDER — LISINOPRIL AND HYDROCHLOROTHIAZIDE 12.5; 2 MG/1; MG/1
1 TABLET ORAL DAILY
Qty: 30 TABLET | Refills: 0 | Status: SHIPPED | OUTPATIENT
Start: 2024-11-25 | End: 2024-12-25

## 2024-11-25 RX ORDER — PREDNISONE 20 MG/1
2 TABLET ORAL DAILY
COMMUNITY
Start: 2024-11-16

## 2024-11-25 RX ORDER — TORSEMIDE 5 MG/1
5 TABLET ORAL DAILY
COMMUNITY
Start: 2024-11-22

## 2024-11-25 RX ORDER — LISINOPRIL 10 MG/1
1 TABLET ORAL DAILY
COMMUNITY
Start: 2024-11-16 | End: 2024-11-25

## 2024-11-25 NOTE — TELEPHONE ENCOUNTER
PER Dr Richardson, called [facility] and requested recent Hospital discharge summary and HMP

## 2024-12-09 ENCOUNTER — OFFICE VISIT (OUTPATIENT)
Dept: FAMILY MEDICINE CLINIC | Facility: CLINIC | Age: 74
End: 2024-12-09
Payer: MEDICARE

## 2024-12-09 VITALS
OXYGEN SATURATION: 96 % | RESPIRATION RATE: 16 BRPM | SYSTOLIC BLOOD PRESSURE: 117 MMHG | DIASTOLIC BLOOD PRESSURE: 70 MMHG | TEMPERATURE: 98.2 F | HEART RATE: 85 BPM | BODY MASS INDEX: 34.87 KG/M2 | WEIGHT: 155 LBS | HEIGHT: 56 IN

## 2024-12-09 DIAGNOSIS — K59.00 OBSTIPATION: ICD-10-CM

## 2024-12-09 DIAGNOSIS — K59.03 THERAPEUTIC OPIOID INDUCED CONSTIPATION: Primary | ICD-10-CM

## 2024-12-09 DIAGNOSIS — E03.9 ACQUIRED HYPOTHYROIDISM: ICD-10-CM

## 2024-12-09 DIAGNOSIS — R73.03 PREDIABETES: ICD-10-CM

## 2024-12-09 DIAGNOSIS — I50.40 COMBINED SYSTOLIC AND DIASTOLIC CONGESTIVE HEART FAILURE, UNSPECIFIED HF CHRONICITY: ICD-10-CM

## 2024-12-09 DIAGNOSIS — E66.3 OVERWEIGHT (BMI 25.0-29.9): ICD-10-CM

## 2024-12-09 DIAGNOSIS — E53.8 VITAMIN B12 DEFICIENCY: Chronic | ICD-10-CM

## 2024-12-09 DIAGNOSIS — T40.2X5A THERAPEUTIC OPIOID INDUCED CONSTIPATION: Primary | ICD-10-CM

## 2024-12-09 DIAGNOSIS — I25.2 HISTORY OF MYOCARDIAL INFARCTION: ICD-10-CM

## 2024-12-09 DIAGNOSIS — I10 PRIMARY HYPERTENSION: ICD-10-CM

## 2024-12-09 DIAGNOSIS — E78.2 MIXED HYPERLIPIDEMIA: ICD-10-CM

## 2024-12-09 DIAGNOSIS — E04.1 THYROID NODULE: ICD-10-CM

## 2024-12-09 PROCEDURE — 99214 OFFICE O/P EST MOD 30 MIN: CPT | Performed by: INTERNAL MEDICINE

## 2024-12-09 PROCEDURE — 1125F AMNT PAIN NOTED PAIN PRSNT: CPT | Performed by: INTERNAL MEDICINE

## 2024-12-09 PROCEDURE — 3078F DIAST BP <80 MM HG: CPT | Performed by: INTERNAL MEDICINE

## 2024-12-09 PROCEDURE — 3074F SYST BP LT 130 MM HG: CPT | Performed by: INTERNAL MEDICINE

## 2024-12-09 PROCEDURE — G2211 COMPLEX E/M VISIT ADD ON: HCPCS | Performed by: INTERNAL MEDICINE

## 2024-12-09 PROCEDURE — 1159F MED LIST DOCD IN RCRD: CPT | Performed by: INTERNAL MEDICINE

## 2024-12-09 PROCEDURE — 1160F RVW MEDS BY RX/DR IN RCRD: CPT | Performed by: INTERNAL MEDICINE

## 2024-12-09 RX ORDER — POLOXAMER 188/SORBITOL/UREA
1 CLEANSER (ML) TOPICAL DAILY
COMMUNITY
Start: 2024-12-04

## 2024-12-09 RX ORDER — POLYETHYLENE GLYCOL 3350 17 G/17G
17 POWDER, FOR SOLUTION ORAL DAILY PRN
COMMUNITY
Start: 2024-12-04

## 2024-12-09 RX ORDER — LISINOPRIL 10 MG/1
10 TABLET ORAL DAILY
COMMUNITY

## 2024-12-09 RX ORDER — MAGNESIUM L-LACTATE 84 MG
2 TABLET, EXTENDED RELEASE ORAL DAILY
COMMUNITY
Start: 2024-12-04

## 2024-12-09 NOTE — PROGRESS NOTES
Subjective     Chief Complaint   Patient presents with    Hypertension    Constipation       Hypertension    Constipation      History of Present Illness  The patient presents for evaluation of constipation. She is accompanied by an adult male.    She experienced a period of 3 to 4 days without a bowel movement, during which she also ceased eating. This led to significant weakness, shaking, and eventual hospitalization. The hospital diagnosed her with dehydration and malnutrition due to vomiting and constipation. Despite attempts to alleviate the constipation with MiraLAX and magnesium citrate, she did not have a bowel movement. However, after consuming a mixture of prune juice and a powder provided by the hospital, she was able to pass stool. She has been advised to return to the hospital if she gains more than 3 pounds.    She reports improvement in her oral health and has been using a prescribed mouthwash.    She has been experiencing weakness, particularly when getting out of bed, and has been suffering from headaches. She also reported a burning sensation in her stomach during her hospital stay.    Her breathing is satisfactory and she is not heavily reliant on oxygen.      Past Medical History:   Past Medical History:   Diagnosis Date    Cancer     throat cancer, breast    CHF (congestive heart failure)     Chronic kidney disease (CKD), stage III (moderate)     Constipation     COPD (chronic obstructive pulmonary disease)     Coronary artery disease     Diverticulitis     GERD (gastroesophageal reflux disease)     History of transfusion     Hypertension     Low back pain     Lung nodule     Neck fracture     Oxygen dependent     wears 2lnc    Sleep apnea     cpap or bipap unsure which one she uses also uses oxygen with this    Stroke     left side face weakness residual    Thyroid nodule      Past Surgical History:  Past Surgical History:   Procedure Laterality Date    APPENDECTOMY      BACK SURGERY  N/A     BRAIN SURGERY      chiari    CARDIAC CATHETERIZATION      no stents    CHOLECYSTECTOMY      COLONOSCOPY      HYSTERECTOMY      PAIN PUMP INSERTION/REVISION      PAIN PUMP INSERTION/REVISION Left 9/19/2023    Procedure: PAIN PUMP and proximal catheter REVISION, left.  Supine position.;  Surgeon: Cristopher Reza MD;  Location: [facility];  Service: Neurosurgery;  Laterality: Left;    TONSILLECTOMY       Social History:  reports that she quit smoking about 11 years ago. Her smoking use included cigarettes. She started smoking about 51 years ago. She has a 20 pack-year smoking history. She has been exposed to tobacco smoke. She has never used smokeless tobacco. She reports that she does not drink alcohol and does not use drugs.    Family History: family history includes No Known Problems in her father and mother.      Allergies:  Allergies   Allergen Reactions    Bee Venom Shortness Of Breath and Swelling    Ciprofloxacin Shortness Of Breath    Nitroglycerin Anaphylaxis    Penicillins Shortness Of Breath    Sulfamethoxazole-Trimethoprim Anaphylaxis, Swelling and Angioedema    Egg-Derived Products Nausea Only, Unknown - Low Severity and GI Intolerance    Codeine Itching    Contrast Dye (Echo Or Unknown Ct/Mr) Unknown - Low Severity     Stage 3 kidney disease cannot take     Iodinated Contrast Media Unknown - Low Severity     Stage 3 kidney disease     Lactose Other (See Comments)    Latex, Natural Rubber Hives    Methenamine GI Intolerance    Nylon Hives    Oxycodone-Acetaminophen Nausea And Vomiting    Latex Rash and Other (See Comments)    Oxycodone Nausea And Vomiting     Medications:  Prior to Admission medications    Medication Sig Start Date End Date Taking? Authorizing Provider   albuterol (PROVENTIL) (2.5 MG/3ML) 0.083% nebulizer solution Take 2.5 mg by nebulization Every 4 (Four) Hours As Needed for Wheezing. 3/30/23  Yes Fabiola Morris APRN   Albuterol-Budesonide 90-80 MCG/ACT aerosol  Inhale 2 Inhalations As Needed (shortness of breath). Two inhalations as needed; maximum daily dose 12 inhalations a day. 8/12/24  Yes Fabiola Morris APRN   allopurinol (ZYLOPRIM) 100 MG tablet Take 1 tablet by mouth Daily. 12/21/22  Yes Jacquie White MD   amLODIPine (NORVASC) 5 MG tablet Take 1 tablet by mouth Daily.   Yes Jacquie White MD   Aspirin Low Dose 81 MG EC tablet Take 1 tablet by mouth Daily. 12/8/22  Yes Jacquie White MD   atorvastatin (LIPITOR) 40 MG tablet Take 1 tablet by mouth every night at bedtime. 12/22/22  Yes Jacquie Wihte MD   baclofen (LIORESAL) 10 MG/20ML injection 237.53 mcg by Intrathecal route 1 (One) Time. Pain pump   Yes Jacquie White MD   Budeson-Glycopyrrol-Formoterol (Breztri Aerosphere) 160-9-4.8 MCG/ACT aerosol inhaler Inhale 2 puffs Every 12 (Twelve) Hours.   Yes Jacquie White MD   [facility] Daily Fiber 0.52 g capsule Take 1 capsule by mouth Daily. 12/4/24  Yes Jacquie White MD   D3-50 1.25 MG (43383 UT) capsule Take 1 capsule by mouth Every 7 (Seven) Days for 84 days. 10/24/24 1/16/25 Yes Kai Richardson MD   ezetimibe (ZETIA) 10 MG tablet Take 1 tablet by mouth every night at bedtime. 12/21/22  Yes Jacquie White MD   fluticasone (FLONASE) 50 MCG/ACT nasal spray Administer 2 sprays into the nostril(s) as directed by provider Daily. 11/3/24  Yes Jacqiue White MD   folic acid (FOLVITE) 1 MG tablet Take 1 tablet by mouth Daily.   Yes Jacquie White MD   HYDROmorphone (DILAUDID) 2 MG/ML injection Infuse 1.3573 mg into a venous catheter.   Yes Jacquie White MD   ipratropium-albuterol (DUO-NEB) 0.5-2.5 mg/3 ml nebulizer Take 3 mL by nebulization Every 4 (Four) Hours As Needed for Wheezing or Shortness of Air. 5/1/23  Yes Fabiola Morris APRN   levothyroxine (SYNTHROID, LEVOTHROID) 25 MCG tablet TAKE 1 TABLET BY MOUTH BEFORE BREAKFAST. 12/21/22  Yes Provider, MD Jacquie    linaclotide (LINZESS) 145 MCG capsule capsule Take 1 capsule by mouth Daily for 90 days. 10/24/24 1/22/25 Yes Kai Richardson MD   lisinopril (PRINIVIL,ZESTRIL) 10 MG tablet Take 1 tablet by mouth Daily.   Yes Jacquie White MD   magnesium (MAGTAB) 84 MG (7MEQ) tablet controlled-release CR tablet Take 2 tablets by mouth Daily. 12/4/24  Yes Jacquie White MD   nystatin (MYCOSTATIN) 100,000 unit/mL suspension Swish and swallow 5 mL 4 (Four) Times a Day for 20 days. 11/25/24 12/15/24 Yes Kai Richardson MD   nystatin (MYCOSTATIN) 868206 UNIT/GM powder APPLY TO AFFECTED AREA EVERY DAY AND AS NEEDED 12/14/22  Yes Jacquie White MD   ondansetron ODT (ZOFRAN-ODT) 4 MG disintegrating tablet Take 1 tablet by mouth Every 6 (Six) Hours As Needed. for nausea 7/17/24  Yes Jacquie White MD   pantoprazole (PROTONIX) 20 MG EC tablet Take 1 tablet by mouth Every Morning Before Breakfast. 12/21/22  Yes Jacquie White MD   polyethylene glycol (MIRALAX) 17 g packet Take 17 g by mouth Daily As Needed. 12/4/24  Yes Jacquie White MD   potassium chloride (MICRO-K) 10 MEQ CR capsule TAKE 1 CAPSULE BY MOUTH DAILY AS NEEDED FOR LOWER EXTREMITY EDEMA, WITH AS NEEDED DOSE OF TORSEMIDE 11/4/24  Yes Fabiola Morris APRN   predniSONE (DELTASONE) 20 MG tablet Take 2 tablets by mouth Daily. 11/16/24  Yes Jacquie White MD   sennosides-docusate (senna-docusate sodium) 8.6-50 MG per tablet Take 1 tablet by mouth 2 (Two) Times a Day As Needed for Constipation. 5/11/23  Yes Fabiola Morris APRN   sertraline (Zoloft) 50 MG tablet Take 1 tablet by mouth Daily for 90 days. 11/25/24 2/23/25 Yes Kai Richardson MD   tiZANidine (ZANAFLEX) 4 MG tablet Take 1 tablet by mouth Every 12 (Twelve) Hours. 11/5/24  Yes Provider, Historical, MD   torsemide (DEMADEX) 10 MG tablet  8/8/24  Yes Provider, Historical, MD   torsemide (DEMADEX) 5 MG tablet Take 1 tablet by mouth Daily.  "11/22/24  Yes Provider, MD Jacquie   lisinopril-hydrochlorothiazide (Zestoretic) 20-12.5 MG per tablet Take 1 tablet by mouth Daily for 30 days.  Patient not taking: Reported on 12/9/2024 11/25/24 12/9/24  Kai Richardson MD           Review of systems   negative unless otherwise specified above in HPI    Objective     Vital Signs: /70 (BP Location: Left arm, Patient Position: Sitting, Cuff Size: Large Adult)   Pulse 85   Temp 98.2 °F (36.8 °C) (Infrared)   Resp 16   Ht 142.2 cm (55.98\")   Wt 70.3 kg (155 lb)   SpO2 96%   BMI 34.77 kg/m²     Physical Exam  Vitals reviewed.   Constitutional:       Appearance: She is obese.   HENT:      Head: Normocephalic and atraumatic.      Mouth/Throat:      Mouth: Mucous membranes are moist.      Pharynx: Oropharynx is clear.   Eyes:      Extraocular Movements: Extraocular movements intact.      Pupils: Pupils are equal, round, and reactive to light.   Neck:      Vascular: No carotid bruit.   Cardiovascular:      Rate and Rhythm: Normal rate and regular rhythm.   Pulmonary:      Effort: Pulmonary effort is normal.      Breath sounds: Normal breath sounds.   Abdominal:      General: Abdomen is flat.      Palpations: Abdomen is soft.   Musculoskeletal:         General: Normal range of motion.   Skin:     General: Skin is warm and dry.      Capillary Refill: Capillary refill takes less than 2 seconds.   Neurological:      General: No focal deficit present.      Mental Status: She is alert.   Psychiatric:         Mood and Affect: Mood normal.       Physical Exam               Results Reviewed:  Glucose   Date Value Ref Range Status   10/28/2024 148 (H) 70 - 99 mg/dL Final   09/20/2023 160 (H) 65 - 99 mg/dL Final     BUN   Date Value Ref Range Status   10/28/2024 19 8 - 27 mg/dL Final   09/20/2023 16 8 - 23 mg/dL Final     Creatinine   Date Value Ref Range Status   10/28/2024 1.39 (H) 0.57 - 1.00 mg/dL Final   09/20/2023 1.24 (H) 0.57 - 1.00 mg/dL Final     Sodium "   Date Value Ref Range Status   10/28/2024 145 (H) 134 - 144 mmol/L Final   09/20/2023 139 136 - 145 mmol/L Final     Potassium   Date Value Ref Range Status   10/28/2024 4.5 3.5 - 5.2 mmol/L Final   09/20/2023 4.6 3.5 - 5.2 mmol/L Final     Chloride   Date Value Ref Range Status   10/28/2024 104 96 - 106 mmol/L Final   09/20/2023 100 98 - 107 mmol/L Final     CO2   Date Value Ref Range Status   09/20/2023 31.0 (H) 22.0 - 29.0 mmol/L Final     Total CO2   Date Value Ref Range Status   10/28/2024 27 20 - 29 mmol/L Final     Calcium   Date Value Ref Range Status   10/28/2024 8.9 8.7 - 10.3 mg/dL Final   09/20/2023 8.9 8.6 - 10.5 mg/dL Final     ALT (SGPT)   Date Value Ref Range Status   10/28/2024 8 0 - 32 IU/L Final   09/20/2023 15 1 - 33 U/L Final     AST (SGOT)   Date Value Ref Range Status   10/28/2024 17 0 - 40 IU/L Final   09/20/2023 18 1 - 32 U/L Final     WBC   Date Value Ref Range Status   10/28/2024 3.8 3.4 - 10.8 x10E3/uL Final     Hematocrit   Date Value Ref Range Status   10/28/2024 38.7 34.0 - 46.6 % Final   09/20/2023 33.9 (L) 34.0 - 46.6 % Final     Platelets   Date Value Ref Range Status   10/28/2024 156 150 - 450 x10E3/uL Final   09/20/2023 144 140 - 450 10*3/mm3 Final     Triglycerides   Date Value Ref Range Status   10/28/2024 135 0 - 149 mg/dL Final     HDL Cholesterol   Date Value Ref Range Status   10/28/2024 43 >39 mg/dL Final     LDL Chol Calc (NIH)   Date Value Ref Range Status   10/28/2024 46 0 - 99 mg/dL Final     Hemoglobin A1C   Date Value Ref Range Status   09/11/2023 5.8 (H) 4.8 - 5.6 % Final     Comment:              Prediabetes: 5.7 - 6.4           Diabetes: >6.4           Glycemic control for adults with diabetes: <7.0       The following data was reviewed by: Kai Richardson MD on 12/09/2024:  Common labs          10/28/2024    09:36   Common Labs   Glucose 148    BUN 19    Creatinine 1.39    Sodium 145    Potassium 4.5    Chloride 104    Calcium 8.9    Total Protein 5.7     Albumin 3.8    Total Bilirubin 0.3    Alkaline Phosphatase 139    AST (SGOT) 17    ALT (SGPT) 8    WBC 3.8    Hemoglobin 11.7    Hematocrit 38.7    Platelets 156    Total Cholesterol 113    Triglycerides 135    HDL Cholesterol 43    LDL Cholesterol  46      CMP          10/28/2024    09:36   CMP   Glucose 148    BUN 19    Creatinine 1.39    Sodium 145    Potassium 4.5    Chloride 104    Calcium 8.9    Total Protein 5.7    Albumin 3.8    Globulin 1.9    Total Bilirubin 0.3    Alkaline Phosphatase 139    AST (SGOT) 17    ALT (SGPT) 8    BUN/Creatinine Ratio 14      CBC          10/28/2024    09:36   CBC   WBC 3.8    RBC 4.17    Hemoglobin 11.7    Hematocrit 38.7    MCV 93    MCH 28.1    MCHC 30.2    RDW 13.8    Platelets 156      Lipid Panel          10/28/2024    09:36   Lipid Panel   Total Cholesterol 113    Triglycerides 135    HDL Cholesterol 43    VLDL Cholesterol 24    LDL Cholesterol  46      TSH          10/28/2024    09:36   TSH   TSH 1.840            Results  Laboratory Studies  Liver enzymes are good. Blood count was a little bit low.        Assessment / Plan     Assessment/Plan:   Diagnosis Plan   1. Combined systolic and diastolic congestive heart failure, unspecified HF chronicity        2. History of myocardial infarction        3. Mixed hyperlipidemia        4. Primary hypertension        5. Acquired hypothyroidism        6. Overweight (BMI 25.0-29.9)        7. Prediabetes        8. Thyroid nodule        9. Vitamin B12 deficiency        10. Obstipation            Assessment & Plan  1. Constipation.  She has been experiencing severe constipation, leading to dehydration and malnutrition. Despite using MiraLAX and magnesium citrate, she did not have a bowel movement until recently. A regimen of MiraLAX mixed with Gatorade was recommended. Linzess was mentioned as a potential treatment option if insurance covers it. No additional blood work is needed as recent hospital labs were reviewed.    2.  Nausea.  Her multivitamins were stopped to see if they were causing nausea. She reports improvement in her condition after discontinuing the multivitamins.    3. Medication Management.  She will call the office if she needs any refills on her medications.  I sent in an order for linzess 146mcg daily for her to try assuming her insurance is game to pay for it.I asked them to call for specific suggestion ig  Follow-up  Return in 1 month for follow up.    4. Congestive heart failure, well compensated today      5. Hypertension well controlled    6.  GERD                                                                                                           Hypeerternsion, well controlled    No follow-ups on file. unless patient needs to be seen sooner or acute issues arise.      I have discussed the patient results/orders and and plan/recommendation with them at today's visit.      Signed by:    Dr. Kai Richardson Date: 12/09/24    EMR Dictation/Transcription disclaimer:   Some of this note may be an electronic transcription/translation of spoken language to printed text. The electronic translation of spoken language may permit erroneous, or at times, nonsensical words or phrases to be inadvertently transcribed; Although I have reviewed the note for such errors, some may still exist.      Patient or patient representative verbalized consent for the use of Ambient Listening during the visit with  Kai Richardson MD for chart documentation. 12/9/2024  13:31 CST

## 2025-01-07 DIAGNOSIS — M79.604 PAIN IN RIGHT LEG: ICD-10-CM

## 2025-01-07 DIAGNOSIS — I50.22 CHRONIC SYSTOLIC (CONGESTIVE) HEART FAILURE: ICD-10-CM

## 2025-01-08 RX ORDER — TORSEMIDE 10 MG/1
TABLET ORAL
Qty: 30 TABLET | Refills: 2 | Status: SHIPPED | OUTPATIENT
Start: 2025-01-08

## 2025-01-13 ENCOUNTER — OFFICE VISIT (OUTPATIENT)
Dept: FAMILY MEDICINE CLINIC | Facility: CLINIC | Age: 75
End: 2025-01-13
Payer: MEDICARE

## 2025-01-13 VITALS
SYSTOLIC BLOOD PRESSURE: 99 MMHG | BODY MASS INDEX: 34.33 KG/M2 | TEMPERATURE: 97.7 F | OXYGEN SATURATION: 99 % | DIASTOLIC BLOOD PRESSURE: 64 MMHG | WEIGHT: 152.6 LBS | HEIGHT: 56 IN | HEART RATE: 71 BPM

## 2025-01-13 DIAGNOSIS — D50.8 OTHER IRON DEFICIENCY ANEMIA: Chronic | ICD-10-CM

## 2025-01-13 DIAGNOSIS — R11.0 NAUSEA: Chronic | ICD-10-CM

## 2025-01-13 DIAGNOSIS — E78.2 MIXED HYPERLIPIDEMIA: Chronic | ICD-10-CM

## 2025-01-13 DIAGNOSIS — R73.03 PREDIABETES: Chronic | ICD-10-CM

## 2025-01-13 DIAGNOSIS — E66.09 CLASS 1 OBESITY DUE TO EXCESS CALORIES WITH SERIOUS COMORBIDITY AND BODY MASS INDEX (BMI) OF 30.0 TO 30.9 IN ADULT: Chronic | ICD-10-CM

## 2025-01-13 DIAGNOSIS — E66.811 CLASS 1 OBESITY DUE TO EXCESS CALORIES WITH SERIOUS COMORBIDITY AND BODY MASS INDEX (BMI) OF 30.0 TO 30.9 IN ADULT: Chronic | ICD-10-CM

## 2025-01-13 DIAGNOSIS — N18.32 STAGE 3B CHRONIC KIDNEY DISEASE: Chronic | ICD-10-CM

## 2025-01-13 DIAGNOSIS — F33.0 MAJOR DEPRESSIVE DISORDER, RECURRENT, MILD: Chronic | ICD-10-CM

## 2025-01-13 DIAGNOSIS — E03.9 ACQUIRED HYPOTHYROIDISM: Chronic | ICD-10-CM

## 2025-01-13 DIAGNOSIS — I87.2 STASIS DERMATITIS: Chronic | ICD-10-CM

## 2025-01-13 DIAGNOSIS — G47.33 OSA (OBSTRUCTIVE SLEEP APNEA): Chronic | ICD-10-CM

## 2025-01-13 DIAGNOSIS — I50.40 COMBINED SYSTOLIC AND DIASTOLIC CONGESTIVE HEART FAILURE, UNSPECIFIED HF CHRONICITY: Primary | Chronic | ICD-10-CM

## 2025-01-13 DIAGNOSIS — I10 PRIMARY HYPERTENSION: Chronic | ICD-10-CM

## 2025-01-13 PROBLEM — L03.116 CELLULITIS OF LEFT LOWER EXTREMITY: Chronic | Status: RESOLVED | Noted: 2024-10-10 | Resolved: 2025-01-13

## 2025-01-13 PROBLEM — E66.3 OVERWEIGHT (BMI 25.0-29.9): Status: RESOLVED | Noted: 2023-11-09 | Resolved: 2025-01-13

## 2025-01-13 PROBLEM — E66.812 CLASS 2 SEVERE OBESITY DUE TO EXCESS CALORIES WITH SERIOUS COMORBIDITY AND BODY MASS INDEX (BMI) OF 35.0 TO 35.9 IN ADULT: Status: RESOLVED | Noted: 2023-05-22 | Resolved: 2025-01-13

## 2025-01-13 PROBLEM — B37.0 ORAL THRUSH: Status: RESOLVED | Noted: 2024-11-25 | Resolved: 2025-01-13

## 2025-01-13 PROBLEM — E66.01 CLASS 2 SEVERE OBESITY DUE TO EXCESS CALORIES WITH SERIOUS COMORBIDITY AND BODY MASS INDEX (BMI) OF 35.0 TO 35.9 IN ADULT: Status: RESOLVED | Noted: 2023-05-22 | Resolved: 2025-01-13

## 2025-01-13 PROCEDURE — 99214 OFFICE O/P EST MOD 30 MIN: CPT | Performed by: INTERNAL MEDICINE

## 2025-01-13 PROCEDURE — 1159F MED LIST DOCD IN RCRD: CPT | Performed by: INTERNAL MEDICINE

## 2025-01-13 PROCEDURE — 1125F AMNT PAIN NOTED PAIN PRSNT: CPT | Performed by: INTERNAL MEDICINE

## 2025-01-13 PROCEDURE — G2211 COMPLEX E/M VISIT ADD ON: HCPCS | Performed by: INTERNAL MEDICINE

## 2025-01-13 PROCEDURE — 1160F RVW MEDS BY RX/DR IN RCRD: CPT | Performed by: INTERNAL MEDICINE

## 2025-01-13 PROCEDURE — 3078F DIAST BP <80 MM HG: CPT | Performed by: INTERNAL MEDICINE

## 2025-01-13 PROCEDURE — 3074F SYST BP LT 130 MM HG: CPT | Performed by: INTERNAL MEDICINE

## 2025-01-13 PROCEDURE — 36415 COLL VENOUS BLD VENIPUNCTURE: CPT | Performed by: INTERNAL MEDICINE

## 2025-01-13 RX ORDER — ONDANSETRON 8 MG/1
8 TABLET, ORALLY DISINTEGRATING ORAL EVERY 8 HOURS PRN
Qty: 27 TABLET | Refills: 3 | Status: SHIPPED | OUTPATIENT
Start: 2025-01-13 | End: 2025-02-22

## 2025-01-13 NOTE — PROGRESS NOTES
Venipuncture Blood Specimen Collection  Venipuncture performed in [facility] by Jazmyne Don MA with good hemostasis. Patient tolerated the procedure well without complications.   01/13/25   Jazmyne Don MA

## 2025-01-13 NOTE — PROGRESS NOTES
Subjective     Chief Complaint   Patient presents with    Follow-up    Memory Loss       Memory Loss    History of Present Illness  The patient is a 74-year-old female who presents for evaluation of memory loss, constipation, nausea, heartburn, and prediabetes.    She reports experiencing pain, which she has previously discussed with Dr. cedillo. Her memory loss remains stable, with no significant changes noted. She experiences visual disturbances, often perceiving objects that disappear upon closer inspection. She maintains an elevated position during sleep to facilitate mobility, as lying flat impedes her ability to rise from the bed. She experiences shortness of breath when lying flat. She recalls an episode of chest pain, described as a crushing sensation, lasting approximately 10 minutes. She reported this incident to her son, who monitored her condition until the pain subsided. She reports persistent nausea and a desire to vomit. She has been diagnosed with arthritis, which is currently in an active phase. She uses albuterol as needed, but not on a daily basis. She has been diagnosed with prediabetes and exhibits occasional symptoms suggestive of diabetes. She has a preference for sweet foods, particularly homemade cookies and cakes. She continues to experience leg swelling, although it has improved. Her appetite remains robust, and she consumed oatmeal and toast for breakfast today. Her stasis dermatitis is completely resolved    She takes Metamucil every morning with breakfast and MiraLAX. She also takes Linzess every night.    She has been taking ondansetron, which appears to be effective, and requests a refill.    MEDICATIONS  Current: Metamucil, MiraLAX, Linzess, albuterol, ondansetron      Past Medical History:   Past Medical History:   Diagnosis Date    Cancer     throat cancer, breast    CHF (congestive heart failure)     Chronic kidney disease (CKD), stage III (moderate)     Constipation      COPD (chronic obstructive pulmonary disease)     Coronary artery disease     Diverticulitis     GERD (gastroesophageal reflux disease)     History of transfusion     Hypertension     Low back pain     Lung nodule     Neck fracture     Oxygen dependent     wears 2lnc    Sleep apnea     cpap or bipap unsure which one she uses also uses oxygen with this    Stroke     left side face weakness residual    Thyroid nodule      Past Surgical History:  Past Surgical History:   Procedure Laterality Date    APPENDECTOMY      BACK SURGERY N/A     BRAIN SURGERY      chiari    CARDIAC CATHETERIZATION      no stents    CHOLECYSTECTOMY      COLONOSCOPY      HYSTERECTOMY      PAIN PUMP INSERTION/REVISION      PAIN PUMP INSERTION/REVISION Left 9/19/2023    Procedure: PAIN PUMP and proximal catheter REVISION, left.  Supine position.;  Surgeon: Cristopher Reza MD;  Location: [facility] OR;  Service: Neurosurgery;  Laterality: Left;    TONSILLECTOMY       Social History:  reports that she quit smoking about 12 years ago. Her smoking use included cigarettes. She started smoking about 52 years ago. She has a 20 pack-year smoking history. She has been exposed to tobacco smoke. She has never used smokeless tobacco. She reports that she does not drink alcohol and does not use drugs.    Family History: family history includes No Known Problems in her father and mother.      Allergies:  Allergies   Allergen Reactions    Bee Venom Shortness Of Breath and Swelling    Ciprofloxacin Shortness Of Breath and Unknown - High Severity    Nitroglycerin Anaphylaxis and Unknown - Low Severity    Penicillins Shortness Of Breath    Sulfamethoxazole-Trimethoprim Anaphylaxis, Swelling and Angioedema    Egg-Derived Products GI Intolerance, Nausea Only, Unknown - Low Severity and Unknown - High Severity    Codeine Itching and Unknown - High Severity    Contrast Dye (Echo Or Unknown Ct/Mr) Unknown - Low Severity     Stage 3 kidney disease cannot take      Iodinated Contrast Media Unknown - Low Severity     Stage 3 kidney disease     Lactose Other (See Comments)    Latex, Natural Rubber Hives    Methenamine GI Intolerance    Nylon Hives    Oxycodone-Acetaminophen Nausea And Vomiting and Unknown - High Severity    Latex Other (See Comments), Rash and Unknown - High Severity    Oxycodone Nausea And Vomiting     Medications:  Prior to Admission medications    Medication Sig Start Date End Date Taking? Authorizing Provider   albuterol (PROVENTIL) (2.5 MG/3ML) 0.083% nebulizer solution Take 2.5 mg by nebulization Every 4 (Four) Hours As Needed for Wheezing. 3/30/23   Fabiola Morris APRN   Albuterol-Budesonide 90-80 MCG/ACT aerosol Inhale 2 Inhalations As Needed (shortness of breath). Two inhalations as needed; maximum daily dose 12 inhalations a day. 8/12/24   Fabiola Morris APRN   allopurinol (ZYLOPRIM) 100 MG tablet Take 1 tablet by mouth Daily. 12/21/22   Jacquie White MD   amLODIPine (NORVASC) 5 MG tablet Take 1 tablet by mouth Daily.    Jacquie White MD   Aspirin Low Dose 81 MG EC tablet Take 1 tablet by mouth Daily. 12/8/22   Jacquie White MD   atorvastatin (LIPITOR) 40 MG tablet Take 1 tablet by mouth every night at bedtime. 12/22/22   Jacquie White MD   baclofen (LIORESAL) 10 MG/20ML injection 237.53 mcg by Intrathecal route 1 (One) Time. Pain pump    Jacquie White MD   Budeson-Glycopyrrol-Formoterol (Breztri Aerosphere) 160-9-4.8 MCG/ACT aerosol inhaler Inhale 2 puffs Every 12 (Twelve) Hours.    Jacquie White MD   [facility] Daily Fiber 0.52 g capsule Take 1 capsule by mouth Daily. 12/4/24   Jacquie White MD   D3-50 1.25 MG (11891 UT) capsule Take 1 capsule by mouth Every 7 (Seven) Days for 84 days. 10/24/24 1/16/25  Kai Richardson MD   ezetimibe (ZETIA) 10 MG tablet Take 1 tablet by mouth every night at bedtime. 12/21/22   Jacquie White MD   fluticasone (FLONASE) 50 MCG/ACT nasal  spray Administer 2 sprays into the nostril(s) as directed by provider Daily. 11/3/24   Jacquie White MD   folic acid (FOLVITE) 1 MG tablet Take 1 tablet by mouth Daily.    Jacquie White MD   HYDROmorphone (DILAUDID) 2 MG/ML injection Infuse 1.3573 mg into a venous catheter.    Jacquie White MD   ipratropium-albuterol (DUO-NEB) 0.5-2.5 mg/3 ml nebulizer Take 3 mL by nebulization Every 4 (Four) Hours As Needed for Wheezing or Shortness of Air. 5/1/23   Fabiola Morris APRN   levothyroxine (SYNTHROID, LEVOTHROID) 25 MCG tablet TAKE 1 TABLET BY MOUTH BEFORE BREAKFAST. 12/21/22   Jacquie White MD   linaclotide (LINZESS) 145 MCG capsule capsule Take 1 capsule by mouth Daily for 90 days. 10/24/24 1/22/25  Kai Richardson MD   linaclotide (Linzess) 145 MCG capsule capsule Take 1 capsule by mouth Every Morning Before Breakfast. 12/9/24   Kai Richardson MD   lisinopril (PRINIVIL,ZESTRIL) 10 MG tablet Take 1 tablet by mouth Daily.    Jacquie White MD   magnesium (MAGTAB) 84 MG (7MEQ) tablet controlled-release CR tablet Take 2 tablets by mouth Daily. 12/4/24   Jacquie White MD   nystatin (MYCOSTATIN) 246541 UNIT/GM powder APPLY TO AFFECTED AREA EVERY DAY AND AS NEEDED 12/14/22   Jacquie White MD   ondansetron ODT (ZOFRAN-ODT) 8 MG disintegrating tablet Place 1 tablet on the tongue Every 8 (Eight) Hours As Needed for Nausea for up to 40 days. 1/13/25 2/22/25  Kai Richardson MD   pantoprazole (PROTONIX) 20 MG EC tablet Take 1 tablet by mouth Every Morning Before Breakfast. 12/21/22   Jacquie White MD   polyethylene glycol (MIRALAX) 17 g packet Take 17 g by mouth Daily As Needed. 12/4/24   Jacquie White MD   potassium chloride (MICRO-K) 10 MEQ CR capsule TAKE 1 CAPSULE BY MOUTH DAILY AS NEEDED FOR LOWER EXTREMITY EDEMA, WITH AS NEEDED DOSE OF TORSEMIDE 11/4/24   Fabiola Morris APRN   predniSONE (DELTASONE) 20 MG tablet Take 2  "tablets by mouth Daily. 11/16/24   Jacquie White MD   sennosides-docusate (senna-docusate sodium) 8.6-50 MG per tablet Take 1 tablet by mouth 2 (Two) Times a Day As Needed for Constipation. 5/11/23   Fabiola Morris APRN   sertraline (Zoloft) 50 MG tablet Take 1 tablet by mouth Daily for 90 days. 11/25/24 2/23/25  Kai Richardson MD   tiZANidine (ZANAFLEX) 4 MG tablet Take 1 tablet by mouth Every 12 (Twelve) Hours. 11/5/24   Jacquie White MD   torsemide (DEMADEX) 10 MG tablet TAKE 1 TABLET BY MOUTH DAILY AS NEEDED (LOWER EXTREMITY EDEMA). 1/8/25   Kai Richardson MD   ondansetron ODT (ZOFRAN-ODT) 4 MG disintegrating tablet Take 1 tablet by mouth Every 6 (Six) Hours As Needed. for nausea 7/17/24 1/13/25  Jacquie White MD           Review of systems   negative unless otherwise specified above in HPI    Objective     Vital Signs: BP 99/64 (BP Location: Left arm, Patient Position: Sitting, Cuff Size: Large Adult)   Pulse 71   Temp 97.7 °F (36.5 °C) (Temporal)   Ht 142.2 cm (55.98\")   Wt 69.2 kg (152 lb 9.6 oz)   SpO2 99%   BMI 34.23 kg/m²     Physical Exam  Vitals reviewed.   Constitutional:       Appearance: She is obese.      Comments: She is alert, bright eyed and appears to be doing well   HENT:      Head: Normocephalic and atraumatic.      Mouth/Throat:      Mouth: Mucous membranes are moist.      Pharynx: Oropharynx is clear.   Eyes:      Extraocular Movements: Extraocular movements intact.      Pupils: Pupils are equal, round, and reactive to light.   Neck:      Vascular: No carotid bruit.   Cardiovascular:      Rate and Rhythm: Normal rate and regular rhythm.   Pulmonary:      Effort: Pulmonary effort is normal.      Breath sounds: Normal breath sounds.   Abdominal:      General: Abdomen is flat.      Palpations: Abdomen is soft.   Musculoskeletal:         General: Normal range of motion.   Skin:     General: Skin is warm and dry.      Capillary Refill: Capillary " refill takes less than 2 seconds.   Neurological:      General: No focal deficit present.      Mental Status: She is alert.   Psychiatric:         Mood and Affect: Mood normal.       Physical Exam  Lungs were auscultated.             Results Reviewed:  Glucose   Date Value Ref Range Status   10/28/2024 148 (H) 70 - 99 mg/dL Final   09/20/2023 160 (H) 65 - 99 mg/dL Final     BUN   Date Value Ref Range Status   10/28/2024 19 8 - 27 mg/dL Final   09/20/2023 16 8 - 23 mg/dL Final     Creatinine   Date Value Ref Range Status   10/28/2024 1.39 (H) 0.57 - 1.00 mg/dL Final   09/20/2023 1.24 (H) 0.57 - 1.00 mg/dL Final     Sodium   Date Value Ref Range Status   10/28/2024 145 (H) 134 - 144 mmol/L Final   09/20/2023 139 136 - 145 mmol/L Final     Potassium   Date Value Ref Range Status   10/28/2024 4.5 3.5 - 5.2 mmol/L Final   09/20/2023 4.6 3.5 - 5.2 mmol/L Final     Chloride   Date Value Ref Range Status   10/28/2024 104 96 - 106 mmol/L Final   09/20/2023 100 98 - 107 mmol/L Final     CO2   Date Value Ref Range Status   09/20/2023 31.0 (H) 22.0 - 29.0 mmol/L Final     Total CO2   Date Value Ref Range Status   10/28/2024 27 20 - 29 mmol/L Final     Calcium   Date Value Ref Range Status   10/28/2024 8.9 8.7 - 10.3 mg/dL Final   09/20/2023 8.9 8.6 - 10.5 mg/dL Final     ALT (SGPT)   Date Value Ref Range Status   10/28/2024 8 0 - 32 IU/L Final   09/20/2023 15 1 - 33 U/L Final     AST (SGOT)   Date Value Ref Range Status   10/28/2024 17 0 - 40 IU/L Final   09/20/2023 18 1 - 32 U/L Final     WBC   Date Value Ref Range Status   10/28/2024 3.8 3.4 - 10.8 x10E3/uL Final     Hematocrit   Date Value Ref Range Status   10/28/2024 38.7 34.0 - 46.6 % Final   09/20/2023 33.9 (L) 34.0 - 46.6 % Final     Platelets   Date Value Ref Range Status   10/28/2024 156 150 - 450 x10E3/uL Final   09/20/2023 144 140 - 450 10*3/mm3 Final     Triglycerides   Date Value Ref Range Status   10/28/2024 135 0 - 149 mg/dL Final     HDL Cholesterol   Date  Value Ref Range Status   10/28/2024 43 >39 mg/dL Final     LDL Chol Calc (NIH)   Date Value Ref Range Status   10/28/2024 46 0 - 99 mg/dL Final     Hemoglobin A1C   Date Value Ref Range Status   09/11/2023 5.8 (H) 4.8 - 5.6 % Final     Comment:              Prediabetes: 5.7 - 6.4           Diabetes: >6.4           Glycemic control for adults with diabetes: <7.0               Results          Assessment / Plan     Assessment/Plan:   Diagnosis Plan   1. Combined systolic and diastolic congestive heart failure, unspecified HF chronicity  Comprehensive Metabolic Panel      2. Nausea  ondansetron ODT (ZOFRAN-ODT) 8 MG disintegrating tablet      3. Other iron deficiency anemia        4. Stage 3b chronic kidney disease  CBC & Differential      5. Mixed hyperlipidemia  Lipid Panel      6. Prediabetes  Hemoglobin A1c      7. Acquired hypothyroidism  TSH      8. Primary hypertension        9. Stasis dermatitis        10. Class 1 obesity due to excess calories with serious comorbidity and body mass index (BMI) of 30.0 to 30.9 in adult        11. Major depressive disorder, recurrent, mild        12. CAIO (obstructive sleep apnea)            Assessment & Plan  1. Memory loss.  Her memory loss remains stable with no significant changes noted. She is aware of the current year and month but was initially confused about the day of the week.    2. Constipation.  She takes Metamucil with breakfast and MiraLAX. She also takes Linzess every other night.    3. Nausea.  She reports feeling nauseous frequently and prefers to vomit to relieve the sensation. Ondansetron 8 mg has been prescribed to manage her nausea.    4.  Errroroneus entry    5. Prediabetes.  She has a history of prediabetes and occasionally exhibits symptoms suggestive of diabetes. An A1c test will be conducted today to monitor her blood sugar levels.    6. Health maintenance.  A comprehensive blood workup, including a chemistry panel, cholesterol, hemoglobin A1c, blood  count, and TSH, will be conducted to ensure overall health.    Follow-up  The patient will follow up in 3 months.      Return in about 3 months (around 4/13/2025). unless patient needs to be seen sooner or acute issues arise.      I have discussed the patient results/orders and and plan/recommendation with them at today's visit.      Signed by:    Dr. Kai Richardson Date: 01/13/25    EMR Dictation/Transcription disclaimer:   Some of this note may be an electronic transcription/translation of spoken language to printed text. The electronic translation of spoken language may permit erroneous, or at times, nonsensical words or phrases to be inadvertently transcribed; Although I have reviewed the note for such errors, some may still exist.      Patient or patient representative verbalized consent for the use of Ambient Listening during the visit with  Kai Richardson MD for chart documentation. 1/13/2025  14:02 CST

## 2025-01-14 LAB
ALBUMIN SERPL-MCNC: 4.1 G/DL (ref 3.8–4.8)
ALP SERPL-CCNC: 136 IU/L (ref 44–121)
ALT SERPL-CCNC: 15 IU/L (ref 0–32)
AST SERPL-CCNC: 21 IU/L (ref 0–40)
BASOPHILS # BLD AUTO: 0 X10E3/UL (ref 0–0.2)
BASOPHILS NFR BLD AUTO: 1 %
BILIRUB SERPL-MCNC: 0.5 MG/DL (ref 0–1.2)
BUN SERPL-MCNC: 18 MG/DL (ref 8–27)
BUN/CREAT SERPL: 13 (ref 12–28)
CALCIUM SERPL-MCNC: 8.7 MG/DL (ref 8.7–10.3)
CHLORIDE SERPL-SCNC: 102 MMOL/L (ref 96–106)
CHOLEST SERPL-MCNC: 119 MG/DL (ref 100–199)
CO2 SERPL-SCNC: 25 MMOL/L (ref 20–29)
CREAT SERPL-MCNC: 1.4 MG/DL (ref 0.57–1)
EGFRCR SERPLBLD CKD-EPI 2021: 39 ML/MIN/1.73
EOSINOPHIL # BLD AUTO: 0.1 X10E3/UL (ref 0–0.4)
EOSINOPHIL NFR BLD AUTO: 2 %
ERYTHROCYTE [DISTWIDTH] IN BLOOD BY AUTOMATED COUNT: 14 % (ref 11.7–15.4)
GLOBULIN SER CALC-MCNC: 1.8 G/DL (ref 1.5–4.5)
GLUCOSE SERPL-MCNC: 111 MG/DL (ref 70–99)
HBA1C MFR BLD: 6.9 % (ref 4.8–5.6)
HCT VFR BLD AUTO: 38.8 % (ref 34–46.6)
HDLC SERPL-MCNC: 39 MG/DL
HGB BLD-MCNC: 12.5 G/DL (ref 11.1–15.9)
IMM GRANULOCYTES # BLD AUTO: 0 X10E3/UL (ref 0–0.1)
IMM GRANULOCYTES NFR BLD AUTO: 1 %
LDLC SERPL CALC-MCNC: 54 MG/DL (ref 0–99)
LYMPHOCYTES # BLD AUTO: 0.8 X10E3/UL (ref 0.7–3.1)
LYMPHOCYTES NFR BLD AUTO: 16 %
MCH RBC QN AUTO: 28.2 PG (ref 26.6–33)
MCHC RBC AUTO-ENTMCNC: 32.2 G/DL (ref 31.5–35.7)
MCV RBC AUTO: 88 FL (ref 79–97)
MONOCYTES # BLD AUTO: 0.4 X10E3/UL (ref 0.1–0.9)
MONOCYTES NFR BLD AUTO: 7 %
NEUTROPHILS # BLD AUTO: 3.8 X10E3/UL (ref 1.4–7)
NEUTROPHILS NFR BLD AUTO: 73 %
PLATELET # BLD AUTO: 131 X10E3/UL (ref 150–450)
POTASSIUM SERPL-SCNC: 4.5 MMOL/L (ref 3.5–5.2)
PROT SERPL-MCNC: 5.9 G/DL (ref 6–8.5)
RBC # BLD AUTO: 4.43 X10E6/UL (ref 3.77–5.28)
SODIUM SERPL-SCNC: 142 MMOL/L (ref 134–144)
TRIGL SERPL-MCNC: 155 MG/DL (ref 0–149)
TSH SERPL DL<=0.005 MIU/L-ACNC: 2.83 UIU/ML (ref 0.45–4.5)
VLDLC SERPL CALC-MCNC: 26 MG/DL (ref 5–40)
WBC # BLD AUTO: 5.1 X10E3/UL (ref 3.4–10.8)

## 2025-01-15 ENCOUNTER — TELEPHONE (OUTPATIENT)
Dept: FAMILY MEDICINE CLINIC | Facility: CLINIC | Age: 75
End: 2025-01-15
Payer: MEDICARE

## 2025-01-15 DIAGNOSIS — E11.65 TYPE 2 DIABETES MELLITUS WITH HYPERGLYCEMIA, WITHOUT LONG-TERM CURRENT USE OF INSULIN: ICD-10-CM

## 2025-01-15 DIAGNOSIS — N18.32 STAGE 3B CHRONIC KIDNEY DISEASE: Primary | Chronic | ICD-10-CM

## 2025-01-15 RX ORDER — DAPAGLIFLOZIN 5 MG/1
5 TABLET, FILM COATED ORAL DAILY
Qty: 90 TABLET | Refills: 3 | Status: SHIPPED | OUTPATIENT
Start: 2025-01-15

## 2025-01-15 NOTE — TELEPHONE ENCOUNTER
----- Message from Kai Richardson sent at 1/15/2025  8:47 AM CST -----  Her sugars have gone up, I think she should be on an SGLT2 inhibitor, both for her sugar  and her heart failure medications. Her cholesterol is excellent, thyroid is normal.  Kidney function is stable.blood count is normal. I am starting farxiga and an Rx has been sent in.

## 2025-01-21 ENCOUNTER — PRIOR AUTHORIZATION (OUTPATIENT)
Dept: FAMILY MEDICINE CLINIC | Facility: CLINIC | Age: 75
End: 2025-01-21
Payer: MEDICARE

## 2025-01-21 NOTE — TELEPHONE ENCOUNTER
THU ESPINOSA (Key: [id number])  PA Case ID #: [id number]  Need Help? Call us at [phone]  Outcome  Approved today by [employer] 2017  CaseId:[id number];Status:Approved;Review Type:Prior Auth;Coverage Start Date:12/22/2024;Coverage End Date:01/21/2026;  Authorization Expiration Date: 1/20/2026  Drug  Farxiga 5MG tablets  ePA   Form

## 2025-01-23 DIAGNOSIS — K59.00 OBSTIPATION: ICD-10-CM

## 2025-01-23 DIAGNOSIS — K59.03 THERAPEUTIC OPIOID INDUCED CONSTIPATION: ICD-10-CM

## 2025-01-23 DIAGNOSIS — T40.2X5A THERAPEUTIC OPIOID INDUCED CONSTIPATION: ICD-10-CM

## 2025-01-24 RX ORDER — PANTOPRAZOLE SODIUM 20 MG/1
20 TABLET, DELAYED RELEASE ORAL
Qty: 90 TABLET | Refills: 3 | Status: SHIPPED | OUTPATIENT
Start: 2025-01-24

## 2025-01-24 RX ORDER — ATORVASTATIN CALCIUM 40 MG/1
40 TABLET, FILM COATED ORAL
Qty: 90 TABLET | Refills: 3 | Status: SHIPPED | OUTPATIENT
Start: 2025-01-24

## 2025-01-24 RX ORDER — FOLIC ACID 1 MG/1
1 TABLET ORAL DAILY
Qty: 90 TABLET | Refills: 3 | Status: SHIPPED | OUTPATIENT
Start: 2025-01-24

## 2025-01-24 RX ORDER — LISINOPRIL 10 MG/1
10 TABLET ORAL DAILY
Qty: 90 TABLET | Refills: 1 | Status: SHIPPED | OUTPATIENT
Start: 2025-01-24

## 2025-02-02 DIAGNOSIS — F33.0 MAJOR DEPRESSIVE DISORDER, RECURRENT, MILD: Chronic | ICD-10-CM

## 2025-02-03 NOTE — TELEPHONE ENCOUNTER
Rx Refill Note  Requested Prescriptions     Pending Prescriptions Disp Refills    sertraline (ZOLOFT) 50 MG tablet [Pharmacy Med Name: SERTRALINE HCL 50 MG TABLET] 30 tablet 2     Sig: TAKE 1 TABLET BY MOUTH EVERY DAY      Last office visit with prescribing clinician: 1/13/2025   Last telemedicine visit with prescribing clinician: Visit date not found   Next office visit with prescribing clinician: 4/14/2025                         Would you like a call back once the refill request has been completed: [] Yes [] No    If the office needs to give you a call back, can they leave a voicemail: [] Yes [] No    Payal Worthington MA  02/03/25, 07:20 CST

## 2025-03-05 ENCOUNTER — TELEPHONE (OUTPATIENT)
Dept: FAMILY MEDICINE CLINIC | Facility: CLINIC | Age: 75
End: 2025-03-05
Payer: MEDICARE

## 2025-03-05 NOTE — TELEPHONE ENCOUNTER
Pt daughter presented in office stating pt is needing refills on Atorvastatin, Allopurinol, Linzess, Psyllium fiber supp, and Sertraline to be sent to [facility] pharmacy in [facility]. She states she is completely out of all of these.

## 2025-03-06 DIAGNOSIS — E78.2 MIXED HYPERLIPIDEMIA: Primary | ICD-10-CM

## 2025-03-06 DIAGNOSIS — F33.0 MAJOR DEPRESSIVE DISORDER, RECURRENT, MILD: Chronic | ICD-10-CM

## 2025-03-06 DIAGNOSIS — K59.03 THERAPEUTIC OPIOID INDUCED CONSTIPATION: ICD-10-CM

## 2025-03-06 DIAGNOSIS — T40.2X5A THERAPEUTIC OPIOID INDUCED CONSTIPATION: ICD-10-CM

## 2025-03-06 DIAGNOSIS — G62.9 PERIPHERAL POLYNEUROPATHY: ICD-10-CM

## 2025-03-06 DIAGNOSIS — K59.00 OBSTIPATION: ICD-10-CM

## 2025-03-06 DIAGNOSIS — M10.9 GOUT, UNSPECIFIED CAUSE, UNSPECIFIED CHRONICITY, UNSPECIFIED SITE: ICD-10-CM

## 2025-03-06 RX ORDER — POLOXAMER 188/SORBITOL/UREA
0.52 CLEANSER (ML) TOPICAL DAILY
Qty: 90 CAPSULE | Refills: 3 | Status: SHIPPED | OUTPATIENT
Start: 2025-03-06

## 2025-03-06 RX ORDER — ALLOPURINOL 100 MG/1
100 TABLET ORAL DAILY
Qty: 90 TABLET | Refills: 3 | Status: SHIPPED | OUTPATIENT
Start: 2025-03-06

## 2025-03-06 RX ORDER — ATORVASTATIN CALCIUM 40 MG/1
40 TABLET, FILM COATED ORAL
Qty: 90 TABLET | Refills: 3 | Status: SHIPPED | OUTPATIENT
Start: 2025-03-06

## 2025-03-07 ENCOUNTER — OFFICE VISIT (OUTPATIENT)
Dept: FAMILY MEDICINE CLINIC | Facility: CLINIC | Age: 75
End: 2025-03-07
Payer: MEDICARE

## 2025-03-07 VITALS — TEMPERATURE: 97.8 F | BODY MASS INDEX: 33.56 KG/M2 | OXYGEN SATURATION: 98 % | WEIGHT: 149.2 LBS | HEIGHT: 56 IN

## 2025-03-07 DIAGNOSIS — R42 POSTURAL DIZZINESS WITH PRESYNCOPE: Primary | ICD-10-CM

## 2025-03-07 DIAGNOSIS — M17.0 PRIMARY OSTEOARTHRITIS OF BOTH KNEES: ICD-10-CM

## 2025-03-07 DIAGNOSIS — R00.1 SINUS BRADYCARDIA: ICD-10-CM

## 2025-03-07 DIAGNOSIS — I50.40 COMBINED SYSTOLIC AND DIASTOLIC CONGESTIVE HEART FAILURE, UNSPECIFIED HF CHRONICITY: ICD-10-CM

## 2025-03-07 DIAGNOSIS — R55 POSTURAL DIZZINESS WITH PRESYNCOPE: Primary | ICD-10-CM

## 2025-03-07 DIAGNOSIS — I10 PRIMARY HYPERTENSION: ICD-10-CM

## 2025-03-07 RX ORDER — AZITHROMYCIN 250 MG/1
TABLET, FILM COATED ORAL
Qty: 6 TABLET | Refills: 0 | Status: SHIPPED | OUTPATIENT
Start: 2025-03-07

## 2025-03-07 RX ORDER — METHOCARBAMOL 750 MG/1
50000 TABLET ORAL
COMMUNITY
Start: 2025-02-22

## 2025-03-07 NOTE — PROGRESS NOTES
Subjective     Chief Complaint   Patient presents with    Dizziness     Started last week.     Memory Loss       Dizziness  Memory Loss    History of Present Illness  The patient is a 74-year-old female who presents for evaluation of dizziness, sinus infection, knee pain, and medication management.    She reports experiencing severe headaches accompanied by intermittent episodes of dizziness, one of which resulted in a fall yesterday. These episodes are not consistently triggered by standing but occur sporadically, lasting approximately 10 minutes. She has a history of syncope, with the most recent episode occurring yesterday, during which she lost consciousness and fell to the floor. She was able to regain her footing by pulling herself up using a chair. She has a history of hypertension and was previously on multiple antihypertensive medications. She is currently on one antihypertensive medication. She has a history of myocardial infarction and has previously worn a Holter monitor. She reports chest pain, which she attributes to her low heart rate. She has a wheelchair at home.    She also reports persistent nasal congestion and a sensation of fullness in her ears, which she believes may be due to cerumen impaction. She has been experiencing difficulty breathing and a productive cough, with expectoration only occurring during episodes of vomiting. She has a history of sinus infections and allergies. She has been using Benadryl to manage her runny nose. She has Flonase at home but discontinued its use due to epistaxis.    She is currently under the care of a pain management specialist in [place]. She has a history of receiving bi-monthly injections but discontinued this treatment due to severe pain. She reports a sensation of heaviness in her right arm, similar to carrying a backpack, followed by numbness. She manages this by moving her arm until sensation returns. She is currently on a muscle relaxant,  which she takes as needed, dividing the dose into quarters. She is also on Dilaudid and baclofen via a pain pump. She does not take any oral analgesics. She has an upcoming appointment for dental extractions and denture placement.    She reports severe knee pain due to arthritis, describing a sensation akin to having six rocks in her knee during movement. She does not take any specific medication for her arthritis but uses Tylenol Extra Strength every 8 hours for pain management. She has tried Biofreeze for her joint pain. She reports that the pain radiates up to her hip and down her leg, affecting both the bones and joints. She has noticed black discoloration in her feet, which she attributes to blood vessel changes.    She is considering receiving the measles, mumps, and rubella vaccine, as well as the shingles and RSV vaccines.    FAMILY HISTORY  Her brother has Graves' disease and a node on the thyroid.    ALLERGIES  She is allergic to PENICILLINS, BACTRIM, and CIPRO.    MEDICATIONS  Current: Dilaudid, baclofen, tizanidine, Benadryl, Flonase (discontinued)      Past Medical History:   Past Medical History:   Diagnosis Date    Cancer     throat cancer, breast    CHF (congestive heart failure)     Chronic kidney disease (CKD), stage III (moderate)     Constipation     COPD (chronic obstructive pulmonary disease)     Coronary artery disease     Diverticulitis     GERD (gastroesophageal reflux disease)     History of transfusion     Hypertension     Low back pain     Lung nodule     Neck fracture     Oxygen dependent     wears 2lnc    Sleep apnea     cpap or bipap unsure which one she uses also uses oxygen with this    Stroke     left side face weakness residual    Thyroid nodule      Past Surgical History:  Past Surgical History:   Procedure Laterality Date    APPENDECTOMY      BACK SURGERY N/A     BRAIN SURGERY      chiari    CARDIAC CATHETERIZATION      no stents    CHOLECYSTECTOMY      COLONOSCOPY       HYSTERECTOMY      PAIN PUMP INSERTION/REVISION      PAIN PUMP INSERTION/REVISION Left 9/19/2023    Procedure: PAIN PUMP and proximal catheter REVISION, left.  Supine position.;  Surgeon: Cristopher Reza MD;  Location: [facility];  Service: Neurosurgery;  Laterality: Left;    TONSILLECTOMY       Social History:  reports that she quit smoking about 12 years ago. Her smoking use included cigarettes. She started smoking about 52 years ago. She has a 20 pack-year smoking history. She has been exposed to tobacco smoke. She has never used smokeless tobacco. She reports that she does not drink alcohol and does not use drugs.    Family History: family history includes No Known Problems in her father and mother.      Allergies:  Allergies   Allergen Reactions    Bee Venom Shortness Of Breath and Swelling    Ciprofloxacin Shortness Of Breath and Unknown - High Severity    Nitroglycerin Anaphylaxis and Unknown - Low Severity    Penicillins Shortness Of Breath    Sulfamethoxazole-Trimethoprim Anaphylaxis, Swelling and Angioedema    Egg-Derived Products GI Intolerance, Nausea Only, Unknown - Low Severity and Unknown - High Severity    Codeine Itching and Unknown - High Severity    Contrast Dye (Echo Or Unknown Ct/Mr) Unknown - Low Severity     Stage 3 kidney disease cannot take     Iodinated Contrast Media Unknown - Low Severity     Stage 3 kidney disease     Lactose Other (See Comments)    Latex, Natural Rubber Hives    Methenamine GI Intolerance    Nylon Hives    Oxycodone-Acetaminophen Nausea And Vomiting and Unknown - High Severity    Latex Other (See Comments), Rash and Unknown - High Severity    Oxycodone Nausea And Vomiting     Medications:  Prior to Admission medications    Medication Sig Start Date End Date Taking? Authorizing Provider   albuterol (PROVENTIL) (2.5 MG/3ML) 0.083% nebulizer solution Take 2.5 mg by nebulization Every 4 (Four) Hours As Needed for Wheezing. 3/30/23  Yes Fabiola Morris APRN    Albuterol-Budesonide 90-80 MCG/ACT aerosol Inhale 2 Inhalations As Needed (shortness of breath). Two inhalations as needed; maximum daily dose 12 inhalations a day. 8/12/24  Yes Fabiola Morris APRN   allopurinol (ZYLOPRIM) 100 MG tablet Take 1 tablet by mouth Daily. 3/6/25  Yes Kai Richardson MD   amLODIPine (NORVASC) 5 MG tablet Take 1 tablet by mouth Daily.   Yes Jacquie White MD   Aspirin Low Dose 81 MG EC tablet Take 1 tablet by mouth Daily. 12/8/22  Yes Jacquie White MD   atorvastatin (LIPITOR) 40 MG tablet Take 1 tablet by mouth every night at bedtime. 3/6/25  Yes Kai Richardson MD   baclofen (LIORESAL) 10 MG/20ML injection 237.53 mcg by Intrathecal route 1 (One) Time. Pain pump   Yes Jacquie White MD   Budeson-Glycopyrrol-Formoterol (Breztri Aerosphere) 160-9-4.8 MCG/ACT aerosol inhaler Inhale 2 puffs Every 12 (Twelve) Hours.   Yes Jacquie White MD   [facility] Daily Fiber 0.52 g capsule Take 1 capsule by mouth Daily. 3/6/25  Yes Kai Richardson MD   D3-50 1.25 MG (23675 UT) capsule Take 1 capsule by mouth Every 7 (Seven) Days. 2/22/25  Yes Jacquie White MD   dapagliflozin (Farxiga) 5 MG tablet tablet Take 1 tablet by mouth Daily. 1/15/25  Yes Kai Richardson MD   ezetimibe (ZETIA) 10 MG tablet Take 1 tablet by mouth every night at bedtime. 12/21/22  Yes Jacquie White MD   fluticasone (FLONASE) 50 MCG/ACT nasal spray Administer 2 sprays into the nostril(s) as directed by provider Daily. 11/3/24  Yes Jacquie White MD   folic acid (FOLVITE) 1 MG tablet Take 1 tablet by mouth Daily. 1/24/25  Yes Kai Richardson MD   HYDROmorphone (DILAUDID) 2 MG/ML injection Infuse 1.3573 mg into a venous catheter.   Yes Jacquie White MD   ipratropium-albuterol (DUO-NEB) 0.5-2.5 mg/3 ml nebulizer Take 3 mL by nebulization Every 4 (Four) Hours As Needed for Wheezing or Shortness of Air. 5/1/23  Yes Fabiola Morris APRN    levothyroxine (SYNTHROID, LEVOTHROID) 25 MCG tablet TAKE 1 TABLET BY MOUTH BEFORE BREAKFAST. 12/21/22  Yes Jacquie White MD   linaclotide (Linzess) 145 MCG capsule capsule Take 1 capsule by mouth Every Morning Before Breakfast. 3/6/25  Yes Kai Richardson MD   lisinopril (PRINIVIL,ZESTRIL) 10 MG tablet Take 1 tablet by mouth Daily. 1/24/25  Yes Kai Richardson MD   magnesium (MAGTAB) 84 MG (7MEQ) tablet controlled-release CR tablet Take 2 tablets by mouth Daily. 12/4/24  Yes Jacquie White MD   nystatin (MYCOSTATIN) 460153 UNIT/GM powder APPLY TO AFFECTED AREA EVERY DAY AND AS NEEDED 12/14/22  Yes Jacquie White MD   pantoprazole (PROTONIX) 20 MG EC tablet Take 1 tablet by mouth Every Morning Before Breakfast. 1/24/25  Yes Kai Richardson MD   polyethylene glycol (MIRALAX) 17 g packet Take 17 g by mouth Daily As Needed. 12/4/24  Yes Jacquie White MD   potassium chloride (MICRO-K) 10 MEQ CR capsule TAKE 1 CAPSULE BY MOUTH DAILY AS NEEDED FOR LOWER EXTREMITY EDEMA, WITH AS NEEDED DOSE OF TORSEMIDE 11/4/24  Yes Fabiola Morris APRN   predniSONE (DELTASONE) 20 MG tablet Take 2 tablets by mouth Daily. 11/16/24  Yes Jacquie White MD   sennosides-docusate (senna-docusate sodium) 8.6-50 MG per tablet Take 1 tablet by mouth 2 (Two) Times a Day As Needed for Constipation. 5/11/23  Yes Fabiola Morris APRN   sertraline (ZOLOFT) 50 MG tablet Take 1 tablet by mouth Daily. 3/6/25  Yes Kai Richardson MD   tiZANidine (ZANAFLEX) 4 MG tablet Take 1 tablet by mouth Every 12 (Twelve) Hours. 11/5/24  Yes Jacquie White MD   torsemide (DEMADEX) 10 MG tablet TAKE 1 TABLET BY MOUTH DAILY AS NEEDED (LOWER EXTREMITY EDEMA). 1/8/25  Yes Kai Richardson MD   azithromycin (ZITHROMAX) 250 MG tablet Take 2 tablets the first day, then 1 tablet daily for 4 days. 3/7/25   Kai Richardson MD   Diclofenac Sodium (VOLTAREN) 1 % gel gel Apply 4 g topically to the  "appropriate area as directed 4 (Four) Times a Day. 4gm to each knee 3-4 times a day prn 3/7/25   Kai Richardson MD           Review of systems   negative unless otherwise specified above in HPI    Objective     Vital Signs: Temp 97.8 °F (36.6 °C) (Infrared)   Ht 142.2 cm (55.98\")   Wt 67.7 kg (149 lb 3.2 oz)   SpO2 98%   BMI 33.47 kg/m²     Physical Exam  Vitals reviewed.   Constitutional:       Appearance: She is obese.      Comments: She is alert, bright eyed and appears to be doing well   HENT:      Head: Normocephalic and atraumatic.      Mouth/Throat:      Mouth: Mucous membranes are moist.      Pharynx: Oropharynx is clear.   Eyes:      Extraocular Movements: Extraocular movements intact.      Pupils: Pupils are equal, round, and reactive to light.   Neck:      Vascular: No carotid bruit.   Cardiovascular:      Rate and Rhythm: Normal rate and regular rhythm.   Pulmonary:      Effort: Pulmonary effort is normal.      Breath sounds: Normal breath sounds.   Abdominal:      General: Abdomen is flat.      Palpations: Abdomen is soft.   Musculoskeletal:         General: Normal range of motion.   Skin:     General: Skin is warm and dry.      Capillary Refill: Capillary refill takes less than 2 seconds.   Neurological:      General: No focal deficit present.      Mental Status: She is alert.   Psychiatric:         Mood and Affect: Mood normal.       Physical Exam  Ear exam was performed.             Results Reviewed:  Glucose   Date Value Ref Range Status   01/13/2025 111 (H) 70 - 99 mg/dL Final   09/20/2023 160 (H) 65 - 99 mg/dL Final     BUN   Date Value Ref Range Status   01/13/2025 18 8 - 27 mg/dL Final   09/20/2023 16 8 - 23 mg/dL Final     Creatinine   Date Value Ref Range Status   01/13/2025 1.40 (H) 0.57 - 1.00 mg/dL Final   09/20/2023 1.24 (H) 0.57 - 1.00 mg/dL Final     Sodium   Date Value Ref Range Status   01/13/2025 142 134 - 144 mmol/L Final   09/20/2023 139 136 - 145 mmol/L Final     Potassium "   Date Value Ref Range Status   01/13/2025 4.5 3.5 - 5.2 mmol/L Final   09/20/2023 4.6 3.5 - 5.2 mmol/L Final     Chloride   Date Value Ref Range Status   01/13/2025 102 96 - 106 mmol/L Final   09/20/2023 100 98 - 107 mmol/L Final     CO2   Date Value Ref Range Status   09/20/2023 31.0 (H) 22.0 - 29.0 mmol/L Final     Total CO2   Date Value Ref Range Status   01/13/2025 25 20 - 29 mmol/L Final     Calcium   Date Value Ref Range Status   01/13/2025 8.7 8.7 - 10.3 mg/dL Final   09/20/2023 8.9 8.6 - 10.5 mg/dL Final     ALT (SGPT)   Date Value Ref Range Status   01/13/2025 15 0 - 32 IU/L Final   09/20/2023 15 1 - 33 U/L Final     AST (SGOT)   Date Value Ref Range Status   01/13/2025 21 0 - 40 IU/L Final   09/20/2023 18 1 - 32 U/L Final     WBC   Date Value Ref Range Status   01/13/2025 5.1 3.4 - 10.8 x10E3/uL Final     Hematocrit   Date Value Ref Range Status   01/13/2025 38.8 34.0 - 46.6 % Final   09/20/2023 33.9 (L) 34.0 - 46.6 % Final     Platelets   Date Value Ref Range Status   01/13/2025 131 (L) 150 - 450 x10E3/uL Final   09/20/2023 144 140 - 450 10*3/mm3 Final     Triglycerides   Date Value Ref Range Status   01/13/2025 155 (H) 0 - 149 mg/dL Final     HDL Cholesterol   Date Value Ref Range Status   01/13/2025 39 (L) >39 mg/dL Final     LDL Chol Calc (NIH)   Date Value Ref Range Status   01/13/2025 54 0 - 99 mg/dL Final     Hemoglobin A1C   Date Value Ref Range Status   01/13/2025 6.9 (H) 4.8 - 5.6 % Final     Comment:              Prediabetes: 5.7 - 6.4           Diabetes: >6.4           Glycemic control for adults with diabetes: <7.0               Results          Assessment / Plan     Assessment/Plan:   Diagnosis Plan   1. Postural dizziness with presyncope  Holter monitor - 24 hour    azithromycin (ZITHROMAX) 250 MG tablet      2. Primary osteoarthritis of both knees  Diclofenac Sodium (VOLTAREN) 1 % gel gel      3. Primary hypertension        4. Sinus bradycardia        5. Combined systolic and diastolic  congestive heart failure, unspecified HF chronicity            Assessment & Plan  1. Dizziness.  Her blood pressure readings have shown a slight decrease, but her pulse rate remains stable. She reports episodes of dizziness and a recent fall. A Holter monitor will be arranged to assess her cardiac function over a 24-hour period. She is advised to exercise caution and utilize her mobility aids such as a walker or cane during these episodes.    2. Sinus Infection.  She reports a runny nose and stuffy ears, suggesting a sinus infection. Azithromycin will be prescribed, with a dosage of two tablets on the first day, followed by one tablet daily until completion. She is also advised to use Flonase nasal spray, despite previous nosebleeds, to help alleviate symptoms.    3. Chest Congestion.  She reports chest congestion and difficulty bringing up mucus. Azithromycin will be prescribed to address any potential underlying infection contributing to these symptoms.    4. Knee Pain.  She reports severe knee pain and a sensation of rocks in her knee. She is advised to apply Voltaren gel to her knees two to three times daily to help manage the pain. She is also advised to continue taking Tylenol as needed.    5. Medication Management.  She is currently on a Dilaudid pain pump and takes tizanidine for muscle relaxation. She is advised to continue her current medication regimen.    Follow-up  The patient will follow up in 2 weeks.      Return in about 2 weeks (around 3/21/2025). unless patient needs to be seen sooner or acute issues arise.      I have discussed the patient results/orders and and plan/recommendation with them at today's visit.      Signed by:    Dr. Kai Richardson Date: 03/07/25    EMR Dictation/Transcription disclaimer:   Some of this note may be an electronic transcription/translation of spoken language to printed text. The electronic translation of spoken language may permit erroneous, or at times, nonsensical  words or phrases to be inadvertently transcribed; Although I have reviewed the note for such errors, some may still exist.      Patient or patient representative verbalized consent for the use of Ambient Listening during the visit with  Kai Richardson MD for chart documentation. 3/7/2025  14:13 CST

## 2025-03-21 ENCOUNTER — OFFICE VISIT (OUTPATIENT)
Dept: FAMILY MEDICINE CLINIC | Facility: CLINIC | Age: 75
End: 2025-03-21
Payer: MEDICARE

## 2025-03-21 VITALS
HEIGHT: 55 IN | TEMPERATURE: 98 F | DIASTOLIC BLOOD PRESSURE: 73 MMHG | BODY MASS INDEX: 33.79 KG/M2 | HEART RATE: 75 BPM | WEIGHT: 146 LBS | SYSTOLIC BLOOD PRESSURE: 139 MMHG | RESPIRATION RATE: 18 BRPM

## 2025-03-21 DIAGNOSIS — Z23 NEED FOR TETANUS BOOSTER: ICD-10-CM

## 2025-03-21 DIAGNOSIS — I50.40 COMBINED SYSTOLIC AND DIASTOLIC CONGESTIVE HEART FAILURE, UNSPECIFIED HF CHRONICITY: ICD-10-CM

## 2025-03-21 DIAGNOSIS — R73.03 PREDIABETES: ICD-10-CM

## 2025-03-21 DIAGNOSIS — Z12.2 SCREENING FOR LUNG CANCER: Primary | ICD-10-CM

## 2025-03-21 DIAGNOSIS — N18.32 STAGE 3B CHRONIC KIDNEY DISEASE: Chronic | ICD-10-CM

## 2025-03-21 DIAGNOSIS — Z87.891 FORMER SMOKER: ICD-10-CM

## 2025-03-21 DIAGNOSIS — Z87.891 PERSONAL HISTORY OF NICOTINE DEPENDENCE: ICD-10-CM

## 2025-03-21 DIAGNOSIS — E66.09 CLASS 1 OBESITY DUE TO EXCESS CALORIES WITH SERIOUS COMORBIDITY AND BODY MASS INDEX (BMI) OF 30.0 TO 30.9 IN ADULT: Chronic | ICD-10-CM

## 2025-03-21 DIAGNOSIS — E03.9 ACQUIRED HYPOTHYROIDISM: ICD-10-CM

## 2025-03-21 DIAGNOSIS — I10 PRIMARY HYPERTENSION: ICD-10-CM

## 2025-03-21 DIAGNOSIS — E66.811 CLASS 1 OBESITY DUE TO EXCESS CALORIES WITH SERIOUS COMORBIDITY AND BODY MASS INDEX (BMI) OF 30.0 TO 30.9 IN ADULT: Chronic | ICD-10-CM

## 2025-03-21 DIAGNOSIS — J42 CHRONIC BRONCHITIS, UNSPECIFIED CHRONIC BRONCHITIS TYPE: Chronic | ICD-10-CM

## 2025-03-21 DIAGNOSIS — Z23 NEED FOR PNEUMOCOCCAL 20-VALENT CONJUGATE VACCINATION: ICD-10-CM

## 2025-03-21 NOTE — LETTER
[facility]  Vaccine Consent Form    Patient Name:  Christine Steven  Patient :  1950     Vaccine(s) Ordered    Pneumococcal Conjugate Vaccine 20-Valent (PCV20)  Tdap Vaccine => 8yo IM (BOOSTRIX/ADACEL)        Screening Checklist  The following questions should be completed prior to vaccination. If you answer “yes” to any question, it does not necessarily mean you should not be vaccinated. It just means we may need to clarify or ask more questions. If a question is unclear, please ask your healthcare provider to explain it.    Yes No   Any fever or moderate to severe illness today (mild illness and/or antibiotic treatment are not contraindications)?     Do you have a history of a serious reaction to any previous vaccinations, such as anaphylaxis, encephalopathy within 7 days, Guillain-Edmore syndrome within 6 weeks, seizure?     Have you received any live vaccine(s) (e.g MMR, CARLOS) or any other vaccines in the last month (to ensure duplicate doses aren't given)?     Do you have an anaphylactic allergy to latex (DTaP, DTaP-IPV, Hep A, Hep B, MenB, RV, Td, Tdap), baker’s yeast (Hep B, HPV), polysorbates (RSV, nirsevimab, PCV 20, Rotavirrus, Tdap, Shingrix), or gelatin (CARLOS, MMR)?     Do you have an anaphylactic allergy to neomycin (Rabies, CARLOS, MMR, IPV, Hep A), polymyxin B (IPV), or streptomycin (IPV)?      Any cancer, leukemia, AIDS, or other immune system disorder? (CARLOS, MMR, RV)     Do you have a parent, brother, or sister with an immune system problem (if immune competence of vaccine recipient clinically verified, can proceed)? (MMR, CARLOS)     Any recent steroid treatments for >2 weeks, chemotherapy, or radiation treatment? (CARLOS, MMR)     Have you received antibody-containing blood transfusions or IVIG in the past 11 months (recommended interval is dependent on product)? (MMR, CARLOS)     Have you taken antiviral drugs (acyclovir, famciclovir, valacyclovir for CARLOS) in the last 24 or 48 hours, respectively?     "  Are you pregnant or planning to become pregnant within 1 month? (CARLOS, MMR, HPV, IPV, MenB, Abrexvy; For Hep B- refer to Engerix-B; For RSV - Abrysvo is indicated for 32-36 weeks of pregnancy from September to January)     For infants, have you ever been told your child has had intussusception or a medical emergency involving obstruction of the intestine (Rotavirus)? If not for an infant, can skip this question.         *Ordering Physicians/APC should be consulted if \"yes\" is checked by the patient or guardian above.  I have received, read, and understand the Vaccine Information Statement (VIS) for each vaccine ordered.  I have considered my or my child's health status as well as the health status of my close contacts.  I have taken the opportunity to discuss my vaccine questions with my or my child's health care provider.   I have requested that the ordered vaccine(s) be given to me or my child.  I understand the benefits and risks of the vaccines.  I understand that I should remain in the clinic for 15 minutes after receiving the vaccine(s).  _________________________________________________________  Signature of Patient or Parent/Legal Guardian ____________________  Date     "

## 2025-03-21 NOTE — PROGRESS NOTES
Subjective     Chief Complaint   Patient presents with    Follow-up       History of Present Illness  History of Present Illness  The patient presents for evaluation of dizziness, sleep apnea, and chronic lung disease.    She reports experiencing episodes of dizziness, which she manages by sitting down to prevent falls. These episodes have been less severe recently. She has not been contacted regarding the Holter monitor. She also experiences lightheadedness, including an episode just prior to this visit. She does not believe her current medications are contributing to her dizziness. She is currently on amlodipine and pain medication.    She has a history of smoking, which she believes has negatively impacted her respiratory function. She quit smoking 10 years ago after a 20-year habit. She uses oxygen during sleep and has a hospital bed at home. She has a history of annual pneumonia infections. She has a known allergy to EGGS.    She has a diagnosis of sleep apnea but struggles to keep the mask on during sleep. She has a sleep apnea machine at home.    She has a history of mild osteoporosis, which has been present for several years. She was previously treated with ACTH for 2 years, which resulted in significant damage and required wheelchair use for over a year.    She has a history of gout, diagnosed 10 years ago, and is currently on allopurinol 100 mg daily.    She has a history of prediabetes, with blood sugar levels increasing when on prednisone.    She has a history of ear problems, including chronic shedding in the ears. Her ears were recently cleaned in the hospital.    Supplemental Information  She has a nodule on her thyroid and lung, which are being monitored for growth. She has a history of easy bruising. She has a history of psoriasis. She has completed her course of antibiotics. She takes tizanidine as needed, which she finds beneficial. She takes sertraline at night to aid sleep. She was  previously on prednisone but discontinued it due to the development of nodules and associated mood changes.    SOCIAL HISTORY  The patient was a smoker for 20 years and quit more than 10 years ago.    ALLERGIES  The patient is allergic to EGGS.    MEDICATIONS  Current: Amlodipine, allopurinol, baby aspirin, Lipitor, baclofen, tizanidine, sertraline, Farxiga, vitamin D.  Discontinued: Prednisone.      Past Medical History:   Past Medical History:   Diagnosis Date    Cancer     throat cancer, breast    CHF (congestive heart failure)     Chronic kidney disease (CKD), stage III (moderate)     Constipation     COPD (chronic obstructive pulmonary disease)     Coronary artery disease     Diverticulitis     GERD (gastroesophageal reflux disease)     History of transfusion     Hypertension     Low back pain     Lung nodule     Neck fracture     Oxygen dependent     wears 2lnc    Sleep apnea     cpap or bipap unsure which one she uses also uses oxygen with this    Stroke     left side face weakness residual    Thyroid nodule      Past Surgical History:  Past Surgical History:   Procedure Laterality Date    APPENDECTOMY      BACK SURGERY N/A     BRAIN SURGERY      chiari    CARDIAC CATHETERIZATION      no stents    CHOLECYSTECTOMY      COLONOSCOPY      HYSTERECTOMY      PAIN PUMP INSERTION/REVISION      PAIN PUMP INSERTION/REVISION Left 09/19/2023    Procedure: PAIN PUMP and proximal catheter REVISION, left.  Supine position.;  Surgeon: Cristopher Reza MD;  Location: [facility] OR;  Service: Neurosurgery;  Laterality: Left;    TONSILLECTOMY      TOOTH EXTRACTION       Social History:  reports that she quit smoking about 12 years ago. Her smoking use included cigarettes. She started smoking about 52 years ago. She has a 20 pack-year smoking history. She has been exposed to tobacco smoke. She has never used smokeless tobacco. She reports that she does not drink alcohol and does not use drugs.    Family History: family  history includes No Known Problems in her father and mother.      Allergies:  Allergies   Allergen Reactions    Bee Venom Shortness Of Breath and Swelling    Ciprofloxacin Shortness Of Breath and Unknown - High Severity    Nitroglycerin Anaphylaxis and Unknown - Low Severity    Penicillins Shortness Of Breath    Sulfamethoxazole-Trimethoprim Anaphylaxis, Swelling and Angioedema    Egg-Derived Products GI Intolerance, Nausea Only, Unknown - Low Severity and Unknown - High Severity    Codeine Itching and Unknown - High Severity    Contrast Dye (Echo Or Unknown Ct/Mr) Unknown - Low Severity     Stage 3 kidney disease cannot take     Iodinated Contrast Media Unknown - Low Severity     Stage 3 kidney disease     Lactose Other (See Comments)    Latex, Natural Rubber Hives    Methenamine GI Intolerance    Nylon Hives    Oxycodone-Acetaminophen Nausea And Vomiting and Unknown - High Severity    Latex Other (See Comments), Rash and Unknown - High Severity    Oxycodone Nausea And Vomiting     Medications:  Prior to Admission medications    Medication Sig Start Date End Date Taking? Authorizing Provider   albuterol (PROVENTIL) (2.5 MG/3ML) 0.083% nebulizer solution Take 2.5 mg by nebulization Every 4 (Four) Hours As Needed for Wheezing. 3/30/23  Yes Fabiola Morris APRN   Albuterol-Budesonide 90-80 MCG/ACT aerosol Inhale 2 Inhalations As Needed (shortness of breath). Two inhalations as needed; maximum daily dose 12 inhalations a day. 8/12/24  Yes Fabiola Morris APRN   amLODIPine (NORVASC) 5 MG tablet Take 1 tablet by mouth Daily.   Yes Jacquie White MD   Aspirin Low Dose 81 MG EC tablet Take 1 tablet by mouth Daily. 12/8/22  Yes Jacquie White MD   atorvastatin (LIPITOR) 40 MG tablet Take 1 tablet by mouth every night at bedtime. 3/6/25  Yes Kai Richardson MD   baclofen (LIORESAL) 10 MG/20ML injection 237.53 mcg by Intrathecal route 1 (One) Time. Pain pump   Yes Jacquie White MD    Budeson-Glycopyrrol-Formoterol (Breztri Aerosphere) 160-9-4.8 MCG/ACT aerosol inhaler Inhale 2 puffs Every 12 (Twelve) Hours.   Yes Jacquie White MD   [facility] Daily Fiber 0.52 g capsule Take 1 capsule by mouth Daily. 3/6/25  Yes Kai Richardson MD   D3-50 1.25 MG (27247 UT) capsule Take 1 capsule by mouth Every 7 (Seven) Days. 2/22/25  Yes Jacquie White MD   dapagliflozin (Farxiga) 5 MG tablet tablet Take 1 tablet by mouth Daily. 1/15/25  Yes Kai Richardson MD   Diclofenac Sodium (VOLTAREN) 1 % gel gel Apply 4 g topically to the appropriate area as directed 4 (Four) Times a Day. 4gm to each knee 3-4 times a day prn 3/7/25  Yes Kai Richardson MD   ezetimibe (ZETIA) 10 MG tablet Take 1 tablet by mouth every night at bedtime. 12/21/22  Yes Jacquie White MD   fluticasone (FLONASE) 50 MCG/ACT nasal spray Administer 2 sprays into the nostril(s) as directed by provider Daily. 11/3/24  Yes Jacquie White MD   folic acid (FOLVITE) 1 MG tablet Take 1 tablet by mouth Daily. 1/24/25  Yes Kai Richardson MD   HYDROmorphone (DILAUDID) 2 MG/ML injection Infuse 1.3573 mg into a venous catheter.   Yes Jacquie White MD   ipratropium-albuterol (DUO-NEB) 0.5-2.5 mg/3 ml nebulizer Take 3 mL by nebulization Every 4 (Four) Hours As Needed for Wheezing or Shortness of Air. 5/1/23  Yes Fabiola Morris APRN   levothyroxine (SYNTHROID, LEVOTHROID) 25 MCG tablet TAKE 1 TABLET BY MOUTH BEFORE BREAKFAST. 12/21/22  Yes Jacquie White MD   linaclotide (Linzess) 145 MCG capsule capsule Take 1 capsule by mouth Every Morning Before Breakfast. 3/6/25  Yes Kai Richardson MD   lisinopril (PRINIVIL,ZESTRIL) 10 MG tablet Take 1 tablet by mouth Daily. 1/24/25  Yes Kai Richardson MD   magnesium (MAGTAB) 84 MG (7MEQ) tablet controlled-release CR tablet Take 2 tablets by mouth Daily. 12/4/24  Yes Provider, MD Jacquie   nystatin (MYCOSTATIN) 286911 UNIT/GM powder APPLY TO  "AFFECTED AREA EVERY DAY AND AS NEEDED 12/14/22  Yes Jacquie White MD   pantoprazole (PROTONIX) 20 MG EC tablet Take 1 tablet by mouth Every Morning Before Breakfast. 1/24/25  Yes Kai Richardson MD   polyethylene glycol (MIRALAX) 17 g packet Take 17 g by mouth Daily As Needed. 12/4/24  Yes Jacquie White MD   potassium chloride (MICRO-K) 10 MEQ CR capsule TAKE 1 CAPSULE BY MOUTH DAILY AS NEEDED FOR LOWER EXTREMITY EDEMA, WITH AS NEEDED DOSE OF TORSEMIDE 11/4/24  Yes Fabiola Morris APRN   sennosides-docusate (senna-docusate sodium) 8.6-50 MG per tablet Take 1 tablet by mouth 2 (Two) Times a Day As Needed for Constipation. 5/11/23  Yes Fabiola Morris APRN   sertraline (ZOLOFT) 50 MG tablet Take 1 tablet by mouth Daily. 3/6/25  Yes Kai Richardson MD   tiZANidine (ZANAFLEX) 4 MG tablet Take 1 tablet by mouth Every 12 (Twelve) Hours. 11/5/24  Yes Jacquie White MD   allopurinol (ZYLOPRIM) 100 MG tablet Take 1 tablet by mouth Daily. 3/6/25 3/21/25 Yes Kai Richardson MD   azithromycin (ZITHROMAX) 250 MG tablet Take 2 tablets the first day, then 1 tablet daily for 4 days. 3/7/25 3/21/25 Yes Kai Richardson MD   predniSONE (DELTASONE) 20 MG tablet Take 2 tablets by mouth Daily. 11/16/24 3/21/25 Yes Jacquie White MD   torsemide (DEMADEX) 10 MG tablet TAKE 1 TABLET BY MOUTH DAILY AS NEEDED (LOWER EXTREMITY EDEMA). 1/8/25 3/21/25 Yes Kai Richardson MD           Review of systems   negative unless otherwise specified above in HPI    Objective     Vital Signs: /73 (BP Location: Left arm, Patient Position: Sitting, Cuff Size: Adult)   Pulse 75   Temp 98 °F (36.7 °C) (Infrared)   Resp 18   Ht 139.7 cm (55\")   Wt 66.2 kg (146 lb)   BMI 33.93 kg/m²     Physical Exam  Vitals reviewed.   Constitutional:       Appearance: She is obese.      Comments: She is alert, bright eyed and appears to be doing well   HENT:      Head: Normocephalic and " atraumatic.      Mouth/Throat:      Mouth: Mucous membranes are moist.      Pharynx: Oropharynx is clear.   Eyes:      Extraocular Movements: Extraocular movements intact.      Pupils: Pupils are equal, round, and reactive to light.   Neck:      Vascular: No carotid bruit.   Cardiovascular:      Rate and Rhythm: Normal rate and regular rhythm.   Pulmonary:      Effort: Pulmonary effort is normal.      Breath sounds: Normal breath sounds.   Abdominal:      General: Abdomen is flat.      Palpations: Abdomen is soft.   Musculoskeletal:         General: Normal range of motion.   Skin:     General: Skin is warm and dry.      Capillary Refill: Capillary refill takes less than 2 seconds.   Neurological:      General: No focal deficit present.      Mental Status: She is alert.   Psychiatric:         Mood and Affect: Mood normal.       Physical Exam  Ears appear normal with no signs of infection or fluid.  Lungs were auscultated.             Results Reviewed:  Glucose   Date Value Ref Range Status   01/13/2025 111 (H) 70 - 99 mg/dL Final   09/20/2023 160 (H) 65 - 99 mg/dL Final     BUN   Date Value Ref Range Status   01/13/2025 18 8 - 27 mg/dL Final   09/20/2023 16 8 - 23 mg/dL Final     Creatinine   Date Value Ref Range Status   01/13/2025 1.40 (H) 0.57 - 1.00 mg/dL Final   09/20/2023 1.24 (H) 0.57 - 1.00 mg/dL Final     Sodium   Date Value Ref Range Status   01/13/2025 142 134 - 144 mmol/L Final   09/20/2023 139 136 - 145 mmol/L Final     Potassium   Date Value Ref Range Status   01/13/2025 4.5 3.5 - 5.2 mmol/L Final   09/20/2023 4.6 3.5 - 5.2 mmol/L Final     Chloride   Date Value Ref Range Status   01/13/2025 102 96 - 106 mmol/L Final   09/20/2023 100 98 - 107 mmol/L Final     CO2   Date Value Ref Range Status   09/20/2023 31.0 (H) 22.0 - 29.0 mmol/L Final     Total CO2   Date Value Ref Range Status   01/13/2025 25 20 - 29 mmol/L Final     Calcium   Date Value Ref Range Status   01/13/2025 8.7 8.7 - 10.3 mg/dL Final    09/20/2023 8.9 8.6 - 10.5 mg/dL Final     ALT (SGPT)   Date Value Ref Range Status   01/13/2025 15 0 - 32 IU/L Final   09/20/2023 15 1 - 33 U/L Final     AST (SGOT)   Date Value Ref Range Status   01/13/2025 21 0 - 40 IU/L Final   09/20/2023 18 1 - 32 U/L Final     WBC   Date Value Ref Range Status   01/13/2025 5.1 3.4 - 10.8 x10E3/uL Final     Hematocrit   Date Value Ref Range Status   01/13/2025 38.8 34.0 - 46.6 % Final   09/20/2023 33.9 (L) 34.0 - 46.6 % Final     Platelets   Date Value Ref Range Status   01/13/2025 131 (L) 150 - 450 x10E3/uL Final   09/20/2023 144 140 - 450 10*3/mm3 Final     Triglycerides   Date Value Ref Range Status   01/13/2025 155 (H) 0 - 149 mg/dL Final     HDL Cholesterol   Date Value Ref Range Status   01/13/2025 39 (L) >39 mg/dL Final     LDL Chol Calc (NIH)   Date Value Ref Range Status   01/13/2025 54 0 - 99 mg/dL Final     Hemoglobin A1C   Date Value Ref Range Status   01/13/2025 6.9 (H) 4.8 - 5.6 % Final     Comment:              Prediabetes: 5.7 - 6.4           Diabetes: >6.4           Glycemic control for adults with diabetes: <7.0               Results          Assessment / Plan     Assessment/Plan:   Diagnosis Plan   1. Screening for lung cancer   CT Chest Low Dose Cancer Screening WO      2. Personal history of nicotine dependence   CT Chest Low Dose Cancer Screening WO      3. Need for pneumococcal 20-valent conjugate vaccination  Pneumococcal Conjugate Vaccine 20-Valent (PCV20)      4. Need for tetanus booster  Tdap Vaccine => 6yo IM (BOOSTRIX/ADACEL)      5. Prediabetes  Microalbumin / Creatinine Urine Ratio - Urine, Clean Catch      6. Combined systolic and diastolic congestive heart failure, unspecified HF chronicity        7. Primary hypertension        8. Acquired hypothyroidism        9. Class 1 obesity due to excess calories with serious comorbidity and body mass index (BMI) of 30.0 to 30.9 in adult        10. Stage 3b chronic kidney disease        11. Chronic  bronchitis, unspecified chronic bronchitis type        12. Former smoker            Assessment & Plan  1. Dizziness.  Her dizziness could be attributed to her antihypertensive medication, amlodipine, or her analgesics. Her blood pressure is not significantly low. She is advised to exercise caution when changing positions and moving up and down.    2. Chronic lung disease.  She has a history of smoking for 20 years and quit more than 10 years ago. Her oxygen levels drop significantly when she walks around. A low-dose radiation chest CT scan will be conducted annually to screen for lung cancer. The pneumococcal vaccine is recommended due to her chronic lung disease.    3. Sleep apnea.  She has been diagnosed with sleep apnea but does not consistently use her CPAP machine. She is advised to try to use the CPAP machine more regularly to improve her sleep quality and overall health.    4. Mild osteoporosis.  She has a known history of mild osteoporosis. A bone density test will not be conducted at this time.    5. Gout.  She was diagnosed with gout 10 years ago and is currently taking allopurinol 100 mg daily. It is recommended to discontinue allopurinol as it may not be necessary.    6. Prediabetes.  She has a history of borderline diabetes, which worsens with steroid use. A urine sample will be collected today for further analysis.    7. Ear issues.  She has a history of ear problems, including chronic shedding in the ears. Her ears were recently cleaned in the hospital.    Follow-up  The patient will follow up in 3 weeks.      Return for  April 14th as scheduled, Next scheduled follow up. unless patient needs to be seen sooner or acute issues arise.      I have discussed the patient results/orders and and plan/recommendation with them at today's visit.      Signed by:    Dr. Kai Richardson Date: 03/21/25    EMR Dictation/Transcription disclaimer:   Some of this note may be an electronic transcription/translation of  spoken language to printed text. The electronic translation of spoken language may permit erroneous, or at times, nonsensical words or phrases to be inadvertently transcribed; Although I have reviewed the note for such errors, some may still exist.      Patient or patient representative verbalized consent for the use of Ambient Listening during the visit with  Kai Richardson MD for chart documentation. 3/21/2025  10:37 CDT

## 2025-03-22 LAB
ALBUMIN/CREAT UR: <5 MG/G CREAT (ref 0–29)
CREAT UR-MCNC: 55 MG/DL
MICROALBUMIN UR-MCNC: <3 UG/ML

## 2025-04-02 DIAGNOSIS — I50.22 CHRONIC SYSTOLIC (CONGESTIVE) HEART FAILURE: ICD-10-CM

## 2025-04-02 DIAGNOSIS — M79.604 PAIN IN RIGHT LEG: ICD-10-CM

## 2025-04-02 NOTE — TELEPHONE ENCOUNTER
Requested Prescriptions     Pending Prescriptions Disp Refills    torsemide (DEMADEX) 10 MG tablet [Pharmacy Med Name: TORSEMIDE 10 MG TABLET] 30 tablet 2     Sig: TAKE 1 TABLET BY MOUTH DAILY AS NEEDED (LOWER EXTREMITY EDEMA).

## 2025-04-03 RX ORDER — TORSEMIDE 10 MG/1
TABLET ORAL
Qty: 30 TABLET | Refills: 2 | Status: SHIPPED | OUTPATIENT
Start: 2025-04-03

## 2025-04-14 ENCOUNTER — TELEPHONE (OUTPATIENT)
Dept: FAMILY MEDICINE CLINIC | Facility: CLINIC | Age: 75
End: 2025-04-14

## 2025-04-30 ENCOUNTER — OFFICE VISIT (OUTPATIENT)
Dept: FAMILY MEDICINE CLINIC | Facility: CLINIC | Age: 75
End: 2025-04-30
Payer: MEDICARE

## 2025-04-30 VITALS
DIASTOLIC BLOOD PRESSURE: 73 MMHG | HEART RATE: 67 BPM | HEIGHT: 55 IN | OXYGEN SATURATION: 94 % | BODY MASS INDEX: 34.16 KG/M2 | TEMPERATURE: 98.4 F | WEIGHT: 147.6 LBS | SYSTOLIC BLOOD PRESSURE: 114 MMHG

## 2025-04-30 DIAGNOSIS — R55 SYNCOPE AND COLLAPSE: Primary | ICD-10-CM

## 2025-04-30 DIAGNOSIS — E53.8 FOLATE DEFICIENCY: Chronic | ICD-10-CM

## 2025-04-30 DIAGNOSIS — B35.4 TINEA CORPORIS: ICD-10-CM

## 2025-04-30 DIAGNOSIS — M79.604 PAIN AND SWELLING OF LOWER EXTREMITY, RIGHT: ICD-10-CM

## 2025-04-30 DIAGNOSIS — K59.03 THERAPEUTIC OPIOID INDUCED CONSTIPATION: ICD-10-CM

## 2025-04-30 DIAGNOSIS — T40.2X5A THERAPEUTIC OPIOID INDUCED CONSTIPATION: ICD-10-CM

## 2025-04-30 DIAGNOSIS — K59.00 OBSTIPATION: ICD-10-CM

## 2025-04-30 DIAGNOSIS — M17.11 PRIMARY OSTEOARTHRITIS OF RIGHT KNEE: ICD-10-CM

## 2025-04-30 DIAGNOSIS — J01.31 ACUTE RECURRENT SPHENOIDAL SINUSITIS: ICD-10-CM

## 2025-04-30 DIAGNOSIS — I50.22 CHRONIC SYSTOLIC HEART FAILURE: ICD-10-CM

## 2025-04-30 DIAGNOSIS — M79.89 PAIN AND SWELLING OF LOWER EXTREMITY, RIGHT: ICD-10-CM

## 2025-04-30 RX ORDER — FOLIC ACID 1 MG/1
1 TABLET ORAL DAILY
Qty: 90 TABLET | Refills: 3 | Status: SHIPPED | OUTPATIENT
Start: 2025-04-30

## 2025-04-30 RX ORDER — ALLOPURINOL 100 MG/1
1 TABLET ORAL DAILY
COMMUNITY
Start: 2025-04-04

## 2025-04-30 RX ORDER — AZITHROMYCIN 250 MG/1
TABLET, FILM COATED ORAL
Qty: 6 TABLET | Refills: 0 | Status: SHIPPED | OUTPATIENT
Start: 2025-04-30

## 2025-04-30 RX ORDER — NYSTATIN 100000 [USP'U]/G
POWDER TOPICAL 3 TIMES DAILY
Qty: 60 G | Refills: 3 | Status: SHIPPED | OUTPATIENT
Start: 2025-04-30

## 2025-04-30 NOTE — PROGRESS NOTES
Subjective     Chief Complaint   Patient presents with   • Knee Pain   • Neck Pain   • Memory Loss   • Insomnia   • Earache   • Nausea   • Fatigue     C.C. syncope  Knee Pain     Neck Pain     Memory Loss  Symptoms include fatigue, nausea and neck pain.    Insomnia  Symptoms include fatigue, nausea and neck pain.    Earache  Associated symptoms: neck pain    Nausea  Symptoms include fatigue, nausea and neck pain.    Fatigue  Symptoms include fatigue, nausea and neck pain.      History of Present Illness  The patient presents for evaluation of insomnia, knee pain, syncope, ear pain, sinusitis, and folate deficiency.    Difficulty sleeping has been ongoing, which she attributes to her medication regimen. A reduction in the dosage of tizanidine, a muscle relaxant, has been made, dividing it into 4 parts. Severe nightmares are also reported, believed to be a side effect of the medication.    Severe knee pain is described as feeling like rocks and occasionally producing a crunching sound. The pain has been present for an extended period but has recently intensified to the point where it is unbearable. A previous fall through a porch is suspected to have caused a fracture. An x-ray was performed at the hospital, but it is unclear if it included the knee. Ankle discomfort is also noted, although it is less severe than the knee pain. Monitoring of veins has been ongoing, with observations of them turning black. Willingness to consider any treatment options, including injections, to alleviate the pain is expressed.    Daily episodes of syncope occur, often FDC to the floor, accompanied by dizziness and weakness. These symptoms have been present for approximately 3 months. No incontinence is experienced during these episodes, but disorientation upon waking is reported. A history of myocardial infarction is noted, with a previous prescription of nitroglycerin discontinued due to an adverse reaction.    Ear pain has  been ongoing, initially attributed to poor hearing. Ear cleaning in the hospital removed a significant amount of skin from the ear. A recent episode of sore throat resolved spontaneously. Nasal discharge was initially bright yellow but has since turned green.    Current folic acid supplementation is noted, although the reason for this is unclear. A history of low magnesium levels is reported, with previous use of prenatal vitamins making her feel better.    A ganglion cyst on the wrist is reported, deemed difficult to remove due to its location over a vein. A history of psoriasis is noted, currently affecting the knee.      Past Medical History:   Past Medical History:   Diagnosis Date   • Cancer     throat cancer, breast   • CHF (congestive heart failure)    • Chronic kidney disease (CKD), stage III (moderate)    • Constipation    • COPD (chronic obstructive pulmonary disease)    • Coronary artery disease    • Diverticulitis    • GERD (gastroesophageal reflux disease)    • History of transfusion    • Hypertension    • Low back pain    • Lung nodule    • Neck fracture    • Oxygen dependent     wears 2lnc   • Sleep apnea     cpap or bipap unsure which one she uses also uses oxygen with this   • Stroke     left side face weakness residual   • Thyroid nodule      Past Surgical History:  Past Surgical History:   Procedure Laterality Date   • APPENDECTOMY     • BACK SURGERY N/A    • BRAIN SURGERY      chiari   • CARDIAC CATHETERIZATION      no stents   • CHOLECYSTECTOMY     • COLONOSCOPY     • HYSTERECTOMY     • PAIN PUMP INSERTION/REVISION     • PAIN PUMP INSERTION/REVISION Left 09/19/2023    Procedure: PAIN PUMP and proximal catheter REVISION, left.  Supine position.;  Surgeon: Cristopher Reza MD;  Location: [facility];  Service: Neurosurgery;  Laterality: Left;   • TONSILLECTOMY     • TOOTH EXTRACTION       Social History:  reports that she quit smoking about 12 years ago. Her smoking use included cigarettes.  She started smoking about 52 years ago. She has a 20 pack-year smoking history. She has been exposed to tobacco smoke. She has never used smokeless tobacco. She reports that she does not drink alcohol and does not use drugs.    Family History: family history includes No Known Problems in her father and mother.      Allergies:  Allergies   Allergen Reactions   • Bee Venom Shortness Of Breath and Swelling   • Ciprofloxacin Shortness Of Breath and Unknown - High Severity   • Nitroglycerin Anaphylaxis and Unknown - Low Severity   • Penicillins Shortness Of Breath, Anaphylaxis and Unknown - Low Severity   • Sulfamethoxazole-Trimethoprim Anaphylaxis, Swelling and Angioedema   • Egg-Derived Products GI Intolerance, Nausea Only, Unknown - Low Severity and Unknown - High Severity   • Codeine Itching and Unknown - High Severity   • Contrast Dye (Echo Or Unknown Ct/Mr) Unknown - Low Severity     Stage 3 kidney disease cannot take    • Iodinated Contrast Media Unknown - Low Severity     Stage 3 kidney disease    • Lactose Other (See Comments)   • Latex, Natural Rubber Hives   • Methenamine GI Intolerance   • Nylon Hives   • Oxycodone-Acetaminophen Nausea And Vomiting and Unknown - High Severity   • Latex Other (See Comments), Rash and Unknown - High Severity     Latex (substance)   • Oxycodone Nausea And Vomiting     Medications:  Prior to Admission medications    Medication Sig Start Date End Date Taking? Authorizing Provider   allopurinol (ZYLOPRIM) 100 MG tablet Take 1 tablet by mouth Daily. 4/4/25  Yes Provider, MD Jacquie   folic acid (FOLVITE) 1 MG tablet Take 1 tablet by mouth Daily. 4/30/25  Yes Kai Richardson MD   linaclotide (Linzess) 145 MCG capsule capsule Take 1 capsule by mouth Every Morning Before Breakfast. 4/30/25  Yes Kai Richardson MD   nystatin (MYCOSTATIN) 732778 UNIT/GM powder Apply  topically to the appropriate area as directed 3 (Three) Times a Day. PRN 4/30/25  Yes Kai Richardson MD    albuterol (PROVENTIL) (2.5 MG/3ML) 0.083% nebulizer solution Take 2.5 mg by nebulization Every 4 (Four) Hours As Needed for Wheezing. 3/30/23   Fabiola Morris APRN   Albuterol-Budesonide 90-80 MCG/ACT aerosol Inhale 2 Inhalations As Needed (shortness of breath). Two inhalations as needed; maximum daily dose 12 inhalations a day. 8/12/24   Fabiola Morris APRN   amLODIPine (NORVASC) 5 MG tablet Take 1 tablet by mouth Daily.    Jacquie White MD   Aspirin Low Dose 81 MG EC tablet Take 1 tablet by mouth Daily. 12/8/22   Jacquie White MD   atorvastatin (LIPITOR) 40 MG tablet Take 1 tablet by mouth every night at bedtime. 3/6/25   Kai Richardsno MD   azithromycin (Zithromax Z-Eddy) 250 MG tablet Take 2 tablets by mouth on day 1, then 1 tablet daily on days 2-5 4/30/25   Kai Richardson MD   baclofen (LIORESAL) 10 MG/20ML injection 237.53 mcg by Intrathecal route 1 (One) Time. Pain pump    Jacquie White MD   Budeson-Glycopyrrol-Formoterol (Breztri Aerosphere) 160-9-4.8 MCG/ACT aerosol inhaler Inhale 2 puffs Every 12 (Twelve) Hours.    Jacquie White MD   [facility] Daily Fiber 0.52 g capsule Take 1 capsule by mouth Daily. 3/6/25   Kai Richardson MD   D3-50 1.25 MG (86493 UT) capsule Take 1 capsule by mouth Every 7 (Seven) Days. 2/22/25   Jacquie White MD   dapagliflozin (Farxiga) 5 MG tablet tablet Take 1 tablet by mouth Daily. 1/15/25   Kai Richardson MD   Diclofenac Sodium (VOLTAREN) 1 % gel gel Apply 4 g topically to the appropriate area as directed 4 (Four) Times a Day. 4gm to each knee 3-4 times a day prn 3/7/25   Kai Richardson MD   ezetimibe (ZETIA) 10 MG tablet Take 1 tablet by mouth every night at bedtime. 12/21/22   Jacquie White MD   fluticasone (FLONASE) 50 MCG/ACT nasal spray Administer 2 sprays into the nostril(s) as directed by provider Daily. 11/3/24   Provider, MD Jacquie   HYDROmorphone (DILAUDID) 2 MG/ML injection  Infuse 1.3573 mg into a venous catheter.    Jacquie White MD   ipratropium-albuterol (DUO-NEB) 0.5-2.5 mg/3 ml nebulizer Take 3 mL by nebulization Every 4 (Four) Hours As Needed for Wheezing or Shortness of Air. 5/1/23   Fabiola Morris APRN   levothyroxine (SYNTHROID, LEVOTHROID) 25 MCG tablet TAKE 1 TABLET BY MOUTH BEFORE BREAKFAST. 12/21/22   Jacquie White MD   lisinopril (PRINIVIL,ZESTRIL) 10 MG tablet Take 1 tablet by mouth Daily. 1/24/25   Kai Richardson MD   magnesium (MAGTAB) 84 MG (7MEQ) tablet controlled-release CR tablet Take 2 tablets by mouth Daily. 12/4/24   Jacquie White MD   pantoprazole (PROTONIX) 20 MG EC tablet Take 1 tablet by mouth Every Morning Before Breakfast. 1/24/25   Kai Richardson MD   polyethylene glycol (MIRALAX) 17 g packet Take 17 g by mouth Daily As Needed. 12/4/24   Jacquie White MD   potassium chloride (MICRO-K) 10 MEQ CR capsule TAKE 1 CAPSULE BY MOUTH DAILY AS NEEDED FOR LOWER EXTREMITY EDEMA, WITH AS NEEDED DOSE OF TORSEMIDE 11/4/24   Fabiola Morris APRN   sennosides-docusate (senna-docusate sodium) 8.6-50 MG per tablet Take 1 tablet by mouth 2 (Two) Times a Day As Needed for Constipation. 5/11/23   Fabiola Morris APRN   sertraline (ZOLOFT) 50 MG tablet Take 1 tablet by mouth Daily. 3/6/25   Kai Richardson MD   tiZANidine (ZANAFLEX) 4 MG tablet Take 1 tablet by mouth Every 12 (Twelve) Hours. 11/5/24   Jacquie White MD   torsemide (DEMADEX) 10 MG tablet TAKE 1 TABLET BY MOUTH DAILY AS NEEDED (LOWER EXTREMITY EDEMA). 4/3/25   Kai Richardson MD   folic acid (FOLVITE) 1 MG tablet Take 1 tablet by mouth Daily. 1/24/25 4/30/25  Kai Richardson MD   linaclotide (Linzess) 145 MCG capsule capsule Take 1 capsule by mouth Every Morning Before Breakfast. 3/6/25 4/30/25  Kai Richardson MD   nystatin (MYCOSTATIN) 311685 UNIT/GM powder APPLY TO AFFECTED AREA EVERY DAY AND AS NEEDED 12/14/22  "4/30/25  Provider, MD Jacquie           Review of systems   negative unless otherwise specified above in HPI    Objective     Vital Signs: /73 (BP Location: Right arm, Patient Position: Sitting, Cuff Size: Large Adult)   Pulse 67   Temp 98.4 °F (36.9 °C) (Infrared)   Ht 139.7 cm (55\")   Wt 67 kg (147 lb 9.6 oz)   SpO2 94%   BMI 34.31 kg/m²     Physical Exam  Vitals and nursing note reviewed.   Constitutional:       Appearance: Normal appearance. She is not ill-appearing, toxic-appearing or diaphoretic.   HENT:      Head: Normocephalic and atraumatic.      Right Ear: Tympanic membrane, ear canal and external ear normal.      Left Ear: Tympanic membrane, ear canal and external ear normal.      Nose: Rhinorrhea present.      Mouth/Throat:      Mouth: Mucous membranes are moist.      Pharynx: Oropharynx is clear. No oropharyngeal exudate or posterior oropharyngeal erythema.   Eyes:      Extraocular Movements: Extraocular movements intact.      Pupils: Pupils are equal, round, and reactive to light.   Cardiovascular:      Rate and Rhythm: Normal rate and regular rhythm.      Pulses: Normal pulses.      Heart sounds: Normal heart sounds.   Pulmonary:      Effort: Pulmonary effort is normal.      Breath sounds: Normal breath sounds.   Abdominal:      General: Bowel sounds are normal.      Palpations: Abdomen is soft.   Musculoskeletal:         General: Tenderness (right knee) present. Normal range of motion.      Cervical back: Neck supple.   Skin:     General: Skin is warm and dry.      Capillary Refill: Capillary refill takes less than 2 seconds.   Neurological:      General: No focal deficit present.      Mental Status: She is alert.   Psychiatric:         Mood and Affect: Mood normal.       Physical Exam  Ears: External ear canals and tympanic membranes intact  Mouth/Throat: Mucous membranes moist, no erythema, no exudate  Cardiovascular: Regular rate and rhythm, no murmurs, rubs, or gallops     "         Results  Imaging   - X-ray of the ankle: Normal        Assessment / Plan     Assessment/Plan:   Diagnosis Plan   1. Syncope and collapse  Holter Monitor - 48 Hour    Adult Transthoracic Echo Complete W/ Cont if Necessary Per Protocol      2. Therapeutic opioid induced constipation  linaclotide (Linzess) 145 MCG capsule capsule      3. Obstipation  linaclotide (Linzess) 145 MCG capsule capsule      4. Folate deficiency  folic acid (FOLVITE) 1 MG tablet      5. Primary osteoarthritis of right knee  Ambulatory Referral to Orthopedic Surgery      6. Tinea corporis  nystatin (MYCOSTATIN) 346548 UNIT/GM powder      7. Acute recurrent sphenoidal sinusitis  azithromycin (Zithromax Z-Eddy) 250 MG tablet          Assessment & Plan  1. Insomnia.  - Reports difficulty sleeping and experiencing terrible dreams, which she attributes to her muscle relaxer, tizanidine.  - Reduced the dosage by dividing the pills into 4 pieces.  - Discussed the potential side effects of tizanidine and the impact on sleep.  - Monitoring the effectiveness of the adjusted dosage. Sleep aides would not be safe.    2. Knee pain.  - Reports severe knee pain, which sometimes feels like rocks and produces a crunching sound.  - Pain has been ongoing for a long time but has recently worsened.  - Referral to Dr. Hernandez, an orthopedic specialist, will be initiated for further evaluation and potential cortisone injections.  - Previous x-rays were reviewed; further imaging may be necessary.    3. Syncope.  - Experiences daily episodes of passing out, followed by dizziness and weakness.  - Holter monitor will be ordered to investigate the cause of syncope.  - Echocardiogram will also be ordered to assess cardiac function.  - Reviewed previous records and discussed the need for further cardiac evaluation.I don't think these studies were completed in March 4. Ear pain.  - Reports ear pain despite having clean ears.  - Examination revealed no specific  cause for the pain.  - Discussed potential causes and management strategies.  - Monitoring for any changes or worsening of symptoms.    5. Sinusitis.  - Reports bright yellow and green discharge from sinuses.  - Prescription for azithromycin (Z-Eddy) will be provided, with instructions to take 2 tablets on the first day and 1 tablet daily thereafter until completion.  - Discussed the symptoms and treatment plan.  - Monitoring response to antibiotic therapy.    6. Folate deficiency.  - Prescription for folic acid will be provided.  - Reviewed the need for folic acid supplementation.  - Monitoring for improvement in folate levels.  - Discussed the importance of adherence to prescribed therapy.    Follow-up  - Follow up in 4 weeks to review the results of the Holter monitor and echocardiogram, and to assess the response to treatments and referrals.      Return in about 4 weeks (around 5/28/2025). unless patient needs to be seen sooner or acute issues arise.      I have discussed the patient results/orders and and plan/recommendation with them at today's visit.      Signed by:    Dr. Kai Richardson Date: 04/30/25    EMR Dictation/Transcription disclaimer:   Some of this note may be an electronic transcription/translation of spoken language to printed text. The electronic translation of spoken language may permit erroneous, or at times, nonsensical words or phrases to be inadvertently transcribed; Although I have reviewed the note for such errors, some may still exist.      Patient or patient representative verbalized consent for the use of Ambient Listening during the visit with  Kai Richardson MD for chart documentation. 4/30/2025  10:53 CDT

## 2025-04-30 NOTE — TELEPHONE ENCOUNTER
Requested Prescriptions     Pending Prescriptions Disp Refills    potassium chloride (MICRO-K) 10 MEQ CR capsule [Pharmacy Med Name: POTASSIUM CL ER 10 MEQ CAPSULE] 30 capsule 5     Sig: TAKE 1 CAPSULE BY MOUTH DAILY AS NEEDED FOR LOWER EXTREMITY EDEMA, WITH AS NEEDED DOSE OF TORSEMIDE

## 2025-05-01 DIAGNOSIS — M17.0 PRIMARY OSTEOARTHRITIS OF BOTH KNEES: ICD-10-CM

## 2025-05-01 RX ORDER — POTASSIUM CHLORIDE 750 MG/1
CAPSULE, EXTENDED RELEASE ORAL
Qty: 30 CAPSULE | Refills: 5 | Status: SHIPPED | OUTPATIENT
Start: 2025-05-01

## 2025-05-19 ENCOUNTER — OFFICE VISIT (OUTPATIENT)
Dept: FAMILY MEDICINE CLINIC | Facility: CLINIC | Age: 75
End: 2025-05-19
Payer: MEDICARE

## 2025-05-19 VITALS
OXYGEN SATURATION: 95 % | DIASTOLIC BLOOD PRESSURE: 80 MMHG | WEIGHT: 148 LBS | SYSTOLIC BLOOD PRESSURE: 134 MMHG | TEMPERATURE: 98.6 F | HEIGHT: 55 IN | BODY MASS INDEX: 34.25 KG/M2 | HEART RATE: 64 BPM | RESPIRATION RATE: 24 BRPM

## 2025-05-19 DIAGNOSIS — Z87.891 HISTORY OF TOBACCO ABUSE: Primary | ICD-10-CM

## 2025-05-19 DIAGNOSIS — J44.1 CHRONIC OBSTRUCTIVE PULMONARY DISEASE WITH ACUTE EXACERBATION: Chronic | ICD-10-CM

## 2025-05-19 DIAGNOSIS — R31.9 HEMATURIA, UNSPECIFIED TYPE: Chronic | ICD-10-CM

## 2025-05-19 DIAGNOSIS — J44.1 COPD WITH EXACERBATION: ICD-10-CM

## 2025-05-19 DIAGNOSIS — D61.818 PANCYTOPENIA: Chronic | ICD-10-CM

## 2025-05-19 RX ORDER — METHYLPREDNISOLONE 4 MG/1
TABLET ORAL
Qty: 21 TABLET | Refills: 0 | Status: SHIPPED | OUTPATIENT
Start: 2025-05-19

## 2025-05-19 RX ORDER — IPRATROPIUM BROMIDE AND ALBUTEROL SULFATE 2.5; .5 MG/3ML; MG/3ML
3 SOLUTION RESPIRATORY (INHALATION)
Status: DISCONTINUED | OUTPATIENT
Start: 2025-05-19 | End: 2025-05-19

## 2025-05-19 RX ORDER — DOXYCYCLINE 100 MG/1
100 CAPSULE ORAL 2 TIMES DAILY
Qty: 20 CAPSULE | Refills: 0 | Status: SHIPPED | OUTPATIENT
Start: 2025-05-19

## 2025-05-19 NOTE — PROGRESS NOTES
"Chief Complaint  Chest Pain (Pt is needing some refills on medications. /Pt was in the er with chest pain. /Er told them to follow up with pcp. /Told her she needed a nebulizer, and steroid shot. /Pt states that she can't get over a cold. /Pt states that she had really bad mucus. )    Subjective        Christine Steven presents to [facility]  History and Present Illness  This is a 74-year-old female patient whom I am seeing for the first time.  She is established patient with the practice and was followed by Dr. Richardson prior to his departure from the practice locally.  Patient does not wish to continue to see him in [place] but does want to follow-up here.    Patient was seen in the emergency room yesterday for shortness of breath and chest pain.  CT of the chest and cardiac workup was stable but she does have chronic COPD, with exacerbation and was advised to follow-up.  Patient needs a nebulizer solution for DuoNebs, she is on Breztri which has been helping up to recently.  Patient has Karpos amounts of mucus.  And based on exam I think she could benefit from steroid therapy as well as antibiotics.    Was also noted on her aftercare instruction sheet that the patient had hematuria and was advised to follow-up with urology.  Ordered a referral.  Patient denies any dysuria, urgency or frequency.    Also had pancytopenia, referring her to hematology.  Patient has no additional complaints other than chronic shortness of breath and pain.  She is followed by pain management.    Given that she has multiple chronic medical problems I am going to ask her to follow-up monthly.    Objective   Vital Signs:  /80 (BP Location: Left arm, Patient Position: Sitting, Cuff Size: Adult)   Pulse 64   Temp 98.6 °F (37 °C) (Infrared)   Resp 24   Ht 139.7 cm (55\")   Wt 67.1 kg (148 lb)   SpO2 95%   BMI 34.40 kg/m²   Estimated body mass index is 34.4 kg/m² as calculated from the following:    " "Height as of this encounter: 139.7 cm (55\").    Weight as of this encounter: 67.1 kg (148 lb).            Physical Exam  Nursing note reviewed. Exam conducted with a chaperone present.   Constitutional:       General: She is not in acute distress.     Appearance: She is ill-appearing.   HENT:      Head: Normocephalic and atraumatic.      Right Ear: Tympanic membrane and ear canal normal.      Left Ear: Tympanic membrane and ear canal normal.      Nose: Congestion present.      Mouth/Throat:      Mouth: Mucous membranes are moist.      Pharynx: Oropharynx is clear.   Eyes:      Pupils: Pupils are equal, round, and reactive to light.   Cardiovascular:      Rate and Rhythm: Normal rate and regular rhythm.      Heart sounds: Normal heart sounds.   Pulmonary:      Effort: Pulmonary effort is normal.      Breath sounds: Decreased breath sounds, wheezing and rhonchi present.   Chest:      Chest wall: Tenderness present.       Musculoskeletal:         General: Deformity present.      Cervical back: Normal range of motion and neck supple.      Comments: kyposis   Skin:     General: Skin is warm.   Neurological:      Mental Status: She is alert and oriented to person, place, and time.        Result Review :                Assessment and Plan   Diagnoses and all orders for this visit:    1. History of tobacco abuse (Primary)    2. Chronic obstructive pulmonary disease with acute exacerbation  -     Discontinue: ipratropium-albuterol (DUO-NEB) nebulizer solution 3 mL    3. COPD with exacerbation  -     methylPREDNISolone (MEDROL) 4 MG dose pack; Take as directed on package instructions.  Dispense: 21 tablet; Refill: 0  -     doxycycline (VIBRAMYCIN) 100 MG capsule; Take 1 capsule by mouth 2 (Two) Times a Day.  Dispense: 20 capsule; Refill: 0    4. Hematuria, unspecified type  -     Ambulatory Referral to Urology  -     Ambulatory Referral to Hematology    5. Pancytopenia             Follow Up   Return in about 4 weeks (around " 6/16/2025).  Patient was given instructions and counseling regarding her condition or for health maintenance advice. Please see specific information pulled into the AVS if appropriate.

## 2025-05-21 ENCOUNTER — TELEPHONE (OUTPATIENT)
Dept: FAMILY MEDICINE CLINIC | Facility: CLINIC | Age: 75
End: 2025-05-21
Payer: MEDICARE

## 2025-05-21 NOTE — TELEPHONE ENCOUNTER
PT CAME IN AND ASKED TO HAVE A REFILL ON A NAUSEA MEDICATION THAT DR BLAND HAD PUT HER ON PREVIOUSLY. SHE SAYS THIS MED WORKS FOR HER BUT COULD NOT REMEMBER THE NAME. SHE IS ASKING IF DR CASILLAS COULD CALL HER IN A REFILL OF THIS MED BECAUSE SHE HAS HAD SEVERE NAUSEA.

## 2025-05-22 NOTE — TELEPHONE ENCOUNTER
Looking ar her medication list only thing I see that might be for her stomach is Protonix and MiraLAX.  She has an upcoming appointment lets see if we can move that up sooner and I can address the nausea.  I have not seen her for this problem I would prefer to see her in office.

## 2025-05-28 ENCOUNTER — LAB (OUTPATIENT)
Dept: LAB | Facility: HOSPITAL | Age: 75
End: 2025-05-28
Payer: MEDICARE

## 2025-05-28 ENCOUNTER — OFFICE VISIT (OUTPATIENT)
Dept: INTERNAL MEDICINE | Facility: CLINIC | Age: 75
End: 2025-05-28
Payer: MEDICARE

## 2025-05-28 ENCOUNTER — HOSPITAL ENCOUNTER (OUTPATIENT)
Dept: GENERAL RADIOLOGY | Facility: HOSPITAL | Age: 75
Discharge: HOME | End: 2025-05-28
Payer: MEDICARE

## 2025-05-28 VITALS
RESPIRATION RATE: 16 BRPM | HEIGHT: 55 IN | DIASTOLIC BLOOD PRESSURE: 72 MMHG | BODY MASS INDEX: 34.02 KG/M2 | WEIGHT: 147 LBS | SYSTOLIC BLOOD PRESSURE: 124 MMHG | OXYGEN SATURATION: 95 % | HEART RATE: 72 BPM | TEMPERATURE: 98.2 F

## 2025-05-28 DIAGNOSIS — I50.40 COMBINED SYSTOLIC AND DIASTOLIC CONGESTIVE HEART FAILURE, UNSPECIFIED HF CHRONICITY: ICD-10-CM

## 2025-05-28 DIAGNOSIS — R06.02 SHORTNESS OF BREATH: ICD-10-CM

## 2025-05-28 DIAGNOSIS — J40 BRONCHITIS: Primary | ICD-10-CM

## 2025-05-28 DIAGNOSIS — E78.2 MIXED HYPERLIPIDEMIA: ICD-10-CM

## 2025-05-28 DIAGNOSIS — J40 BRONCHITIS: ICD-10-CM

## 2025-05-28 DIAGNOSIS — J44.1 COPD WITH EXACERBATION: ICD-10-CM

## 2025-05-28 DIAGNOSIS — I10 PRIMARY HYPERTENSION: ICD-10-CM

## 2025-05-28 LAB
BASOPHILS # BLD AUTO: 0.01 10*3/MM3 (ref 0–0.2)
BASOPHILS NFR BLD AUTO: 0.1 % (ref 0–1.5)
DEPRECATED RDW RBC AUTO: 48.2 FL (ref 37–54)
EOSINOPHIL # BLD AUTO: 0 10*3/MM3 (ref 0–0.4)
EOSINOPHIL NFR BLD AUTO: 0 % (ref 0.3–6.2)
ERYTHROCYTE [DISTWIDTH] IN BLOOD BY AUTOMATED COUNT: 15.3 % (ref 12.3–15.4)
HCT VFR BLD AUTO: 37.9 % (ref 34–46.6)
HGB BLD-MCNC: 11.8 G/DL (ref 12–15.9)
IMM GRANULOCYTES # BLD AUTO: 0.07 10*3/MM3 (ref 0–0.05)
IMM GRANULOCYTES NFR BLD AUTO: 0.8 % (ref 0–0.5)
LYMPHOCYTES # BLD AUTO: 0.42 10*3/MM3 (ref 0.7–3.1)
LYMPHOCYTES NFR BLD AUTO: 4.7 % (ref 19.6–45.3)
MCH RBC QN AUTO: 27.4 PG (ref 26.6–33)
MCHC RBC AUTO-ENTMCNC: 31.1 G/DL (ref 31.5–35.7)
MCV RBC AUTO: 87.9 FL (ref 79–97)
MONOCYTES # BLD AUTO: 0.52 10*3/MM3 (ref 0.1–0.9)
MONOCYTES NFR BLD AUTO: 5.8 % (ref 5–12)
NEUTROPHILS NFR BLD AUTO: 8 10*3/MM3 (ref 1.7–7)
NEUTROPHILS NFR BLD AUTO: 88.6 % (ref 42.7–76)
NRBC BLD AUTO-RTO: 0 /100 WBC (ref 0–0.2)
NT-PROBNP SERPL-MCNC: 445.1 PG/ML (ref 0–1800)
PLATELET # BLD AUTO: 136 10*3/MM3 (ref 140–450)
PMV BLD AUTO: 10.3 FL (ref 6–12)
RBC # BLD AUTO: 4.31 10*6/MM3 (ref 3.77–5.28)
WBC NRBC COR # BLD AUTO: 9.02 10*3/MM3 (ref 3.4–10.8)

## 2025-05-28 PROCEDURE — 85025 COMPLETE CBC W/AUTO DIFF WBC: CPT

## 2025-05-28 PROCEDURE — 36415 COLL VENOUS BLD VENIPUNCTURE: CPT | Performed by: INTERNAL MEDICINE

## 2025-05-28 PROCEDURE — 83880 ASSAY OF NATRIURETIC PEPTIDE: CPT | Performed by: INTERNAL MEDICINE

## 2025-05-28 PROCEDURE — 71046 X-RAY EXAM CHEST 2 VIEWS: CPT

## 2025-05-28 RX ORDER — CEFDINIR 300 MG/1
300 CAPSULE ORAL 2 TIMES DAILY
Qty: 20 CAPSULE | Refills: 0 | Status: SHIPPED | OUTPATIENT
Start: 2025-05-28

## 2025-05-28 NOTE — PROGRESS NOTES
Subjective     Chief Complaint   Patient presents with    Sinus Problem     congestion    Follow-up       Sinus Problem      History of Present Illness  The patient presents for evaluation of congestion and oral sores.    She was recently admitted to the hospital due to persistent congestion, which is a common occurrence for her during this time of year, often leading to pneumonia. Despite receiving a pneumonia vaccine, she continues to experience these symptoms. During her hospital stay, she underwent blood work that revealed abnormalities, prompting a referral to an oncologist. She was prescribed steroids and antibiotics, but these medications did not alleviate her congestion. She has been using a nebulizer for breathing treatments, but it has not been effective. She reports no productive cough but notes the presence of deep-seated mucus. Approximately 5 to 6 days after her hospital visit, she developed a cough. She was prescribed doxycycline, which did not provide relief. She describes her sputum as a dark yellow color, a change from her usual symptoms. She has been using her Breztri inhaler. She has an upcoming appointment with yRan Escudero on 06/17/2025.    She also reports a fall incident where she landed on her ribs, but subsequent imaging confirmed no fractures. However, she was observed to be taking shallow breaths, raising concerns about potential pneumonia development.    She has had all her teeth extracted and now keeps getting sores in her mouth. She has dentures but cannot keep them in as it hurts too much. She has had 5 adjustments.    SOCIAL HISTORY  She does not smoke.      Past Medical History:   Past Medical History:   Diagnosis Date    Cancer     throat cancer, breast    CHF (congestive heart failure)     Chronic kidney disease (CKD), stage III (moderate)     Constipation     COPD (chronic obstructive pulmonary disease)     Coronary artery disease     Diverticulitis     GERD  (gastroesophageal reflux disease)     History of transfusion     Hypertension     Low back pain     Lung nodule     Neck fracture     Oxygen dependent     wears 2lnc    Sleep apnea     cpap or bipap unsure which one she uses also uses oxygen with this    Stroke     left side face weakness residual    Thyroid nodule      Past Surgical History:  Past Surgical History:   Procedure Laterality Date    APPENDECTOMY      BACK SURGERY N/A     BRAIN SURGERY      chiari    CARDIAC CATHETERIZATION      no stents    CHOLECYSTECTOMY      COLONOSCOPY      HYSTERECTOMY      PAIN PUMP INSERTION/REVISION      PAIN PUMP INSERTION/REVISION Left 09/19/2023    Procedure: PAIN PUMP and proximal catheter REVISION, left.  Supine position.;  Surgeon: Cristopher Reza MD;  Location: [facility] OR;  Service: Neurosurgery;  Laterality: Left;    TONSILLECTOMY      TOOTH EXTRACTION       Social History:  reports that she quit smoking about 12 years ago. Her smoking use included cigarettes. She started smoking about 52 years ago. She has a 20 pack-year smoking history. She has been exposed to tobacco smoke. She has never used smokeless tobacco. She reports that she does not drink alcohol and does not use drugs.    Family History: family history includes No Known Problems in her father and mother.      Allergies:  Allergies   Allergen Reactions    Bee Venom Shortness Of Breath and Swelling    Ciprofloxacin Shortness Of Breath and Unknown - High Severity    Nitroglycerin Anaphylaxis and Unknown - Low Severity    Penicillins Shortness Of Breath, Anaphylaxis and Unknown - Low Severity    Sulfamethoxazole-Trimethoprim Anaphylaxis, Swelling and Angioedema    Egg-Derived Products GI Intolerance, Nausea Only, Unknown - Low Severity and Unknown - High Severity    Codeine Itching and Unknown - High Severity    Contrast Dye (Echo Or Unknown Ct/Mr) Unknown - Low Severity     Stage 3 kidney disease cannot take     Iodinated Contrast Media Unknown - Low  Severity     Stage 3 kidney disease     Lactose Other (See Comments)    Latex, Natural Rubber Hives    Methenamine GI Intolerance    Nylon Hives    Oxycodone-Acetaminophen Nausea And Vomiting and Unknown - High Severity    Benzalkonium Chloride Rash    Latex Other (See Comments), Rash and Unknown - High Severity     Latex (substance)    Oxycodone Nausea And Vomiting    Penicillin G Rash     Medications:  Prior to Admission medications    Medication Sig Start Date End Date Taking? Authorizing Provider   allopurinol (ZYLOPRIM) 100 MG tablet Take 1 tablet by mouth Daily. 4/4/25  Yes Jacquie White MD   amLODIPine (NORVASC) 5 MG tablet Take 1 tablet by mouth Daily.   Yes Jacquie White MD   Aspirin Low Dose 81 MG EC tablet Take 1 tablet by mouth Daily. 12/8/22  Yes Jacquie White MD   atorvastatin (LIPITOR) 40 MG tablet Take 1 tablet by mouth every night at bedtime. 3/6/25  Yes Kai Richardson MD   baclofen (LIORESAL) 10 MG/20ML injection 237.53 mcg by Intrathecal route 1 (One) Time. Pain pump   Yes Jacquie White MD   Budeson-Glycopyrrol-Formoterol (Breztri Aerosphere) 160-9-4.8 MCG/ACT aerosol inhaler Inhale 2 puffs Every 12 (Twelve) Hours.   Yes Jacquie White MD   [facility] Daily Fiber 0.52 g capsule Take 1 capsule by mouth Daily. 3/6/25  Yes Kai Richardson MD   D3-50 1.25 MG (70422 UT) capsule Take 1 capsule by mouth Every 7 (Seven) Days. 2/22/25  Yes Jacquie White MD   Diclofenac Sodium (VOLTAREN) 1 % gel gel APPLY TO AFFECTED AREA AS DIRECTED 4 TIMES A DAY AS NEEDED 5/1/25  Yes Kai Richardson MD   ezetimibe (ZETIA) 10 MG tablet Take 1 tablet by mouth every night at bedtime. 12/21/22  Yes Jacquie White MD   fluticasone (FLONASE) 50 MCG/ACT nasal spray Administer 2 sprays into the nostril(s) as directed by provider Daily. 11/3/24  Yes Jacquie White MD   folic acid (FOLVITE) 1 MG tablet Take 1 tablet by mouth Daily. 4/30/25  Yes Kai Richardson  MD   HYDROmorphone (DILAUDID) 2 MG/ML injection Infuse 1.3573 mg into a venous catheter.   Yes Jacquie White MD   ipratropium-albuterol (DUO-NEB) 0.5-2.5 mg/3 ml nebulizer Take 3 mL by nebulization Every 4 (Four) Hours As Needed for Wheezing or Shortness of Air. 5/1/23  Yes Fabiola Morris APRN   linaclotide (Linzess) 145 MCG capsule capsule Take 1 capsule by mouth Every Morning Before Breakfast. 4/30/25  Yes Kai Richardson MD   lisinopril (PRINIVIL,ZESTRIL) 10 MG tablet Take 1 tablet by mouth Daily. 1/24/25  Yes Kai Richardson MD   magnesium (MAGTAB) 84 MG (7MEQ) tablet controlled-release CR tablet Take 2 tablets by mouth Daily. 12/4/24  Yes Jacquie White MD   nystatin (MYCOSTATIN) 903258 UNIT/GM powder Apply  topically to the appropriate area as directed 3 (Three) Times a Day. PRN 4/30/25  Yes Kai Richardson MD   O2 (OXYGEN) Inhale 2 L/min Every Night.   Yes Jacquie White MD   pantoprazole (PROTONIX) 20 MG EC tablet Take 1 tablet by mouth Every Morning Before Breakfast. 1/24/25  Yes Kai Richardson MD   polyethylene glycol (MIRALAX) 17 g packet Take 17 g by mouth Daily As Needed. 12/4/24  Yes Jacquie White MD   potassium chloride (MICRO-K) 10 MEQ CR capsule TAKE 1 CAPSULE BY MOUTH DAILY AS NEEDED FOR LOWER EXTREMITY EDEMA, WITH AS NEEDED DOSE OF TORSEMIDE 5/1/25  Yes Kai Richardson MD   Prenatal Vit-Fe Fumarate-FA (PRENATAL PO) Take 1 tablet by mouth Daily.   Yes Jacquie White MD   sennosides-docusate (senna-docusate sodium) 8.6-50 MG per tablet Take 1 tablet by mouth 2 (Two) Times a Day As Needed for Constipation. 5/11/23  Yes Fabiola Morris APRN   sertraline (ZOLOFT) 50 MG tablet Take 1 tablet by mouth Daily. 3/6/25  Yes Kai Richardson MD   tiZANidine (ZANAFLEX) 4 MG tablet Take 1 tablet by mouth 2 (Two) Times a Day As Needed. 11/5/24  Yes Provider, MD Jacquie   torsemide (DEMADEX) 10 MG tablet TAKE 1 TABLET BY MOUTH  "DAILY AS NEEDED (LOWER EXTREMITY EDEMA). 4/3/25  Yes Kai Richardson MD   cefdinir (OMNICEF) 300 MG capsule Take 1 capsule by mouth 2 (Two) Times a Day. 5/28/25   Kai Richardson MD   dapagliflozin (Farxiga) 5 MG tablet tablet Take 1 tablet by mouth Daily. 1/15/25   Kai Richardson MD   doxycycline (VIBRAMYCIN) 100 MG capsule Take 1 capsule by mouth 2 (Two) Times a Day. 5/19/25   Ez Escudero APRN   levothyroxine (SYNTHROID, LEVOTHROID) 25 MCG tablet TAKE 1 TABLET BY MOUTH BEFORE BREAKFAST.  Patient not taking: Reported on 5/28/2025 12/21/22   Provider, MD Jacquie   methylPREDNISolone (MEDROL) 4 MG dose pack Take as directed on package instructions. 5/19/25   Ez Escudero APRN   azithromycin (Zithromax Z-Eddy) 250 MG tablet Take 2 tablets by mouth on day 1, then 1 tablet daily on days 2-5  Patient not taking: Reported on 5/19/2025 4/30/25 5/28/25  Kai Richardson MD           Review of systems   negative unless otherwise specified above in HPI    Objective     Vital Signs: /72 (BP Location: Right arm, Patient Position: Sitting, Cuff Size: Other (Comment))   Pulse 72   Temp 98.2 °F (36.8 °C) (Temporal)   Resp 16   Ht 139.7 cm (55\")   Wt 66.7 kg (147 lb)   SpO2 95%   BMI 34.17 kg/m²     Physical Exam  Vitals and nursing note reviewed.   Constitutional:       Appearance: Normal appearance. She is not ill-appearing, toxic-appearing or diaphoretic.   HENT:      Head: Normocephalic and atraumatic.      Right Ear: Tympanic membrane, ear canal and external ear normal.      Left Ear: Tympanic membrane, ear canal and external ear normal.      Nose: Rhinorrhea present.      Mouth/Throat:      Mouth: Mucous membranes are moist.      Pharynx: Oropharynx is clear. No oropharyngeal exudate or posterior oropharyngeal erythema.   Eyes:      Extraocular Movements: Extraocular movements intact.      Pupils: Pupils are equal, round, and reactive to light.   Cardiovascular:      Rate and Rhythm: " Normal rate and regular rhythm.      Pulses: Normal pulses.      Heart sounds: Normal heart sounds.   Pulmonary:      Effort: Pulmonary effort is normal.      Breath sounds: Normal breath sounds.   Abdominal:      General: Bowel sounds are normal.      Palpations: Abdomen is soft.   Musculoskeletal:         General: Tenderness (right knee) present. Normal range of motion.      Cervical back: Neck supple.   Skin:     General: Skin is warm and dry.      Capillary Refill: Capillary refill takes less than 2 seconds.   Neurological:      General: No focal deficit present.      Mental Status: She is alert.   Psychiatric:         Mood and Affect: Mood normal.       Physical Exam  Respiratory: Clear to auscultation, no wheezing, rales or rhonchi             Results  Labs   - Blood count: 01/2025, Normal        Assessment / Plan     Assessment/Plan:   Diagnosis Plan   1. Bronchitis  cefdinir (OMNICEF) 300 MG capsule    XR Chest PA & Lateral    CBC & Differential      2. COPD with exacerbation  BNP      3. Mixed hyperlipidemia        4. Primary hypertension        5. Combined systolic and diastolic congestive heart failure, unspecified HF chronicity        6. Shortness of breath  BNP          Assessment & Plan  1. Congestion.  - She was seen by Ryan Escudero, a nurse practitioner in [place] on 05/19/2025.  - She was given steroids and antibiotics, which did not help.  - She is currently using Breztri inhaler and nebulizer treatments, which do not seem to be effective.  - Cefdinir 300 mg will be prescribed, to be taken twice daily for 10 days. A complete blood count (CBC) will be conducted today. Additionally, a chest x-ray will be performed to rule out the possibility of pneumonia. If there is no improvement in her condition, she is advised to contact the office next week.    2. Oral sores.  - She reports difficulty keeping her dentures in due to pain and sores in her mouth.  - She is advised to schedule an appointment with her  dentist for further evaluation and potential adjustment of her dentures.  - Follow-up in 6 weeks.  - If there is no improvement, she is advised to call next week.      Return in about 6 weeks (around 7/9/2025). unless patient needs to be seen sooner or acute issues arise.      I have discussed the patient results/orders and and plan/recommendation with them at today's visit.      Signed by:    Dr. Kai Richardson Date: 05/29/25    EMR Dictation/Transcription disclaimer:   Some of this note may be an electronic transcription/translation of spoken language to printed text. The electronic translation of spoken language may permit erroneous, or at times, nonsensical words or phrases to be inadvertently transcribed; Although I have reviewed the note for such errors, some may still exist.      Patient or patient representative verbalized consent for the use of Ambient Listening during the visit with  Kai Richardson MD for chart documentation. 5/29/2025  12:01 CDT

## 2025-05-29 ENCOUNTER — TELEPHONE (OUTPATIENT)
Dept: FAMILY MEDICINE CLINIC | Facility: CLINIC | Age: 75
End: 2025-05-29
Payer: MEDICARE

## 2025-05-29 NOTE — TELEPHONE ENCOUNTER
Hub called in to let this know they are missing a lab to complete the scheduling of this pts Hematuria..  She is needing to complete a microscopic urinalysis. Could we add this lab and we can call the pt to try to get her into the office to complete lab.

## 2025-06-13 ENCOUNTER — TELEPHONE (OUTPATIENT)
Dept: INTERNAL MEDICINE | Facility: CLINIC | Age: 75
End: 2025-06-13
Payer: MEDICARE

## 2025-06-13 DIAGNOSIS — J44.1 COPD WITH ACUTE EXACERBATION: Primary | ICD-10-CM

## 2025-06-13 DIAGNOSIS — K12.0 APHTHOUS ULCER: ICD-10-CM

## 2025-06-13 RX ORDER — DIPHENHYDRAMINE HYDROCHLORIDE AND LIDOCAINE HYDROCHLORIDE AND ALUMINUM HYDROXIDE AND MAGNESIUM HYDRO
5 KIT EVERY 4 HOURS
Qty: 237 ML | Refills: 1 | Status: SHIPPED | OUTPATIENT
Start: 2025-06-13

## 2025-06-13 RX ORDER — ASPIRIN 81 MG/1
81 TABLET, COATED ORAL DAILY
Qty: 30 TABLET | Refills: 2 | Status: SHIPPED | OUTPATIENT
Start: 2025-06-13

## 2025-06-13 RX ORDER — METHYLPREDNISOLONE 4 MG/1
TABLET ORAL
Qty: 21 TABLET | Refills: 0 | Status: SHIPPED | OUTPATIENT
Start: 2025-06-13

## 2025-06-13 RX ORDER — EZETIMIBE 10 MG/1
10 TABLET ORAL
Qty: 30 TABLET | Refills: 2 | Status: SHIPPED | OUTPATIENT
Start: 2025-06-13

## 2025-06-13 NOTE — TELEPHONE ENCOUNTER
Caller: DAXA ESPINOSA    Relationship: Emergency Contact    Best call back number: [phone]     What medication are you requesting: MEDICATION TO TREAT SYMPTOMS     What are your current symptoms: COUGH, CONGESTION     How long have you been experiencing symptoms: THREE WEEKS     Have you had these symptoms before:    [x] Yes  [] No    Have you been treated for these symptoms before:   [x] Yes  [] No    If a prescription is needed, what is your preferred pharmacy and phone number: [facility]/PHARMACY #[id number] - [place] - [facility] [facility] [phone] [facility] [phone] FX     Additional notes:    PLEASE FOLLOW-UP WITH PATIENT'S SON REGARDING THIS REQUEST.

## 2025-06-18 ENCOUNTER — CLINICAL SUPPORT (OUTPATIENT)
Dept: FAMILY MEDICINE CLINIC | Facility: CLINIC | Age: 75
End: 2025-06-18
Payer: MEDICARE

## 2025-06-18 DIAGNOSIS — I10 PRIMARY HYPERTENSION: ICD-10-CM

## 2025-06-18 DIAGNOSIS — R31.9 HEMATURIA, UNSPECIFIED TYPE: Primary | ICD-10-CM

## 2025-06-18 DIAGNOSIS — I10 PRIMARY HYPERTENSION: Primary | ICD-10-CM

## 2025-06-19 LAB
APPEARANCE UR: CLEAR
BACTERIA #/AREA URNS HPF: NORMAL /[HPF]
BILIRUB UR QL STRIP: NEGATIVE
CASTS URNS QL MICRO: NORMAL /LPF
COLOR UR: YELLOW
EPI CELLS #/AREA URNS HPF: NORMAL /HPF (ref 0–10)
GLUCOSE UR QL STRIP: NEGATIVE
HGB UR QL STRIP: NEGATIVE
KETONES UR QL STRIP: NEGATIVE
LEUKOCYTE ESTERASE UR QL STRIP: ABNORMAL
MICRO URNS: ABNORMAL
NITRITE UR QL STRIP: NEGATIVE
PH UR STRIP: 6 [PH] (ref 5–7.5)
PROT UR QL STRIP: NEGATIVE
RBC #/AREA URNS HPF: NORMAL /HPF (ref 0–2)
SP GR UR STRIP: 1.02 (ref 1–1.03)
UROBILINOGEN UR STRIP-MCNC: 0.2 MG/DL (ref 0.2–1)
WBC #/AREA URNS HPF: NORMAL /HPF (ref 0–5)

## 2025-06-24 DIAGNOSIS — M17.0 PRIMARY OSTEOARTHRITIS OF BOTH KNEES: ICD-10-CM

## 2025-06-24 NOTE — TELEPHONE ENCOUNTER
Rx Refill Note  Requested Prescriptions     Pending Prescriptions Disp Refills    Diclofenac Sodium (VOLTAREN) 1 % gel gel [Pharmacy Med Name: DICLOFENAC SODIUM 1% GEL] 100 g 1     Sig: APPLY TO AFFECTED AREA AS DIRECTED 4 TIMES A DAY AS NEEDED      Last office visit with prescribing clinician: 5/28/2025   Last telemedicine visit with prescribing clinician: Visit date not found   Next office visit with prescribing clinician: 7/9/2025                         Would you like a call back once the refill request has been completed: [] Yes [] No    If the office needs to give you a call back, can they leave a voicemail: [] Yes [] No    David Nuno MA  06/24/25, 08:50 CDT

## 2025-06-26 NOTE — PROGRESS NOTES
Subjective    Ms. Steven is 74 y.o. female    Chief Complaint: Hematuria    History of Present Illness  74 year old female new patient with PMH of CHF, COPD, HTN, CKD, and Breast Cancer referred by Ez Escudero for hematuria. She was recently hospitalized at [facility] for constipation and she states today that it has been two weeks since she has had a bowel movement. She was originally on miralax for bowel regimen but stopped due to vomiting. She states that she has to strain to urinate. She supposedly had blood in her urine on a recent urine test at the ED but I do not have that test available to me. Last urine I see for her was 6/28 visit to the ED that shows no blood in her urine. Here urine today and two previous dipstick are negative for urine. She denies history of stone, gross hematuria, or frequent UTIs. She is being seen by oncology for a lesion on her tongue and history of cancer but she denies seeing gastroenterology.     The following portions of the patient's history were reviewed and updated as appropriate: allergies, current medications, past family history, past medical history, past social history, past surgical history and problem list.    Review of Systems      Current Outpatient Medications:     allopurinol (ZYLOPRIM) 100 MG tablet, Take 1 tablet by mouth Daily., Disp: , Rfl:     amLODIPine (NORVASC) 5 MG tablet, Take 1 tablet by mouth Daily., Disp: , Rfl:     Aspirin Low Dose 81 MG EC tablet, TAKE 1 TABLET BY MOUTH EVERY DAY, Disp: 30 tablet, Rfl: 2    atorvastatin (LIPITOR) 40 MG tablet, Take 1 tablet by mouth every night at bedtime., Disp: 90 tablet, Rfl: 3    baclofen (LIORESAL) 10 MG/20ML injection, 237.53 mcg by Intrathecal route 1 (One) Time. Pain pump, Disp: , Rfl:     Budeson-Glycopyrrol-Formoterol (Breztri Aerosphere) 160-9-4.8 MCG/ACT aerosol inhaler, Inhale 2 puffs Every 12 (Twelve) Hours., Disp: , Rfl:     cefdinir (OMNICEF) 300 MG capsule, Take 1 capsule by mouth 2 (Two) Times a Day.,  Disp: 20 capsule, Rfl: 0    [facility] Daily Fiber 0.52 g capsule, Take 1 capsule by mouth Daily., Disp: 90 capsule, Rfl: 3    D3-50 1.25 MG (21400 UT) capsule, Take 1 capsule by mouth Every 7 (Seven) Days., Disp: , Rfl:     dapagliflozin (Farxiga) 5 MG tablet tablet, Take 1 tablet by mouth Daily., Disp: 90 tablet, Rfl: 3    Diclofenac Sodium (VOLTAREN) 1 % gel gel, Apply  topically to the appropriate area as directed 4 (Four) Times a Day As Needed (4 gm to knee qid prn)., Disp: 100 g, Rfl: 5    DPH-Lido-AlHydr-MgHydr-Simeth (First Mouthwash, Magic Mouthwash,) suspension, Swish and spit 5 mL Every 4 (Four) Hours., Disp: 237 mL, Rfl: 1    ezetimibe (ZETIA) 10 MG tablet, TAKE 1 TABLET BY MOUTH EVERY DAY AT NIGHT, Disp: 30 tablet, Rfl: 2    fluticasone (FLONASE) 50 MCG/ACT nasal spray, Administer 2 sprays into the nostril(s) as directed by provider Daily., Disp: , Rfl:     folic acid (FOLVITE) 1 MG tablet, Take 1 tablet by mouth Daily., Disp: 90 tablet, Rfl: 3    HYDROmorphone (DILAUDID) 2 MG/ML injection, Infuse 1.3573 mg into a venous catheter., Disp: , Rfl:     ipratropium-albuterol (DUO-NEB) 0.5-2.5 mg/3 ml nebulizer, Take 3 mL by nebulization Every 4 (Four) Hours As Needed for Wheezing or Shortness of Air., Disp: 1080 mL, Rfl: 1    linaclotide (Linzess) 145 MCG capsule capsule, Take 1 capsule by mouth Every Morning Before Breakfast., Disp: 90 capsule, Rfl: 1    lisinopril (PRINIVIL,ZESTRIL) 10 MG tablet, Take 1 tablet by mouth Daily., Disp: 90 tablet, Rfl: 1    magnesium (MAGTAB) 84 MG (7MEQ) tablet controlled-release CR tablet, Take 2 tablets by mouth Daily., Disp: , Rfl:     methylPREDNISolone (MEDROL) 4 MG dose pack, Take as directed on package instructions., Disp: 21 tablet, Rfl: 0    Movantik 25 MG tablet, Take 1 tablet by mouth., Disp: , Rfl:     nystatin (MYCOSTATIN) 673431 UNIT/GM powder, Apply  topically to the appropriate area as directed 3 (Three) Times a Day. PRN, Disp: 60 g, Rfl: 3    O2 (OXYGEN), Inhale 2  L/min Every Night., Disp: , Rfl:     pantoprazole (PROTONIX) 20 MG EC tablet, Take 1 tablet by mouth Every Morning Before Breakfast., Disp: 90 tablet, Rfl: 3    polyethylene glycol (MIRALAX) 17 g packet, Take 17 g by mouth Daily As Needed., Disp: , Rfl:     potassium chloride (MICRO-K) 10 MEQ CR capsule, TAKE 1 CAPSULE BY MOUTH DAILY AS NEEDED FOR LOWER EXTREMITY EDEMA, WITH AS NEEDED DOSE OF TORSEMIDE, Disp: 30 capsule, Rfl: 5    Prenatal Vit-Fe Fumarate-FA (PRENATAL PO), Take 1 tablet by mouth Daily., Disp: , Rfl:     sennosides-docusate (senna-docusate sodium) 8.6-50 MG per tablet, Take 1 tablet by mouth 2 (Two) Times a Day As Needed for Constipation., Disp: 30 tablet, Rfl: 2    sertraline (ZOLOFT) 50 MG tablet, Take 1 tablet by mouth Daily., Disp: 90 tablet, Rfl: 1    tiZANidine (ZANAFLEX) 4 MG tablet, Take 1 tablet by mouth 2 (Two) Times a Day As Needed., Disp: , Rfl:     torsemide (DEMADEX) 10 MG tablet, TAKE 1 TABLET BY MOUTH DAILY AS NEEDED (LOWER EXTREMITY EDEMA)., Disp: 30 tablet, Rfl: 2    Past Medical History:   Diagnosis Date    Cancer     throat cancer, breast    CHF (congestive heart failure)     Chronic kidney disease (CKD), stage III (moderate)     Constipation     COPD (chronic obstructive pulmonary disease)     Coronary artery disease     Diverticulitis     GERD (gastroesophageal reflux disease)     History of transfusion     Hypertension     Low back pain     Lung nodule     Neck fracture     Oxygen dependent     wears 2lnc    Sleep apnea     cpap or bipap unsure which one she uses also uses oxygen with this    Stroke     left side face weakness residual    Thyroid nodule        Past Surgical History:   Procedure Laterality Date    APPENDECTOMY      BACK SURGERY N/A     BRAIN SURGERY      chiari    CARDIAC CATHETERIZATION      no stents    CHOLECYSTECTOMY      COLONOSCOPY      HYSTERECTOMY      PAIN PUMP INSERTION/REVISION      PAIN PUMP INSERTION/REVISION Left 09/19/2023    Procedure: PAIN PUMP  "and proximal catheter REVISION, left.  Supine position.;  Surgeon: Cristopher Reza MD;  Location: [facility] OR;  Service: Neurosurgery;  Laterality: Left;    TONSILLECTOMY      TOOTH EXTRACTION         Social History     Socioeconomic History    Marital status:    Tobacco Use    Smoking status: Former     Current packs/day: 0.00     Average packs/day: 0.5 packs/day for 40.0 years (20.0 ttl pk-yrs)     Types: Cigarettes     Start date: 1973     Quit date: 2013     Years since quittin.5     Passive exposure: Past    Smokeless tobacco: Never   Vaping Use    Vaping status: Every Day    Substances: Flavoring    Devices: Pre-filled or refillable cartridge    Passive vaping exposure: Yes   Substance and Sexual Activity    Alcohol use: Never    Drug use: Never    Sexual activity: Defer       Family History   Problem Relation Age of Onset    No Known Problems Mother     No Known Problems Father        Objective    Temp 96.7 °F (35.9 °C) (Infrared)   Ht 137.2 cm (54\")   Wt 64.4 kg (142 lb)   BMI 34.24 kg/m²     Physical Exam    Constitutional:No apparent distress; vitals reviewed as above  Psychiatric: Appropriate affect; alert and oriented  Musculoskeletal: Normal gait and station  Respiratory: No distress; unlabored movement; no accessory musculature needed with symmetric movements  Skin: No pallor or diaphoresis      Results for orders placed or performed in visit on 25   POC Urinalysis Dipstick, Multipro    Collection Time: 25 10:24 AM    Specimen: Urine   Result Value Ref Range    Color Yellow Yellow, Straw, Dark Yellow, Amelie    Clarity, UA Clear Clear    Glucose, UA Negative Negative mg/dL    Bilirubin Negative Negative    Ketones, UA Negative Negative    Specific Gravity  1.010 1.005 - 1.030    Blood, UA Negative Negative    pH, Urine 5.0 5.0 - 8.0    Protein, POC Negative Negative mg/dL    Urobilinogen, UA 0.2 E.U./dL Normal, 0.2 E.U./dL    Nitrite, UA Negative Negative    " Leukocytes Negative Negative     Assessment and Plan    Diagnoses and all orders for this visit:    1. Hematuria, unspecified type (Primary)  -     POC Urinalysis Dipstick, Multipro    2. Constipation, unspecified constipation type  -     Ambulatory Referral to Gastroenterology    3. Urinary retention    No evidence of hematuria on urine today. Patient with possible retention symptoms but does not have regular bowel movements and is not on a consistent bowel regimen. Discussed with son and patient and recommend consul with gastroenterology to determine a bowel regimen and avoiding constipation as this can lead to urinary retention issues. Patient will follow up with me in 6 months to repeat urine and address any urinary issues after addressing her constipation.         Patient reports that she is not currently experiencing any symptoms of urinary incontinence.

## 2025-07-02 ENCOUNTER — OFFICE VISIT (OUTPATIENT)
Dept: UROLOGY | Facility: CLINIC | Age: 75
End: 2025-07-02
Payer: MEDICARE

## 2025-07-02 VITALS — HEIGHT: 55 IN | WEIGHT: 142 LBS | TEMPERATURE: 96.7 F | BODY MASS INDEX: 32.86 KG/M2

## 2025-07-02 DIAGNOSIS — R33.9 URINARY RETENTION: ICD-10-CM

## 2025-07-02 DIAGNOSIS — K59.00 CONSTIPATION, UNSPECIFIED CONSTIPATION TYPE: ICD-10-CM

## 2025-07-02 DIAGNOSIS — R31.9 HEMATURIA, UNSPECIFIED TYPE: Primary | ICD-10-CM

## 2025-07-02 LAB
BILIRUB BLD-MCNC: NEGATIVE MG/DL
CLARITY, POC: CLEAR
COLOR UR: YELLOW
GLUCOSE UR STRIP-MCNC: NEGATIVE MG/DL
KETONES UR QL: NEGATIVE
LEUKOCYTE EST, POC: NEGATIVE
NITRITE UR-MCNC: NEGATIVE MG/ML
PH UR: 5 [PH] (ref 5–8)
PROT UR STRIP-MCNC: NEGATIVE MG/DL
RBC # UR STRIP: NEGATIVE /UL
SP GR UR: 1.01 (ref 1–1.03)
UROBILINOGEN UR QL: NORMAL

## 2025-07-02 RX ORDER — NALOXEGOL OXALATE 25 MG/1
25 TABLET, FILM COATED ORAL
COMMUNITY
Start: 2025-06-30

## 2025-07-03 DIAGNOSIS — M79.604 PAIN IN RIGHT LEG: ICD-10-CM

## 2025-07-03 DIAGNOSIS — I50.22 CHRONIC SYSTOLIC (CONGESTIVE) HEART FAILURE: ICD-10-CM

## 2025-07-03 RX ORDER — TORSEMIDE 10 MG/1
TABLET ORAL
Qty: 30 TABLET | Refills: 2 | Status: SHIPPED | OUTPATIENT
Start: 2025-07-03

## 2025-07-07 RX ORDER — LISINOPRIL 10 MG/1
10 TABLET ORAL DAILY
Qty: 90 TABLET | Refills: 1 | Status: SHIPPED | OUTPATIENT
Start: 2025-07-07

## 2025-07-07 NOTE — TELEPHONE ENCOUNTER
Rx Refill Note  Requested Prescriptions     Pending Prescriptions Disp Refills    lisinopril (PRINIVIL,ZESTRIL) 10 MG tablet [Pharmacy Med Name: LISINOPRIL 10 MG TABLET] 30 tablet 5     Sig: TAKE 1 TABLET BY MOUTH EVERY DAY      Last office visit with prescribing clinician: 5/28/2025   Last telemedicine visit with prescribing clinician: Visit date not found   Next office visit with prescribing clinician: 7/9/2025                         Would you like a call back once the refill request has been completed: [] Yes [] No    If the office needs to give you a call back, can they leave a voicemail: [] Yes [] No    David Nuno MA  07/07/25, 10:08 CDT

## 2025-07-14 ENCOUNTER — TELEPHONE (OUTPATIENT)
Dept: INTERNAL MEDICINE | Facility: CLINIC | Age: 75
End: 2025-07-14
Payer: MEDICARE

## (undated) DEVICE — ADHS LIQ MASTISOL 2/3ML

## (undated) DEVICE — 3M™ IOBAN™ 2 ANTIMICROBIAL INCISE DRAPE 6650EZ: Brand: IOBAN™ 2

## (undated) DEVICE — ANTIBACTERIAL UNDYED BRAIDED (POLYGLACTIN 910), SYNTHETIC ABSORBABLE SUTURE: Brand: COATED VICRYL

## (undated) DEVICE — PCH INST SURG INVISISHIELD 2PCKT

## (undated) DEVICE — APPL CHLORAPREP HI/LITE 26ML ORNG

## (undated) DEVICE — IRRIGATOR BULB ASEPTO 60CC STRL

## (undated) DEVICE — GLV SURG DERMASSURE GRN LF PF 7.0

## (undated) DEVICE — SYR SLP TP 10ML DISP

## (undated) DEVICE — CONN FLX BREATHE CIRCT

## (undated) DEVICE — CVR HNDL LIGHT RIGID

## (undated) DEVICE — GLV SURG SENSICARE W/ALOE PF LF 7.5 STRL

## (undated) DEVICE — DRP C/ARMOR

## (undated) DEVICE — SUT ETHIB 2/0 RB1 DA 30IN GRN MX553

## (undated) DEVICE — PAD MINOR UNIVERSAL: Brand: MEDLINE INDUSTRIES, INC.

## (undated) DEVICE — PROXIMATE RH ROTATING HEAD SKIN STAPLERS (35 WIDE) CONTAINS 35 STAINLESS STEEL STAPLES: Brand: PROXIMATE

## (undated) DEVICE — CLTH CLENS READYCLEANSE PERI CARE PK/5

## (undated) DEVICE — DRSNG TELFA PAD NONADH STR 1S 3X8IN

## (undated) DEVICE — TRAP FLD MINIVAC MEGADYNE 100ML

## (undated) DEVICE — DISPOSABLE IRRIGATION BIPOLAR CORD, M1000 TYPE: Brand: KIRWAN

## (undated) DEVICE — SURGICAL SUCTION CONNECTING TUBE WITH MALE CONNECTOR AND SUCTION CLAMP, 2 FT. LONG (.6 M), 5 MM I.D.: Brand: CONMED

## (undated) DEVICE — SYR LUERLOK 20CC BX/50

## (undated) DEVICE — BAPTIST TURNOVER KIT: Brand: MEDLINE INDUSTRIES, INC.

## (undated) DEVICE — ELECTRD BLD EZ CLN MOD XLNG 2.75IN

## (undated) DEVICE — STRIP,CLOSURE,WOUND,MEDI-STRIP,1/2X4: Brand: MEDLINE

## (undated) DEVICE — GLV SURG BIOGEL LTX PF 6 1/2

## (undated) DEVICE — SUT MNCRYL 4/0 PS2 27IN UD MCP426H

## (undated) DEVICE — CVR UNIV C/ARM

## (undated) DEVICE — GLV SURG DERMASSURE GRN LF PF 8.0

## (undated) NOTE — Clinical Note
Please let patient know that I added lower extremity EMG to the upper extremity EMGs to help assist in terms of cause of leg pain.

## (undated) NOTE — Clinical Note
Hi Dr. Krista Borden,    Could I transition patient from Lexapro to Cymbalta to help with her chronic MSK pain?      Many thanks,    Lupillo Murdock MD  [address] [facility]  7/14/2022